# Patient Record
Sex: FEMALE | Race: WHITE | NOT HISPANIC OR LATINO | Employment: OTHER | URBAN - METROPOLITAN AREA
[De-identification: names, ages, dates, MRNs, and addresses within clinical notes are randomized per-mention and may not be internally consistent; named-entity substitution may affect disease eponyms.]

---

## 2018-01-17 ENCOUNTER — HISTORICAL (OUTPATIENT)
Dept: LAB | Facility: HOSPITAL | Age: 78
End: 2018-01-17

## 2018-01-17 LAB — LIPASE SERPL-CCNC: 256 UNIT/L (ref 73–393)

## 2018-02-07 ENCOUNTER — HISTORICAL (OUTPATIENT)
Dept: LAB | Facility: HOSPITAL | Age: 78
End: 2018-02-07

## 2018-02-26 ENCOUNTER — HISTORICAL (OUTPATIENT)
Dept: CARDIOLOGY | Facility: HOSPITAL | Age: 78
End: 2018-02-26

## 2019-02-28 ENCOUNTER — HISTORICAL (OUTPATIENT)
Dept: LAB | Facility: HOSPITAL | Age: 79
End: 2019-02-28

## 2019-03-02 LAB — FINAL CULTURE: NO GROWTH

## 2019-05-02 ENCOUNTER — HISTORICAL (OUTPATIENT)
Dept: LAB | Facility: HOSPITAL | Age: 79
End: 2019-05-02

## 2019-05-04 LAB — FINAL CULTURE: NORMAL

## 2019-08-27 ENCOUNTER — HISTORICAL (OUTPATIENT)
Dept: LAB | Facility: HOSPITAL | Age: 79
End: 2019-08-27

## 2019-08-27 ENCOUNTER — HISTORICAL (OUTPATIENT)
Dept: ADMINISTRATIVE | Facility: HOSPITAL | Age: 79
End: 2019-08-27

## 2019-08-27 LAB
ABS NEUT (OLG): 4.8 X10(3)/MCL (ref 2.1–9.2)
ALBUMIN SERPL-MCNC: 3.8 GM/DL (ref 3.4–5)
ALBUMIN SERPL-MCNC: 4.2 GM/DL (ref 3.4–5)
ALBUMIN/GLOB SERPL: 1.75 {RATIO} (ref 1.5–2.5)
ALP SERPL-CCNC: 43 UNIT/L (ref 38–126)
ALT SERPL-CCNC: 15 UNIT/L (ref 7–52)
APPEARANCE, UA: CLEAR
AST SERPL-CCNC: 20 UNIT/L (ref 15–37)
BACTERIA #/AREA URNS AUTO: ABNORMAL /HPF
BILIRUB SERPL-MCNC: 0.6 MG/DL (ref 0.2–1)
BILIRUB UR QL STRIP: NEGATIVE MG/DL
BILIRUBIN DIRECT+TOT PNL SERPL-MCNC: 0.1 MG/DL (ref 0–0.5)
BILIRUBIN DIRECT+TOT PNL SERPL-MCNC: 0.5 MG/DL
BUN SERPL-MCNC: 41 MG/DL (ref 7–18)
CALCIUM SERPL-MCNC: 9.4 MG/DL (ref 8.5–10)
CHLORIDE SERPL-SCNC: 103 MMOL/L (ref 98–107)
CHOLEST SERPL-MCNC: 142 MG/DL (ref 0–200)
CHOLEST/HDLC SERPL: 4.3 {RATIO}
CO2 SERPL-SCNC: 30 MMOL/L (ref 21–32)
COLOR UR: YELLOW
CREAT SERPL-MCNC: 1.24 MG/DL (ref 0.6–1.3)
ERYTHROCYTE [DISTWIDTH] IN BLOOD BY AUTOMATED COUNT: 12.7 % (ref 11.5–17)
EST. AVERAGE GLUCOSE BLD GHB EST-MCNC: 108 MG/DL
GLOBULIN SER-MCNC: 2.4 GM/DL (ref 1.2–3)
GLUCOSE (UA): NEGATIVE MG/DL
GLUCOSE SERPL-MCNC: 113 MG/DL (ref 74–106)
HBA1C MFR BLD: 5.4 % (ref 4.4–6.4)
HCT VFR BLD AUTO: 34.1 % (ref 37–47)
HDLC SERPL-MCNC: 33 MG/DL (ref 35–60)
HGB BLD-MCNC: 11 GM/DL (ref 12–16)
HGB UR QL STRIP: ABNORMAL UNIT/L
KETONES UR QL STRIP: NEGATIVE MG/DL
LDLC SERPL CALC-MCNC: 67 MG/DL (ref 0–129)
LEUKOCYTE ESTERASE UR QL STRIP: ABNORMAL UNIT/L
LYMPHOCYTES # BLD AUTO: 2 X10(3)/MCL (ref 0.6–3.4)
LYMPHOCYTES NFR BLD AUTO: 26.7 % (ref 13–40)
MAGNESIUM SERPL-MCNC: 2.1 MG/DL (ref 1.8–2.4)
MCH RBC QN AUTO: 30.6 PG (ref 27–31.2)
MCHC RBC AUTO-ENTMCNC: 32 GM/DL (ref 32–36)
MCV RBC AUTO: 95 FL (ref 80–94)
MONOCYTES # BLD AUTO: 0.6 X10(3)/MCL (ref 0.1–1.3)
MONOCYTES NFR BLD AUTO: 8.1 % (ref 0.1–24)
NEUTROPHILS NFR BLD AUTO: 65.2 % (ref 47–80)
NITRITE UR QL STRIP.AUTO: NEGATIVE
PH UR STRIP: 5.5 [PH]
PHOSPHATE SERPL-MCNC: 4.1 MG/DL (ref 2.5–4.9)
PLATELET # BLD AUTO: 162 X10(3)/MCL (ref 130–400)
PMV BLD AUTO: 12.2 FL (ref 9.4–12.4)
POTASSIUM SERPL-SCNC: 4.4 MMOL/L (ref 3.5–5.1)
PROT SERPL-MCNC: 6.6 GM/DL (ref 6.4–8.2)
PROT UR QL STRIP: NEGATIVE MG/DL
PTH-INTACT SERPL-MCNC: 47 PG/ML (ref 18.4–80.1)
RBC # BLD AUTO: 3.59 X10(6)/MCL (ref 4.2–5.4)
RBC #/AREA URNS HPF: ABNORMAL /HPF
SODIUM SERPL-SCNC: 141 MMOL/L (ref 136–145)
SP GR UR STRIP: <1.005
SQUAMOUS EPITHELIAL, UA: ABNORMAL /LPF
TRIGL SERPL-MCNC: 138 MG/DL (ref 30–150)
URATE SERPL-MCNC: 7.1 MG/DL (ref 2.6–7.2)
UROBILINOGEN UR STRIP-ACNC: 0.2 MG/DL
VLDLC SERPL CALC-MCNC: 27.6 MG/DL
WBC # SPEC AUTO: 7.4 X10(3)/MCL (ref 4.5–11.5)
WBC #/AREA URNS AUTO: ABNORMAL /[HPF]

## 2021-06-03 ENCOUNTER — HISTORICAL (OUTPATIENT)
Dept: ADMINISTRATIVE | Facility: HOSPITAL | Age: 81
End: 2021-06-03

## 2021-06-06 LAB — FINAL CULTURE: NORMAL

## 2021-07-15 ENCOUNTER — HISTORICAL (OUTPATIENT)
Dept: ADMINISTRATIVE | Facility: HOSPITAL | Age: 81
End: 2021-07-15

## 2021-07-18 LAB — FINAL CULTURE: NO GROWTH

## 2021-08-04 ENCOUNTER — HISTORICAL (OUTPATIENT)
Dept: ADMINISTRATIVE | Facility: HOSPITAL | Age: 81
End: 2021-08-04

## 2021-08-07 LAB — FINAL CULTURE: NO GROWTH

## 2021-08-16 LAB — FINAL CULTURE: NORMAL

## 2021-11-03 ENCOUNTER — HISTORICAL (OUTPATIENT)
Dept: ADMINISTRATIVE | Facility: HOSPITAL | Age: 81
End: 2021-11-03

## 2021-11-03 LAB
ABS NEUT (OLG): 3.9 X10(3)/MCL (ref 2.1–9.2)
ALBUMIN SERPL-MCNC: 4.2 GM/DL (ref 3.4–5)
ALBUMIN SERPL-MCNC: 4.2 GM/DL (ref 3.4–5)
ALBUMIN/GLOB SERPL: 1.75 {RATIO} (ref 1.5–2.5)
ALP SERPL-CCNC: 38 UNIT/L (ref 38–126)
ALT SERPL-CCNC: 13 UNIT/L (ref 7–52)
APPEARANCE, UA: CLEAR
AST SERPL-CCNC: 21 UNIT/L (ref 15–37)
BACTERIA #/AREA URNS AUTO: ABNORMAL /HPF
BILIRUB SERPL-MCNC: 0.4 MG/DL (ref 0.2–1)
BILIRUB UR QL STRIP: NEGATIVE MG/DL
BILIRUBIN DIRECT+TOT PNL SERPL-MCNC: 0.1 MG/DL (ref 0–0.5)
BILIRUBIN DIRECT+TOT PNL SERPL-MCNC: 0.3 MG/DL
BUN SERPL-MCNC: 49 MG/DL (ref 7–18)
BUN SERPL-MCNC: 51 MG/DL (ref 7–18)
CALCIUM SERPL-MCNC: 9.6 MG/DL (ref 8.5–10)
CALCIUM SERPL-MCNC: 9.6 MG/DL (ref 8.5–10)
CHLORIDE SERPL-SCNC: 105 MMOL/L (ref 98–107)
CHLORIDE SERPL-SCNC: 106 MMOL/L (ref 98–107)
CHOLEST SERPL-MCNC: 161 MG/DL (ref 0–200)
CHOLEST/HDLC SERPL: 4.4 {RATIO}
CO2 SERPL-SCNC: 28 MMOL/L (ref 21–32)
CO2 SERPL-SCNC: 30 MMOL/L (ref 21–32)
COLOR UR: YELLOW
CREAT SERPL-MCNC: 1.31 MG/DL (ref 0.6–1.3)
CREAT SERPL-MCNC: 1.33 MG/DL (ref 0.6–1.3)
CREAT UR-MCNC: 114.7 MG/DL (ref 45–106)
CREAT/UREA NIT SERPL: 37.4
DEPRECATED CALCIDIOL+CALCIFEROL SERPL-MC: 49.2 NG/ML (ref 30–80)
ERYTHROCYTE [DISTWIDTH] IN BLOOD BY AUTOMATED COUNT: 13 % (ref 11.5–17)
EST. AVERAGE GLUCOSE BLD GHB EST-MCNC: 117 MG/DL
GLOBULIN SER-MCNC: 2.4 GM/DL (ref 1.2–3)
GLUCOSE (UA): NEGATIVE MG/DL
GLUCOSE SERPL-MCNC: 118 MG/DL (ref 74–106)
GLUCOSE SERPL-MCNC: 120 MG/DL (ref 74–106)
HBA1C MFR BLD: 5.7 % (ref 4.4–6.4)
HCT VFR BLD AUTO: 35.2 % (ref 37–47)
HDLC SERPL-MCNC: 37 MG/DL (ref 35–60)
HGB BLD-MCNC: 11.2 GM/DL (ref 12–16)
HGB UR QL STRIP: ABNORMAL UNIT/L
KETONES UR QL STRIP: NEGATIVE MG/DL
LDLC SERPL CALC-MCNC: 65 MG/DL (ref 0–129)
LEUKOCYTE ESTERASE UR QL STRIP: ABNORMAL UNIT/L
LYMPHOCYTES # BLD AUTO: 1.9 X10(3)/MCL (ref 0.6–3.4)
LYMPHOCYTES NFR BLD AUTO: 29.1 % (ref 13–40)
MCH RBC QN AUTO: 30.3 PG (ref 27–31.2)
MCHC RBC AUTO-ENTMCNC: 32 GM/DL (ref 32–36)
MCV RBC AUTO: 95 FL (ref 80–94)
MONOCYTES # BLD AUTO: 0.6 X10(3)/MCL (ref 0.1–1.3)
MONOCYTES NFR BLD AUTO: 10.1 % (ref 0.1–24)
NEUTROPHILS NFR BLD AUTO: 60.8 % (ref 47–80)
NITRITE UR QL STRIP.AUTO: NEGATIVE
PH UR STRIP: 5.5 [PH]
PHOSPHATE SERPL-MCNC: 3.7 MG/DL (ref 2.3–4.7)
PLATELET # BLD AUTO: 192 X10(3)/MCL (ref 130–400)
PMV BLD AUTO: 11.5 FL (ref 9.4–12.4)
POTASSIUM SERPL-SCNC: 5 MMOL/L (ref 3.5–5.1)
POTASSIUM SERPL-SCNC: 5 MMOL/L (ref 3.5–5.1)
PROT SERPL-MCNC: 6.6 GM/DL (ref 6.4–8.2)
PROT UR QL STRIP: NEGATIVE MG/DL
PROT UR STRIP-MCNC: 7.3 MG/DL
PROT/CREAT UR-RTO: 63.6 MG/GM CR
PTH-INTACT SERPL-MCNC: 84 PG/ML (ref 8.7–77.1)
RBC # BLD AUTO: 3.7 X10(6)/MCL (ref 4.2–5.4)
RBC #/AREA URNS HPF: ABNORMAL /HPF
SODIUM SERPL-SCNC: 143 MMOL/L (ref 136–145)
SODIUM SERPL-SCNC: 143 MMOL/L (ref 136–145)
SP GR UR STRIP: 1.02
SQUAMOUS EPITHELIAL, UA: ABNORMAL /LPF
TRIGL SERPL-MCNC: 199 MG/DL (ref 30–150)
TSH SERPL-ACNC: 2.11 MIU/ML (ref 0.35–4.94)
UROBILINOGEN UR STRIP-ACNC: 0.2 MG/DL
VLDLC SERPL CALC-MCNC: 39.8 MG/DL
WBC # SPEC AUTO: 6.4 X10(3)/MCL (ref 4.5–11.5)
WBC #/AREA URNS AUTO: ABNORMAL /[HPF]

## 2022-03-31 ENCOUNTER — HISTORICAL (OUTPATIENT)
Dept: ADMINISTRATIVE | Facility: HOSPITAL | Age: 82
End: 2022-03-31

## 2022-03-31 LAB
ALBUMIN SERPL-MCNC: 4 G/DL (ref 3.4–4.8)
CREAT UR-MCNC: 151.8 MG/DL (ref 45–106)
HEMOLYSIS INTERF INDEX SERPL-ACNC: 4
HEMOLYSIS INTERF INDEX SERPL-ACNC: 4
ICTERIC INTERF INDEX SERPL-ACNC: 1
LIPEMIC INTERF INDEX SERPL-ACNC: 0
MAGNESIUM SERPL-MCNC: 2.1 MG/DL (ref 1.6–2.6)
PHOSPHATE SERPL-MCNC: 4.1 MG/DL (ref 2.3–4.7)
PROT UR STRIP-MCNC: 17.4 MG/DL
PROT/CREAT UR-RTO: 114.6
PTH-INTACT SERPL-MCNC: 73.5 PG/ML (ref 8.7–77.1)

## 2022-04-10 ENCOUNTER — HISTORICAL (OUTPATIENT)
Dept: ADMINISTRATIVE | Facility: HOSPITAL | Age: 82
End: 2022-04-10

## 2022-04-29 VITALS
DIASTOLIC BLOOD PRESSURE: 58 MMHG | WEIGHT: 142.19 LBS | HEIGHT: 65 IN | BODY MASS INDEX: 23.69 KG/M2 | SYSTOLIC BLOOD PRESSURE: 106 MMHG

## 2022-08-10 PROCEDURE — 83735 ASSAY OF MAGNESIUM: CPT | Performed by: FAMILY MEDICINE

## 2022-08-10 PROCEDURE — 82570 ASSAY OF URINE CREATININE: CPT | Performed by: FAMILY MEDICINE

## 2022-08-10 PROCEDURE — 84100 ASSAY OF PHOSPHORUS: CPT | Performed by: FAMILY MEDICINE

## 2022-08-10 PROCEDURE — 83970 ASSAY OF PARATHORMONE: CPT | Performed by: FAMILY MEDICINE

## 2022-08-18 PROCEDURE — 87077 CULTURE AEROBIC IDENTIFY: CPT | Performed by: FAMILY MEDICINE

## 2022-09-22 ENCOUNTER — LAB VISIT (OUTPATIENT)
Dept: LAB | Facility: HOSPITAL | Age: 82
End: 2022-09-22
Attending: INTERNAL MEDICINE
Payer: MEDICARE

## 2022-09-22 DIAGNOSIS — I25.10 CORONARY ATHEROSCLEROSIS OF NATIVE CORONARY ARTERY: Primary | ICD-10-CM

## 2022-09-22 LAB
ANION GAP SERPL CALC-SCNC: 11 MEQ/L
BUN SERPL-MCNC: 34.6 MG/DL (ref 9.8–20.1)
CALCIUM SERPL-MCNC: 9.2 MG/DL (ref 8.4–10.2)
CHLORIDE SERPL-SCNC: 110 MMOL/L (ref 98–107)
CO2 SERPL-SCNC: 22 MMOL/L (ref 23–31)
CREAT SERPL-MCNC: 1.2 MG/DL (ref 0.55–1.02)
CREAT/UREA NIT SERPL: 29
ERYTHROCYTE [DISTWIDTH] IN BLOOD BY AUTOMATED COUNT: 14 % (ref 11.5–17)
GFR SERPLBLD CREATININE-BSD FMLA CKD-EPI: 46 MLS/MIN/1.73/M2
GLUCOSE SERPL-MCNC: 106 MG/DL (ref 82–115)
HCT VFR BLD AUTO: 35.8 % (ref 37–47)
HGB BLD-MCNC: 11 GM/DL (ref 12–16)
MCH RBC QN AUTO: 30.2 PG (ref 27–31)
MCHC RBC AUTO-ENTMCNC: 30.7 MG/DL (ref 33–36)
MCV RBC AUTO: 98.4 FL (ref 80–94)
NRBC BLD AUTO-RTO: 0 %
PLATELET # BLD AUTO: 161 X10(3)/MCL (ref 130–400)
PMV BLD AUTO: 12.8 FL (ref 7.4–10.4)
POTASSIUM SERPL-SCNC: 4.1 MMOL/L (ref 3.5–5.1)
RBC # BLD AUTO: 3.64 X10(6)/MCL (ref 4.2–5.4)
SODIUM SERPL-SCNC: 143 MMOL/L (ref 136–145)
WBC # SPEC AUTO: 5.2 X10(3)/MCL (ref 4.5–11.5)

## 2022-09-22 PROCEDURE — 80048 BASIC METABOLIC PNL TOTAL CA: CPT

## 2022-09-22 PROCEDURE — 85027 COMPLETE CBC AUTOMATED: CPT

## 2022-09-22 PROCEDURE — 36415 COLL VENOUS BLD VENIPUNCTURE: CPT

## 2022-09-23 ENCOUNTER — HOSPITAL ENCOUNTER (OUTPATIENT)
Facility: HOSPITAL | Age: 82
Discharge: HOME | End: 2022-09-23
Attending: INTERNAL MEDICINE | Admitting: INTERNAL MEDICINE
Payer: MEDICARE

## 2022-09-23 VITALS
HEIGHT: 64 IN | OXYGEN SATURATION: 93 % | DIASTOLIC BLOOD PRESSURE: 75 MMHG | RESPIRATION RATE: 14 BRPM | HEART RATE: 64 BPM | SYSTOLIC BLOOD PRESSURE: 164 MMHG | WEIGHT: 139.13 LBS | BODY MASS INDEX: 23.75 KG/M2 | TEMPERATURE: 98 F

## 2022-09-23 DIAGNOSIS — R94.8 NONSPECIFIC ABNORMAL RESULTS OF BASAL METABOLISM FUNCTION STUDY: Primary | ICD-10-CM

## 2022-09-23 LAB — CATH EF QUANTITATIVE: 55 %

## 2022-09-23 PROCEDURE — 25000003 PHARM REV CODE 250: Performed by: INTERNAL MEDICINE

## 2022-09-23 PROCEDURE — 99152 MOD SED SAME PHYS/QHP 5/>YRS: CPT | Performed by: INTERNAL MEDICINE

## 2022-09-23 PROCEDURE — 25500020 PHARM REV CODE 255: Performed by: INTERNAL MEDICINE

## 2022-09-23 PROCEDURE — 27201423 OPTIME MED/SURG SUP & DEVICES STERILE SUPPLY: Performed by: INTERNAL MEDICINE

## 2022-09-23 PROCEDURE — C1894 INTRO/SHEATH, NON-LASER: HCPCS | Performed by: INTERNAL MEDICINE

## 2022-09-23 PROCEDURE — 93459 L HRT ART/GRFT ANGIO: CPT | Performed by: INTERNAL MEDICINE

## 2022-09-23 PROCEDURE — 63600175 PHARM REV CODE 636 W HCPCS: Performed by: INTERNAL MEDICINE

## 2022-09-23 PROCEDURE — 99153 MOD SED SAME PHYS/QHP EA: CPT | Performed by: INTERNAL MEDICINE

## 2022-09-23 PROCEDURE — C1769 GUIDE WIRE: HCPCS | Performed by: INTERNAL MEDICINE

## 2022-09-23 RX ORDER — HEPARIN SODIUM 1000 [USP'U]/ML
INJECTION, SOLUTION INTRAVENOUS; SUBCUTANEOUS
Status: DISCONTINUED | OUTPATIENT
Start: 2022-09-23 | End: 2022-09-23 | Stop reason: HOSPADM

## 2022-09-23 RX ORDER — LIDOCAINE HYDROCHLORIDE 20 MG/ML
INJECTION, SOLUTION EPIDURAL; INFILTRATION; INTRACAUDAL; PERINEURAL
Status: DISCONTINUED | OUTPATIENT
Start: 2022-09-23 | End: 2022-09-23 | Stop reason: HOSPADM

## 2022-09-23 RX ORDER — NITROGLYCERIN 20 MG/100ML
INJECTION INTRAVENOUS
Status: DISCONTINUED | OUTPATIENT
Start: 2022-09-23 | End: 2022-09-23 | Stop reason: HOSPADM

## 2022-09-23 RX ORDER — ROSUVASTATIN CALCIUM 20 MG/1
20 TABLET, COATED ORAL NIGHTLY
Status: ON HOLD | COMMUNITY
Start: 2022-08-14 | End: 2022-11-03 | Stop reason: SDUPTHER

## 2022-09-23 RX ORDER — SERTRALINE HYDROCHLORIDE 50 MG/1
50 TABLET, FILM COATED ORAL DAILY
Status: ON HOLD | COMMUNITY
Start: 2022-06-27 | End: 2022-11-03 | Stop reason: SDUPTHER

## 2022-09-23 RX ORDER — SODIUM CHLORIDE 0.9 % (FLUSH) 0.9 %
10 SYRINGE (ML) INJECTION
Status: ACTIVE | OUTPATIENT
Start: 2022-09-23

## 2022-09-23 RX ORDER — APIXABAN 5 MG/1
5 TABLET, FILM COATED ORAL 2 TIMES DAILY
Status: ON HOLD | COMMUNITY
Start: 2022-09-06 | End: 2022-11-03 | Stop reason: SDUPTHER

## 2022-09-23 RX ORDER — ESTRADIOL 0.1 MG/G
CREAM VAGINAL
COMMUNITY
Start: 2021-09-29 | End: 2022-10-10 | Stop reason: CLARIF

## 2022-09-23 RX ORDER — AZELASTINE 1 MG/ML
SPRAY, METERED NASAL
COMMUNITY
Start: 2021-09-29 | End: 2023-01-27

## 2022-09-23 RX ORDER — UBIDECARENONE 30 MG
200 CAPSULE ORAL DAILY
COMMUNITY

## 2022-09-23 RX ORDER — DIAZEPAM 5 MG/1
10 TABLET ORAL
Status: DISPENSED | OUTPATIENT
Start: 2022-09-23

## 2022-09-23 RX ORDER — SODIUM CHLORIDE 9 MG/ML
INJECTION, SOLUTION INTRAVENOUS ONCE
Status: ACTIVE | OUTPATIENT
Start: 2022-09-23

## 2022-09-23 RX ORDER — TERAZOSIN 2 MG/1
2 CAPSULE ORAL DAILY
Status: ON HOLD | COMMUNITY
Start: 2022-08-14 | End: 2022-11-03 | Stop reason: HOSPADM

## 2022-09-23 RX ORDER — MIDAZOLAM HYDROCHLORIDE 1 MG/ML
INJECTION, SOLUTION INTRAMUSCULAR; INTRAVENOUS
Status: DISCONTINUED | OUTPATIENT
Start: 2022-09-23 | End: 2022-09-23 | Stop reason: HOSPADM

## 2022-09-23 RX ORDER — FENTANYL CITRATE 50 UG/ML
INJECTION, SOLUTION INTRAMUSCULAR; INTRAVENOUS
Status: DISCONTINUED | OUTPATIENT
Start: 2022-09-23 | End: 2022-09-23 | Stop reason: HOSPADM

## 2022-09-23 RX ORDER — DIPHENHYDRAMINE HCL 25 MG
50 CAPSULE ORAL
Status: DISPENSED | OUTPATIENT
Start: 2022-09-23

## 2022-09-23 RX ORDER — AMLODIPINE BESYLATE 10 MG/1
5 TABLET ORAL DAILY
Status: ON HOLD | COMMUNITY
Start: 2022-07-30 | End: 2022-11-03 | Stop reason: HOSPADM

## 2022-09-23 RX ORDER — ACETAMINOPHEN 325 MG/1
650 TABLET ORAL EVERY 4 HOURS PRN
Status: DISCONTINUED | OUTPATIENT
Start: 2022-09-23 | End: 2022-09-23 | Stop reason: HOSPADM

## 2022-09-23 RX ORDER — VERAPAMIL HYDROCHLORIDE 2.5 MG/ML
INJECTION, SOLUTION INTRAVENOUS
Status: DISCONTINUED | OUTPATIENT
Start: 2022-09-23 | End: 2022-09-23 | Stop reason: HOSPADM

## 2022-09-23 RX ORDER — SODIUM CHLORIDE 9 MG/ML
INJECTION, SOLUTION INTRAVENOUS CONTINUOUS
Status: DISCONTINUED | OUTPATIENT
Start: 2022-09-23 | End: 2022-09-23 | Stop reason: HOSPADM

## 2022-09-23 RX ORDER — ONDANSETRON 4 MG/1
8 TABLET, ORALLY DISINTEGRATING ORAL EVERY 8 HOURS PRN
Status: DISCONTINUED | OUTPATIENT
Start: 2022-09-23 | End: 2022-09-23 | Stop reason: HOSPADM

## 2022-09-23 RX ADMIN — DIAZEPAM 10 MG: 5 TABLET ORAL at 08:09

## 2022-09-23 RX ADMIN — DIPHENHYDRAMINE HYDROCHLORIDE 50 MG: 25 CAPSULE ORAL at 08:09

## 2022-09-23 NOTE — Clinical Note
The catheter was inserted into the, was removed from the and was inserted over the wire into the left ventricle. Hemodynamics were performed.  and Pullback was recorded.  An angiography was performed of the LV. The angiography was performed via power injection.  pig

## 2022-09-23 NOTE — CONSULTS
[facility]                       [address] [address]    PATIENT NAME:       GEORGE LANIER  YOB: 1940  CSN:                [id number]   MRN:                [id number]  ADMIT DATE:         09/23/2022 06:51:00  PHYSICIAN:          Bety Thompson MD                            CONSULTATION    DATE OF CONSULT:  09/23/2022 00:00:00    REASON FOR CONSULT:  Evaluate for severe triple-vessel coronary artery disease.    HISTORY OF PRESENT ILLNESS:  The patient is an 82-year-old female with a history   of hypertension, hyperlipidemia, and nonobstructive coronary artery disease.    Apparently had been having a lot of episodes of dizziness and palpitations.    Evaluation by Dr. Mitchell with a PET scan revealed evidence of significant   ischemia.  She underwent cardiac cath today revealing evidence of severe   triple-vessel disease.  I was consulted for possible intervention.  She does   report some palpitations.    PAST MEDICAL HISTORY:  Positive for coronary carotid artery disease bilateral,   history of pacemaker, sick sinus syndrome, bradycardia, dizziness, presyncope,   hypertension, obstructive sleep apnea, hyperlipidemia, history of carotid   endarterectomy.    PAST SURGICAL HISTORY:  Appendectomy, bladder surgery, hysterectomy, removal of   lipoma, pacemaker.    FAMILY HISTORY:  Positive for coronary artery disease and cerebrovascular   disease.    SOCIAL HISTORY:  Former smoker, quit in 1967.    MEDICATIONS:  Per MAR.    ALLERGIES:  PENICILLIN AND SULFA DRUGS.     REVIEW OF SYSTEMS:  Essentially very positive for dizziness, presyncope, palpitations.    PHYSICAL EXAMINATION:  GENERAL:  She is in no acute distress.  VITAL SIGNS:  Stable.  HEENT:  Trachea midline.  No carotid bruits.  Eyes EOMI, PERRLA.  LUNGS:  Good air entry bilaterally.  HEART:  Normal S1, S2.    ABDOMEN:  Soft.  EXTREMITIES:   Warm.  PSYCHIATRIC:  Affect appropriate.  NEUROLOGIC:  2+ motor power.  MUSCULOSKELETAL:  No gross deformity.    ASSESSMENT AND PLAN:  Cardiac cath was reviewed revealing evidence of severe   triple-vessel disease.  The patient will definitely benefit from coronary artery   bypass grafting with targets in the left anterior descending, circumflex and in   the right coronary artery.  She understands the risk and benefits of procedure,   including risk of bleeding, infection, myocardial infarction, stroke, renal   failure and death.  She elected to proceed.  She will need to come off her   Eliquis prior to this procedure.  Will plan surgery soon.    I would like to thank Dr. Mitchell for allowing us to participate in the care of   this patient.        ______________________________  MD KEE Martins/ABHIJEET  DD:  09/23/2022  Time:  01:28PM  DT:  09/23/2022  Time:  02:24PM  Job #:  [id number]      CONSULTATION

## 2022-09-23 NOTE — Clinical Note
The groin and right radial was prepped. The site was prepped with ChloraPrep. The patient was draped. The patient was positioned supine.

## 2022-09-23 NOTE — H&P (VIEW-ONLY)
[facility]                       [address] [address]    PATIENT NAME:       GEORGE LANIER  YOB: 1940  CSN:                [id number]   MRN:                [id number]  ADMIT DATE:         09/23/2022 06:51:00  PHYSICIAN:          Bety Thompson MD                            CONSULTATION    DATE OF CONSULT:  09/23/2022 00:00:00    REASON FOR CONSULT:  Evaluate for severe triple-vessel coronary artery disease.    HISTORY OF PRESENT ILLNESS:  The patient is an 82-year-old female with a history   of hypertension, hyperlipidemia, and nonobstructive coronary artery disease.    Apparently had been having a lot of episodes of dizziness and palpitations.    Evaluation by Dr. Mitchell with a PET scan revealed evidence of significant   ischemia.  She underwent cardiac cath today revealing evidence of severe   triple-vessel disease.  I was consulted for possible intervention.  She does   report some palpitations.    PAST MEDICAL HISTORY:  Positive for coronary carotid artery disease bilateral,   history of pacemaker, sick sinus syndrome, bradycardia, dizziness, presyncope,   hypertension, obstructive sleep apnea, hyperlipidemia, history of carotid   endarterectomy.    PAST SURGICAL HISTORY:  Appendectomy, bladder surgery, hysterectomy, removal of   lipoma, pacemaker.    FAMILY HISTORY:  Positive for coronary artery disease and cerebrovascular   disease.    SOCIAL HISTORY:  Former smoker, quit in 1967.    MEDICATIONS:  Per MAR.    ALLERGIES:  PENICILLIN AND SULFA DRUGS.     REVIEW OF SYSTEMS:  Essentially very positive for dizziness, presyncope, palpitations.    PHYSICAL EXAMINATION:  GENERAL:  She is in no acute distress.  VITAL SIGNS:  Stable.  HEENT:  Trachea midline.  No carotid bruits.  Eyes EOMI, PERRLA.  LUNGS:  Good air entry bilaterally.  HEART:  Normal S1, S2.    ABDOMEN:  Soft.  EXTREMITIES:   Warm.  PSYCHIATRIC:  Affect appropriate.  NEUROLOGIC:  2+ motor power.  MUSCULOSKELETAL:  No gross deformity.    ASSESSMENT AND PLAN:  Cardiac cath was reviewed revealing evidence of severe   triple-vessel disease.  The patient will definitely benefit from coronary artery   bypass grafting with targets in the left anterior descending, circumflex and in   the right coronary artery.  She understands the risk and benefits of procedure,   including risk of bleeding, infection, myocardial infarction, stroke, renal   failure and death.  She elected to proceed.  She will need to come off her   Eliquis prior to this procedure.  Will plan surgery soon.    I would like to thank Dr. Mitchell for allowing us to participate in the care of   this patient.        ______________________________  MD KEE Martins/ABHIJEET  DD:  09/23/2022  Time:  01:28PM  DT:  09/23/2022  Time:  02:24PM  Job #:  [id number]      CONSULTATION

## 2022-09-23 NOTE — H&P
Patient name: Jessica Elias  MRN: [id number]  : 1940  Cath Lab Procedure H&P Update    Pre-Procedure Assessment:    I saw and examined the patient face to face. The patient has been re-evaluated and her condition is unchanged. The reason for admission, procedure and care is still present.  Based on the patients H&P, pre-procedure physical exam, relevant diagnostic studies, NPO status and information obtained from the patient, I determine the patient is an appropriate candidate for the proposed procedure and anesthesia planned. I further certify the anesthesia risks, benefits and options have been explained to the patient to which she agrees as documented on the procedural consent.    See scanned updated H&P and additional records from media tab.      Abner Mitchell

## 2022-09-23 NOTE — Clinical Note
The catheter was removed from the and was repositioned into the left subclavian artery. An angiography was performed of the LIMA. The angiography was performed via power injection.  The catheter was unable to engage the area..

## 2022-09-23 NOTE — Clinical Note
The catheter was inserted into the and was inserted over the wire into the ostium   left main. Hemodynamics were performed.  An angiography was performed of the left coronary arteries. Multiple views were taken. The angiography was performed via power injection.

## 2022-09-23 NOTE — Clinical Note
The catheter was inserted into the, was removed from the and was inserted over the wire into the ostium   right coronary artery. An angiography was performed of the right coronary arteries. Multiple views were taken. The angiography was performed via power injection.

## 2022-09-24 ENCOUNTER — PATIENT MESSAGE (OUTPATIENT)
Dept: ADMINISTRATIVE | Facility: OTHER | Age: 82
End: 2022-09-24
Payer: MEDICARE

## 2022-09-26 ENCOUNTER — TELEPHONE (OUTPATIENT)
Dept: CARDIAC SURGERY | Facility: CLINIC | Age: 82
End: 2022-09-26
Payer: MEDICARE

## 2022-09-26 DIAGNOSIS — Z79.1 ENCOUNTER FOR MONITORING NSAID THERAPY: ICD-10-CM

## 2022-09-26 DIAGNOSIS — I25.10 CORONARY ARTERY DISEASE, UNSPECIFIED VESSEL OR LESION TYPE, UNSPECIFIED WHETHER ANGINA PRESENT, UNSPECIFIED WHETHER NATIVE OR TRANSPLANTED HEART: Primary | ICD-10-CM

## 2022-09-26 DIAGNOSIS — Z51.81 ENCOUNTER FOR MONITORING NSAID THERAPY: ICD-10-CM

## 2022-09-26 RX ORDER — MUPIROCIN 20 MG/G
OINTMENT TOPICAL
Status: CANCELLED | OUTPATIENT
Start: 2022-09-26 | End: 2022-09-26

## 2022-09-26 RX ORDER — HYDROCODONE BITARTRATE AND ACETAMINOPHEN 500; 5 MG/1; MG/1
TABLET ORAL
Status: CANCELLED | OUTPATIENT
Start: 2022-09-26

## 2022-09-26 NOTE — TELEPHONE ENCOUNTER
Pt stated she was calling her insurance to make sure she gets approved for surgery, thought she may could move it up but informed pt MD will be our of town and date scheduled was 1st available.  Verbalized understanding.   ----- Message from Higinio Garcia sent at 2022  9:11 AM CDT -----  Regarding: Pt needs a call back  Contact: Pt Called  Dr. Thompson's pt called and had a question about her insurance for her upcoming surgery. Please give a call back whenever you get a chance.    : 44  Phone: [phone]

## 2022-10-07 ENCOUNTER — PATIENT MESSAGE (OUTPATIENT)
Dept: ADMINISTRATIVE | Facility: OTHER | Age: 82
End: 2022-10-07
Payer: MEDICARE

## 2022-10-07 ENCOUNTER — ANESTHESIA EVENT (OUTPATIENT)
Dept: SURGERY | Facility: HOSPITAL | Age: 82
DRG: 236 | End: 2022-10-07
Payer: MEDICARE

## 2022-10-07 ENCOUNTER — HOSPITAL ENCOUNTER (OUTPATIENT)
Dept: RADIOLOGY | Facility: HOSPITAL | Age: 82
Discharge: HOME | End: 2022-10-07
Attending: THORACIC SURGERY (CARDIOTHORACIC VASCULAR SURGERY)
Payer: MEDICARE

## 2022-10-07 DIAGNOSIS — I25.10 CORONARY ARTERY DISEASE, UNSPECIFIED VESSEL OR LESION TYPE, UNSPECIFIED WHETHER ANGINA PRESENT, UNSPECIFIED WHETHER NATIVE OR TRANSPLANTED HEART: ICD-10-CM

## 2022-10-07 PROCEDURE — 71046 X-RAY EXAM CHEST 2 VIEWS: CPT | Mod: TC

## 2022-10-07 NOTE — DISCHARGE SUMMARY
[facility] - Cath Lab Services  Discharge Note  Short Stay    Procedure(s) (LRB):  CATHETERIZATION, HEART, LEFT (Left)      OUTCOME: Patient tolerated treatment/procedure well without complication and is now ready for discharge.    DISPOSITION: Home or Self Care    FINAL DIAGNOSIS:  CAD    FOLLOWUP: In clinic    DISCHARGE INSTRUCTIONS:    Discharge Procedure Orders   Diet Cardiac         Clinical Reference Documents Added to Patient Instructions         Document    CARDIAC CATHETERIZATION DISCHARGE INSTRUCTIONS (ENGLISH)    MODERATE SEDATION IN ADULTS DISCHARGE INSTRUCTIONS (ENGLISH)            TIME SPENT ON DISCHARGE: minutes

## 2022-10-09 ENCOUNTER — PATIENT MESSAGE (OUTPATIENT)
Dept: ADMINISTRATIVE | Facility: OTHER | Age: 82
End: 2022-10-09
Payer: MEDICARE

## 2022-10-11 ENCOUNTER — HOSPITAL ENCOUNTER (INPATIENT)
Facility: HOSPITAL | Age: 82
LOS: 14 days | Discharge: NURSING, REHAB OR LONG-TERM CARE | DRG: 236 | End: 2022-10-25
Attending: THORACIC SURGERY (CARDIOTHORACIC VASCULAR SURGERY) | Admitting: THORACIC SURGERY (CARDIOTHORACIC VASCULAR SURGERY)
Payer: MEDICARE

## 2022-10-11 ENCOUNTER — ANESTHESIA (OUTPATIENT)
Dept: SURGERY | Facility: HOSPITAL | Age: 82
DRG: 236 | End: 2022-10-11
Payer: MEDICARE

## 2022-10-11 DIAGNOSIS — R60.9 SWELLING: ICD-10-CM

## 2022-10-11 DIAGNOSIS — I25.10 CORONARY ARTERY DISEASE: ICD-10-CM

## 2022-10-11 DIAGNOSIS — I48.91 A-FIB: ICD-10-CM

## 2022-10-11 DIAGNOSIS — I25.10 CAD (CORONARY ARTERY DISEASE): ICD-10-CM

## 2022-10-11 DIAGNOSIS — I25.10 CORONARY ARTERY DISEASE, UNSPECIFIED VESSEL OR LESION TYPE, UNSPECIFIED WHETHER ANGINA PRESENT, UNSPECIFIED WHETHER NATIVE OR TRANSPLANTED HEART: ICD-10-CM

## 2022-10-11 DIAGNOSIS — E11.9 TYPE 2 DIABETES MELLITUS WITHOUT COMPLICATION, WITHOUT LONG-TERM CURRENT USE OF INSULIN: Primary | ICD-10-CM

## 2022-10-11 LAB
ANION GAP SERPL CALC-SCNC: 4 MEQ/L
ANION GAP SERPL CALC-SCNC: 5 MEQ/L
APTT PPP: 30.4 SECONDS (ref 23.2–33.7)
BASE EXCESS ARTERIAL: 0.3 MMOL/L (ref -2–2)
BASE EXCESS ARTERIAL: 3 MMOL/L (ref -2–2)
BASOPHILS # BLD AUTO: 0.02 X10(3)/MCL (ref 0–0.2)
BASOPHILS # BLD AUTO: 0.05 X10(3)/MCL (ref 0–0.2)
BASOPHILS NFR BLD AUTO: 0.2 %
BASOPHILS NFR BLD AUTO: 0.4 %
BSA FOR ECHO PROCEDURE: 1.68 M2
BUN SERPL-MCNC: 22.1 MG/DL (ref 9.8–20.1)
BUN SERPL-MCNC: 27.5 MG/DL (ref 9.8–20.1)
CALCIUM SERPL-MCNC: 10.9 MG/DL (ref 8.4–10.2)
CALCIUM SERPL-MCNC: 8.2 MG/DL (ref 8.4–10.2)
CHLORIDE SERPL-SCNC: 114 MMOL/L (ref 98–107)
CHLORIDE SERPL-SCNC: 115 MMOL/L (ref 98–107)
CO2 SERPL-SCNC: 24 MMOL/L (ref 23–31)
CO2 SERPL-SCNC: 24 MMOL/L (ref 23–31)
CREAT SERPL-MCNC: 0.75 MG/DL (ref 0.55–1.02)
CREAT SERPL-MCNC: 0.79 MG/DL (ref 0.55–1.02)
CREAT/UREA NIT SERPL: 28
CREAT/UREA NIT SERPL: 37
EOSINOPHIL # BLD AUTO: 0.01 X10(3)/MCL (ref 0–0.9)
EOSINOPHIL # BLD AUTO: 0.24 X10(3)/MCL (ref 0–0.9)
EOSINOPHIL NFR BLD AUTO: 0.1 %
EOSINOPHIL NFR BLD AUTO: 1.8 %
ERYTHROCYTE [DISTWIDTH] IN BLOOD BY AUTOMATED COUNT: 14.1 % (ref 11.5–17)
ERYTHROCYTE [DISTWIDTH] IN BLOOD BY AUTOMATED COUNT: 14.6 % (ref 11.5–17)
GFR SERPLBLD CREATININE-BSD FMLA CKD-EPI: >60 MLS/MIN/1.73/M2
GFR SERPLBLD CREATININE-BSD FMLA CKD-EPI: >60 MLS/MIN/1.73/M2
GLUCOSE SERPL-MCNC: 156 MG/DL (ref 82–115)
GLUCOSE SERPL-MCNC: 163 MG/DL (ref 82–115)
GROUP & RH: NORMAL
HCO3 ARTERIAL: 22.9 MMOL/L (ref 18–23)
HCO3 ARTERIAL: 28.5 MMOL/L (ref 18–23)
HCT VFR BLD AUTO: 31.9 % (ref 37–47)
HCT VFR BLD AUTO: 33.5 % (ref 37–47)
HGB BLD-MCNC: 10.1 GM/DL (ref 12–16)
HGB BLD-MCNC: 10.8 GM/DL (ref 12–16)
HGB BLD-MCNC: 9.6 G/DL
HGB BLD-MCNC: 99 G/DL
IMM GRANULOCYTES # BLD AUTO: 0.04 X10(3)/MCL (ref 0–0.04)
IMM GRANULOCYTES # BLD AUTO: 0.05 X10(3)/MCL (ref 0–0.04)
IMM GRANULOCYTES NFR BLD AUTO: 0.4 %
IMM GRANULOCYTES NFR BLD AUTO: 0.4 %
INDIRECT COOMBS GEL: NORMAL
INR BLD: 1.55 (ref 0–1.3)
LYMPHOCYTES # BLD AUTO: 0.66 X10(3)/MCL (ref 0.6–4.6)
LYMPHOCYTES # BLD AUTO: 3.94 X10(3)/MCL (ref 0.6–4.6)
LYMPHOCYTES NFR BLD AUTO: 29.9 %
LYMPHOCYTES NFR BLD AUTO: 7.1 %
MAGNESIUM SERPL-MCNC: 3.2 MG/DL (ref 1.6–2.6)
MCH RBC QN AUTO: 30.4 PG (ref 27–31)
MCH RBC QN AUTO: 30.5 PG (ref 27–31)
MCHC RBC AUTO-ENTMCNC: 31.7 MG/DL (ref 33–36)
MCHC RBC AUTO-ENTMCNC: 32.2 MG/DL (ref 33–36)
MCV RBC AUTO: 94.4 FL (ref 80–94)
MCV RBC AUTO: 96.4 FL (ref 80–94)
MONOCYTES # BLD AUTO: 0.37 X10(3)/MCL (ref 0.1–1.3)
MONOCYTES # BLD AUTO: 0.67 X10(3)/MCL (ref 0.1–1.3)
MONOCYTES NFR BLD AUTO: 2.8 %
MONOCYTES NFR BLD AUTO: 7.2 %
NEUTROPHILS # BLD AUTO: 7.9 X10(3)/MCL (ref 2.1–9.2)
NEUTROPHILS # BLD AUTO: 8.5 X10(3)/MCL (ref 2.1–9.2)
NEUTROPHILS NFR BLD AUTO: 64.7 %
NEUTROPHILS NFR BLD AUTO: 85 %
NRBC BLD AUTO-RTO: 0 %
NRBC BLD AUTO-RTO: 0 %
PCO2 BLDA: 30 MM[HG]
PCO2 BLDA: 46 MMHG
PCO2 BLDA: 47 MM[HG]
PCO2 BLDA: NORMAL MM[HG]
PH SMN: 7.37 [PH]
PH SMN: 7.39 [PH]
PH SMN: 7.49 [PH]
PHOSPHATE SERPL-MCNC: 1.8 MG/DL (ref 2.3–4.7)
PHOSPHATE SERPL-MCNC: 2.4 MG/DL (ref 2.3–4.7)
PLATELET # BLD AUTO: 81 X10(3)/MCL (ref 130–400)
PLATELET # BLD AUTO: 87 X10(3)/MCL (ref 130–400)
PMV BLD AUTO: 12.4 FL (ref 7.4–10.4)
PMV BLD AUTO: 12.6 FL (ref 7.4–10.4)
PO2 BLDA: 108 MM[HG]
PO2 BLDA: 77 MM[HG]
PO2 BLDA: 92 MMHG
POC BASE DEFICIT: 0.9 MMOL/L
POC COHB: 0.7
POC COHB: NORMAL
POC HCO3: 26.6 MMOL/L
POC IONIZED CALCIUM: 0.3
POC IONIZED CALCIUM: 1.18
POC IONIZED CALCIUM: 1.22 MMOL/L
POC METHB: 0.6
POC METHB: NORMAL
POC O2HB: 94.2
POC O2HB: NORMAL
POC SATURATED O2: 97 %
POC TCO2: 29.9
POC TEMPERATURE: 37 C
POCT GLUCOSE: 100 MG/DL (ref 70–110)
POCT GLUCOSE: 113 MG/DL (ref 70–110)
POCT GLUCOSE: 115 MG/DL (ref 70–110)
POCT GLUCOSE: 115 MG/DL (ref 70–110)
POCT GLUCOSE: 124 MG/DL (ref 70–110)
POCT GLUCOSE: 125 MG/DL (ref 70–110)
POCT GLUCOSE: 125 MG/DL (ref 70–110)
POCT GLUCOSE: 132 MG/DL (ref 70–110)
POCT GLUCOSE: 134 MG/DL (ref 70–110)
POCT GLUCOSE: 136 MG/DL (ref 70–110)
POCT GLUCOSE: 140 MG/DL (ref 70–110)
POCT GLUCOSE: 143 MG/DL (ref 70–110)
POCT GLUCOSE: 147 MG/DL (ref 70–110)
POCT GLUCOSE: 154 MG/DL (ref 70–110)
POTASSIUM BLD-SCNC: 23.8 MMOL/L
POTASSIUM BLD-SCNC: 3.1 MMOL/L
POTASSIUM BLD-SCNC: 3.8 MMOL/L
POTASSIUM SERPL-SCNC: 2.7 MMOL/L (ref 3.5–5.1)
POTASSIUM SERPL-SCNC: 3.9 MMOL/L (ref 3.5–5.1)
PROTHROMBIN TIME: 18.3 SECONDS (ref 12.5–14.5)
RBC # BLD AUTO: 3.31 X10(6)/MCL (ref 4.2–5.4)
RBC # BLD AUTO: 3.55 X10(6)/MCL (ref 4.2–5.4)
SATURATED O2 ARTERIAL, I-STAT: 95.1
SATURATED O2 ARTERIAL, I-STAT: 99
SODIUM BLD-SCNC: 141 MMOL/L
SODIUM BLD-SCNC: 142 MMOL/L
SODIUM BLD-SCNC: 22.9 MMOL/L
SODIUM SERPL-SCNC: 142 MMOL/L (ref 136–145)
SODIUM SERPL-SCNC: 144 MMOL/L (ref 136–145)
SPECIMEN SOURCE: ABNORMAL
WBC # SPEC AUTO: 13.2 X10(3)/MCL (ref 4.5–11.5)
WBC # SPEC AUTO: 9.3 X10(3)/MCL (ref 4.5–11.5)

## 2022-10-11 PROCEDURE — 33519 PR CABG, ARTERY-VEIN, THREE: ICD-10-PCS | Mod: AS,,, | Performed by: PHYSICIAN ASSISTANT

## 2022-10-11 PROCEDURE — 63600175 PHARM REV CODE 636 W HCPCS: Performed by: THORACIC SURGERY (CARDIOTHORACIC VASCULAR SURGERY)

## 2022-10-11 PROCEDURE — 20000000 HC ICU ROOM

## 2022-10-11 PROCEDURE — 84100 ASSAY OF PHOSPHORUS: CPT | Performed by: THORACIC SURGERY (CARDIOTHORACIC VASCULAR SURGERY)

## 2022-10-11 PROCEDURE — 99900031 HC PATIENT EDUCATION (STAT)

## 2022-10-11 PROCEDURE — 27000221 HC OXYGEN, UP TO 24 HOURS

## 2022-10-11 PROCEDURE — 33519 CABG ARTERY-VEIN THREE: CPT | Mod: AS,,, | Performed by: PHYSICIAN ASSISTANT

## 2022-10-11 PROCEDURE — 33268 EXCL LAA OPN OTH PX ANY METH: CPT | Mod: ,,, | Performed by: THORACIC SURGERY (CARDIOTHORACIC VASCULAR SURGERY)

## 2022-10-11 PROCEDURE — 85610 PROTHROMBIN TIME: CPT | Performed by: THORACIC SURGERY (CARDIOTHORACIC VASCULAR SURGERY)

## 2022-10-11 PROCEDURE — 94799 UNLISTED PULMONARY SVC/PX: CPT

## 2022-10-11 PROCEDURE — C1894 INTRO/SHEATH, NON-LASER: HCPCS | Performed by: THORACIC SURGERY (CARDIOTHORACIC VASCULAR SURGERY)

## 2022-10-11 PROCEDURE — 94761 N-INVAS EAR/PLS OXIMETRY MLT: CPT

## 2022-10-11 PROCEDURE — 37000008 HC ANESTHESIA 1ST 15 MINUTES: Performed by: THORACIC SURGERY (CARDIOTHORACIC VASCULAR SURGERY)

## 2022-10-11 PROCEDURE — A4216 STERILE WATER/SALINE, 10 ML: HCPCS

## 2022-10-11 PROCEDURE — 25000003 PHARM REV CODE 250: Performed by: THORACIC SURGERY (CARDIOTHORACIC VASCULAR SURGERY)

## 2022-10-11 PROCEDURE — 27200966 HC CLOSED SUCTION SYSTEM

## 2022-10-11 PROCEDURE — 33533 PR CABG, ARTERIAL, SINGLE: ICD-10-PCS | Mod: AS,,, | Performed by: PHYSICIAN ASSISTANT

## 2022-10-11 PROCEDURE — C1887 CATHETER, GUIDING: HCPCS | Performed by: THORACIC SURGERY (CARDIOTHORACIC VASCULAR SURGERY)

## 2022-10-11 PROCEDURE — 82803 BLOOD GASES ANY COMBINATION: CPT

## 2022-10-11 PROCEDURE — 86901 BLOOD TYPING SEROLOGIC RH(D): CPT | Performed by: THORACIC SURGERY (CARDIOTHORACIC VASCULAR SURGERY)

## 2022-10-11 PROCEDURE — C1729 CATH, DRAINAGE: HCPCS | Performed by: THORACIC SURGERY (CARDIOTHORACIC VASCULAR SURGERY)

## 2022-10-11 PROCEDURE — 25000003 PHARM REV CODE 250

## 2022-10-11 PROCEDURE — 33519 PR CABG, ARTERY-VEIN, THREE: ICD-10-PCS | Mod: ,,, | Performed by: THORACIC SURGERY (CARDIOTHORACIC VASCULAR SURGERY)

## 2022-10-11 PROCEDURE — 33268 PR EXCLUSION, LT ATRIAL APPENDAGE, W/OTHER PROCED, OPEN, ANY METHOD: ICD-10-PCS | Mod: ,,, | Performed by: THORACIC SURGERY (CARDIOTHORACIC VASCULAR SURGERY)

## 2022-10-11 PROCEDURE — 33533 CABG ARTERIAL SINGLE: CPT | Mod: ,,, | Performed by: THORACIC SURGERY (CARDIOTHORACIC VASCULAR SURGERY)

## 2022-10-11 PROCEDURE — 37000009 HC ANESTHESIA EA ADD 15 MINS: Performed by: THORACIC SURGERY (CARDIOTHORACIC VASCULAR SURGERY)

## 2022-10-11 PROCEDURE — 83735 ASSAY OF MAGNESIUM: CPT | Performed by: PHYSICIAN ASSISTANT

## 2022-10-11 PROCEDURE — 25000003 PHARM REV CODE 250: Performed by: PHYSICIAN ASSISTANT

## 2022-10-11 PROCEDURE — 85730 THROMBOPLASTIN TIME PARTIAL: CPT | Performed by: THORACIC SURGERY (CARDIOTHORACIC VASCULAR SURGERY)

## 2022-10-11 PROCEDURE — 63600175 PHARM REV CODE 636 W HCPCS

## 2022-10-11 PROCEDURE — 33268 PR EXCLUSION, LT ATRIAL APPENDAGE, W/OTHER PROCED, OPEN, ANY METHOD: ICD-10-PCS | Mod: AS,,, | Performed by: PHYSICIAN ASSISTANT

## 2022-10-11 PROCEDURE — 36620 INSERTION CATHETER ARTERY: CPT

## 2022-10-11 PROCEDURE — 84100 ASSAY OF PHOSPHORUS: CPT | Performed by: PHYSICIAN ASSISTANT

## 2022-10-11 PROCEDURE — 99900026 HC AIRWAY MAINTENANCE (STAT)

## 2022-10-11 PROCEDURE — 27201423 OPTIME MED/SURG SUP & DEVICES STERILE SUPPLY: Performed by: THORACIC SURGERY (CARDIOTHORACIC VASCULAR SURGERY)

## 2022-10-11 PROCEDURE — 63600175 PHARM REV CODE 636 W HCPCS: Performed by: PHYSICIAN ASSISTANT

## 2022-10-11 PROCEDURE — 99900035 HC TECH TIME PER 15 MIN (STAT)

## 2022-10-11 PROCEDURE — C9248 INJ, CLEVIDIPINE BUTYRATE: HCPCS | Mod: JG | Performed by: THORACIC SURGERY (CARDIOTHORACIC VASCULAR SURGERY)

## 2022-10-11 PROCEDURE — 33519 CABG ARTERY-VEIN THREE: CPT | Mod: ,,, | Performed by: THORACIC SURGERY (CARDIOTHORACIC VASCULAR SURGERY)

## 2022-10-11 PROCEDURE — 99900017 HC EXTUBATION W/PARAMETERS (STAT)

## 2022-10-11 PROCEDURE — 37799 UNLISTED PX VASCULAR SURGERY: CPT

## 2022-10-11 PROCEDURE — 94002 VENT MGMT INPAT INIT DAY: CPT

## 2022-10-11 PROCEDURE — 33268 EXCL LAA OPN OTH PX ANY METH: CPT | Mod: AS,,, | Performed by: PHYSICIAN ASSISTANT

## 2022-10-11 PROCEDURE — 86923 COMPATIBILITY TEST ELECTRIC: CPT | Mod: 91 | Performed by: THORACIC SURGERY (CARDIOTHORACIC VASCULAR SURGERY)

## 2022-10-11 PROCEDURE — S5010 5% DEXTROSE AND 0.45% SALINE: HCPCS | Performed by: PHYSICIAN ASSISTANT

## 2022-10-11 PROCEDURE — 85025 COMPLETE CBC W/AUTO DIFF WBC: CPT | Performed by: THORACIC SURGERY (CARDIOTHORACIC VASCULAR SURGERY)

## 2022-10-11 PROCEDURE — 80048 BASIC METABOLIC PNL TOTAL CA: CPT | Performed by: THORACIC SURGERY (CARDIOTHORACIC VASCULAR SURGERY)

## 2022-10-11 PROCEDURE — 33508 ENDOSCOPIC VEIN HARVEST: CPT | Mod: 59,,, | Performed by: THORACIC SURGERY (CARDIOTHORACIC VASCULAR SURGERY)

## 2022-10-11 PROCEDURE — 33533 PR CABG, ARTERIAL, SINGLE: ICD-10-PCS | Mod: ,,, | Performed by: THORACIC SURGERY (CARDIOTHORACIC VASCULAR SURGERY)

## 2022-10-11 PROCEDURE — 36415 COLL VENOUS BLD VENIPUNCTURE: CPT | Performed by: THORACIC SURGERY (CARDIOTHORACIC VASCULAR SURGERY)

## 2022-10-11 PROCEDURE — 33508 PR ENDOSCOPY W/VIDEO-ASST VEIN HARVEST,CABG: ICD-10-PCS | Mod: 59,,, | Performed by: THORACIC SURGERY (CARDIOTHORACIC VASCULAR SURGERY)

## 2022-10-11 PROCEDURE — 36000713 HC OR TIME LEV V EA ADD 15 MIN: Performed by: THORACIC SURGERY (CARDIOTHORACIC VASCULAR SURGERY)

## 2022-10-11 PROCEDURE — 33533 CABG ARTERIAL SINGLE: CPT | Mod: AS,,, | Performed by: PHYSICIAN ASSISTANT

## 2022-10-11 PROCEDURE — 36000712 HC OR TIME LEV V 1ST 15 MIN: Performed by: THORACIC SURGERY (CARDIOTHORACIC VASCULAR SURGERY)

## 2022-10-11 PROCEDURE — P9016 RBC LEUKOCYTES REDUCED: HCPCS | Performed by: THORACIC SURGERY (CARDIOTHORACIC VASCULAR SURGERY)

## 2022-10-11 PROCEDURE — 27800903 OPTIME MED/SURG SUP & DEVICES OTHER IMPLANTS: Performed by: THORACIC SURGERY (CARDIOTHORACIC VASCULAR SURGERY)

## 2022-10-11 RX ORDER — VANCOMYCIN HCL IN 5 % DEXTROSE 1G/250ML
1000 PLASTIC BAG, INJECTION (ML) INTRAVENOUS ONCE
Status: DISCONTINUED | OUTPATIENT
Start: 2022-10-11 | End: 2022-10-25 | Stop reason: HOSPADM

## 2022-10-11 RX ORDER — TRANEXAMIC ACID 100 MG/ML
INJECTION, SOLUTION INTRAVENOUS
Status: DISCONTINUED | OUTPATIENT
Start: 2022-10-11 | End: 2022-10-11

## 2022-10-11 RX ORDER — FENTANYL CITRATE 50 UG/ML
INJECTION, SOLUTION INTRAMUSCULAR; INTRAVENOUS
Status: DISCONTINUED | OUTPATIENT
Start: 2022-10-11 | End: 2022-10-11

## 2022-10-11 RX ORDER — LACTULOSE 10 G/15ML
20 SOLUTION ORAL EVERY 6 HOURS PRN
Status: DISCONTINUED | OUTPATIENT
Start: 2022-10-11 | End: 2022-10-25 | Stop reason: HOSPADM

## 2022-10-11 RX ORDER — FOLIC ACID 1 MG/1
1 TABLET ORAL DAILY
Status: DISCONTINUED | OUTPATIENT
Start: 2022-10-12 | End: 2022-10-25 | Stop reason: HOSPADM

## 2022-10-11 RX ORDER — CEFAZOLIN SODIUM 2 G/50ML
2 SOLUTION INTRAVENOUS
Status: CANCELLED | OUTPATIENT
Start: 2022-10-11 | End: 2022-10-12

## 2022-10-11 RX ORDER — ACETAMINOPHEN 650 MG/20.3ML
650 LIQUID ORAL EVERY 6 HOURS PRN
Status: DISCONTINUED | OUTPATIENT
Start: 2022-10-11 | End: 2022-10-25 | Stop reason: HOSPADM

## 2022-10-11 RX ORDER — FAMOTIDINE 10 MG/ML
20 INJECTION INTRAVENOUS DAILY
Status: DISCONTINUED | OUTPATIENT
Start: 2022-10-11 | End: 2022-10-18

## 2022-10-11 RX ORDER — DEXMEDETOMIDINE HYDROCHLORIDE 4 UG/ML
0-1.4 INJECTION, SOLUTION INTRAVENOUS CONTINUOUS
Status: DISCONTINUED | OUTPATIENT
Start: 2022-10-11 | End: 2022-10-13

## 2022-10-11 RX ORDER — DOCUSATE SODIUM 100 MG/1
100 CAPSULE, LIQUID FILLED ORAL 2 TIMES DAILY
Status: DISCONTINUED | OUTPATIENT
Start: 2022-10-12 | End: 2022-10-25 | Stop reason: HOSPADM

## 2022-10-11 RX ORDER — HYDROCODONE BITARTRATE AND ACETAMINOPHEN 500; 5 MG/1; MG/1
TABLET ORAL
Status: DISCONTINUED | OUTPATIENT
Start: 2022-10-11 | End: 2022-10-25 | Stop reason: HOSPADM

## 2022-10-11 RX ORDER — HEPARIN SODIUM 1000 [USP'U]/ML
INJECTION, SOLUTION INTRAVENOUS; SUBCUTANEOUS
Status: DISCONTINUED | OUTPATIENT
Start: 2022-10-11 | End: 2022-10-11

## 2022-10-11 RX ORDER — POTASSIUM CHLORIDE 14.9 MG/ML
20 INJECTION INTRAVENOUS
Status: DISCONTINUED | OUTPATIENT
Start: 2022-10-11 | End: 2022-10-13

## 2022-10-11 RX ORDER — HEPARIN 100 UNIT/ML
SYRINGE INTRAVENOUS
Status: DISPENSED
Start: 2022-10-11 | End: 2022-10-11

## 2022-10-11 RX ORDER — MIDAZOLAM HYDROCHLORIDE 1 MG/ML
INJECTION INTRAMUSCULAR; INTRAVENOUS
Status: DISCONTINUED | OUTPATIENT
Start: 2022-10-11 | End: 2022-10-11

## 2022-10-11 RX ORDER — SODIUM CHLORIDE, SODIUM GLUCONATE, SODIUM ACETATE, POTASSIUM CHLORIDE AND MAGNESIUM CHLORIDE 30; 37; 368; 526; 502 MG/100ML; MG/100ML; MG/100ML; MG/100ML; MG/100ML
1000 INJECTION, SOLUTION INTRAVENOUS CONTINUOUS
Status: ACTIVE | OUTPATIENT
Start: 2022-10-11 | End: 2022-10-11

## 2022-10-11 RX ORDER — ROCURONIUM BROMIDE 10 MG/ML
INJECTION, SOLUTION INTRAVENOUS
Status: DISCONTINUED | OUTPATIENT
Start: 2022-10-11 | End: 2022-10-11

## 2022-10-11 RX ORDER — PHENYLEPHRINE HCL IN 0.9% NACL 1 MG/10 ML
SYRINGE (ML) INTRAVENOUS
Status: DISCONTINUED | OUTPATIENT
Start: 2022-10-11 | End: 2022-10-11

## 2022-10-11 RX ORDER — POTASSIUM CHLORIDE 29.8 MG/ML
40 INJECTION INTRAVENOUS
Status: DISCONTINUED | OUTPATIENT
Start: 2022-10-11 | End: 2022-10-13

## 2022-10-11 RX ORDER — METOCLOPRAMIDE HYDROCHLORIDE 5 MG/ML
5 INJECTION INTRAMUSCULAR; INTRAVENOUS EVERY 6 HOURS PRN
Status: DISCONTINUED | OUTPATIENT
Start: 2022-10-11 | End: 2022-10-25 | Stop reason: HOSPADM

## 2022-10-11 RX ORDER — PROPOFOL 10 MG/ML
VIAL (ML) INTRAVENOUS
Status: DISCONTINUED | OUTPATIENT
Start: 2022-10-11 | End: 2022-10-11

## 2022-10-11 RX ORDER — LIDOCAINE HYDROCHLORIDE 10 MG/ML
1 INJECTION, SOLUTION EPIDURAL; INFILTRATION; INTRACAUDAL; PERINEURAL ONCE
Status: CANCELLED | OUTPATIENT
Start: 2022-10-11 | End: 2022-10-11

## 2022-10-11 RX ORDER — LIDOCAINE HYDROCHLORIDE 20 MG/ML
INJECTION, SOLUTION EPIDURAL; INFILTRATION; INTRACAUDAL; PERINEURAL
Status: DISCONTINUED | OUTPATIENT
Start: 2022-10-11 | End: 2022-10-11

## 2022-10-11 RX ORDER — MUPIROCIN 20 MG/G
OINTMENT TOPICAL 2 TIMES DAILY
Status: COMPLETED | OUTPATIENT
Start: 2022-10-11 | End: 2022-10-15

## 2022-10-11 RX ORDER — CALCIUM GLUCONATE 20 MG/ML
3 INJECTION, SOLUTION INTRAVENOUS
Status: DISCONTINUED | OUTPATIENT
Start: 2022-10-11 | End: 2022-10-25 | Stop reason: HOSPADM

## 2022-10-11 RX ORDER — OXYCODONE HYDROCHLORIDE 5 MG/1
10 TABLET ORAL EVERY 4 HOURS PRN
Status: DISCONTINUED | OUTPATIENT
Start: 2022-10-11 | End: 2022-10-25 | Stop reason: HOSPADM

## 2022-10-11 RX ORDER — ONDANSETRON 2 MG/ML
4 INJECTION INTRAMUSCULAR; INTRAVENOUS EVERY 4 HOURS PRN
Status: DISCONTINUED | OUTPATIENT
Start: 2022-10-11 | End: 2022-10-25 | Stop reason: HOSPADM

## 2022-10-11 RX ORDER — METOPROLOL SUCCINATE 50 MG/1
50 TABLET, EXTENDED RELEASE ORAL DAILY
Status: ON HOLD | COMMUNITY
Start: 2022-09-28 | End: 2022-10-25 | Stop reason: HOSPADM

## 2022-10-11 RX ORDER — MUPIROCIN 20 MG/G
OINTMENT TOPICAL
Status: DISPENSED | OUTPATIENT
Start: 2022-10-11 | End: 2022-10-11

## 2022-10-11 RX ORDER — MAGNESIUM SULFATE HEPTAHYDRATE 40 MG/ML
2 INJECTION, SOLUTION INTRAVENOUS
Status: DISCONTINUED | OUTPATIENT
Start: 2022-10-11 | End: 2022-10-25 | Stop reason: HOSPADM

## 2022-10-11 RX ORDER — EPINEPHRINE 1 MG/ML
INJECTION, SOLUTION INTRACARDIAC; INTRAMUSCULAR; INTRAVENOUS; SUBCUTANEOUS
Status: DISCONTINUED | OUTPATIENT
Start: 2022-10-11 | End: 2022-10-11

## 2022-10-11 RX ORDER — PROTAMINE SULFATE 10 MG/ML
INJECTION, SOLUTION INTRAVENOUS
Status: DISCONTINUED | OUTPATIENT
Start: 2022-10-11 | End: 2022-10-11

## 2022-10-11 RX ORDER — POTASSIUM CHLORIDE 14.9 MG/ML
20 INJECTION INTRAVENOUS
Status: COMPLETED | OUTPATIENT
Start: 2022-10-11 | End: 2022-10-12

## 2022-10-11 RX ORDER — CALCIUM CHLORIDE INJECTION 100 MG/ML
INJECTION, SOLUTION INTRAVENOUS
Status: DISCONTINUED | OUTPATIENT
Start: 2022-10-11 | End: 2022-10-11

## 2022-10-11 RX ORDER — METOPROLOL TARTRATE 25 MG/1
12.5 TABLET ORAL 2 TIMES DAILY
Status: DISCONTINUED | OUTPATIENT
Start: 2022-10-12 | End: 2022-10-15

## 2022-10-11 RX ORDER — FAMOTIDINE 10 MG/ML
20 INJECTION INTRAVENOUS ONCE
Status: CANCELLED | OUTPATIENT
Start: 2022-10-11

## 2022-10-11 RX ORDER — ALBUMIN HUMAN 50 G/1000ML
12.5 SOLUTION INTRAVENOUS
Status: DISCONTINUED | OUTPATIENT
Start: 2022-10-11 | End: 2022-10-25 | Stop reason: HOSPADM

## 2022-10-11 RX ORDER — SUCRALFATE 1 G/1
1 TABLET ORAL
Status: DISCONTINUED | OUTPATIENT
Start: 2022-10-12 | End: 2022-10-25 | Stop reason: HOSPADM

## 2022-10-11 RX ORDER — DEXTROSE MONOHYDRATE AND SODIUM CHLORIDE 5; .45 G/100ML; G/100ML
INJECTION, SOLUTION INTRAVENOUS CONTINUOUS
Status: DISCONTINUED | OUTPATIENT
Start: 2022-10-11 | End: 2022-10-14

## 2022-10-11 RX ORDER — MAGNESIUM SULFATE HEPTAHYDRATE 40 MG/ML
4 INJECTION, SOLUTION INTRAVENOUS
Status: DISCONTINUED | OUTPATIENT
Start: 2022-10-11 | End: 2022-10-25 | Stop reason: HOSPADM

## 2022-10-11 RX ORDER — MORPHINE SULFATE 10 MG/ML
4 INJECTION INTRAMUSCULAR; INTRAVENOUS; SUBCUTANEOUS EVERY 4 HOURS PRN
Status: DISCONTINUED | OUTPATIENT
Start: 2022-10-11 | End: 2022-10-25 | Stop reason: HOSPADM

## 2022-10-11 RX ORDER — ASPIRIN 81 MG/1
81 TABLET ORAL DAILY
Status: DISCONTINUED | OUTPATIENT
Start: 2022-10-12 | End: 2022-10-25 | Stop reason: HOSPADM

## 2022-10-11 RX ORDER — PAPAVERINE HYDROCHLORIDE 30 MG/ML
INJECTION INTRAMUSCULAR; INTRAVENOUS
Status: DISCONTINUED | OUTPATIENT
Start: 2022-10-11 | End: 2022-10-11 | Stop reason: HOSPADM

## 2022-10-11 RX ORDER — HEPARIN SODIUM 5000 [USP'U]/ML
INJECTION, SOLUTION INTRAVENOUS; SUBCUTANEOUS
Status: DISCONTINUED | OUTPATIENT
Start: 2022-10-11 | End: 2022-10-11 | Stop reason: HOSPADM

## 2022-10-11 RX ORDER — HYDROCODONE BITARTRATE AND ACETAMINOPHEN 5; 325 MG/1; MG/1
1 TABLET ORAL EVERY 4 HOURS PRN
Status: DISCONTINUED | OUTPATIENT
Start: 2022-10-11 | End: 2022-10-25 | Stop reason: HOSPADM

## 2022-10-11 RX ORDER — POTASSIUM CHLORIDE 14.9 MG/ML
40 INJECTION INTRAVENOUS
Status: DISCONTINUED | OUTPATIENT
Start: 2022-10-11 | End: 2022-10-13

## 2022-10-11 RX ORDER — CALCIUM GLUCONATE 20 MG/ML
2 INJECTION, SOLUTION INTRAVENOUS
Status: DISCONTINUED | OUTPATIENT
Start: 2022-10-11 | End: 2022-10-25 | Stop reason: HOSPADM

## 2022-10-11 RX ORDER — VANCOMYCIN HCL IN 5 % DEXTROSE 1G/250ML
1000 PLASTIC BAG, INJECTION (ML) INTRAVENOUS
Status: COMPLETED | OUTPATIENT
Start: 2022-10-11 | End: 2022-10-12

## 2022-10-11 RX ORDER — LOPERAMIDE HYDROCHLORIDE 2 MG/1
2 CAPSULE ORAL CONTINUOUS PRN
Status: DISCONTINUED | OUTPATIENT
Start: 2022-10-11 | End: 2022-10-25 | Stop reason: HOSPADM

## 2022-10-11 RX ORDER — CALCIUM GLUCONATE 20 MG/ML
1 INJECTION, SOLUTION INTRAVENOUS
Status: DISCONTINUED | OUTPATIENT
Start: 2022-10-11 | End: 2022-10-25 | Stop reason: HOSPADM

## 2022-10-11 RX ADMIN — FENTANYL CITRATE 150 MCG: 50 INJECTION, SOLUTION INTRAMUSCULAR; INTRAVENOUS at 07:10

## 2022-10-11 RX ADMIN — CLEVIPIDINE 3 MG/HR: 0.5 EMULSION INTRAVENOUS at 08:10

## 2022-10-11 RX ADMIN — Medication 100 MCG: at 08:10

## 2022-10-11 RX ADMIN — DEXTROSE AND SODIUM CHLORIDE: 5; 450 INJECTION, SOLUTION INTRAVENOUS at 11:10

## 2022-10-11 RX ADMIN — DEXMEDETOMIDINE HYDROCHLORIDE 0.4 MCG/KG/HR: 100 INJECTION, SOLUTION INTRAVENOUS at 09:10

## 2022-10-11 RX ADMIN — SODIUM CHLORIDE 1 UNITS/HR: 9 INJECTION, SOLUTION INTRAVENOUS at 09:10

## 2022-10-11 RX ADMIN — FAMOTIDINE 20 MG: 10 INJECTION, SOLUTION INTRAVENOUS at 02:10

## 2022-10-11 RX ADMIN — INSULIN HUMAN 2.5 UNITS/HR: 1 INJECTION, SOLUTION INTRAVENOUS at 10:10

## 2022-10-11 RX ADMIN — ROCURONIUM BROMIDE 25 MG: 50 INJECTION INTRAVENOUS at 08:10

## 2022-10-11 RX ADMIN — EPINEPHRINE 1.24 MG: 1 INJECTION, SOLUTION INTRACARDIAC; INTRAMUSCULAR; INTRAVENOUS; SUBCUTANEOUS at 09:10

## 2022-10-11 RX ADMIN — DEXTROSE AND SODIUM CHLORIDE: 5; 450 INJECTION, SOLUTION INTRAVENOUS at 10:10

## 2022-10-11 RX ADMIN — PROPOFOL 50 MG: 10 INJECTION, EMULSION INTRAVENOUS at 07:10

## 2022-10-11 RX ADMIN — MIDAZOLAM 2 MG: 1 INJECTION INTRAMUSCULAR; INTRAVENOUS at 07:10

## 2022-10-11 RX ADMIN — OXYCODONE 5 MG: 5 TABLET ORAL at 11:10

## 2022-10-11 RX ADMIN — SODIUM CHLORIDE, SODIUM GLUCONATE, SODIUM ACETATE, POTASSIUM CHLORIDE AND MAGNESIUM CHLORIDE: 526; 502; 368; 37; 30 INJECTION, SOLUTION INTRAVENOUS at 07:10

## 2022-10-11 RX ADMIN — LIDOCAINE HYDROCHLORIDE 50 MG: 20 INJECTION, SOLUTION EPIDURAL; INFILTRATION; INTRACAUDAL; PERINEURAL at 07:10

## 2022-10-11 RX ADMIN — PROTAMINE SULFATE 250 MG: 10 INJECTION, SOLUTION INTRAVENOUS at 09:10

## 2022-10-11 RX ADMIN — SODIUM PHOSPHATE, MONOBASIC, MONOHYDRATE AND SODIUM PHOSPHATE, DIBASIC, ANHYDROUS 15 MMOL: 142; 276 INJECTION, SOLUTION INTRAVENOUS at 10:10

## 2022-10-11 RX ADMIN — CLEVIPIDINE 2 MG/HR: 0.5 EMULSION INTRAVENOUS at 12:10

## 2022-10-11 RX ADMIN — MIDAZOLAM 3 MG: 1 INJECTION INTRAMUSCULAR; INTRAVENOUS at 08:10

## 2022-10-11 RX ADMIN — FENTANYL CITRATE 100 MCG: 50 INJECTION, SOLUTION INTRAMUSCULAR; INTRAVENOUS at 10:10

## 2022-10-11 RX ADMIN — FENTANYL CITRATE 100 MCG: 50 INJECTION, SOLUTION INTRAMUSCULAR; INTRAVENOUS at 08:10

## 2022-10-11 RX ADMIN — MUPIROCIN: 20 OINTMENT TOPICAL at 02:10

## 2022-10-11 RX ADMIN — ROCURONIUM BROMIDE 50 MG: 50 INJECTION INTRAVENOUS at 07:10

## 2022-10-11 RX ADMIN — CALCIUM CHLORIDE INJECTION 250 G: 100 INJECTION, SOLUTION INTRAVENOUS at 09:10

## 2022-10-11 RX ADMIN — ROCURONIUM BROMIDE 25 MG: 50 INJECTION INTRAVENOUS at 09:10

## 2022-10-11 RX ADMIN — TRANEXAMIC ACID 700 MG: 100 INJECTION, SOLUTION INTRAVENOUS at 07:10

## 2022-10-11 RX ADMIN — POTASSIUM CHLORIDE 40 MEQ: 14.9 INJECTION, SOLUTION INTRAVENOUS at 12:10

## 2022-10-11 RX ADMIN — SUGAMMADEX 200 MG: 100 INJECTION, SOLUTION INTRAVENOUS at 10:10

## 2022-10-11 RX ADMIN — MORPHINE SULFATE 2 MG: 10 INJECTION, SOLUTION INTRAMUSCULAR; INTRAVENOUS at 02:10

## 2022-10-11 RX ADMIN — TRANEXAMIC ACID 700 MG: 100 INJECTION, SOLUTION INTRAVENOUS at 09:10

## 2022-10-11 RX ADMIN — MUPIROCIN: 20 OINTMENT TOPICAL at 08:10

## 2022-10-11 RX ADMIN — HEPARIN SODIUM 20000 UNITS: 1000 INJECTION, SOLUTION INTRAVENOUS; SUBCUTANEOUS at 08:10

## 2022-10-11 RX ADMIN — VANCOMYCIN HYDROCHLORIDE 1000 MG: 1 INJECTION, POWDER, LYOPHILIZED, FOR SOLUTION INTRAVENOUS at 06:10

## 2022-10-11 RX ADMIN — SODIUM CHLORIDE, SODIUM GLUCONATE, SODIUM ACETATE, POTASSIUM CHLORIDE AND MAGNESIUM CHLORIDE 1000 ML: 526; 502; 368; 37; 30 INJECTION, SOLUTION INTRAVENOUS at 10:10

## 2022-10-11 NOTE — TRANSFER OF CARE
Anesthesia Transfer of Care Note    Patient: Jessica Elias    Procedure(s) Performed: Procedure(s) (LRB):  CORONARY ARTERY BYPASS GRAFT (CABG) (N/A)    Patient location: ICU    Anesthesia Type: general    Transport from OR: Transported from OR intubated on 100% O2 by AMBU with adequate controlled ventilation. Upon arrival to PACU/ICU, patient attached to ventilator and auscultated to confirm bilateral breath sounds and adequate TV    Post pain: adequate analgesia    Post assessment: no apparent anesthetic complications    Post vital signs: stable    Level of consciousness: sedated    Nausea/Vomiting: no nausea/vomiting    Complications: none    Transfer of care protocol was followed      Last vitals:   115/49 BP  SpO2 100%  HR 60  T 37.1

## 2022-10-11 NOTE — ANESTHESIA PROCEDURE NOTES
Central Line    Diagnosis: venous access  Patient location during procedure: done in OR    Staffing  Authorizing Provider: Álvaro Brice DO  Performing Provider: Elma Millard    Staffing  Performed: resident/CRNA   Anesthesiologist: Álvaro Brice DO  Resident/CRNA: Elma Millard  Anesthesiologist was present at the time of the procedure.  Preanesthetic Checklist  Completed: patient identified, IV checked, site marked, risks and benefits discussed, surgical consent, monitors and equipment checked, pre-op evaluation, timeout performed and anesthesia consent given  Indication   Indication: hemodynamic monitoring, vascular access, med administration     Anesthesia   general anesthesia    Central Line   Skin Prep: skin prepped with ChloraPrep, skin prep agent completely dried prior to procedure  Sterile Barriers Followed: Yes    All five maximal barriers used- gloves, gown, cap, mask, and large sterile sheet    hand hygiene performed prior to central venous catheter insertion  Location: right internal jugular.   Catheter type: double lumen  Catheter Size: 7 Fr  Ultrasound: vascular probe with ultrasound   Vessel Caliber: medium, patent, compressibility normal  Needle advanced into vessel with real time Ultrasound guidance.  Guidewire confirmed in vessel.  Image recorded and saved.  sterile gel and probe cover used in ultrasound-guided central venous catheter insertion  Manometry: none  Insertion Attempts: 1   Securement:line sutured, sterile dressing applied and blood return through all ports    Post-Procedure        Guidewire Guidewire removed intact. Guidewire removed intact, verified with nurse.

## 2022-10-11 NOTE — OP NOTE
[facility]                       [address]                      [place] [address]    PATIENT NAME:      GEORGE LANIER  YOB: 1940  CSN:               [id number]  MRN:               [id number]  ADMIT DATE:        10/11/2022 05:27:00  PHYSICIAN:         Bety Thompson MD                          OPERATIVE REPORT      DATE OF SURGERY:    10/11/2022 00:00:00    SURGEON:  Bety Thompson MD    PREOPERATIVE DIAGNOSIS:  Severe triple-vessel coronary artery disease, atrial   fibrillation.    PROCEDURES:    1. Coronary artery bypass grafting x4 with left internal mammary artery to the   left anterior descending, saphenous venous graft to diagonal 1, saphenous venous   graft to obtuse marginal, and saphenous venous graft to right coronary artery.  2. Endoscopic venous harvesting left greater saphenous vein.   3. Ligation of left atrial appendage.    POSTOPERATIVE DIAGNOSIS:  Severe triple-vessel coronary artery disease, atrial   fibrillation.    ASSISTANT:  Nehemias Elkins.    BLOOD LOSS:  Per perfusion.    ANESTHESIA:  General.    TECHNIQUE:  Under informed consent, the patient was taken to the OR in supine   position.  General anesthesia was induced and therefore maintained for the   remainder of procedure.  All pressure points were padded equally.  The skin over   chest, abdomen, and legs was prepped and draped in a sterile fashion.  IV   antibiotics were administered.  Preop RYLEE revealed evidence of trivial mitral   regurgitation.  Good ejection fraction.  No clot in the left atrial appendage.    Endoscopic venous harvesting was performed left lower extremity, good quality   vein.  Next, a midline incision was made while taking down subcutaneous tissue   and fascia, exposing the sternum that was penetrated in the midline.  The   mammary was harvested on pedicle.  Patient was heparinized.  Midline retractor was   placed.   Pericardium was opened.  Pericardial stay sutures were placed.    Ascending aortic cannulation was performed.  The mammary was cut.  Distal   pedicle ligated and clipped.  Proximal pedicle was controlled with bulldog.  It   was shaped for later anastomosis.  The mammary had good flow in it.  A dual   stage venous cannula was placed in the right atrium.  When the ACT was   therapeutic, the patient went on full cardiopulmonary bypass with good emptying.    Crossclamp was placed followed by antegrade dose of cardioplegia, 1 L of cold   blood repeated after 20-30 minutes with another dose with good iso electricity throughout   the whole case.  The patient's temperature was allowed to drift to 34 degrees   Celsius.  Left atrial appendage was ligated using a 40 mm Endoclip.  Next,   examination of the lateral aspect of the heart revealed the OM vessel.  It was   opened.  This was 2 mm in diameter.  It was anastomosed to reverse segment of   saphenous vein with good flow down the graft.  The vein was cut to the   appropriate length.  Right coronary artery was prematurely calcified along the   whole course.  I was able to find a soft spot at the acute margin of the heart.    This was opened.  This was 2 mm in diameter.  It was anastomosed to reverse   segment of saphenous vein.  Good flow down the graft.  The vein was cut to the   appropriate length.  The 1st diagonal vessel was opened again.  This was a 2 mm   vessel.  It was anastomosed to reverse segment of saphenous vein.  Good flow   down the graft.  The vein was cut to the appropriate length.  Slit was made in   the pericardium.  The patient was being rewarmed.  The LAD was opened in the mid   epicardium.  It was anastomosed to mammary in running Prolene fashion.  Mammary   pedicle was tacked to the epicardium.  This was a 2 mm vessel.  There was brisk   blood flow down the LAD when the mammary bulldog was released.  Crossclamp was   removed.  Partial occlusion clamp  was placed.  Three arteriotomies were   performed.  4 mm punch was used.  Proximal anastomoses were made.  Partial   occlusion clamp was released.  Proximal and distal anastomoses were checked for   hemostasis.  When the patient was warmed, she came off pump with no problem.    Heparin was reversed with protamine.  Mediastinum and left chest were drained.    The V wire was placed.  The patient was decannulated.  Sternum was wired, and   the wounds were closed in layers of absorbable sutures.  The patient tolerated   procedure well.  She was transferred to the ICU in acceptable condition.        ______________________________  MD KEE Martins/ABHIJEET  DD:  10/11/2022  Time:  11:54AM  DT:  10/11/2022  Time:  12:22PM  Job #:  [id number]      OPERATIVE REPORT

## 2022-10-11 NOTE — H&P
[facility] - 7 North ICU  Pulmonary Critical Care Note    Patient Name: Jessica Elias  MRN: [id number]  Admission Date: 10/11/2022  Hospital Length of Stay: 0 days  Code Status: Full Code  Attending Provider: Bety Thompson MD  Primary Care Provider: Tarik Gee MD     Subjective:     HPI:   There is a 82-year-old female with a past medical history sick sinus syndrome status post pacemaker placement, hypertension, hyperlipidemia, obstructive sleep apnea, and carotid artery to the status post previous endarterectomy who had recently been experiencing dizziness and palpitation.  She underwent a PET scan which revealed significant anemia and subsequent heart catheterization revealed triple-vessel disease.  She was admitted today on 10/11/2022 and underwent CABG x3 (op report pending). Admitted to the ICU still sedated and intubated. Not currently on any vasopressor support.     Hospital Course/Significant events:  As above    24 Hour Interval History:  As above    Past Medical History:   Diagnosis Date    Chronic kidney disease, unspecified     stage 3    Coronary artery disease     History of carotid artery disease     Hyperlipidemia     Hypertension     Sleep apnea        Past Surgical History:   Procedure Laterality Date    APPENDECTOMY N/A     BLADDER SURGERY N/A     CAROTID ENDARTERECTOMY N/A     CATARACT EXTRACTION N/A     HYSTERECTOMY N/A     INSERTION OF PERMANENT PACEMAKER N/A     LEFT HEART CATHETERIZATION Left 09/23/2022    Procedure: CATHETERIZATION, HEART, LEFT;  Surgeon: Abner Mitchell MD;  Location: [facility] CATH LAB;  Service: Cardiology;  Laterality: Left;  LHC VIA RRA    lump removed from right shoulder         Social History     Socioeconomic History    Marital status:    Tobacco Use    Smoking status: Never    Smokeless tobacco: Never   Substance and Sexual Activity    Alcohol use: Not Currently    Drug use: Never           Current Outpatient Medications   Medication  Instructions    amLODIPine (NORVASC) 5 mg, Oral, Daily    azelastine (ASTELIN) 137 mcg (0.1 %) nasal spray Nasal    co-enzyme Q-10 200 mg, Oral, Daily    ELIQUIS 5 mg, Oral, 2 times daily    metoprolol succinate (TOPROL-XL) 50 mg, Oral, Daily    multivitamin capsule 1 capsule, Oral, Daily    rosuvastatin (CRESTOR) 20 mg, Oral, Nightly    sertraline (ZOLOFT) 50 mg, Oral, Daily    terazosin (HYTRIN) 2 mg, Oral, Daily       Current Inpatient Medications   [START ON 10/12/2022] aspirin  81 mg Oral Daily    [START ON 10/12/2022] docusate sodium  100 mg Oral BID    famotidine (PF)  20 mg Intravenous Daily    [START ON 10/12/2022] folic acid  1 mg Oral Daily    heparin, porcine (PF)        [START ON 10/12/2022] metoprolol tartrate  12.5 mg Oral BID    mupirocin   Topical (Top) Once Pre-Op    mupirocin   Nasal BID    [START ON 10/12/2022] sucralfate  1 g Oral QID (AC & HS)    vancomycin (VANCOCIN) IVPB  1,000 mg Intravenous Once    vancomycin (VANCOCIN) IVPB  1,000 mg Intravenous Q12H       Current Intravenous Infusions   dexmedetomidine (PRECEDEX) infusion      dextrose 5 % and 0.45 % NaCl      EPINEPHrine      insulin regular 1 units/mL infusion orderable (CTS POST-OP)      loperamide           Review of Systems   Unable to perform ROS: Intubated        Objective:       Intake/Output Summary (Last 24 hours) at 10/11/2022 1137  Last data filed at 10/11/2022 1101  Gross per 24 hour   Intake 1600 ml   Output 435 ml   Net 1165 ml         Vital Signs (Most Recent):  Temp: (!) 94.5 °F (34.7 °C) (10/11/22 1100)  Pulse: 60 (10/11/22 1100)  Resp: 18 (10/11/22 0613)  BP: 133/69 (10/11/22 1100)  SpO2: 97 % (10/11/22 1100)    Body mass index is 23.17 kg/m².  Weight: 62.2 kg (137 lb 2 oz) Vital Signs (24h Range):  Temp:  [94.5 °F (34.7 °C)-98 °F (36.7 °C)] 94.5 °F (34.7 °C)  Pulse:  [60-68] 60  Resp:  [18] 18  SpO2:  [97 %-98 %] 97 %  BP: (133-193)/(69-84) 133/69  Arterial Line BP: (121)/(53) 121/53     Physical Exam  Vitals reviewed.    Constitutional:       Appearance: Normal appearance.   HENT:      Head: Normocephalic and atraumatic.      Mouth/Throat:      Comments: Et tube present  Cardiovascular:      Rate and Rhythm: Normal rate.      Heart sounds: Normal heart sounds.      Comments: Chest tubes present  Pulmonary:      Effort: Pulmonary effort is normal.      Breath sounds: Normal breath sounds.   Abdominal:      Palpations: Abdomen is soft.   Musculoskeletal:         General: Normal range of motion.      Cervical back: Neck supple.   Skin:     General: Skin is warm and dry.   Neurological:      Comments: Still sedated         Lines/Drains/Airways       Central Venous Catheter Line  Duration             Percutaneous Central Line Insertion/Assessment - Double Lumen  10/11/22 0752 right internal jugular <1 day              Drain  Duration                  Chest Tube 10/11/22 0950 Left Midaxillary 28 Fr. <1 day         Chest Tube 10/11/22 0950 Mediastinal 28 Fr. <1 day         Urethral Catheter 10/11/22 0717 Non-latex;Temperature probe 16 Fr. <1 day              Airway  Duration                  Airway - Non-Surgical 10/11/22 0716 <1 day              Arterial Line  Duration             Arterial Line 10/11/22 0751 Left Radial <1 day              Peripheral Intravenous Line  Duration                  Peripheral IV - Single Lumen 10/11/22 0546 20 G Anterior;Distal;Right Forearm <1 day                    Significant Labs:    Lab Results   Component Value Date    WBC 13.2 (H) 10/11/2022    HGB 10.1 (L) 10/11/2022    HCT 31.9 (L) 10/11/2022    MCV 96.4 (H) 10/11/2022    PLT 81 (L) 10/11/2022         BMP  Lab Results   Component Value Date     10/07/2022    K 3.7 10/07/2022    CO2 24 10/07/2022    BUN 38.5 (H) 10/07/2022    CREATININE 1.28 (H) 10/07/2022    CALCIUM 9.3 10/07/2022    EGFRNONAA 41 11/03/2021       ABG  Recent Labs   Lab 10/11/22  0625   PH 7.390   PO2 77   PCO2 47       Mechanical Ventilation Support:       Significant  Imaging:  I have reviewed the pertinent imaging within the past 24 hours.        Assessment/Plan:     Assessment  Multivessel CAD s/p CABG x3 (op report pending)  Hx of HTN and HLD  SSS post PPM placement in the past      Plan  Continue post CABG orders per protocol  Wean mechanical ventilation as tolerated  Monitor chest tubes for bleeding  Continue good blood pressure and glucose control  Continue ICU care         HEATH Castillo  Pulmonary Critical Care Medicine  [facility] - [facility] ICU

## 2022-10-11 NOTE — ANESTHESIA PROCEDURE NOTES
Intubation    Date/Time: 10/11/2022 7:16 AM  Performed by: Elma Millard  Authorized by: Álvaro Brice DO     Intubation:     Induction:  Intravenous    Intubated:  Postinduction    Mask Ventilation:  Easy mask    Attempts:  1    Attempted By:  Student    Method of Intubation:  Direct    Blade:  Joseph 2    Laryngeal View Grade: Grade I - full view of cords      Difficult Airway Encountered?: No      Complications:  None    Airway Device:  Oral endotracheal tube    Airway Device Size:  8.0    Style/Cuff Inflation:  Cuffed (inflated to minimal occlusive pressure)    Tube secured:  22    Secured at:  The lips    Placement Verified By:  Capnometry    Complicating Factors:  None    Findings Post-Intubation:  BS equal bilateral and atraumatic/condition of teeth unchanged

## 2022-10-11 NOTE — ANESTHESIA PROCEDURE NOTES
RYLEE    Diagnosis: coronary artery disease  Patient location during procedure: OR  Procedure start time: 10/11/2022 7:30 AM  Timeout: 10/11/2022 7:30 AM  Procedure end time: 10/11/2022 7:40 AM  Surgery related to: coronary artery disease  Exam type: Baseline    Staffing  Performed: anesthesiologist     Anesthesiologist: Álvaro Brice DO        Anesthesiologist Present  Yes      Setup & Induction  Patient preparation: bite block inserted  Probe Insertion: easy  Exam: completeDoppler Echo: 2D, color flow mapping and pulse wave Doppler.  Exam     Left Heart  Left Atruim: normal    Left Ventricle: cm, normal (men 4.2-5.9; women 3.8-5.2)  LV Wall Thickness (posterior wall):0.85 cm, normal (men 0.6-1.0 cm; women 0.6-0.9 cm)    LVAD  Estimated Ejection Fraction: > 55% normal  Regional Wall Abnormalities: no RWMA            Right Heart  Right Ventricle: normal  Right Atria:  normal    Intra Atrial Septum  PFO: no shunt by color flow doppler  no IAS aneurysm  no lipomatous hypertrophy  no Atrial Septal Defect (Asd)    Right Ventricle  Size: normal    Aortic Valve:  Stenosis: none.  Morphology: trileaflet    Regurgitation: no aortic valve regurgitation      Mitral Valve:   Morphology:normal  Prolapse: none  Flail: no flail  Jet Description: mildStenosis: no significant stenosis.    Tricuspid Valve:  Morphology: normal        Great Vessels  Ascending Aorta Diameter: 3.36 cm   Ascending Aorta Atherosclerosis: 1=nl-min dz  Descending Aorta Atherosclerosis: 4=protruding dz (>5mm)      Effusions none    SummaryFindings discussed with surgeon.    Other Findings

## 2022-10-11 NOTE — TRANSFER OF CARE
"Anesthesia Transfer of Care Note    Patient: Jessica Elias    Procedure(s) Performed: Procedure(s) (LRB):  CORONARY ARTERY BYPASS GRAFT (CABG) (N/A)    Patient location: ICU    Anesthesia Type: general    Transport from OR: Upon arrival to PACU/ICU, patient attached to ventilator and auscultated to confirm bilateral breath sounds and adequate TV. Transported from OR intubated on 100% O2 by AMBU with adequate controlled ventilation    Post pain: adequate analgesia    Post assessment: no apparent anesthetic complications    Post vital signs: stable    Level of consciousness: sedated    Nausea/Vomiting: no nausea/vomiting    Complications: none    Transfer of care protocol was followed      Last vitals:   Visit Vitals  BP (!) 193/79   Pulse 61   Temp 36.7 °C (98 °F) (Oral)   Resp 18   Ht 5' 4.5" (1.638 m)   Wt 62.2 kg (137 lb 2 oz)   SpO2 98%   Breastfeeding No   BMI 23.17 kg/m²     "

## 2022-10-11 NOTE — ANESTHESIA PREPROCEDURE EVALUATION
10/11/2022  Jessica Elias is a 82 y.o., female.      Pre-op Assessment    I have reviewed the Patient Summary Reports.     I have reviewed the Nursing Notes. I have reviewed the NPO Status.   I have reviewed the Medications.     Review of Systems  Anesthesia Hx:  No problems with previous Anesthesia    Social:  Non-Smoker    Hematology/Oncology:        Hematology Comments: H/H 11.2/34.8  plt 169   Cardiovascular:   Pacemaker (pacer) Hypertension Denies MI. CAD     Denies Angina.  Denies CHF. hyperlipidemia EF 55%    Cath    LMT normal, calcified    LAD 75% prox      70% distal      diag 90%    CIRC 99% prox    RCA99% ostium   Pulmonary:   Denies COPD.  Denies Asthma. Sleep Apnea    Renal/:   Chronic Renal Disease, CKD Cr 1.28   Hepatic/GI:   GERD, well controlled Denies Liver Disease. Denies Hepatitis.    Neurological:   Denies CVA. Denies Seizures.    Endocrine:   Diabetes: A1C 5.7 for an average glucose of 117. Denies Hypothyroidism. Denies Hyperthyroidism. K+ 3.7 Denies Obesity / BMI > 30      Physical Exam  General: Well nourished, Cooperative, Alert and Oriented    Airway:  Mallampati: I   Mouth Opening: Normal  TM Distance: Normal  Tongue: Normal  Neck ROM: Normal ROM    Dental:  Intact        Anesthesia Plan  Type of Anesthesia, risks & benefits discussed:    Anesthesia Type: Gen ETT  Intra-op Monitoring Plan: Standard ASA Monitors, Art Line, Central Line and RYLEE  Induction:  IV  Airway Plan: Video  Informed Consent: Informed consent signed with the Patient and all parties understand the risks and agree with anesthesia plan.  All questions answered. Patient consented to blood products? Yes  ASA Score: 4  Day of Surgery Review of History & Physical: H&P Update referred to the surgeon/provider.    Ready For Surgery From Anesthesia Perspective.     .

## 2022-10-11 NOTE — NURSING
Nurses Note -- 4 Eyes      10/11/2022   11:04 AM      Skin assessed during: Admit      [x] No Pressure Injuries Present    []Prevention Measures Documented      [] Yes- Altered Skin Integrity Present or Discovered   [] LDA Added if Not in Epic (Describe Wound)   [] New Altered Skin Integrity was Present on Admit and Documented in LDA   [] Wound Image Taken    Wound Care Consulted? No    Attending Nurse:  Urvashi Hernandez RN     Second RN/Staff Member:  Rosina Mcgowan RN

## 2022-10-11 NOTE — ANESTHESIA PROCEDURE NOTES
Arterial    Diagnosis: hemodynamic monitoring/labwork    Patient location during procedure: holding area    Staffing  Authorizing Provider: Álvaro Brice DO  Performing Provider: Elma Millard    Staffing  Other anesthesia staff: Elma Millard  Anesthesiologist was present at the time of the procedure.    Preanesthetic Checklist  Completed: patient identified, IV checked, site marked, risks and benefits discussed, surgical consent, monitors and equipment checked, pre-op evaluation, timeout performed and anesthesia consent givenArterial  Skin Prep: chlorhexidine gluconate  Local Infiltration: lidocaine  Orientation: left  Location: radial    Catheter Size: 20 G  Catheter placement by Ultrasound guidance. Heme positive aspiration all ports.   Vessel Caliber: medium, patent  Needle advanced into vessel with real time Ultrasound guidance.  Guidewire confirmed in vessel.Insertion Attempts: 1  Assessment  Dressing: secured with tape and tegaderm  Patient: Tolerated well

## 2022-10-12 LAB
ANION GAP SERPL CALC-SCNC: 5 MEQ/L
APPEARANCE UR: ABNORMAL
BACTERIA #/AREA URNS AUTO: ABNORMAL /HPF
BASOPHILS # BLD AUTO: 0.03 X10(3)/MCL (ref 0–0.2)
BASOPHILS NFR BLD AUTO: 0.2 %
BILIRUB UR QL STRIP.AUTO: NEGATIVE MG/DL
BUN SERPL-MCNC: 19.4 MG/DL (ref 9.8–20.1)
CALCIUM SERPL-MCNC: 8 MG/DL (ref 8.4–10.2)
CHLORIDE SERPL-SCNC: 109 MMOL/L (ref 98–107)
CO2 SERPL-SCNC: 24 MMOL/L (ref 23–31)
COLOR UR AUTO: YELLOW
CREAT SERPL-MCNC: 0.81 MG/DL (ref 0.55–1.02)
CREAT UR-MCNC: 105.1 MG/DL (ref 47–110)
CREAT/UREA NIT SERPL: 24
EOSINOPHIL # BLD AUTO: 0 X10(3)/MCL (ref 0–0.9)
EOSINOPHIL NFR BLD AUTO: 0 %
ERYTHROCYTE [DISTWIDTH] IN BLOOD BY AUTOMATED COUNT: 15 % (ref 11.5–17)
GFR SERPLBLD CREATININE-BSD FMLA CKD-EPI: >60 MLS/MIN/1.73/M2
GLUCOSE SERPL-MCNC: 189 MG/DL (ref 82–115)
GLUCOSE UR QL STRIP.AUTO: NEGATIVE MG/DL
HCT VFR BLD AUTO: 33.2 % (ref 37–47)
HGB BLD-MCNC: 11.1 GM/DL (ref 12–16)
IMM GRANULOCYTES # BLD AUTO: 0.06 X10(3)/MCL (ref 0–0.04)
IMM GRANULOCYTES NFR BLD AUTO: 0.5 %
KETONES UR QL STRIP.AUTO: NEGATIVE MG/DL
LEUKOCYTE ESTERASE UR QL STRIP.AUTO: ABNORMAL UNIT/L
LYMPHOCYTES # BLD AUTO: 1.24 X10(3)/MCL (ref 0.6–4.6)
LYMPHOCYTES NFR BLD AUTO: 10.3 %
MCH RBC QN AUTO: 31.1 PG (ref 27–31)
MCHC RBC AUTO-ENTMCNC: 33.4 MG/DL (ref 33–36)
MCV RBC AUTO: 93 FL (ref 80–94)
MONOCYTES # BLD AUTO: 1.25 X10(3)/MCL (ref 0.1–1.3)
MONOCYTES NFR BLD AUTO: 10.3 %
NEUTROPHILS # BLD AUTO: 9.5 X10(3)/MCL (ref 2.1–9.2)
NEUTROPHILS NFR BLD AUTO: 78.7 %
NITRITE UR QL STRIP.AUTO: NEGATIVE
NRBC BLD AUTO-RTO: 0 %
PH UR STRIP.AUTO: 5 [PH]
PLATELET # BLD AUTO: 93 X10(3)/MCL (ref 130–400)
PMV BLD AUTO: 12.8 FL (ref 7.4–10.4)
POCT GLUCOSE: 104 MG/DL (ref 70–110)
POCT GLUCOSE: 112 MG/DL (ref 70–110)
POCT GLUCOSE: 118 MG/DL (ref 70–110)
POCT GLUCOSE: 118 MG/DL (ref 70–110)
POCT GLUCOSE: 138 MG/DL (ref 70–110)
POCT GLUCOSE: 140 MG/DL (ref 70–110)
POCT GLUCOSE: 158 MG/DL (ref 70–110)
POCT GLUCOSE: 171 MG/DL (ref 70–110)
POCT GLUCOSE: 173 MG/DL (ref 70–110)
POCT GLUCOSE: 181 MG/DL (ref 70–110)
POCT GLUCOSE: 192 MG/DL (ref 70–110)
POCT GLUCOSE: 95 MG/DL (ref 70–110)
POCT GLUCOSE: 98 MG/DL (ref 70–110)
POTASSIUM SERPL-SCNC: 3.5 MMOL/L (ref 3.5–5.1)
PROT UR QL STRIP.AUTO: ABNORMAL MG/DL
RBC # BLD AUTO: 3.57 X10(6)/MCL (ref 4.2–5.4)
RBC #/AREA URNS AUTO: <5 /HPF
RBC UR QL AUTO: ABNORMAL UNIT/L
SODIUM SERPL-SCNC: 138 MMOL/L (ref 136–145)
SODIUM UR-SCNC: 69 MMOL/L
SP GR UR STRIP.AUTO: 1.02 (ref 1–1.03)
SQUAMOUS #/AREA URNS AUTO: <5 /HPF
UROBILINOGEN UR STRIP-ACNC: 0.2 MG/DL
WBC # SPEC AUTO: 12.1 X10(3)/MCL (ref 4.5–11.5)
WBC #/AREA URNS AUTO: 228 /HPF

## 2022-10-12 PROCEDURE — 27000221 HC OXYGEN, UP TO 24 HOURS

## 2022-10-12 PROCEDURE — 81001 URINALYSIS AUTO W/SCOPE: CPT | Performed by: INTERNAL MEDICINE

## 2022-10-12 PROCEDURE — 63600175 PHARM REV CODE 636 W HCPCS: Performed by: INTERNAL MEDICINE

## 2022-10-12 PROCEDURE — 63600175 PHARM REV CODE 636 W HCPCS: Mod: JG | Performed by: PHYSICIAN ASSISTANT

## 2022-10-12 PROCEDURE — 82570 ASSAY OF URINE CREATININE: CPT | Performed by: INTERNAL MEDICINE

## 2022-10-12 PROCEDURE — 63600175 PHARM REV CODE 636 W HCPCS: Performed by: THORACIC SURGERY (CARDIOTHORACIC VASCULAR SURGERY)

## 2022-10-12 PROCEDURE — 25000003 PHARM REV CODE 250: Performed by: THORACIC SURGERY (CARDIOTHORACIC VASCULAR SURGERY)

## 2022-10-12 PROCEDURE — 94761 N-INVAS EAR/PLS OXIMETRY MLT: CPT

## 2022-10-12 PROCEDURE — 84300 ASSAY OF URINE SODIUM: CPT | Performed by: INTERNAL MEDICINE

## 2022-10-12 PROCEDURE — 87088 URINE BACTERIA CULTURE: CPT | Performed by: INTERNAL MEDICINE

## 2022-10-12 PROCEDURE — 85025 COMPLETE CBC W/AUTO DIFF WBC: CPT | Performed by: PHYSICIAN ASSISTANT

## 2022-10-12 PROCEDURE — 25000003 PHARM REV CODE 250: Performed by: PHYSICIAN ASSISTANT

## 2022-10-12 PROCEDURE — 99024 POSTOP FOLLOW-UP VISIT: CPT | Mod: ,,, | Performed by: PHYSICIAN ASSISTANT

## 2022-10-12 PROCEDURE — S5010 5% DEXTROSE AND 0.45% SALINE: HCPCS | Performed by: PHYSICIAN ASSISTANT

## 2022-10-12 PROCEDURE — 25000003 PHARM REV CODE 250: Performed by: INTERNAL MEDICINE

## 2022-10-12 PROCEDURE — 80048 BASIC METABOLIC PNL TOTAL CA: CPT | Performed by: PHYSICIAN ASSISTANT

## 2022-10-12 PROCEDURE — P9045 ALBUMIN (HUMAN), 5%, 250 ML: HCPCS | Mod: JG | Performed by: PHYSICIAN ASSISTANT

## 2022-10-12 PROCEDURE — 20000000 HC ICU ROOM

## 2022-10-12 PROCEDURE — 99024 PR POST-OP FOLLOW-UP VISIT: ICD-10-PCS | Mod: ,,, | Performed by: PHYSICIAN ASSISTANT

## 2022-10-12 PROCEDURE — C9248 INJ, CLEVIDIPINE BUTYRATE: HCPCS | Mod: JG | Performed by: THORACIC SURGERY (CARDIOTHORACIC VASCULAR SURGERY)

## 2022-10-12 PROCEDURE — 36415 COLL VENOUS BLD VENIPUNCTURE: CPT | Performed by: PHYSICIAN ASSISTANT

## 2022-10-12 RX ORDER — AMLODIPINE BESYLATE 5 MG/1
5 TABLET ORAL DAILY
Status: DISCONTINUED | OUTPATIENT
Start: 2022-10-12 | End: 2022-10-12

## 2022-10-12 RX ORDER — IBUPROFEN 200 MG
16 TABLET ORAL
Status: DISCONTINUED | OUTPATIENT
Start: 2022-10-12 | End: 2022-10-25 | Stop reason: HOSPADM

## 2022-10-12 RX ORDER — FUROSEMIDE 10 MG/ML
80 INJECTION INTRAMUSCULAR; INTRAVENOUS ONCE
Status: COMPLETED | OUTPATIENT
Start: 2022-10-12 | End: 2022-10-12

## 2022-10-12 RX ORDER — SERTRALINE HYDROCHLORIDE 50 MG/1
50 TABLET, FILM COATED ORAL DAILY
Status: DISCONTINUED | OUTPATIENT
Start: 2022-10-12 | End: 2022-10-25 | Stop reason: HOSPADM

## 2022-10-12 RX ORDER — GLUCAGON 1 MG
1 KIT INJECTION
Status: DISCONTINUED | OUTPATIENT
Start: 2022-10-12 | End: 2022-10-25 | Stop reason: HOSPADM

## 2022-10-12 RX ORDER — FUROSEMIDE 10 MG/ML
INJECTION INTRAMUSCULAR; INTRAVENOUS
Status: DISPENSED
Start: 2022-10-12 | End: 2022-10-13

## 2022-10-12 RX ORDER — TAMSULOSIN HYDROCHLORIDE 0.4 MG/1
0.4 CAPSULE ORAL NIGHTLY
Status: DISCONTINUED | OUTPATIENT
Start: 2022-10-12 | End: 2022-10-17

## 2022-10-12 RX ORDER — IBUPROFEN 200 MG
24 TABLET ORAL
Status: DISCONTINUED | OUTPATIENT
Start: 2022-10-12 | End: 2022-10-25 | Stop reason: HOSPADM

## 2022-10-12 RX ORDER — TAMSULOSIN HYDROCHLORIDE 0.4 MG/1
0.4 CAPSULE ORAL DAILY
Status: DISCONTINUED | OUTPATIENT
Start: 2022-10-12 | End: 2022-10-12

## 2022-10-12 RX ORDER — INSULIN ASPART 100 [IU]/ML
0-5 INJECTION, SOLUTION INTRAVENOUS; SUBCUTANEOUS
Status: DISCONTINUED | OUTPATIENT
Start: 2022-10-12 | End: 2022-10-25 | Stop reason: HOSPADM

## 2022-10-12 RX ADMIN — DEXTROSE AND SODIUM CHLORIDE: 5; 450 INJECTION, SOLUTION INTRAVENOUS at 04:10

## 2022-10-12 RX ADMIN — Medication: at 08:10

## 2022-10-12 RX ADMIN — FUROSEMIDE 80 MG: 10 INJECTION, SOLUTION INTRAMUSCULAR; INTRAVENOUS at 12:10

## 2022-10-12 RX ADMIN — POTASSIUM BICARBONATE 50 MEQ: 977.5 TABLET, EFFERVESCENT ORAL at 12:10

## 2022-10-12 RX ADMIN — MUPIROCIN: 20 OINTMENT TOPICAL at 08:10

## 2022-10-12 RX ADMIN — SERTRALINE HYDROCHLORIDE 50 MG: 50 TABLET ORAL at 08:10

## 2022-10-12 RX ADMIN — SUCRALFATE 1 G: 1 TABLET ORAL at 04:10

## 2022-10-12 RX ADMIN — ASPIRIN 81 MG: 81 TABLET, COATED ORAL at 08:10

## 2022-10-12 RX ADMIN — INSULIN HUMAN 3 UNITS/HR: 1 INJECTION, SOLUTION INTRAVENOUS at 05:10

## 2022-10-12 RX ADMIN — CLEVIPIDINE 4.5 MG/HR: 0.5 EMULSION INTRAVENOUS at 02:10

## 2022-10-12 RX ADMIN — DOCUSATE SODIUM 100 MG: 100 CAPSULE, LIQUID FILLED ORAL at 08:10

## 2022-10-12 RX ADMIN — SUCRALFATE 1 G: 1 TABLET ORAL at 11:10

## 2022-10-12 RX ADMIN — FAMOTIDINE 20 MG: 10 INJECTION, SOLUTION INTRAVENOUS at 08:10

## 2022-10-12 RX ADMIN — METOPROLOL TARTRATE 12.5 MG: 25 TABLET, FILM COATED ORAL at 08:10

## 2022-10-12 RX ADMIN — FOLIC ACID 1 MG: 1 TABLET ORAL at 08:10

## 2022-10-12 RX ADMIN — ONDANSETRON 4 MG: 2 INJECTION INTRAMUSCULAR; INTRAVENOUS at 05:10

## 2022-10-12 RX ADMIN — SUCRALFATE 1 G: 1 TABLET ORAL at 08:10

## 2022-10-12 RX ADMIN — OXYCODONE 5 MG: 5 TABLET ORAL at 04:10

## 2022-10-12 RX ADMIN — VANCOMYCIN HYDROCHLORIDE 1000 MG: 1 INJECTION, POWDER, LYOPHILIZED, FOR SOLUTION INTRAVENOUS at 05:10

## 2022-10-12 RX ADMIN — TAMSULOSIN HYDROCHLORIDE 0.4 MG: 0.4 CAPSULE ORAL at 08:10

## 2022-10-12 RX ADMIN — AMLODIPINE BESYLATE 5 MG: 5 TABLET ORAL at 08:10

## 2022-10-12 RX ADMIN — SUCRALFATE 1 G: 1 TABLET ORAL at 06:10

## 2022-10-12 RX ADMIN — CLEVIPIDINE 4.5 MG/HR: 0.5 EMULSION INTRAVENOUS at 05:10

## 2022-10-12 RX ADMIN — ALBUMIN (HUMAN) 12.5 G: 12.5 SOLUTION INTRAVENOUS at 02:10

## 2022-10-12 RX ADMIN — POTASSIUM CHLORIDE 20 MEQ: 14.9 INJECTION, SOLUTION INTRAVENOUS at 06:10

## 2022-10-12 RX ADMIN — HYDROCODONE BITARTRATE AND ACETAMINOPHEN 1 TABLET: 5; 325 TABLET ORAL at 01:10

## 2022-10-12 NOTE — PROGRESS NOTES
[facility] - [facility] ICU  Wound Care    Patient Name:  Jessica Elias  MRN:  [id number]  Date: 10/12/2022  Diagnosis: <principal problem not specified>    History:   S/p Coronary artery bypass grafting x4 on 10/11/2022 admitted to ICU.    Past Medical History:   Diagnosis Date    Chronic kidney disease, unspecified     stage 3    Coronary artery disease     History of carotid artery disease     Hyperlipidemia     Hypertension     Sleep apnea        Social History     Socioeconomic History    Marital status:    Tobacco Use    Smoking status: Never    Smokeless tobacco: Never   Substance and Sexual Activity    Alcohol use: Not Currently    Drug use: Never     Social Determinants of Health     Food Insecurity: Unknown    Worried About Running Out of Food in the Last Year: Never true   Transportation Needs: Unknown    Lack of Transportation (Medical): No   Housing Stability: Unknown    Unable to Pay for Housing in the Last Year: No       Allergies as of 09/26/2022 - Reviewed 09/26/2022   Allergen Reaction Noted    Penicillin  09/23/2022    Sulfa (sulfonamide antibiotics)  09/23/2022         Need for Treatment:     Patient tolerated today's treatment without any adverse effects.      [facility] Assessment Details:      10/12/22 1357        Altered Skin Integrity 10/12/22 1356 Buttocks Purple or maroon localized area of discolored intact skin or non-intact skin or a blood-filled blister.   Date First Assessed/Time First Assessed: 10/12/22 1356   Location: Buttocks  Description of Altered Skin Integrity: Purple or maroon localized area of discolored intact skin or non-intact skin or a blood-filled blister.   Wound Image    Description of Altered Skin Integrity Purple or maroon localized area of discolored intact skin or non-intact skin or a blood-filled blister.   Dressing Appearance Open to air   Drainage Amount None   Appearance Red;Maroon;Purple   Periwound Area Intact   Wound Edges Defined   Wound  Length (cm) 3 cm   Wound Width (cm) 3 cm   Wound Depth (cm)   (unknown)   Wound Surface Area (cm^2) 9 cm^2   Care Cleansed with:;Sterile normal saline   Dressing Applied         Treatment:     Clean technique maintained throughout treatment.   Previous dressing removed avoiding trauma to the wound bed.       Plan:     WOCN consulted for Buttocks.   Daughter at bedside. Patient sitting in chair, no distress noted. Patient states she has had this wound for years. It comes and goes. Educated on pressure relief. She voiced understanding. Recommendations made to Nurse for Desitin ointment, cover with 4 x 4, abd pad, secure with medipore tape. Continue with turning every 2 hours, wedge and float heels while in bed. She is on a DONATO mattress. Will follow up in a few days.       10/12/2022

## 2022-10-12 NOTE — PROGRESS NOTES
[facility] - [facility] ICU  Pulmonary Critical Care Note    Patient Name: Jessica Elias  MRN: [id number]  Admission Date: 10/11/2022  Hospital Length of Stay: 1 days  Code Status: Full Code  Attending Provider: Bety Thompson MD  Primary Care Provider: Tarik Gee MD     Subjective:     HPI:   This is an 82-year-old female with a past medical history sick sinus syndrome status post pacemaker placement, hypertension, hyperlipidemia, obstructive sleep apnea, and carotid artery to the status post previous endarterectomy who had recently been experiencing dizziness and palpitation.  She underwent a PET scan which revealed significant anemia and subsequent heart catheterization revealed triple-vessel disease.  She was admitted today on 10/11/2022 and underwent CABG x3 (op report pending). Admitted to the ICU still sedated and intubated. Not currently on any vasopressor support.     Hospital Course/Significant events:  As above.    24 Hour Interval History:  No acute events overnight. VSS. Afebrile. BP controlled with Cleviprex. Patient doing well this morning. She reports no pain and states that she feels well. Intake 4865mL and 1801mL Output.    Past Medical History:   Diagnosis Date    Chronic kidney disease, unspecified     stage 3    Coronary artery disease     History of carotid artery disease     Hyperlipidemia     Hypertension     Sleep apnea        Social History     Socioeconomic History    Marital status:    Tobacco Use    Smoking status: Never    Smokeless tobacco: Never   Substance and Sexual Activity    Alcohol use: Not Currently    Drug use: Never           Objective:     Current Outpatient Medications   Medication Instructions    amLODIPine (NORVASC) 5 mg, Oral, Daily    azelastine (ASTELIN) 137 mcg (0.1 %) nasal spray Nasal    co-enzyme Q-10 200 mg, Oral, Daily    ELIQUIS 5 mg, Oral, 2 times daily    metoprolol succinate (TOPROL-XL) 50 mg, Oral, Daily    multivitamin capsule 1  capsule, Oral, Daily    rosuvastatin (CRESTOR) 20 mg, Oral, Nightly    sertraline (ZOLOFT) 50 mg, Oral, Daily    terazosin (HYTRIN) 2 mg, Oral, Daily       Current Inpatient Medications   aspirin  81 mg Oral Daily    docusate sodium  100 mg Oral BID    famotidine (PF)  20 mg Intravenous Daily    folic acid  1 mg Oral Daily    metoprolol tartrate  12.5 mg Oral BID    mupirocin   Nasal BID    sucralfate  1 g Oral QID (AC & HS)    vancomycin (VANCOCIN) IVPB  1,000 mg Intravenous Once    vancomycin (VANCOCIN) IVPB  1,000 mg Intravenous Q12H           Intake/Output Summary (Last 24 hours) at 10/12/2022 0346  Last data filed at 10/12/2022 0300  Gross per 24 hour   Intake 4864.98 ml   Output 1631 ml   Net 3233.98 ml       Review of Systems   Constitutional:  Negative for chills and fever.   Cardiovascular:  Negative for chest pain.   Gastrointestinal:  Positive for nausea. Negative for vomiting.   Neurological:  Negative for headaches.      Vital Signs (Most Recent):  Temp: 98.7 °F (37.1 °C) (10/11/22 1930)  Pulse: 78 (10/12/22 0300)  Resp: 18 (10/12/22 0300)  BP: 134/62 (10/12/22 0300)  SpO2: 97 % (10/12/22 0300)  Body mass index is 23.17 kg/m².  Weight: 62.2 kg (137 lb 2 oz) Vital Signs (24h Range):  Temp:  [94.1 °F (34.5 °C)-98.7 °F (37.1 °C)] 98.7 °F (37.1 °C)  Pulse:  [60-78] 78  Resp:  [10-24] 18  SpO2:  [88 %-100 %] 97 %  BP: ()/(54-85) 134/62  Arterial Line BP: ()/(38-64) 124/40     Physical Exam  Constitutional:       Appearance: Pleasant elderly woman. Awake and alert. Sitting in chair in NAD.  HENT:      Head: Normocephalic and atraumatic.   Cardiovascular:      Rate and Rhythm: Normal rate.      Heart sounds: Normal heart sounds.      Comments: Chest tubes present.  Pulmonary:      Effort: Pulmonary effort is normal.      Breath sounds: Normal breath sounds.   Abdominal:      Palpations: Abdomen is soft.   Musculoskeletal:         General: Normal range of motion.      Cervical back: Neck supple.    Skin:     General: Skin is warm and dry.     Mechanical ventilation support:  Vent Mode: CPAP PSV (10/11/22 1650)  Set Rate: 10 BPM (10/11/22 1505)  Vt Set: 500 mL (10/11/22 1505)  Pressure Support: 10 cmH20 (10/11/22 1650)  PEEP/CPAP: 5 cmH20 (10/11/22 1650)  Oxygen Concentration (%): 30 (10/11/22 1650)  Total Ve: 8.5 mL (10/11/22 1650)  F/VT Ratio<105 (RSBI): (!) 53.57 (10/11/22 1650)    Lines/Drains/Airways       Central Venous Catheter Line  Duration             Percutaneous Central Line Insertion/Assessment - Double Lumen  10/11/22 0752 right internal jugular <1 day              Drain  Duration                  Chest Tube 10/11/22 0950 Left Midaxillary 28 Fr. <1 day         Chest Tube 10/11/22 0950 Mediastinal 28 Fr. <1 day         Urethral Catheter 10/11/22 0717 Non-latex;Temperature probe 16 Fr. <1 day              Arterial Line  Duration             Arterial Line 10/11/22 0751 Left Radial <1 day              Line  Duration                  Pacer Wires 10/11/22 1055 <1 day              Peripheral Intravenous Line  Duration                  Peripheral IV - Single Lumen 10/11/22 0546 20 G Anterior;Distal;Right Forearm <1 day                    Significant Labs:    Lab Results   Component Value Date    WBC 9.3 10/11/2022    HGB 10.8 (L) 10/11/2022    HCT 33.5 (L) 10/11/2022    MCV 94.4 (H) 10/11/2022    PLT 87 (L) 10/11/2022         BMP  Lab Results   Component Value Date     10/11/2022    K 3.9 10/11/2022    CO2 24 10/11/2022    BUN 22.1 (H) 10/11/2022    CREATININE 0.79 10/11/2022    CALCIUM 8.2 (L) 10/11/2022    EGFRNONAA 41 11/03/2021       ABG  Recent Labs   Lab 10/11/22  1656   PH 7.37   PO2 92   PCO2 46*   HCO3 26.6           Significant Imaging:  I have reviewed all pertinent imaging within the past 24 hours.        Assessment/Plan:     Assessment  Multivessel CAD s/p CABG x4  Hx of HTN and HLD  Hx of SSS post PPM placement      Plan  POD #1: Patient doing well.  Continue post CABG orders per  protocol.   [facility].  Wean Cleviprex.  Monitor chest tubes for excessive bleeding.  Continue blood pressure and glucose control  Plan to downgrade later today if able.       DVT Prophylaxis: SCD  GI Prophylaxis: Famotidine     Greater than 30 minutes of critical care was time spent personally by me on the following activities: development of treatment plan with patient or surrogate and bedside caregivers, discussions with consultants, evaluation of patient's response to treatment, examination of patient, ordering and performing treatments and interventions, ordering and review of laboratory studies, ordering and review of radiographic studies, pulse oximetry, re-evaluation of patient's condition.  This critical care time did not overlap with that of any other provider or involve time for any procedures.     Ruben Joshi MD  Pulmonary Critical Care Medicine  [facility] - [facility] ICU

## 2022-10-12 NOTE — PROGRESS NOTES
Jessica Elias is a 82 y.o. female patient.   1. CAD (coronary artery disease)    2. Coronary artery disease, unspecified vessel or lesion type, unspecified whether angina present, unspecified whether native or transplanted heart    3. Coronary artery disease      Past Medical History:   Diagnosis Date    Chronic kidney disease, unspecified     stage 3    Coronary artery disease     History of carotid artery disease     Hyperlipidemia     Hypertension     Sleep apnea      No past surgical history pertinent negatives on file.  Scheduled Meds:   aspirin  81 mg Oral Daily    docusate sodium  100 mg Oral BID    famotidine (PF)  20 mg Intravenous Daily    folic acid  1 mg Oral Daily    metoprolol tartrate  12.5 mg Oral BID    mupirocin   Nasal BID    sucralfate  1 g Oral QID (AC & HS)    vancomycin (VANCOCIN) IVPB  1,000 mg Intravenous Once     Continuous Infusions:   clevidipine 3 mg/hr (10/12/22 0645)    dexmedetomidine (PRECEDEX) infusion Stopped (10/11/22 1145)    dextrose 5 % and 0.45 % NaCl 100 mL/hr at 10/12/22 0600    EPINEPHrine      insulin regular 1 units/mL infusion orderable (CTS POST-OP) 4 Units/hr (10/12/22 0601)    loperamide       PRN Meds:sodium chloride, acetaminophen, albumin human 5%, calcium gluconate IVPB, calcium gluconate IVPB, calcium gluconate IVPB, dextrose 50%, dextrose 50%, HYDROcodone-acetaminophen, lactulose 10 gram/15 ml, loperamide, magnesium sulfate IVPB, magnesium sulfate IVPB, metoclopramide HCl, morphine, ondansetron, oxyCODONE, potassium chloride in water, potassium chloride in water, potassium chloride in water, sodium phosphate IVPB, sodium phosphate IVPB, sodium phosphate IVPB    Review of patient's allergies indicates:   Allergen Reactions    Penicillin     Sulfa (sulfonamide antibiotics)      There are no hospital problems to display for this patient.    Blood pressure 132/76, pulse 64, temperature 99.5 °F (37.5 °C), temperature source Core Bladder, resp. rate 17, height 5'  "4.5" (1.638 m), weight 68.5 kg (151 lb 0.2 oz), SpO2 96 %, not currently breastfeeding.    Subjective:    POD #1  Awake. Alert.  Sitting up in chair  On cleviprex  UOP 20cc/hr - patient of Dr. Ibrahim    Objective:   AFVSS  Heart: RRR  Lungs: respirations nonlabored, diminished at bases  Incision: dressed, dry  H/H: 11/33  Cr: 0.81  L CT: 350cc/24hrs  MS CT: 290cc/24hrs    Assesment/Plan:    S/P CAB 10/11  CKD - patient of Dr. Ibrahim    - restart home blood pressure meds  - wean cleviprex as tolerate  - mobilize  - IS    HOLDEN Herman  10/12/2022      "

## 2022-10-12 NOTE — PLAN OF CARE
10/12/22 1303   Discharge Assessment   Assessment Type Discharge Planning Assessment   Confirmed/corrected address, phone number and insurance Yes   Confirmed Demographics Correct on Facesheet   Source of Information patient   Reason For Admission s/p CAB   Lives With alone  (Pt lives in a single story home with one step to enter home)   Do you expect to return to your current living situation? Yes   Do you have help at home or someone to help you manage your care at home? No   Prior to hospitilization cognitive status: Alert/Oriented   Current cognitive status: Alert/Oriented   Walking or Climbing Stairs Difficulty none   Dressing/Bathing Difficulty none   Home Layout Able to live on 1st floor   Equipment Currently Used at Home none   Patient currently being followed by outpatient case management? No   Do you currently have service(s) that help you manage your care at home? No   Who is going to help you get home at discharge? Family   How do you get to doctors appointments? car, drives self   Are you on dialysis? No   Discharge Plan A   (Home with home health)   Discharge Plan B   (Home with home health)   DME Needed Upon Discharge  other (see comments)  (TBD)   Discharge Plan discussed with: Patient   Discharge Barriers Identified None   Housing Stability   In the last 12 months, was there a time when you were not able to pay the mortgage or rent on time? N   Transportation Needs   In the past 12 months, has lack of transportation kept you from medical appointments or from getting medications? no   Food Insecurity   Within the past 12 months, you worried that your food would run out before you got the money to buy more. Never true     Pt's PCP is Dr Tarik Gee. Her  is her daughter, Carrillo ([phone]). She was independent with mobility and was driving. She uses [facility] pharmacy off of [address]   Rec consult for HH services. FOC obtained. Referral sent to [facility] thru care port. Follow for  dc needs.

## 2022-10-13 LAB
ALBUMIN SERPL-MCNC: 2.7 GM/DL (ref 3.4–4.8)
ALBUMIN/GLOB SERPL: 1.6 RATIO (ref 1.1–2)
ALP SERPL-CCNC: 34 UNIT/L (ref 40–150)
ALT SERPL-CCNC: 30 UNIT/L (ref 0–55)
AST SERPL-CCNC: 37 UNIT/L (ref 5–34)
BASOPHILS # BLD AUTO: 0.03 X10(3)/MCL (ref 0–0.2)
BASOPHILS NFR BLD AUTO: 0.3 %
BILIRUBIN DIRECT+TOT PNL SERPL-MCNC: 0.6 MG/DL
BUN SERPL-MCNC: 21.5 MG/DL (ref 9.8–20.1)
CALCIUM SERPL-MCNC: 7.4 MG/DL (ref 8.4–10.2)
CHLORIDE SERPL-SCNC: 104 MMOL/L (ref 98–107)
CO2 SERPL-SCNC: 24 MMOL/L (ref 23–31)
CREAT SERPL-MCNC: 1.08 MG/DL (ref 0.55–1.02)
EOSINOPHIL # BLD AUTO: 0.02 X10(3)/MCL (ref 0–0.9)
EOSINOPHIL NFR BLD AUTO: 0.2 %
ERYTHROCYTE [DISTWIDTH] IN BLOOD BY AUTOMATED COUNT: 15 % (ref 11.5–17)
GFR SERPLBLD CREATININE-BSD FMLA CKD-EPI: 52 MLS/MIN/1.73/M2
GLOBULIN SER-MCNC: 1.7 GM/DL (ref 2.4–3.5)
GLUCOSE SERPL-MCNC: 168 MG/DL (ref 70–110)
GLUCOSE SERPL-MCNC: 184 MG/DL (ref 82–115)
HCT VFR BLD AUTO: 32.4 % (ref 37–47)
HGB BLD-MCNC: 10.6 GM/DL (ref 12–16)
IMM GRANULOCYTES # BLD AUTO: 0.06 X10(3)/MCL (ref 0–0.04)
IMM GRANULOCYTES NFR BLD AUTO: 0.6 %
LYMPHOCYTES # BLD AUTO: 1.42 X10(3)/MCL (ref 0.6–4.6)
LYMPHOCYTES NFR BLD AUTO: 13.3 %
MCH RBC QN AUTO: 30.6 PG (ref 27–31)
MCHC RBC AUTO-ENTMCNC: 32.7 MG/DL (ref 33–36)
MCV RBC AUTO: 93.6 FL (ref 80–94)
MONOCYTES # BLD AUTO: 0.98 X10(3)/MCL (ref 0.1–1.3)
MONOCYTES NFR BLD AUTO: 9.2 %
NEUTROPHILS # BLD AUTO: 8.1 X10(3)/MCL (ref 2.1–9.2)
NEUTROPHILS NFR BLD AUTO: 76.4 %
NRBC BLD AUTO-RTO: 0 %
PLATELET # BLD AUTO: 83 X10(3)/MCL (ref 130–400)
PMV BLD AUTO: 13.5 FL (ref 7.4–10.4)
POCT GLUCOSE: 143 MG/DL (ref 70–110)
POCT GLUCOSE: 164 MG/DL (ref 70–110)
POCT GLUCOSE: 171 MG/DL (ref 70–110)
POCT GLUCOSE: 172 MG/DL (ref 70–110)
POTASSIUM SERPL-SCNC: 4 MMOL/L (ref 3.5–5.1)
PROT SERPL-MCNC: 4.4 GM/DL (ref 5.8–7.6)
RBC # BLD AUTO: 3.46 X10(6)/MCL (ref 4.2–5.4)
SODIUM SERPL-SCNC: 133 MMOL/L (ref 136–145)
WBC # SPEC AUTO: 10.6 X10(3)/MCL (ref 4.5–11.5)

## 2022-10-13 PROCEDURE — 97166 OT EVAL MOD COMPLEX 45 MIN: CPT

## 2022-10-13 PROCEDURE — 36415 COLL VENOUS BLD VENIPUNCTURE: CPT | Performed by: INTERNAL MEDICINE

## 2022-10-13 PROCEDURE — 25000003 PHARM REV CODE 250: Performed by: PHYSICIAN ASSISTANT

## 2022-10-13 PROCEDURE — 11000001 HC ACUTE MED/SURG PRIVATE ROOM

## 2022-10-13 PROCEDURE — 99024 POSTOP FOLLOW-UP VISIT: CPT | Mod: ,,,

## 2022-10-13 PROCEDURE — 99024 PR POST-OP FOLLOW-UP VISIT: ICD-10-PCS | Mod: ,,,

## 2022-10-13 PROCEDURE — 85025 COMPLETE CBC W/AUTO DIFF WBC: CPT | Performed by: PHYSICIAN ASSISTANT

## 2022-10-13 PROCEDURE — 97110 THERAPEUTIC EXERCISES: CPT

## 2022-10-13 PROCEDURE — 94760 N-INVAS EAR/PLS OXIMETRY 1: CPT

## 2022-10-13 PROCEDURE — 63600175 PHARM REV CODE 636 W HCPCS: Performed by: PHYSICIAN ASSISTANT

## 2022-10-13 PROCEDURE — 27000221 HC OXYGEN, UP TO 24 HOURS

## 2022-10-13 PROCEDURE — 97162 PT EVAL MOD COMPLEX 30 MIN: CPT

## 2022-10-13 PROCEDURE — 63600175 PHARM REV CODE 636 W HCPCS: Performed by: INTERNAL MEDICINE

## 2022-10-13 PROCEDURE — 25000003 PHARM REV CODE 250: Performed by: INTERNAL MEDICINE

## 2022-10-13 PROCEDURE — 80053 COMPREHEN METABOLIC PANEL: CPT | Performed by: INTERNAL MEDICINE

## 2022-10-13 PROCEDURE — 94761 N-INVAS EAR/PLS OXIMETRY MLT: CPT

## 2022-10-13 PROCEDURE — 21400001 HC TELEMETRY ROOM

## 2022-10-13 RX ORDER — BISACODYL 10 MG
10 SUPPOSITORY, RECTAL RECTAL DAILY PRN
Status: DISCONTINUED | OUTPATIENT
Start: 2022-10-13 | End: 2022-10-25 | Stop reason: HOSPADM

## 2022-10-13 RX ORDER — FUROSEMIDE 10 MG/ML
40 INJECTION INTRAMUSCULAR; INTRAVENOUS ONCE
Status: COMPLETED | OUTPATIENT
Start: 2022-10-13 | End: 2022-10-13

## 2022-10-13 RX ORDER — ENOXAPARIN SODIUM 100 MG/ML
40 INJECTION SUBCUTANEOUS EVERY 24 HOURS
Status: DISCONTINUED | OUTPATIENT
Start: 2022-10-13 | End: 2022-10-19

## 2022-10-13 RX ORDER — POLYETHYLENE GLYCOL 3350 17 G/17G
17 POWDER, FOR SOLUTION ORAL DAILY PRN
Status: DISCONTINUED | OUTPATIENT
Start: 2022-10-13 | End: 2022-10-25 | Stop reason: HOSPADM

## 2022-10-13 RX ADMIN — Medication: at 08:10

## 2022-10-13 RX ADMIN — DOCUSATE SODIUM 100 MG: 100 CAPSULE, LIQUID FILLED ORAL at 08:10

## 2022-10-13 RX ADMIN — SUCRALFATE 1 G: 1 TABLET ORAL at 08:10

## 2022-10-13 RX ADMIN — MUPIROCIN: 20 OINTMENT TOPICAL at 08:10

## 2022-10-13 RX ADMIN — TAMSULOSIN HYDROCHLORIDE 0.4 MG: 0.4 CAPSULE ORAL at 08:10

## 2022-10-13 RX ADMIN — METOPROLOL TARTRATE 12.5 MG: 25 TABLET, FILM COATED ORAL at 08:10

## 2022-10-13 RX ADMIN — SUCRALFATE 1 G: 1 TABLET ORAL at 04:10

## 2022-10-13 RX ADMIN — FAMOTIDINE 20 MG: 10 INJECTION, SOLUTION INTRAVENOUS at 08:10

## 2022-10-13 RX ADMIN — SUCRALFATE 1 G: 1 TABLET ORAL at 10:10

## 2022-10-13 RX ADMIN — FUROSEMIDE 40 MG: 10 INJECTION, SOLUTION INTRAMUSCULAR; INTRAVENOUS at 10:10

## 2022-10-13 RX ADMIN — ASPIRIN 81 MG: 81 TABLET, COATED ORAL at 08:10

## 2022-10-13 RX ADMIN — ENOXAPARIN SODIUM 40 MG: 40 INJECTION SUBCUTANEOUS at 04:10

## 2022-10-13 RX ADMIN — SODIUM PHOSPHATE, MONOBASIC, MONOHYDRATE AND SODIUM PHOSPHATE, DIBASIC, ANHYDROUS 20.01 MMOL: 142; 276 INJECTION, SOLUTION INTRAVENOUS at 12:10

## 2022-10-13 RX ADMIN — HYDROCODONE BITARTRATE AND ACETAMINOPHEN 1 TABLET: 5; 325 TABLET ORAL at 04:10

## 2022-10-13 RX ADMIN — FOLIC ACID 1 MG: 1 TABLET ORAL at 08:10

## 2022-10-13 RX ADMIN — SERTRALINE HYDROCHLORIDE 50 MG: 50 TABLET ORAL at 08:10

## 2022-10-13 NOTE — PROGRESS NOTES
CT SURGERY PROGRESS NOTE  Jessica Elias  82 y.o.  1940    Patients Procedure: Procedure(s) (LRB):  CORONARY ARTERY BYPASS GRAFT (CABG) (N/A)    Subjective  Interval History: NAEO. Patient doing well, minimal pain. Off pressors. Chest tube output 540 cc in 24hr. Slight increase in CR 1. From 0.8.     Review of Systems   Constitutional: Negative.    Respiratory:  Negative for cough, sputum production and shortness of breath.    Cardiovascular:  Negative for chest pain, palpitations, claudication and leg swelling.   Gastrointestinal:  Negative for abdominal pain, nausea and vomiting.   Genitourinary: Negative.    Skin: Negative.         Incision C/D/I     Medication List  Infusions   dextrose 5 % and 0.45 % NaCl 100 mL/hr at 10/12/22 0600    loperamide       Scheduled   aspirin  81 mg Oral Daily    docusate sodium  100 mg Oral BID    famotidine (PF)  20 mg Intravenous Daily    folic acid  1 mg Oral Daily    metoprolol tartrate  12.5 mg Oral BID    mupirocin   Nasal BID    sertraline  50 mg Oral Daily    sucralfate  1 g Oral QID (AC & HS)    tamsulosin  0.4 mg Oral QHS    vancomycin (VANCOCIN) IVPB  1,000 mg Intravenous Once    zinc oxide-cod liver oil   Topical (Top) BID       Objective:  Recent Vitals:  Temp:  [98.4 °F (36.9 °C)] 98.4 °F (36.9 °C)  Pulse:  [60-91] 78  Resp:  [14-30] 23  SpO2:  [86 %-100 %] 100 %  BP: (111-163)/(52-98) 133/90  Arterial Line BP: ()/() 163/59    Physical Exam  Vitals and nursing note reviewed.   Constitutional:       General: She is awake. She is not in acute distress.     Appearance: Normal appearance. She is not toxic-appearing or diaphoretic.   HENT:      Head: Normocephalic and atraumatic.   Eyes:      Extraocular Movements: Extraocular movements intact.      Pupils: Pupils are equal, round, and reactive to light.   Cardiovascular:      Rate and Rhythm: Normal rate and regular rhythm.      Pulses: Normal pulses.           Radial pulses are 2+ on the right side  and 2+ on the left side.        Dorsalis pedis pulses are 2+ on the right side and 2+ on the left side.      Heart sounds: Normal heart sounds.   Pulmonary:      Effort: Pulmonary effort is normal.      Breath sounds: Normal breath sounds.   Musculoskeletal:      Right lower leg: No edema.      Left lower leg: No edema.   Skin:     General: Skin is warm and dry.      Comments: INCISION C/D/I   Neurological:      General: No focal deficit present.      Mental Status: She is alert and oriented to person, place, and time.   Psychiatric:         Mood and Affect: Mood and affect normal.        I/O last 24 hrs:  Intake/Output - Last 3 Shifts         10/11 0700  10/12 0659 10/12 0700  10/13 0659 10/13 0700  10/14 0659    P.O. 240 765     I.V. (mL/kg) 4309.9 (62.9) 2162 (31.6)     Blood 400      IV Piggyback 2257 322.3     Total Intake(mL/kg) 7206.9 (105.2) 3249.2 (47.4)     Urine (mL/kg/hr) 1250 (0.8) 1590 (1)     Chest Tube 641 540     Total Output 1891 2130     Net +5315.9 +1119.2                    Labs  BMP:   Recent Labs   Lab 10/13/22  0047   *   K 4.0   CO2 24   BUN 21.5*   CREATININE 1.08*   CALCIUM 7.4*     CBC:   Recent Labs   Lab 10/13/22  0047   WBC 10.6   RBC 3.46*   HGB 10.6*   HCT 32.4*   PLT 83*   MCV 93.6   MCH 30.6   MCHC 32.7*     All pertinent labs from the last 24 hours have been reviewed.      Imaging:   CXR: X-Ray Chest AP Portable    Result Date: 10/13/2022  Stable exam without significant interval change. Electronically signed by: Joycelyn Bolanos Date:    10/13/2022 Time:    06:06   I have reviewed all pertinent imaging results/findings within the past 24 hours.        ASSESSMENT/PLAN:  - downgrade  - con chest tubes  - Is/OOB/Ambulate  - Cardiac rehab    Case and plan of care discussed with MD Scott Balderrama PA-C

## 2022-10-13 NOTE — PROGRESS NOTES
[facility] - [facility] ICU  Pulmonary Critical Care Note    Patient Name: Jessica Elias  MRN: [id number]  Admission Date: 10/11/2022  Hospital Length of Stay: 2 days  Code Status: Full Code  Attending Provider: Bety Thompson MD  Primary Care Provider: Tarik Gee MD     Subjective:     HPI:   This is an 82-year-old female with a past medical history sick sinus syndrome status post pacemaker placement, hypertension, hyperlipidemia, obstructive sleep apnea, and carotid artery to the status post previous endarterectomy who had recently been experiencing dizziness and palpitation.  She underwent a PET scan which revealed significant anemia and subsequent heart catheterization revealed triple-vessel disease.  She was admitted today on 10/11/2022 and underwent CABG x4 and SHAVON ligation. Admitted to the ICU still sedated and intubated. Not currently on any vasopressor support.     Hospital Course/Significant events:  10/11: CABG x4     24 Hour Interval History:  No acute events overnight. VSS. Afebrile. Cleviprex drip has been weaned off.  Resting comfortably in bed this morning.  On 2 L nasal cannula.  Net positive 1.1 L over 24 hours.  Nephrology and CT surgery following.    Past Medical History:   Diagnosis Date    Chronic kidney disease, unspecified     stage 3    Coronary artery disease     History of carotid artery disease     Hyperlipidemia     Hypertension     Sleep apnea        Social History     Socioeconomic History    Marital status:    Tobacco Use    Smoking status: Never    Smokeless tobacco: Never   Substance and Sexual Activity    Alcohol use: Not Currently    Drug use: Never     Social Determinants of Health     Food Insecurity: Unknown    Worried About Running Out of Food in the Last Year: Never true   Transportation Needs: Unknown    Lack of Transportation (Medical): No   Housing Stability: Unknown    Unable to Pay for Housing in the Last Year: No           Objective:      Current Outpatient Medications   Medication Instructions    amLODIPine (NORVASC) 5 mg, Oral, Daily    azelastine (ASTELIN) 137 mcg (0.1 %) nasal spray Nasal    co-enzyme Q-10 200 mg, Oral, Daily    ELIQUIS 5 mg, Oral, 2 times daily    metoprolol succinate (TOPROL-XL) 50 mg, Oral, Daily    multivitamin capsule 1 capsule, Oral, Daily    rosuvastatin (CRESTOR) 20 mg, Oral, Nightly    sertraline (ZOLOFT) 50 mg, Oral, Daily    terazosin (HYTRIN) 2 mg, Oral, Daily       Current Inpatient Medications   aspirin  81 mg Oral Daily    docusate sodium  100 mg Oral BID    famotidine (PF)  20 mg Intravenous Daily    folic acid  1 mg Oral Daily    metoprolol tartrate  12.5 mg Oral BID    mupirocin   Nasal BID    sertraline  50 mg Oral Daily    sucralfate  1 g Oral QID (AC & HS)    tamsulosin  0.4 mg Oral QHS    vancomycin (VANCOCIN) IVPB  1,000 mg Intravenous Once    zinc oxide-cod liver oil   Topical (Top) BID           Intake/Output Summary (Last 24 hours) at 10/13/2022 0713  Last data filed at 10/13/2022 0600  Gross per 24 hour   Intake 3249.23 ml   Output 2130 ml   Net 1119.23 ml       ROS:   Negative except as stated above     Vital Signs (Most Recent):  Temp: 98.4 °F (36.9 °C) (10/13/22 0400)  Pulse: 76 (10/13/22 0545)  Resp: (!) 23 (10/13/22 0545)  BP: (!) 163/85 (10/13/22 0545)  SpO2: 97 % (10/13/22 0545)  Body mass index is 25.52 kg/m².  Weight: 68.5 kg (151 lb 0.2 oz) Vital Signs (24h Range):  Temp:  [98.3 °F (36.8 °C)-98.4 °F (36.9 °C)] 98.4 °F (36.9 °C)  Pulse:  [60-78] 76  Resp:  [14-30] 23  SpO2:  [90 %-100 %] 97 %  BP: (105-163)/(50-85) 163/85  Arterial Line BP: ()/() 163/59     Physical Exam  Constitutional:       Appearance: Pleasant elderly woman. Awake and alert. Sitting in chair in NAD.  HENT:      Head: Normocephalic and atraumatic.   Cardiovascular:      Rate and Rhythm: Normal rate.      Heart sounds: Normal heart sounds.      Comments: Chest tubes present.  Pulmonary:      Effort: Pulmonary  effort is normal.      Breath sounds: Normal breath sounds.   Abdominal:      Palpations: Abdomen is soft.   Musculoskeletal:         General: Normal range of motion.      Cervical back: Neck supple.   Skin:     General: Skin is warm and dry.     Mechanical ventilation support:  2L NC     Lines/Drains/Airways       Central Venous Catheter Line  Duration             Percutaneous Central Line Insertion/Assessment - Double Lumen  10/11/22 0752 right internal jugular 1 day              Drain  Duration                  Chest Tube 10/11/22 0950 Left Midaxillary 28 Fr. 1 day         Chest Tube 10/11/22 0950 Mediastinal 28 Fr. 1 day         Urethral Catheter 10/11/22 0717 Non-latex;Temperature probe 16 Fr. 1 day              Arterial Line  Duration             Arterial Line 10/11/22  Left Radial 2 days              Line  Duration                  Pacer Wires 10/11/22 1055 1 day              Peripheral Intravenous Line  Duration                  Peripheral IV - Single Lumen 10/11/22 0546 20 G Anterior;Distal;Right Forearm 2 days                    Significant Labs:    Lab Results   Component Value Date    WBC 10.6 10/13/2022    HGB 10.6 (L) 10/13/2022    HCT 32.4 (L) 10/13/2022    MCV 93.6 10/13/2022    PLT 83 (L) 10/13/2022         BMP  Lab Results   Component Value Date     (L) 10/13/2022    K 4.0 10/13/2022    CO2 24 10/13/2022    BUN 21.5 (H) 10/13/2022    CREATININE 1.08 (H) 10/13/2022    CALCIUM 7.4 (L) 10/13/2022    EGFRNONAA 41 11/03/2021       ABG  Recent Labs   Lab 10/11/22  1656   PH 7.37   PO2 92   PCO2 46*   HCO3 26.6             Significant Imaging:  I have reviewed all pertinent imaging within the past 24 hours.        Assessment/Plan:     Assessment  Multivessel CAD s/p CABG x4 and SHAVON ligation   Hx of HTN, HLD, CKD, PAD  Hx of SSS post PPM placement    Plan  POD #2: Patient doing well.  Continue post CABG orders per protocol.   Cleviprex has been weaned off  Requiring 2 L nasal cannula, wean as  tolerated   Monitor chest tubes for excessive bleeding.  Continue blood pressure and glucose control  Nephrology on board   PT/OT, IS, OOB    Potential downgrade from ICU level of care later today     DVT Prophylaxis: SCD  GI Prophylaxis: Famotidine     Greater than 30 minutes of critical care was time spent personally by me on the following activities: development of treatment plan with patient or surrogate and bedside caregivers, discussions with consultants, evaluation of patient's response to treatment, examination of patient, ordering and performing treatments and interventions, ordering and review of laboratory studies, ordering and review of radiographic studies, pulse oximetry, re-evaluation of patient's condition.  This critical care time did not overlap with that of any other provider or involve time for any procedures.     Marco Antonio Cervantes MD, PGY-3   Pulmonary Critical Care Medicine  [facility] - [facility] ICU

## 2022-10-13 NOTE — PROGRESS NOTES
Chief complaint: none    Interval History:  Pt sitting up in chair in good spirits, wants to advance diet, no acute c/o.    Review of Systems   Constitutional: Negative.    HENT: Negative.     Respiratory:  Positive for cough.    Cardiovascular: Negative.    Gastrointestinal: Negative.    Genitourinary: Negative.    Musculoskeletal:         Post-op pain well controlled   Psychiatric/Behavioral: Negative.      all else Neg     aspirin  81 mg Oral Daily    docusate sodium  100 mg Oral BID    famotidine (PF)  20 mg Intravenous Daily    folic acid  1 mg Oral Daily    furosemide (LASIX) injection  40 mg Intravenous Once    metoprolol tartrate  12.5 mg Oral BID    mupirocin   Nasal BID    sertraline  50 mg Oral Daily    sucralfate  1 g Oral QID (AC & HS)    tamsulosin  0.4 mg Oral QHS    vancomycin (VANCOCIN) IVPB  1,000 mg Intravenous Once    zinc oxide-cod liver oil   Topical (Top) BID       Objective     VITAL SIGNS: 24 HR MIN & MAX LAST    Temp  Min: 98.4 °F (36.9 °C)  Max: 98.4 °F (36.9 °C)  98.4 °F (36.9 °C)    BP  Min: 105/74  Max: 163/85  136/79     Pulse  Min: 60  Max: 78  73     Resp  Min: 14  Max: 30  15    SpO2  Min: 86 %  Max: 100 %  98 %      Wt Readings from Last 3 Encounters:   10/12/22 68.5 kg (151 lb 0.2 oz)   09/23/22 63.1 kg (139 lb 1.8 oz)   03/26/21 64.5 kg (142 lb 3.2 oz)       Intake/Output Summary (Last 24 hours) at 10/13/2022 0957  Last data filed at 10/13/2022 0600  Gross per 24 hour   Intake 3249.23 ml   Output 2040 ml   Net 1209.23 ml       Physical Exam  Constitutional:       General: She is not in acute distress.  Cardiovascular:      Rate and Rhythm: Normal rate.   Pulmonary:      Effort: Pulmonary effort is normal. No respiratory distress.      Breath sounds: Rales present.      Comments: CT's in place  Abdominal:      General: Bowel sounds are normal.      Palpations: Abdomen is soft.      Tenderness: There is no abdominal tenderness.   Genitourinary:     Comments:  israel  Musculoskeletal:         General: Swelling present.      Cervical back: Normal range of motion.      Comments: L leg ACE wrap C/D/I   Neurological:      Mental Status: She is alert and oriented to person, place, and time.        Recent Labs     10/11/22  1020 10/11/22  1854 10/12/22  0537 10/13/22  0047    142 138 133*   K 2.7* 3.9 3.5 4.0   CHLORIDE 115* 114* 109* 104   CO2 24 24 24 24   BUN 27.5* 22.1* 19.4 21.5*   CREATININE 0.75 0.79 0.81 1.08*   GLUCOSE 156* 163* 189* 184*   CALCIUM 10.9* 8.2* 8.0* 7.4*   MG 3.20*  --   --   --    PHOS 1.8* 2.4  --   --    ALBUMIN  --   --   --  2.7*      Recent Labs     10/11/22  1854 10/12/22  0537 10/13/22  0047   WBC 9.3 12.1* 10.6   HGB 10.8* 11.1* 10.6*   HCT 33.5* 33.2* 32.4*   PLT 87* 93* 83*         Assessment & Plan     Prerenal azotemia - due to perioperative 3rd spacing, cardiorenal with improved Uout p lasix 80 mg IV yesterday.  Will continue diuresis, supportive care for now   Vol overload -  pt was up about 15 lbs post-op, will give additional lasix 40mg IV as VSS, monitor response.  Hold IVF as BP's good  CAD, POD #2 s/p CAB - routine post-op mngt per critical care, CV sx  Hypokalemia - resolved p Effer K 50 meq yesterday  Bacteruria, chronic, ? Bladdder dysfxn - she has remote h/o bladder sling, on flomax, Ucx + quinolone-resistant E coli, will order israel removal, consider abx coverage with macrobid (+ allergy to sulfa, PCN)  HTN - she is normotensive off cleviprex, monitor

## 2022-10-13 NOTE — PT/OT/SLP EVAL
Occupational Therapy   Evaluation    Name: Jessica Elias  MRN: [id number]  Admitting Diagnosis:  CABG  Recent Surgery: Procedure(s) (LRB):  CORONARY ARTERY BYPASS GRAFT (CABG) (N/A) 2 Days Post-Op    Recommendations:     Discharge Recommendations: home with home health, home (pending progress)  Discharge Equipment Recommendations:  walker, rolling  Barriers to discharge:  None    Assessment:     Jessica Elias is a 82 y.o. female with a medical diagnosis of MVCAD s/p CABG x 4.  She presents post op limitations from pain and precautions. Performance deficits affecting function: weakness, impaired endurance, impaired self care skills, impaired functional mobility, pain, impaired cardiopulmonary response to activity.  She states that her daughter plans to stay with her 2 for weeks on dc. Rec home with  services once medically stable as long as pt continues to progress as expected.     Rehab Prognosis: Good; patient would benefit from acute skilled OT services to address these deficits and reach maximum level of function.       Plan:     Patient to be seen 5 x/week, 6 x/week to address the above listed problems via self-care/home management, therapeutic activities  Plan of Care Expires: 10/27/22  Plan of Care Reviewed with: patient    Subjective     Chief Complaint: none  Patient/Family Comments/goals: to go home    Occupational Profile:  Living Environment: pt lives alone in a single story home with 1 step to enter, walk in shower with SC, and grab bars near shower/toilet  Previous level of function: independent  Roles and Routines: ADL, IADL, driving  Equipment Used at Home:  shower chair, grab bar  Assistance upon Discharge: daughter for 2 weeks    Pain/Comfort:  Pain Rating 1: 3/10  Location - Orientation 1: midline  Location 1: chest  Pain Addressed 1: Reposition, Distraction    Patients cultural, spiritual, Rastafarian conflicts given the current situation: no    Objective:     Communicated with: RN prior to  session.  Patient found up in chair with pulse ox (continuous), telemetry, blood pressure cuff, israel catheter, peripheral IV, chest tube upon OT entry to room.    General Precautions: Standard, sternal   Orthopedic Precautions:N/A   Braces: N/A  Respiratory Status: Nasal cannula, flow 2 L/min  /98  Sp02 98%  HR 75    Occupational Performance:    Functional Mobility/Transfers:  Patient completed Sit <> Stand Transfer with minimum assistance  with  cues for forward weight shift   Functional Mobility: min A with RW    Activities of Daily Living:  Feeding:  stand by assistance UIC  Grooming: stand by assistance seated  Upper Body Dressing: moderate assistance .  Lower Body Dressing: moderate assistance .  Toileting: does not occur-  israel      Physical Exam:  Sit balance SBA  Stand balance Min A  BUE WFL within sternal pxns    AMPAC 6 Click ADL:  AMPAC Total Score: 15    Treatment & Education:  OT eval performed. Education provided on OT role/goals/POC and sternal pxns.    Patient left up in chair with all lines intact    GOALS:   Multidisciplinary Problems       Occupational Therapy Goals          Problem: Occupational Therapy    Goal Priority Disciplines Outcome Interventions   Occupational Therapy Goal     OT, PT/OT Ongoing, Progressing    Description: STG: to be met in 2 weeks  1. UB dressing Mod I.  2. LB dressing Mod I.  3. Toileting, toilet t/f Mod I with LRAD.  4. Shower t/f Mod I.                        History:     Past Medical History:   Diagnosis Date    Chronic kidney disease, unspecified     stage 3    Coronary artery disease     History of carotid artery disease     Hyperlipidemia     Hypertension     Sleep apnea          Past Surgical History:   Procedure Laterality Date    APPENDECTOMY N/A     BLADDER SURGERY N/A     CAROTID ENDARTERECTOMY N/A     CATARACT EXTRACTION N/A     CORONARY ARTERY BYPASS GRAFT (CABG) N/A 10/11/2022    Procedure: CORONARY ARTERY BYPASS GRAFT (CABG);  Surgeon: Bety JERNIGAN  MD Donna;  Location: [facility] OR;  Service: Cardiothoracic;  Laterality: N/A;    HYSTERECTOMY N/A     INSERTION OF PERMANENT PACEMAKER N/A     LEFT HEART CATHETERIZATION Left 09/23/2022    Procedure: CATHETERIZATION, HEART, LEFT;  Surgeon: Abner Mitchell MD;  Location: [facility] CATH LAB;  Service: Cardiology;  Laterality: Left;  LHC VIA RRA    lump removed from right shoulder         Time Tracking:     OT Date of Treatment: 10/13/22  OT Start Time: 0830  OT Stop Time: 0847  OT Total Time (min): 17 min    Billable Minutes:Evaluation moderate    10/13/2022

## 2022-10-13 NOTE — PLAN OF CARE
Goals to be met by: 2022     Patient will increase functional independence with mobility by performin. Supine to sit with Modified [place]  2. Sit to supine with Modified [place]  3. Sit to stand transfer with Modified [place]  4. Gait  x 500 feet with Modified [place] using Rolling Walker vs LRAD.

## 2022-10-13 NOTE — PLAN OF CARE
Problem: Occupational Therapy  Goal: Occupational Therapy Goal  Description: STG: to be met in 2 weeks  1. UB dressing Mod I.  2. LB dressing Mod I.  3. Toileting, toilet t/f Mod I with LRAD.  4. Shower t/f Mod I.   Outcome: Ongoing, Progressing

## 2022-10-13 NOTE — PT/OT/SLP EVAL
Physical Therapy Evaluation    Patient Name:  Jessica Elias   MRN:  [id number]    Recommendations:     Discharge Recommendations:  rehabilitation facility, home with home health   Discharge Equipment Recommendations: walker, rolling   Barriers to discharge:  acuity of illness    Assessment:     Jessica Elias is a 82 y.o. female admitted with a medical diagnosis of CAD, s/p CABG.  She presents with the following impairments/functional limitations:  weakness, impaired endurance, impaired self care skills, impaired functional mobility, gait instability, pain.    Rehab Prognosis: Good; patient would benefit from acute skilled PT services to address these deficits and reach maximum level of function.    Recent Surgery: Procedure(s) (LRB):  CORONARY ARTERY BYPASS GRAFT (CABG) (N/A) 2 Days Post-Op    Plan:     During this hospitalization, patient to be seen 6 x/week to address the identified rehab impairments via gait training, therapeutic activities, therapeutic exercises, neuromuscular re-education and progress toward the following goals:    Plan of Care Expires:  11/12/22    Subjective     Chief Complaint: pain  Patient/Family Comments/goals: to be independent  Pain/Comfort:  Pain Rating 1: 3/10  Location - Side 1: Left  Location 1: rib(s)    Patients cultural, spiritual, Temple conflicts given the current situation: no    Living Environment:  Pt lives alone in a [facility] home with one step to enter.  Prior to admission, patients level of function was independent.  Equipment used at home: none.  DME owned (not currently used): rolling walker.  Upon discharge, patient will have assistance from unknown.    Objective:     Communicated with RN prior to session.  Patient found up in chair with telemetry, peripheral IV, chest tube, israel catheter (Kalyan mat on chair)  upon PT entry to room.    General Precautions: Standard,  (GABG precautions)   Orthopedic Precautions:    Braces:    Respiratory Status: Nasal cannula, flow  2 L/min    Exams:  Cognitive Exam:  Patient is oriented to Person, Place, Time, and Situation  RLE ROM: WFL  RLE Strength: WFL  LLE ROM: WFL  LLE Strength: WFL    Functional Mobility:  Transfers:     Sit to Stand:  minimum assistance with rolling walker  Gait: pt ambulated 80 ft with a slow steady gt pattern with a RW and Lila. Pt required mutliple VC's to navigate obstacles in the hallway.         AM-PAC 6 CLICK MOBILITY  Total Score:18     Patient left up in chair with all lines intact, call button in reach, and RN notified. Seat cushion in place for pressure ulcer prevention    GOALS:   Multidisciplinary Problems       Physical Therapy Goals          Problem: Physical Therapy    Goal Priority Disciplines Outcome Goal Variances Interventions   Physical Therapy Goal     PT, PT/OT      Description: Goals to be met by: 2022     Patient will increase functional independence with mobility by performin. Supine to sit with Modified [place]  2. Sit to supine with Modified [place]  3. Sit to stand transfer with Modified [place]  4. Gait  x 500 feet with Modified [place] using Rolling Walker vs LRAD.                          History:     Past Medical History:   Diagnosis Date    Chronic kidney disease, unspecified     stage 3    Coronary artery disease     History of carotid artery disease     Hyperlipidemia     Hypertension     Sleep apnea        Past Surgical History:   Procedure Laterality Date    APPENDECTOMY N/A     BLADDER SURGERY N/A     CAROTID ENDARTERECTOMY N/A     CATARACT EXTRACTION N/A     CORONARY ARTERY BYPASS GRAFT (CABG) N/A 10/11/2022    Procedure: CORONARY ARTERY BYPASS GRAFT (CABG);  Surgeon: Bety Thompson MD;  Location: [facility];  Service: Cardiothoracic;  Laterality: N/A;    HYSTERECTOMY N/A     INSERTION OF PERMANENT PACEMAKER N/A     LEFT HEART CATHETERIZATION Left 2022    Procedure: CATHETERIZATION, HEART, LEFT;  Surgeon: Abner Mitchell MD;  Location: [facility]  CATH LAB;  Service: Cardiology;  Laterality: Left;  LHC VIA RRA    lump removed from right shoulder         Time Tracking:     PT Received On: 10/13/22  PT Start Time: 1118     PT Stop Time: 1128  PT Total Time (min): 10 min     Billable Minutes: Evaluation , moderate complexity      10/13/2022

## 2022-10-13 NOTE — PROGRESS NOTES
"Inpatient Nutrition Evaluation    Admit Date: 10/11/2022   Total duration of encounter: 2 days    Nutrition Recommendation/Prescription     Continue current diet as tolerated.    Nutrition Assessment     Chart Review    Reason Seen: continuous nutrition monitoring    Diagnosis:  Multivessel CAD s/p CABG x4 and SHAVON ligation     Relevant Medical History: CKD, CAD, HLD, HTN    Nutrition-Related Medications: D5-1/2NS @ 100ml/hr    Nutrition-Related Labs:  10/13 Na 133, BUN 21.5, Crea 1.08, Glu 184, Mg 3.2    Diet Order: Diet Adult Regular  Oral Supplement Order: none at this time  Appetite/Oral Intake: good/% of meals  Factors Affecting Nutritional Intake: none identified at this time  Food/Buddhist/Cultural Preferences: none reported    Skin Integrity: incision  Wound(s):   incision noted    Comments    10/13/22: Pt with good appetite, tolerating meals.    Anthropometrics    Height: 5' 4.5" (163.8 cm) Height Method: Stated  Last Weight: 68.5 kg (151 lb 0.2 oz) (10/12/22 0600) Weight Method: Standard Scale  BMI (Calculated): 25.5  BMI Classification: overweight (BMI 25-29.9)     Ideal Body Weight (IBW), Female: 122.5 lb     % Ideal Body Weight, Female (lb): 111.94 %                             Usual Weight Provided By: not applicable    Wt Readings from Last 5 Encounters:   10/12/22 68.5 kg (151 lb 0.2 oz)   09/23/22 63.1 kg (139 lb 1.8 oz)   03/26/21 64.5 kg (142 lb 3.2 oz)     Weight Change(s) Since Admission:  Admit Weight: 62.2 kg (137 lb 2 oz) (10/11/22 0556)  10/13/22: 68.5kg (post-op CABG)    Patient Education    Not applicable.    Monitoring & Evaluation     Dietitian will monitor energy intake.  Nutrition Risk/Follow-Up: low (follow-up in 5-7 days)  Patients assigned 'low nutrition risk' status do not qualify for a full nutritional assessment but will be monitored and re-evaluated in a 5-7 day time period.   "

## 2022-10-13 NOTE — PROGRESS NOTES
"   10/13/22 1015   Pre Exercise Vitals   /64   Pulse 74  (paced)   Supplemental O2? Yes   O2 Device nasal cannula   O2 Flow (L/min) 2   SpO2 98 %   During Exercise Vitals   Supplemental O2? Yes   O2 Device nasal cannula   O2 Flow (L/min) 3   SpO2 (!) 86 %   Distance Walked 25 feet   Post Exercise Vitals   /76   Pulse 77  (paced)   Supplemental O2? Yes   O2 Device nasal cannula   O2 Flow (L/min) 2   SpO2 98 %   Modality   Modality   (rollator)   Communicated with nurse prior to activity.   Jhonatan, CT x2, 02 cont.  Min assist to ambulate.  Desats with activity but quickly recovers.  Ambulation limited by pt's "feeling like I need to pee" (jhonatan draining without obstruction).  Encouraged increased activity and use of IS.  Sternal precautions reviewed and maintained.     "

## 2022-10-14 LAB
ALBUMIN SERPL-MCNC: 2.6 GM/DL (ref 3.4–4.8)
ALBUMIN/GLOB SERPL: 1.2 RATIO (ref 1.1–2)
ALP SERPL-CCNC: 44 UNIT/L (ref 40–150)
ALT SERPL-CCNC: 26 UNIT/L (ref 0–55)
AST SERPL-CCNC: 27 UNIT/L (ref 5–34)
BACTERIA UR CULT: NORMAL
BASOPHILS # BLD AUTO: 0.03 X10(3)/MCL (ref 0–0.2)
BASOPHILS NFR BLD AUTO: 0.3 %
BILIRUBIN DIRECT+TOT PNL SERPL-MCNC: 0.7 MG/DL
BUN SERPL-MCNC: 23.7 MG/DL (ref 9.8–20.1)
CALCIUM SERPL-MCNC: 8.2 MG/DL (ref 8.4–10.2)
CHLORIDE SERPL-SCNC: 104 MMOL/L (ref 98–107)
CO2 SERPL-SCNC: 24 MMOL/L (ref 23–31)
CREAT SERPL-MCNC: 1.02 MG/DL (ref 0.55–1.02)
EOSINOPHIL # BLD AUTO: 0.18 X10(3)/MCL (ref 0–0.9)
EOSINOPHIL NFR BLD AUTO: 1.9 %
ERYTHROCYTE [DISTWIDTH] IN BLOOD BY AUTOMATED COUNT: 14.4 % (ref 11.5–17)
GFR SERPLBLD CREATININE-BSD FMLA CKD-EPI: 56 MLS/MIN/1.73/M2
GLOBULIN SER-MCNC: 2.2 GM/DL (ref 2.4–3.5)
GLUCOSE SERPL-MCNC: 118 MG/DL (ref 82–115)
HCT VFR BLD AUTO: 32.6 % (ref 37–47)
HGB BLD-MCNC: 10.7 GM/DL (ref 12–16)
IMM GRANULOCYTES # BLD AUTO: 0.04 X10(3)/MCL (ref 0–0.04)
IMM GRANULOCYTES NFR BLD AUTO: 0.4 %
LYMPHOCYTES # BLD AUTO: 1.03 X10(3)/MCL (ref 0.6–4.6)
LYMPHOCYTES NFR BLD AUTO: 10.8 %
MCH RBC QN AUTO: 30.7 PG (ref 27–31)
MCHC RBC AUTO-ENTMCNC: 32.8 MG/DL (ref 33–36)
MCV RBC AUTO: 93.7 FL (ref 80–94)
MONOCYTES # BLD AUTO: 0.89 X10(3)/MCL (ref 0.1–1.3)
MONOCYTES NFR BLD AUTO: 9.3 %
NEUTROPHILS # BLD AUTO: 7.4 X10(3)/MCL (ref 2.1–9.2)
NEUTROPHILS NFR BLD AUTO: 77.3 %
NRBC BLD AUTO-RTO: 0 %
PLATELET # BLD AUTO: 103 X10(3)/MCL (ref 130–400)
PMV BLD AUTO: 13.3 FL (ref 7.4–10.4)
POCT GLUCOSE: 107 MG/DL (ref 70–110)
POCT GLUCOSE: 117 MG/DL (ref 70–110)
POCT GLUCOSE: 152 MG/DL (ref 70–110)
POCT GLUCOSE: 203 MG/DL (ref 70–110)
POTASSIUM SERPL-SCNC: 3.3 MMOL/L (ref 3.5–5.1)
PROT SERPL-MCNC: 4.8 GM/DL (ref 5.8–7.6)
RBC # BLD AUTO: 3.48 X10(6)/MCL (ref 4.2–5.4)
SODIUM SERPL-SCNC: 135 MMOL/L (ref 136–145)
WBC # SPEC AUTO: 9.5 X10(3)/MCL (ref 4.5–11.5)

## 2022-10-14 PROCEDURE — 97116 GAIT TRAINING THERAPY: CPT | Mod: CQ

## 2022-10-14 PROCEDURE — 97110 THERAPEUTIC EXERCISES: CPT

## 2022-10-14 PROCEDURE — 63600175 PHARM REV CODE 636 W HCPCS

## 2022-10-14 PROCEDURE — 63600175 PHARM REV CODE 636 W HCPCS: Performed by: INTERNAL MEDICINE

## 2022-10-14 PROCEDURE — 99024 POSTOP FOLLOW-UP VISIT: CPT | Mod: ,,, | Performed by: PHYSICIAN ASSISTANT

## 2022-10-14 PROCEDURE — 25000003 PHARM REV CODE 250: Performed by: PHYSICIAN ASSISTANT

## 2022-10-14 PROCEDURE — 36415 COLL VENOUS BLD VENIPUNCTURE: CPT | Performed by: PHYSICIAN ASSISTANT

## 2022-10-14 PROCEDURE — 63600175 PHARM REV CODE 636 W HCPCS: Performed by: PHYSICIAN ASSISTANT

## 2022-10-14 PROCEDURE — 99024 PR POST-OP FOLLOW-UP VISIT: ICD-10-PCS | Mod: ,,, | Performed by: PHYSICIAN ASSISTANT

## 2022-10-14 PROCEDURE — 85025 COMPLETE CBC W/AUTO DIFF WBC: CPT | Performed by: PHYSICIAN ASSISTANT

## 2022-10-14 PROCEDURE — 25000003 PHARM REV CODE 250: Performed by: INTERNAL MEDICINE

## 2022-10-14 PROCEDURE — 80053 COMPREHEN METABOLIC PANEL: CPT | Performed by: PHYSICIAN ASSISTANT

## 2022-10-14 PROCEDURE — 11000001 HC ACUTE MED/SURG PRIVATE ROOM

## 2022-10-14 PROCEDURE — 21400001 HC TELEMETRY ROOM

## 2022-10-14 PROCEDURE — 97530 THERAPEUTIC ACTIVITIES: CPT | Mod: CQ

## 2022-10-14 PROCEDURE — 94760 N-INVAS EAR/PLS OXIMETRY 1: CPT

## 2022-10-14 RX ORDER — FUROSEMIDE 10 MG/ML
40 INJECTION INTRAMUSCULAR; INTRAVENOUS ONCE
Status: COMPLETED | OUTPATIENT
Start: 2022-10-14 | End: 2022-10-14

## 2022-10-14 RX ADMIN — FAMOTIDINE 20 MG: 10 INJECTION, SOLUTION INTRAVENOUS at 08:10

## 2022-10-14 RX ADMIN — Medication: at 07:10

## 2022-10-14 RX ADMIN — METOPROLOL TARTRATE 12.5 MG: 25 TABLET, FILM COATED ORAL at 07:10

## 2022-10-14 RX ADMIN — FOLIC ACID 1 MG: 1 TABLET ORAL at 08:10

## 2022-10-14 RX ADMIN — DOCUSATE SODIUM 100 MG: 100 CAPSULE, LIQUID FILLED ORAL at 08:10

## 2022-10-14 RX ADMIN — Medication: at 08:10

## 2022-10-14 RX ADMIN — SUCRALFATE 1 G: 1 TABLET ORAL at 07:10

## 2022-10-14 RX ADMIN — METOPROLOL TARTRATE 12.5 MG: 25 TABLET, FILM COATED ORAL at 08:10

## 2022-10-14 RX ADMIN — MUPIROCIN: 20 OINTMENT TOPICAL at 08:10

## 2022-10-14 RX ADMIN — INSULIN ASPART 2 UNITS: 100 INJECTION, SOLUTION INTRAVENOUS; SUBCUTANEOUS at 11:10

## 2022-10-14 RX ADMIN — ENOXAPARIN SODIUM 40 MG: 40 INJECTION SUBCUTANEOUS at 04:10

## 2022-10-14 RX ADMIN — SUCRALFATE 1 G: 1 TABLET ORAL at 04:10

## 2022-10-14 RX ADMIN — FUROSEMIDE 40 MG: 10 INJECTION, SOLUTION INTRAMUSCULAR; INTRAVENOUS at 12:10

## 2022-10-14 RX ADMIN — SERTRALINE HYDROCHLORIDE 50 MG: 50 TABLET ORAL at 08:10

## 2022-10-14 RX ADMIN — ASPIRIN 81 MG: 81 TABLET, COATED ORAL at 08:10

## 2022-10-14 RX ADMIN — TAMSULOSIN HYDROCHLORIDE 0.4 MG: 0.4 CAPSULE ORAL at 08:10

## 2022-10-14 RX ADMIN — SUCRALFATE 1 G: 1 TABLET ORAL at 05:10

## 2022-10-14 RX ADMIN — SUCRALFATE 1 G: 1 TABLET ORAL at 11:10

## 2022-10-14 RX ADMIN — POTASSIUM BICARBONATE 50 MEQ: 977.5 TABLET, EFFERVESCENT ORAL at 12:10

## 2022-10-14 RX ADMIN — HYDROCODONE BITARTRATE AND ACETAMINOPHEN 1 TABLET: 5; 325 TABLET ORAL at 06:10

## 2022-10-14 NOTE — PT/OT/SLP PROGRESS
Occupational Therapy   Treatment    Name: Jessica Elias  MRN: [id number]  Admitting Diagnosis:  CABG  Recent Surgery: Procedure(s) (LRB):  CORONARY ARTERY BYPASS GRAFT (CABG) (N/A) 3 Days Post-Op    Recommendations:     Discharge Recommendations: home with HH and family assist vs rehab (TBD)  Discharge Equipment Recommendations:  walker, rolling  Barriers to discharge: acuity of illness    Assessment:     Jessica Elias is a 82 y.o. female with a medical diagnosis of of MVCAD s/p CABG x 4.  She presents motivated to participate in therapy. Performance deficits affecting function are weakness, impaired endurance, impaired self care skills, impaired functional mobility, impaired cardiopulmonary response to activity.     Rehab Prognosis:  Good; patient would benefit from acute skilled OT services to address these deficits and reach maximum level of function.       Plan:     Patient to be seen 5 x/week, 6 x/week to address the above listed problems via self-care/home management, therapeutic activities  Plan of Care Expires: 10/27/22  Plan of Care Reviewed with: patient    Subjective     Pain/Comfort:  Pt denied any pain during session.    Objective:     Communicated with: RN prior to session. Patient found up in chair with pulse ox (continuous), telemetry, chest tubes x2 upon OT entry to room.    General Precautions: Standard, fall, sternal   Respiratory Status: Room air  Vitals:  BP: 129/60  NE: 69-80  O2: 82-96%. Pt's O2 able to recover from 80s to WNL with rest and use of pursued lip breathing technique. RN notified.     Occupational Performance:     Functional Mobility/Transfers:  Patient completed Sit <> Stand Transfers with CGA progressing to SBA provided  and with BUEs positioned across chest, holding cardiac bear.   Functional Mobility: Pt transferred on/off medical scale (for RN to record pt's current weight) with min A x2 provided during session.    Treatment & Education:  Patient performed sit to stand  exercise 3x5 with CGA progressing to SBA, rest breaks x2 provided, and with BUEs positioned across chest (holding cardiac bear). Pt participated in therapeutic exercise to increase strength and endurance of BLEs and standing balance needed for ADLs.   OT provided education on pursued lip breathing technique to conserve pt's energy and increase O2 intake during session. Pt verbalized and demonstrated good understanding.    Patient left up in chair with all lines intact and call button in reach    GOALS:   Multidisciplinary Problems       Occupational Therapy Goals          Problem: Occupational Therapy    Goal Priority Disciplines Outcome Interventions   Occupational Therapy Goal     OT, PT/OT Ongoing, Progressing    Description: STG: to be met in 2 weeks  1. UB dressing Mod I.  2. LB dressing Mod I.  3. Toileting, toilet t/f Mod I with LRAD.  4. Shower t/f Mod I.                        Time Tracking:     OT Date of Treatment: 10/14/22  OT Start Time: 1100  OT Stop Time: 1122  OT Total Time (min): 22 min    Billable Minutes:Therapeutic Exercise 22 mins    OT/JACKIE: OT     JACKIE Visit Number: 1    10/14/2022

## 2022-10-14 NOTE — PROGRESS NOTES
10/14/22 0900   Pre Exercise Vitals   /67   Pulse 79   Supplemental O2? No   SpO2 91 %   During Exercise Vitals   Pulse 88   Supplemental O2? No   SpO2 91 %   Distance Walked 80 feet   Post Exercise Vitals   /73   Pulse 77   Supplemental O2? No   SpO2 92 %   Modality   Modality Walker   Minimal assist to stand; maintained sternal precautions; demonstrated IS @500; ambulated 80 feet using rolling walker, O2 sat @88% recovers to 91% with standing rest break

## 2022-10-14 NOTE — PROGRESS NOTES
Chief complaint: none    Interval History:  Pt sitting in chair reading, no c/o, no SOB, appetite improving    Review of Systems   Constitutional: Negative.    HENT: Negative.     Respiratory: Negative.     Cardiovascular: Negative.    Gastrointestinal: Negative.    Genitourinary: Negative.    Musculoskeletal: Negative.    Neurological: Negative.    Psychiatric/Behavioral: Negative.      all else Neg     aspirin  81 mg Oral Daily    docusate sodium  100 mg Oral BID    enoxaparin  40 mg Subcutaneous Daily    famotidine (PF)  20 mg Intravenous Daily    folic acid  1 mg Oral Daily    metoprolol tartrate  12.5 mg Oral BID    mupirocin   Nasal BID    sertraline  50 mg Oral Daily    sucralfate  1 g Oral QID (AC & HS)    tamsulosin  0.4 mg Oral QHS    vancomycin (VANCOCIN) IVPB  1,000 mg Intravenous Once    zinc oxide-cod liver oil   Topical (Top) BID       Objective     VITAL SIGNS: 24 HR MIN & MAX LAST    Temp  Min: 98.3 °F (36.8 °C)  Max: 98.6 °F (37 °C)  98.4 °F (36.9 °C)    BP  Min: 115/56  Max: 145/74  129/60     Pulse  Min: 66  Max: 80  80     Resp  Min: 18  Max: 29  20    SpO2  Min: 90 %  Max: 97 %  (!) 91 %      Wt Readings from Last 3 Encounters:   10/14/22 70 kg (154 lb 4.8 oz)   09/23/22 63.1 kg (139 lb 1.8 oz)   03/26/21 64.5 kg (142 lb 3.2 oz)       Intake/Output Summary (Last 24 hours) at 10/14/2022 1227  Last data filed at 10/14/2022 0458  Gross per 24 hour   Intake --   Output 1001 ml   Net -1001 ml       Physical Exam  Constitutional:       General: She is not in acute distress.  Cardiovascular:      Rate and Rhythm: Normal rate.   Pulmonary:      Effort: Pulmonary effort is normal. No respiratory distress.      Comments: CT's in place  Abdominal:      General: Bowel sounds are normal.      Palpations: Abdomen is soft.      Tenderness: There is no abdominal tenderness.   Musculoskeletal:         General: Swelling present.      Cervical back: Normal range of motion.   Neurological:      Mental Status:  She is alert and oriented to person, place, and time. Mental status is at baseline.        Recent Labs     10/11/22  1854 10/12/22  0537 10/12/22  0537 10/13/22  0047 10/14/22  0445    138  --  133* 135*   K 3.9 3.5  --  4.0 3.3*   CHLORIDE 114* 109*  --  104 104   CO2 24 24  --  24 24   BUN 22.1* 19.4  --  21.5* 23.7*   CREATININE 0.79 0.81  --  1.08* 1.02   GLUCOSE 163* 189*  --  184* 118*   CALCIUM 8.2* 8.0*  --  7.4* 8.2*   PHOS 2.4  --   --   --   --    ALBUMIN  --   --    < > 2.7* 2.6*    < > = values in this interval not displayed.      Recent Labs     10/12/22  0537 10/13/22  0047 10/14/22  0445   WBC 12.1* 10.6 9.5   HGB 11.1* 10.6* 10.7*   HCT 33.2* 32.4* 32.6*   PLT 93* 83* 103*         Assessment & Plan     Prerenal azotemia - due to perioperative 3rd spacing, cardiorenal, resolved with diuresis, continue supportive care  Vol overload -  pt was up about 15 lbs post-op, will give additional lasix 40mg IV, monitor response.    CAD, POD #3 s/p CAB - routine post-op mngt per CV sx  Hypokalemia - will give additional Effer K 50 meq   Bacteruria, chronic, ? Bladdder dysfxn - she has remote h/o bladder sling, on flomax, Ucx + quinolone-resistant E coli, israel removed, consider abx coverage with macrobid if symptomatic (+ allergy to sulfa, PCN)  HTN - she is normotensive off cleviprex, diurese    Will sign off, please call for questions / concerns

## 2022-10-14 NOTE — PLAN OF CARE
Problem: Adult Inpatient Plan of Care  Goal: Plan of Care Review  Outcome: Ongoing, Progressing  Goal: Patient-Specific Goal (Individualized)  Outcome: Ongoing, Progressing  Goal: Absence of Hospital-Acquired Illness or Injury  Outcome: Ongoing, Progressing  Goal: Optimal Comfort and Wellbeing  Outcome: Ongoing, Progressing  Goal: Readiness for Transition of Care  Outcome: Ongoing, Progressing     Problem: Infection  Goal: Absence of Infection Signs and Symptoms  Outcome: Ongoing, Progressing     Problem: Fall Injury Risk  Goal: Absence of Fall and Fall-Related Injury  Outcome: Ongoing, Progressing     Problem: Skin Injury Risk Increased  Goal: Skin Health and Integrity  Outcome: Ongoing, Progressing     Problem: Ventilator-Induced Lung Injury (Mechanical Ventilation, Invasive)  Goal: Absence of Ventilator-Induced Lung Injury  Outcome: Ongoing, Progressing     Problem: Impaired Wound Healing  Goal: Optimal Wound Healing  Outcome: Ongoing, Progressing

## 2022-10-15 LAB
ABO + RH BLD: NORMAL
ANION GAP SERPL CALC-SCNC: 9 MEQ/L
BLD PROD TYP BPU: NORMAL
BLOOD UNIT EXPIRATION DATE: NORMAL
BLOOD UNIT TYPE CODE: 8400
BUN SERPL-MCNC: 28.2 MG/DL (ref 9.8–20.1)
CALCIUM SERPL-MCNC: 8.5 MG/DL (ref 8.4–10.2)
CHLORIDE SERPL-SCNC: 105 MMOL/L (ref 98–107)
CO2 SERPL-SCNC: 27 MMOL/L (ref 23–31)
CREAT SERPL-MCNC: 0.97 MG/DL (ref 0.55–1.02)
CREAT/UREA NIT SERPL: 29
CROSSMATCH INTERPRETATION: NORMAL
DISPENSE STATUS: NORMAL
GFR SERPLBLD CREATININE-BSD FMLA CKD-EPI: 59 MLS/MIN/1.73/M2
GLUCOSE SERPL-MCNC: 112 MG/DL (ref 82–115)
POCT GLUCOSE: 106 MG/DL (ref 70–110)
POCT GLUCOSE: 114 MG/DL (ref 70–110)
POCT GLUCOSE: 121 MG/DL (ref 70–110)
POTASSIUM SERPL-SCNC: 3.8 MMOL/L (ref 3.5–5.1)
SODIUM SERPL-SCNC: 141 MMOL/L (ref 136–145)
UNIT NUMBER: NORMAL

## 2022-10-15 PROCEDURE — 80048 BASIC METABOLIC PNL TOTAL CA: CPT | Performed by: INTERNAL MEDICINE

## 2022-10-15 PROCEDURE — 99024 PR POST-OP FOLLOW-UP VISIT: ICD-10-PCS | Mod: ,,, | Performed by: PHYSICIAN ASSISTANT

## 2022-10-15 PROCEDURE — 25000003 PHARM REV CODE 250: Performed by: PHYSICIAN ASSISTANT

## 2022-10-15 PROCEDURE — 11000001 HC ACUTE MED/SURG PRIVATE ROOM

## 2022-10-15 PROCEDURE — 97530 THERAPEUTIC ACTIVITIES: CPT | Mod: CQ

## 2022-10-15 PROCEDURE — 97110 THERAPEUTIC EXERCISES: CPT

## 2022-10-15 PROCEDURE — 36415 COLL VENOUS BLD VENIPUNCTURE: CPT | Performed by: INTERNAL MEDICINE

## 2022-10-15 PROCEDURE — 93010 ELECTROCARDIOGRAM REPORT: CPT | Mod: ,,, | Performed by: INTERNAL MEDICINE

## 2022-10-15 PROCEDURE — 27000221 HC OXYGEN, UP TO 24 HOURS

## 2022-10-15 PROCEDURE — 99024 POSTOP FOLLOW-UP VISIT: CPT | Mod: ,,, | Performed by: PHYSICIAN ASSISTANT

## 2022-10-15 PROCEDURE — 63600175 PHARM REV CODE 636 W HCPCS: Performed by: PHYSICIAN ASSISTANT

## 2022-10-15 PROCEDURE — 21400001 HC TELEMETRY ROOM

## 2022-10-15 PROCEDURE — 93010 EKG 12-LEAD: ICD-10-PCS | Mod: ,,, | Performed by: INTERNAL MEDICINE

## 2022-10-15 PROCEDURE — 97116 GAIT TRAINING THERAPY: CPT | Mod: CQ

## 2022-10-15 PROCEDURE — 94760 N-INVAS EAR/PLS OXIMETRY 1: CPT

## 2022-10-15 PROCEDURE — 25000003 PHARM REV CODE 250: Performed by: INTERNAL MEDICINE

## 2022-10-15 PROCEDURE — 93005 ELECTROCARDIOGRAM TRACING: CPT

## 2022-10-15 PROCEDURE — 25000003 PHARM REV CODE 250: Performed by: NURSE PRACTITIONER

## 2022-10-15 RX ORDER — ATORVASTATIN CALCIUM 40 MG/1
40 TABLET, FILM COATED ORAL DAILY
Status: DISCONTINUED | OUTPATIENT
Start: 2022-10-15 | End: 2022-10-25 | Stop reason: HOSPADM

## 2022-10-15 RX ORDER — METOPROLOL SUCCINATE 50 MG/1
50 TABLET, EXTENDED RELEASE ORAL DAILY
Status: DISCONTINUED | OUTPATIENT
Start: 2022-10-15 | End: 2022-10-18

## 2022-10-15 RX ORDER — VALSARTAN 80 MG/1
80 TABLET ORAL DAILY
Status: DISCONTINUED | OUTPATIENT
Start: 2022-10-15 | End: 2022-10-16

## 2022-10-15 RX ADMIN — Medication: at 09:10

## 2022-10-15 RX ADMIN — ENOXAPARIN SODIUM 40 MG: 40 INJECTION SUBCUTANEOUS at 04:10

## 2022-10-15 RX ADMIN — ATORVASTATIN CALCIUM 40 MG: 40 TABLET, FILM COATED ORAL at 04:10

## 2022-10-15 RX ADMIN — SUCRALFATE 1 G: 1 TABLET ORAL at 05:10

## 2022-10-15 RX ADMIN — DOCUSATE SODIUM 100 MG: 100 CAPSULE, LIQUID FILLED ORAL at 09:10

## 2022-10-15 RX ADMIN — DOCUSATE SODIUM 100 MG: 100 CAPSULE, LIQUID FILLED ORAL at 08:10

## 2022-10-15 RX ADMIN — METOPROLOL SUCCINATE 50 MG: 50 TABLET, EXTENDED RELEASE ORAL at 04:10

## 2022-10-15 RX ADMIN — MUPIROCIN: 20 OINTMENT TOPICAL at 08:10

## 2022-10-15 RX ADMIN — FAMOTIDINE 20 MG: 10 INJECTION, SOLUTION INTRAVENOUS at 08:10

## 2022-10-15 RX ADMIN — Medication: at 08:10

## 2022-10-15 RX ADMIN — SUCRALFATE 1 G: 1 TABLET ORAL at 04:10

## 2022-10-15 RX ADMIN — VALSARTAN 80 MG: 80 TABLET, FILM COATED ORAL at 12:10

## 2022-10-15 RX ADMIN — AMIODARONE HYDROCHLORIDE 0.5 MG/MIN: 1.8 INJECTION, SOLUTION INTRAVENOUS at 12:10

## 2022-10-15 RX ADMIN — ASPIRIN 81 MG: 81 TABLET, COATED ORAL at 08:10

## 2022-10-15 RX ADMIN — MUPIROCIN: 20 OINTMENT TOPICAL at 09:10

## 2022-10-15 RX ADMIN — SERTRALINE HYDROCHLORIDE 50 MG: 50 TABLET ORAL at 08:10

## 2022-10-15 RX ADMIN — AMIODARONE HYDROCHLORIDE 150 MG: 1.5 INJECTION, SOLUTION INTRAVENOUS at 06:10

## 2022-10-15 RX ADMIN — SUCRALFATE 1 G: 1 TABLET ORAL at 10:10

## 2022-10-15 RX ADMIN — AMIODARONE HYDROCHLORIDE 1 MG/MIN: 1.8 INJECTION, SOLUTION INTRAVENOUS at 06:10

## 2022-10-15 RX ADMIN — FOLIC ACID 1 MG: 1 TABLET ORAL at 08:10

## 2022-10-15 RX ADMIN — METOPROLOL TARTRATE 12.5 MG: 25 TABLET, FILM COATED ORAL at 08:10

## 2022-10-15 RX ADMIN — SUCRALFATE 1 G: 1 TABLET ORAL at 08:10

## 2022-10-15 RX ADMIN — TAMSULOSIN HYDROCHLORIDE 0.4 MG: 0.4 CAPSULE ORAL at 08:10

## 2022-10-15 NOTE — PT/OT/SLP PROGRESS
Physical Therapy  Treatment    Jessica Elias   MRN: [id number]   Admitting Diagnosis: <principal problem not specified>       PT Start Time: 1500     PT Stop Time: 1524    PT Total Time (min): 24 min       Billable Minutes:  Gait Training 12 and Therapeutic Activity 12    Treatment Type: Treatment  PT/PTA: PTA     PTA Visit Number: 1       General Precautions: Standard,  (GABG precautions)  Orthopedic Precautions:     Braces:    Respiratory Status: Room air    Spiritual, Cultural Beliefs, Yazdanism Practices, Values that Affect Care: no    Subjective:  Communicated with NSG prior to session.         Objective:        Functional Mobility     Transfers:   Sit to/from stand. Cues for sternal pxns when coming to standing. Pt demos good follow through.      Gait: Pt ambulated ~15ft to/from the commode without AD; pt states she has been walking without RW to the bathroom. Pt demos a slow gait speed with decreased SOLEDAD foot clearance and flexed posture. Encouraged use of the RW for all mobility at this time for safety. Assist with donning underwear. Pt ambulated ~100ft in the hallway. Min A/CGA with RW. Slow but steady gait. Pt very motivated.           AM-PAC 6 CLICK MOBILITY  How much help from another person does this patient currently need?   1 = Unable, Total/Dependent Assistance  2 = A lot, Maximum/Moderate Assistance  3 = A little, Minimum/Contact Guard/Supervision  4 = None, Modified [place]/Independent         AM-PAC Raw Score CMS G-Code Modifier Level of Impairment Assistance   6 % Total / Unable   7 - 9 CM 80 - 100% Maximal Assist   10 - 14 CL 60 - 80% Moderate Assist   15 - 19 CK 40 - 60% Moderate Assist   20 - 22 CJ 20 - 40% Minimal Assist   23 CI 1-20% SBA / CGA   24 CH 0% Independent/ Mod I     Patient left up in chair with all lines intact, call button in reach, and geomat in chair for wound prevention .      Rehab identified problem list/impairments:      Rehab potential is  excellent.    Activity tolerance: Excellent    Discharge recommendations:       Barriers to discharge:      Equipment recommendations:       GOALS:   Multidisciplinary Problems       Physical Therapy Goals          Problem: Physical Therapy    Goal Priority Disciplines Outcome Goal Variances Interventions   Physical Therapy Goal     PT, PT/OT      Description: Goals to be met by: 2022     Patient will increase functional independence with mobility by performin. Supine to sit with Modified [place]  2. Sit to supine with Modified [place]  3. Sit to stand transfer with Modified [place]  4. Gait  x 500 feet with Modified [place] using Rolling Walker vs LRAD.                          PLAN:    Patient to be seen 6 x/week  to address the above listed problems via gait training, therapeutic activities, therapeutic exercises  Plan of Care expires: 22  Plan of Care reviewed with: patient         10/15/2022

## 2022-10-15 NOTE — PROGRESS NOTES
Pod 4  Afib this am  Back in sinus on amio  Vss  Woudns c/d/I  Cts min drainage  Dc cts, afib mgt per card

## 2022-10-15 NOTE — PLAN OF CARE
Problem: Adult Inpatient Plan of Care  Goal: Plan of Care Review  Outcome: Ongoing, Progressing  Goal: Patient-Specific Goal (Individualized)  Outcome: Ongoing, Progressing  Goal: Absence of Hospital-Acquired Illness or Injury  Outcome: Ongoing, Progressing  Goal: Optimal Comfort and Wellbeing  Outcome: Ongoing, Progressing  Goal: Readiness for Transition of Care  Outcome: Ongoing, Progressing     Problem: Infection  Goal: Absence of Infection Signs and Symptoms  Outcome: Ongoing, Progressing     Problem: Fall Injury Risk  Goal: Absence of Fall and Fall-Related Injury  Outcome: Ongoing, Progressing     Problem: Skin Injury Risk Increased  Goal: Skin Health and Integrity  Outcome: Ongoing, Progressing     Problem: Impaired Wound Healing  Goal: Optimal Wound Healing  Outcome: Ongoing, Progressing

## 2022-10-15 NOTE — PLAN OF CARE
Problem: Adult Inpatient Plan of Care  Goal: Plan of Care Review  10/15/2022 1508 by Willy Rushing RN  Outcome: Ongoing, Progressing  10/15/2022 1508 by Willy Rushing RN  Outcome: Ongoing, Progressing  Goal: Patient-Specific Goal (Individualized)  10/15/2022 1508 by Willy Rushing RN  Outcome: Ongoing, Progressing  10/15/2022 1508 by Willy Rushing RN  Outcome: Ongoing, Progressing  Goal: Absence of Hospital-Acquired Illness or Injury  10/15/2022 1508 by Willy Rushing RN  Outcome: Ongoing, Progressing  10/15/2022 1508 by Willy Rushing RN  Outcome: Ongoing, Progressing  Goal: Optimal Comfort and Wellbeing  10/15/2022 1508 by Willy Rushing RN  Outcome: Ongoing, Progressing  10/15/2022 1508 by Willy Rushing RN  Outcome: Ongoing, Progressing  Goal: Readiness for Transition of Care  10/15/2022 1508 by Willy Rushing RN  Outcome: Ongoing, Progressing  10/15/2022 1508 by Willy Rushing RN  Outcome: Ongoing, Progressing     Problem: Infection  Goal: Absence of Infection Signs and Symptoms  10/15/2022 1508 by Willy Rushing RN  Outcome: Ongoing, Progressing  10/15/2022 1508 by Willy Rushing RN  Outcome: Ongoing, Progressing     Problem: Fall Injury Risk  Goal: Absence of Fall and Fall-Related Injury  10/15/2022 1508 by Willy Rushing RN  Outcome: Ongoing, Progressing  10/15/2022 1508 by Willy Rushing RN  Outcome: Ongoing, Progressing     Problem: Skin Injury Risk Increased  Goal: Skin Health and Integrity  10/15/2022 1508 by Willy Rushing RN  Outcome: Ongoing, Progressing  10/15/2022 1508 by Willy Rushing RN  Outcome: Ongoing, Progressing     Problem: Impaired Wound Healing  Goal: Optimal Wound Healing  10/15/2022 1508 by Willy Rushing RN  Outcome: Ongoing, Progressing  10/15/2022 1508 by Willy Rushing RN  Outcome: Ongoing, Progressing

## 2022-10-15 NOTE — PROGRESS NOTES
10/15/22 0815   Pre Exercise Vitals   /69   Pulse 69   Supplemental O2? Yes   O2 Device nasal cannula   O2 Flow (L/min) 2   SpO2 97 %   During Exercise Vitals   Pulse 72   Supplemental O2? Yes   O2 Device nasal cannula   O2 Flow (L/min) 3   SpO2 92 %   Distance Walked 100 feet   Post Exercise Vitals   /69   Pulse 69   Supplemental O2? Yes   O2 Device nasal cannula   O2 Flow (L/min) 2   SpO2 98 %   Modality   Modality Walker   Min assist x1 from sitting to standing. Sternal precautions maintained. Gait steady, slow. C/o tired today. Early fatigue noted. Encouraged activity and incentive spirometer use.    Patient designates girlfriend Lina Torri to make medical decisions on his behalf in an emergency/No

## 2022-10-15 NOTE — PLAN OF CARE
Problem: Adult Inpatient Plan of Care  Goal: Plan of Care Review  10/15/2022 1514 by Willy Rushing RN  Outcome: Ongoing, Progressing  10/15/2022 1508 by Willy Rushing RN  Outcome: Ongoing, Progressing  10/15/2022 1508 by Willy Rushing RN  Outcome: Ongoing, Progressing  Goal: Patient-Specific Goal (Individualized)  10/15/2022 1514 by Willy Rushing RN  Outcome: Ongoing, Progressing  10/15/2022 1508 by Willy Rushing RN  Outcome: Ongoing, Progressing  10/15/2022 1508 by Willy Rushing RN  Outcome: Ongoing, Progressing  Goal: Absence of Hospital-Acquired Illness or Injury  10/15/2022 1514 by Willy Rushing RN  Outcome: Ongoing, Progressing  10/15/2022 1508 by Willy Rushing RN  Outcome: Ongoing, Progressing  10/15/2022 1508 by Willy Rushing RN  Outcome: Ongoing, Progressing  Goal: Optimal Comfort and Wellbeing  10/15/2022 1514 by Willy Rushing RN  Outcome: Ongoing, Progressing  10/15/2022 1508 by Willy Rushing RN  Outcome: Ongoing, Progressing  10/15/2022 1508 by Willy Rushing RN  Outcome: Ongoing, Progressing  Goal: Readiness for Transition of Care  10/15/2022 1514 by Willy Rushing RN  Outcome: Ongoing, Progressing  10/15/2022 1508 by Willy Rushing RN  Outcome: Ongoing, Progressing  10/15/2022 1508 by Willy Rushing RN  Outcome: Ongoing, Progressing     Problem: Infection  Goal: Absence of Infection Signs and Symptoms  10/15/2022 1514 by Willy Rushing RN  Outcome: Ongoing, Progressing  10/15/2022 1508 by Willy Rushing RN  Outcome: Ongoing, Progressing  10/15/2022 1508 by Willy Rushing RN  Outcome: Ongoing, Progressing     Problem: Fall Injury Risk  Goal: Absence of Fall and Fall-Related Injury  10/15/2022 1514 by Willy Rushing RN  Outcome: Ongoing, Progressing  10/15/2022 1508 by Willy Rushing RN  Outcome: Ongoing, Progressing  10/15/2022 1508  by Willy Rushing RN  Outcome: Ongoing, Progressing     Problem: Skin Injury Risk Increased  Goal: Skin Health and Integrity  10/15/2022 1514 by Willy Rushing RN  Outcome: Ongoing, Progressing  10/15/2022 1508 by Willy Rushing RN  Outcome: Ongoing, Progressing  10/15/2022 1508 by Willy Rushing RN  Outcome: Ongoing, Progressing     Problem: Impaired Wound Healing  Goal: Optimal Wound Healing  10/15/2022 1514 by Willy Rushing RN  Outcome: Ongoing, Progressing  10/15/2022 1508 by Willy Rushing RN  Outcome: Ongoing, Progressing  10/15/2022 1508 by Willy Rushing RN  Outcome: Ongoing, Progressing

## 2022-10-15 NOTE — CONSULTS
Consults  [facility] - 3rd Floor Medical Telemetry  Cardiology  Consult Note    Patient Name: Jessica Elias  MRN: [id number]  Admission Date: 10/11/2022  Hospital Length of Stay: 4 days  Code Status: Full Code   Attending Provider:  Dr. Bety Thompson  Consulting Provider: LUIS ENRIQUE Gomez  Primary Care Physician: Tarik Gee MD  Principal Problem:<principal problem not specified>    Patient information was obtained from ER records.     Subjective:     Chief Complaint:  Consult:  Atrial Fibrillation     HPI:  82-year-old female known to both Dr. Dos Santos and Dr. Sánchez with a past medical history of CAD, PAF, carotid artery stenosis status post CEA, hypertension, hyperlipidemia, CKD, obstructive sleep apnea, and recurrent bradycardia status post pacemaker implant in February 2018.  Patient underwent outpatient coronary angiogram which revealed triple-vessel disease.  She was electively admitted on October 11, 2022 and underwent CABG x 4 (LIMA to LAD, SVG to Diag 1, SVG to OM, and SVG to RCA), Ligation of left atrial appendage per Dr. Bety Thompson.   Patient was noted to be in Atrial Fibrillation RVR earlier this morning,  therefore [facility] has been consulted for further evaluation of Atrial Fibrillation.      PMH: CAD, PAF, RUKHSANA, HTN, HLD, MP, Recurrent Bradycardia, CKD  PSH: CEA, Cardiac Pacemaker, Colonoscopy, Coronary Angiogram, Lipoma Removal, Cataract Surgery, Hysterectomy, Bladder Surgery, Appendectomy  Family History: Father - MI, HTN; Mother - CVA, HTN, CABG  Social History: Former Smoker.  Denies Alcohol Use.  Denies Illicit Drug Use.     Previous Cardiac Diagnostics:  LEFT HEART CATHETERIZATION, SELECTIVE CORONARY ANGIOGRAPHY AND LEFT VENTRICULOGRAM 9.23.22:  1. LEFT MAIN:  HIGHLY CALCIFIED WITHOUT OBSTRUCTIVE LESIONS  2. LAD:  HEAVILY CALCIFIED 70-80% STENOSIS PROXIMALLY (LONG LESION) AND 70% DISTAL AT THE BIFURCATION WITH THE 2ND DIAGONAL BRANCH.  THE 1ST DIAGONAL BRANCH SHOWS DISEASE OF  90%.  3. LEFT CIRCUMFLEX:  CALCIFIED, 99% STENOSIS PROXIMALLY  4.  RIGHT CORONARY ARTERY:  HIGHLY CALCIFIED, 99% STENOSIS OF THE OSTIUM FOLLOWED BY 90% STENOSIS PROXIMALLY     RECOMMENDATIONS:  1. RECOMMEND CARDIOTHORACIC SURGICAL EVALUATION BY DR. THOMPSON CORONARY ARTERY BYPASS SURGERY  2. IF SHE GOES HOME TODAY WITH AN APPOINTMENT FOR SURGERY LATER, RESUME ELIQUIS TOMORROW MORNING  3.  CARDIAC DIET  4.  FOLLOW-UP WITH ME AT THE OFFICE IN 1 WEEK      Left Ventriculogram Summary 9.23.22:  The ejection fraction was calculated to be 55%.  The left ventricular systolic function was normal.  The left ventricular end diastolic pressure was elevated.  The pre-procedure left ventricular end diastolic pressure was 23.  The estimated blood loss was none.  There was three vessel coronary artery disease.  There was no mitral valve regurgitation.  There was no aortic valve stenosis.        Past Medical History:   Diagnosis Date    Chronic kidney disease, unspecified     stage 3    Coronary artery disease     History of carotid artery disease     Hyperlipidemia     Hypertension     Sleep apnea        Past Surgical History:   Procedure Laterality Date    APPENDECTOMY N/A     BLADDER SURGERY N/A     CAROTID ENDARTERECTOMY N/A     CATARACT EXTRACTION N/A     CORONARY ARTERY BYPASS GRAFT (CABG) N/A 10/11/2022    Procedure: CORONARY ARTERY BYPASS GRAFT (CABG);  Surgeon: Bety Thompson MD;  Location: [facility] OR;  Service: Cardiothoracic;  Laterality: N/A;    HYSTERECTOMY N/A     INSERTION OF PERMANENT PACEMAKER N/A     LEFT HEART CATHETERIZATION Left 09/23/2022    Procedure: CATHETERIZATION, HEART, LEFT;  Surgeon: Abner Mitchell MD;  Location: [facility] CATH LAB;  Service: Cardiology;  Laterality: Left;  LHC VIA RRA    lump removed from right shoulder         Review of patient's allergies indicates:   Allergen Reactions    Penicillin     Sulfa (sulfonamide antibiotics)        Current Facility-Administered Medications on File Prior to  Encounter   Medication    0.9%  NaCl infusion    diazePAM tablet 10 mg    diphenhydrAMINE capsule 50 mg    sodium chloride 0.9% flush 10 mL     Current Outpatient Medications on File Prior to Encounter   Medication Sig    amLODIPine (NORVASC) 10 MG tablet Take 5 mg by mouth once daily.    ELIQUIS 5 mg Tab Take 5 mg by mouth 2 (two) times daily.    metoprolol succinate (TOPROL-XL) 50 MG 24 hr tablet Take 50 mg by mouth once daily.    multivitamin capsule Take 1 capsule by mouth once daily.    rosuvastatin (CRESTOR) 20 MG tablet Take 20 mg by mouth every evening.    sertraline (ZOLOFT) 50 MG tablet Take 50 mg by mouth once daily.    terazosin (HYTRIN) 2 MG capsule Take 2 mg by mouth once daily.    azelastine (ASTELIN) 137 mcg (0.1 %) nasal spray by Nasal route.    co-enzyme Q-10 30 mg capsule Take 200 mg by mouth once daily.     Family History    None       Tobacco Use    Smoking status: Never    Smokeless tobacco: Never   Substance and Sexual Activity    Alcohol use: Not Currently    Drug use: Never    Sexual activity: Not on file       Review of Systems   Constitutional: Negative.    HENT: Negative.     Eyes: Negative.    Respiratory: Negative.     Cardiovascular:         Incisional Pain   Gastrointestinal:  Positive for nausea.   Endocrine: Negative.    Genitourinary:  Positive for dysuria.   Musculoskeletal: Negative.    Skin: Negative.    Neurological:  Positive for weakness.   Psychiatric/Behavioral: Negative.       Objective:     Vital Signs (Most Recent):  Temp: 98.2 °F (36.8 °C) (10/15/22 0720)  Pulse: 74 (10/15/22 0720)  Resp: 20 (10/15/22 0720)  BP: (!) 150/75 (10/15/22 0720)  SpO2: (!) 93 % (10/15/22 0720)   Vital Signs (24h Range):  Temp:  [98.2 °F (36.8 °C)-98.7 °F (37.1 °C)] 98.2 °F (36.8 °C)  Pulse:  [66-89] 74  Resp:  [18-20] 20  SpO2:  [88 %-96 %] 93 %  BP: (129-168)/(60-81) 150/75     Weight: 68.5 kg (151 lb 0.2 oz)  Body mass index is 25.52 kg/m².    SpO2: (!) 93 %  O2 Device (Oxygen Therapy): room  air      Intake/Output Summary (Last 24 hours) at 10/15/2022 0949  Last data filed at 10/15/2022 0648  Gross per 24 hour   Intake 440 ml   Output 270 ml   Net 170 ml       Lines/Drains/Airways       Drain  Duration                  Chest Tube 10/11/22 0950 Left Midaxillary 28 Fr. 3 days         Chest Tube 10/11/22 0950 Mediastinal 28 Fr. 3 days              Line  Duration                  Pacer Wires 10/11/22 1055 3 days              Peripheral Intravenous Line  Duration                  Peripheral IV - Single Lumen 10/11/22 0546 20 G Anterior;Distal;Right Forearm 4 days                    Significant Labs:  Recent Results (from the past 72 hour(s))   POCT glucose    Collection Time: 10/12/22 10:08 AM   Result Value Ref Range    POCT Glucose 118 (H) 70 - 110 mg/dL   POCT glucose    Collection Time: 10/12/22 12:00 PM   Result Value Ref Range    POCT Glucose 138 (H) 70 - 110 mg/dL   Urinalysis, Reflex to Urine Culture Urine, Clean Catch    Collection Time: 10/12/22 12:36 PM    Specimen: Urine, Catheterized   Result Value Ref Range    Color, UA Yellow Yellow, Light-Yellow, Dark Yellow, Clari, Straw    Appearance, UA Turbid (A) Clear    Specific Gravity, UA 1.016 1.001 - 1.030    pH, UA 5.0 5.0, 5.5, 6.0, 6.5, 7.0, 7.5, 8.0, 8.5    Protein, UA 1+ (A) Negative mg/dL    Glucose, UA Negative Negative, Normal mg/dL    Ketones, UA Negative Negative mg/dL    Blood, UA Trace (A) Negative unit/L    Bilirubin, UA Negative Negative mg/dL    Urobilinogen, UA 0.2 0.2, 1.0, Normal mg/dL    Nitrites, UA Negative Negative    Leukocyte Esterase, UA 3+ (A) Negative unit/L   Sodium, Random Urine    Collection Time: 10/12/22 12:36 PM   Result Value Ref Range    Urine Sodium 69.0 mmol/L   Creatinine, Random Urine    Collection Time: 10/12/22 12:36 PM   Result Value Ref Range    Urine Creatinine 105.1 47.0 - 110.0 mg/dL   Urinalysis, Microscopic    Collection Time: 10/12/22 12:36 PM   Result Value Ref Range    RBC, UA <5 <=5 /HPF    WBC, UA  228 (H) <=5 /HPF    Squamous Epithelial Cells, UA <5 <=5 /HPF    Bacteria, UA None Seen None Seen, Rare, Occasional /HPF   Urine culture    Collection Time: 10/12/22 12:36 PM    Specimen: Urine, Catheterized   Result Value Ref Range    Urine Culture No Significant Growth    POCT glucose    Collection Time: 10/12/22  4:44 PM   Result Value Ref Range    POCT Glucose 192 (H) 70 - 110 mg/dL   POCT glucose    Collection Time: 10/12/22 10:09 PM   Result Value Ref Range    POCT Glucose 172 (H) 70 - 110 mg/dL   Comprehensive Metabolic Panel    Collection Time: 10/13/22 12:47 AM   Result Value Ref Range    Sodium Level 133 (L) 136 - 145 mmol/L    Potassium Level 4.0 3.5 - 5.1 mmol/L    Chloride 104 98 - 107 mmol/L    Carbon Dioxide 24 23 - 31 mmol/L    Glucose Level 184 (H) 82 - 115 mg/dL    Blood Urea Nitrogen 21.5 (H) 9.8 - 20.1 mg/dL    Creatinine 1.08 (H) 0.55 - 1.02 mg/dL    Calcium Level Total 7.4 (L) 8.4 - 10.2 mg/dL    Protein Total 4.4 (L) 5.8 - 7.6 gm/dL    Albumin Level 2.7 (L) 3.4 - 4.8 gm/dL    Globulin 1.7 (L) 2.4 - 3.5 gm/dL    Albumin/Globulin Ratio 1.6 1.1 - 2.0 ratio    Bilirubin Total 0.6 <=1.5 mg/dL    Alkaline Phosphatase 34 (L) 40 - 150 unit/L    Alanine Aminotransferase 30 0 - 55 unit/L    Aspartate Aminotransferase 37 (H) 5 - 34 unit/L    eGFR 52 mls/min/1.73/m2   CBC with Differential    Collection Time: 10/13/22 12:47 AM   Result Value Ref Range    WBC 10.6 4.5 - 11.5 x10(3)/mcL    RBC 3.46 (L) 4.20 - 5.40 x10(6)/mcL    Hgb 10.6 (L) 12.0 - 16.0 gm/dL    Hct 32.4 (L) 37.0 - 47.0 %    MCV 93.6 80.0 - 94.0 fL    MCH 30.6 27.0 - 31.0 pg    MCHC 32.7 (L) 33.0 - 36.0 mg/dL    RDW 15.0 11.5 - 17.0 %    Platelet 83 (L) 130 - 400 x10(3)/mcL    MPV 13.5 (H) 7.4 - 10.4 fL    Neut % 76.4 %    Lymph % 13.3 %    Mono % 9.2 %    Eos % 0.2 %    Basophil % 0.3 %    Lymph # 1.42 0.6 - 4.6 x10(3)/mcL    Neut # 8.1 2.1 - 9.2 x10(3)/mcL    Mono # 0.98 0.1 - 1.3 x10(3)/mcL    Eos # 0.02 0 - 0.9 x10(3)/mcL    Baso # 0.03  0 - 0.2 x10(3)/mcL    IG# 0.06 (H) 0 - 0.04 x10(3)/mcL    IG% 0.6 %    NRBC% 0.0 %   POCT glucose    Collection Time: 10/13/22  8:26 AM   Result Value Ref Range    POCT Glucose 164 (H) 70 - 110 mg/dL   POCT Glucose, Hand-Held Device    Collection Time: 10/13/22 12:34 PM   Result Value Ref Range    POC Glucose 168 (A) 70 - 110 MG/DL   POCT glucose    Collection Time: 10/13/22  4:08 PM   Result Value Ref Range    POCT Glucose 143 (H) 70 - 110 mg/dL   POCT glucose    Collection Time: 10/13/22  9:12 PM   Result Value Ref Range    POCT Glucose 171 (H) 70 - 110 mg/dL   Comprehensive metabolic panel    Collection Time: 10/14/22  4:45 AM   Result Value Ref Range    Sodium Level 135 (L) 136 - 145 mmol/L    Potassium Level 3.3 (L) 3.5 - 5.1 mmol/L    Chloride 104 98 - 107 mmol/L    Carbon Dioxide 24 23 - 31 mmol/L    Glucose Level 118 (H) 82 - 115 mg/dL    Blood Urea Nitrogen 23.7 (H) 9.8 - 20.1 mg/dL    Creatinine 1.02 0.55 - 1.02 mg/dL    Calcium Level Total 8.2 (L) 8.4 - 10.2 mg/dL    Protein Total 4.8 (L) 5.8 - 7.6 gm/dL    Albumin Level 2.6 (L) 3.4 - 4.8 gm/dL    Globulin 2.2 (L) 2.4 - 3.5 gm/dL    Albumin/Globulin Ratio 1.2 1.1 - 2.0 ratio    Bilirubin Total 0.7 <=1.5 mg/dL    Alkaline Phosphatase 44 40 - 150 unit/L    Alanine Aminotransferase 26 0 - 55 unit/L    Aspartate Aminotransferase 27 5 - 34 unit/L    eGFR 56 mls/min/1.73/m2   CBC with Differential    Collection Time: 10/14/22  4:45 AM   Result Value Ref Range    WBC 9.5 4.5 - 11.5 x10(3)/mcL    RBC 3.48 (L) 4.20 - 5.40 x10(6)/mcL    Hgb 10.7 (L) 12.0 - 16.0 gm/dL    Hct 32.6 (L) 37.0 - 47.0 %    MCV 93.7 80.0 - 94.0 fL    MCH 30.7 27.0 - 31.0 pg    MCHC 32.8 (L) 33.0 - 36.0 mg/dL    RDW 14.4 11.5 - 17.0 %    Platelet 103 (L) 130 - 400 x10(3)/mcL    MPV 13.3 (H) 7.4 - 10.4 fL    Neut % 77.3 %    Lymph % 10.8 %    Mono % 9.3 %    Eos % 1.9 %    Basophil % 0.3 %    Lymph # 1.03 0.6 - 4.6 x10(3)/mcL    Neut # 7.4 2.1 - 9.2 x10(3)/mcL    Mono # 0.89 0.1 - 1.3  x10(3)/mcL    Eos # 0.18 0 - 0.9 x10(3)/mcL    Baso # 0.03 0 - 0.2 x10(3)/mcL    IG# 0.04 0 - 0.04 x10(3)/mcL    IG% 0.4 %    NRBC% 0.0 %   POCT glucose    Collection Time: 10/14/22  5:05 AM   Result Value Ref Range    POCT Glucose 107 70 - 110 mg/dL   POCT glucose    Collection Time: 10/14/22 10:43 AM   Result Value Ref Range    POCT Glucose 203 (H) 70 - 110 mg/dL   POCT glucose    Collection Time: 10/14/22  3:58 PM   Result Value Ref Range    POCT Glucose 152 (H) 70 - 110 mg/dL   POCT glucose    Collection Time: 10/14/22  7:57 PM   Result Value Ref Range    POCT Glucose 117 (H) 70 - 110 mg/dL   Basic Metabolic Panel    Collection Time: 10/15/22  4:43 AM   Result Value Ref Range    Sodium Level 141 136 - 145 mmol/L    Potassium Level 3.8 3.5 - 5.1 mmol/L    Chloride 105 98 - 107 mmol/L    Carbon Dioxide 27 23 - 31 mmol/L    Glucose Level 112 82 - 115 mg/dL    Blood Urea Nitrogen 28.2 (H) 9.8 - 20.1 mg/dL    Creatinine 0.97 0.55 - 1.02 mg/dL    BUN/Creatinine Ratio 29     Calcium Level Total 8.5 8.4 - 10.2 mg/dL    Anion Gap 9.0 mEq/L    eGFR 59 mls/min/1.73/m2   POCT glucose    Collection Time: 10/15/22  5:33 AM   Result Value Ref Range    POCT Glucose 114 (H) 70 - 110 mg/dL       Significant Imaging:      EKG:        Telemetry:  SR with HR 64bpm at 1025    Physical Exam  Constitutional:       Appearance: Normal appearance.   HENT:      Head: Normocephalic and atraumatic.   Eyes:      Extraocular Movements: Extraocular movements intact.   Cardiovascular:      Rate and Rhythm: Normal rate and regular rhythm.   Pulmonary:      Effort: Pulmonary effort is normal.   Musculoskeletal:         General: Normal range of motion.   Skin:     General: Skin is warm and dry.   Neurological:      Mental Status: She is alert and oriented to person, place, and time.   Psychiatric:         Mood and Affect: Mood normal.         Behavior: Behavior normal.       Home Medications:   Current Facility-Administered Medications on File  Prior to Encounter   Medication Dose Route Frequency Provider Last Rate Last Admin    0.9%  NaCl infusion   Intravenous Once Abner Mitchell MD        diazePAM tablet 10 mg  10 mg Oral On Call Procedure Abner Mitchell MD   10 mg at 09/23/22 0823    diphenhydrAMINE capsule 50 mg  50 mg Oral On Call Procedure Abner Mitchell MD   50 mg at 09/23/22 0823    sodium chloride 0.9% flush 10 mL  10 mL Intravenous PRN Abner Mitchell MD         Current Outpatient Medications on File Prior to Encounter   Medication Sig Dispense Refill    amLODIPine (NORVASC) 10 MG tablet Take 5 mg by mouth once daily.      ELIQUIS 5 mg Tab Take 5 mg by mouth 2 (two) times daily.      metoprolol succinate (TOPROL-XL) 50 MG 24 hr tablet Take 50 mg by mouth once daily.      multivitamin capsule Take 1 capsule by mouth once daily.      rosuvastatin (CRESTOR) 20 MG tablet Take 20 mg by mouth every evening.      sertraline (ZOLOFT) 50 MG tablet Take 50 mg by mouth once daily.      terazosin (HYTRIN) 2 MG capsule Take 2 mg by mouth once daily.      azelastine (ASTELIN) 137 mcg (0.1 %) nasal spray by Nasal route.      co-enzyme Q-10 30 mg capsule Take 200 mg by mouth once daily.         Current Inpatient Medications:    Current Facility-Administered Medications:     0.9%  NaCl infusion (for blood administration), , Intravenous, Q24H PRN, Bety Thompson MD    acetaminophen oral solution 650 mg, 650 mg, Per OG tube, Q6H PRN, OHLDEN Cristina    albumin human 5% bottle 12.5 g, 12.5 g, Intravenous, PRN, HOLDEN Cristina, Stopped at 10/12/22 0310    amiodarone 360 mg/200 mL (1.8 mg/mL) infusion, 1 mg/min, Intravenous, Continuous, HOLDEN Cristina, Last Rate: 33.3 mL/hr at 10/15/22 0621, 1 mg/min at 10/15/22 0621    amiodarone 360 mg/200 mL (1.8 mg/mL) infusion, 0.5 mg/min, Intravenous, Continuous, HOLDEN Cristina    aspirin EC tablet 81 mg, 81 mg, Oral, Daily, HOLDEN Cristina, 81 mg at 10/15/22 1911    bisacodyL  suppository 10 mg, 10 mg, Rectal, Daily PRN, HOLDEN Mendoza    calcium gluconate 1 g in NS IVPB (premixed), 1 g, Intravenous, PRN, HOLDEN Cristina    calcium gluconate 1 g in NS IVPB (premixed), 2 g, Intravenous, PRN, HOLDEN Cristina    calcium gluconate 1 g in NS IVPB (premixed), 3 g, Intravenous, PRN, HOLDEN Cristina    dextrose 10% bolus 125 mL, 12.5 g, Intravenous, PRN, Bety Thompson MD    dextrose 10% bolus 250 mL, 25 g, Intravenous, PRN, Bety Thompson MD    dextrose 50% injection 12.5 g, 12.5 g, Intravenous, PRN, HOLDEN Cristina    dextrose 50% injection 25 g, 25 g, Intravenous, PRN, HOLDEN Cristina    docusate sodium capsule 100 mg, 100 mg, Oral, BID, HOLDEN Cristina, 100 mg at 10/15/22 0900    enoxaparin injection 40 mg, 40 mg, Subcutaneous, Daily, HOLDEN Mendoza, 40 mg at 10/14/22 1627    famotidine (PF) injection 20 mg, 20 mg, Intravenous, Daily, HOLDEN Cristina, 20 mg at 10/15/22 0845    folic acid tablet 1 mg, 1 mg, Oral, Daily, HOLDEN Cristina, 1 mg at 10/15/22 0845    glucagon (human recombinant) injection 1 mg, 1 mg, Intramuscular, PRN, Jackie Rush MD    glucose chewable tablet 16 g, 16 g, Oral, PRN, Jackie Rush MD    glucose chewable tablet 24 g, 24 g, Oral, PRN, Jackie Rush MD    HYDROcodone-acetaminophen 5-325 mg per tablet 1 tablet, 1 tablet, Oral, Q4H PRN, HOLDEN Cristina, 1 tablet at 10/14/22 0600    insulin aspart U-100 injection 0-5 Units, 0-5 Units, Subcutaneous, QID (AC + HS) PRN, Jackie Rush MD, 2 Units at 10/14/22 1109    lactulose 10 gram/15 ml solution 20 g, 20 g, Oral, Q6H PRN, HOLDEN Cristina    loperamide capsule 2 mg, 2 mg, Oral, Continuous PRN, HOLDEN Cristina    magnesium sulfate 2g in water 50mL IVPB (premix), 2 g, Intravenous, PRN, HOLDEN Cristina    magnesium sulfate 2g in water 50mL IVPB (premix), 4 g, Intravenous, PRN, HOLDEN Cristina    metoclopramide HCl injection  5 mg, 5 mg, Intravenous, Q6H PRN, HOLDEN Cristina    metoprolol tartrate (LOPRESSOR) split tablet 12.5 mg, 12.5 mg, Oral, BID, HOLDEN Cristina, 12.5 mg at 10/15/22 0845    morphine injection 4 mg, 4 mg, Intravenous, Q4H PRN, HOLDEN Cristina, 2 mg at 10/11/22 1423    mupirocin 2 % ointment, , Nasal, BID, HOLDEN Cristina, Given at 10/15/22 0846    ondansetron injection 4 mg, 4 mg, Intravenous, Q4H PRN, HOLDEN Cristina, 4 mg at 10/12/22 0545    oxyCODONE immediate release tablet 10 mg, 10 mg, Oral, Q4H PRN, HOLDEN Cristina, 5 mg at 10/12/22 0410    polyethylene glycol packet 17 g, 17 g, Oral, Daily PRN, HOLDEN Mendoza    sertraline tablet 50 mg, 50 mg, Oral, Daily, HOLDEN Mendoza, 50 mg at 10/15/22 0845    sodium phosphate 15 mmol in dextrose 5 % 250 mL IVPB, 15 mmol, Intravenous, PRN, HOLDEN Cristina, Stopped at 10/12/22 0156    sodium phosphate 20.01 mmol in dextrose 5 % 250 mL IVPB, 20.01 mmol, Intravenous, PRN, HOLDEN Cristina, Stopped at 10/13/22 1612    sodium phosphate 30 mmol in dextrose 5 % 250 mL IVPB, 30 mmol, Intravenous, PRN, HOLDEN Cristina    sucralfate tablet 1 g, 1 g, Oral, QID (AC & HS), HOLDEN Cristina, 1 g at 10/15/22 0531    tamsulosin 24 hr capsule 0.4 mg, 0.4 mg, Oral, QHS, Yoshi Wiggins MD, 0.4 mg at 10/14/22 2000    vancomycin in dextrose 5 % 1 gram/250 mL IVPB 1,000 mg, 1,000 mg, Intravenous, Once, Bety Thompson MD    zinc oxide-cod liver oil 40 % paste, , Topical (Top), BID, Bety Thompson MD, Given at 10/15/22 0846    Facility-Administered Medications Ordered in Other Encounters:     0.9%  NaCl infusion, , Intravenous, Once, Abner Mitchell MD    diazePAM tablet 10 mg, 10 mg, Oral, On Call Procedure, Abner Mitchell MD, 10 mg at 09/23/22 0823    diphenhydrAMINE capsule 50 mg, 50 mg, Oral, On Call Procedure, Abner Mitchell MD, 50 mg at 09/23/22 0823    sodium chloride 0.9% flush 10 mL, 10 mL,  Intravenous, PRN, Abner Mitchell MD         VTE Risk Mitigation (From admission, onward)           Ordered     enoxaparin injection 40 mg  Daily         10/13/22 1141     IP VTE HIGH RISK PATIENT  Once         10/13/22 1141     heparin, porcine (PF) (heparin flush 100 units/mL) 100 unit/mL injection flush         10/11/22 0607                    Assessment:   CAD       - s/p CABG x 4 (LIMA to LAD, SVG to Diag 1, SVG to OM, and SVG to RCA), Ligation of left atrial appendage per Dr. Bety Thompson.    PAF       - Afib RVR on 10.15.22 - now SR  Recurrent Bradycardia       - status post pacemaker implant in February 2018   Carotid Artery Stenosis        - status post CEA  Hypertension  Hyperlipidemia  CKD  Obstructive Sleep Apnea      Plan:   Continue IV Amiodarone per 24 hour protocol.  Continue Aspirin 81mg daily  Resume Eliquis 5mg BID for CVA prevention when okay with CT Surgery  Discontinue Metoprolol Tartrate  and resume home dose Metoprolol Succinate 50mg daily   Start Atorvastatin 40mg daily (was on Crestor 20mg daily at home)  Start Valsartan 80mg daily (was on Olmesartan 20mg daily at home)    Labs in AM: CBC, CMP    Thank you for your consult.     LUIS ENRIQUE Gomez  Cardiology  [facility]   10/15/2022 9:49 AM

## 2022-10-16 LAB
ALBUMIN SERPL-MCNC: 2.5 GM/DL (ref 3.4–4.8)
ALBUMIN/GLOB SERPL: 0.9 RATIO (ref 1.1–2)
ALP SERPL-CCNC: 45 UNIT/L (ref 40–150)
ALT SERPL-CCNC: 17 UNIT/L (ref 0–55)
AST SERPL-CCNC: 19 UNIT/L (ref 5–34)
BASOPHILS # BLD AUTO: 0.04 X10(3)/MCL (ref 0–0.2)
BASOPHILS NFR BLD AUTO: 0.5 %
BILIRUBIN DIRECT+TOT PNL SERPL-MCNC: 0.6 MG/DL
BUN SERPL-MCNC: 25.5 MG/DL (ref 9.8–20.1)
CALCIUM SERPL-MCNC: 8.7 MG/DL (ref 8.4–10.2)
CHLORIDE SERPL-SCNC: 104 MMOL/L (ref 98–107)
CO2 SERPL-SCNC: 25 MMOL/L (ref 23–31)
CREAT SERPL-MCNC: 0.91 MG/DL (ref 0.55–1.02)
EOSINOPHIL # BLD AUTO: 0.45 X10(3)/MCL (ref 0–0.9)
EOSINOPHIL NFR BLD AUTO: 5.7 %
ERYTHROCYTE [DISTWIDTH] IN BLOOD BY AUTOMATED COUNT: 13.9 % (ref 11.5–17)
GFR SERPLBLD CREATININE-BSD FMLA CKD-EPI: >60 MLS/MIN/1.73/M2
GLOBULIN SER-MCNC: 2.7 GM/DL (ref 2.4–3.5)
GLUCOSE SERPL-MCNC: 119 MG/DL (ref 82–115)
HCT VFR BLD AUTO: 36.8 % (ref 37–47)
HGB BLD-MCNC: 11.8 GM/DL (ref 12–16)
IMM GRANULOCYTES # BLD AUTO: 0.02 X10(3)/MCL (ref 0–0.04)
IMM GRANULOCYTES NFR BLD AUTO: 0.3 %
LYMPHOCYTES # BLD AUTO: 1.18 X10(3)/MCL (ref 0.6–4.6)
LYMPHOCYTES NFR BLD AUTO: 15.1 %
MCH RBC QN AUTO: 30.2 PG (ref 27–31)
MCHC RBC AUTO-ENTMCNC: 32.1 MG/DL (ref 33–36)
MCV RBC AUTO: 94.1 FL (ref 80–94)
MONOCYTES # BLD AUTO: 0.9 X10(3)/MCL (ref 0.1–1.3)
MONOCYTES NFR BLD AUTO: 11.5 %
NEUTROPHILS # BLD AUTO: 5.2 X10(3)/MCL (ref 2.1–9.2)
NEUTROPHILS NFR BLD AUTO: 66.9 %
NRBC BLD AUTO-RTO: 0 %
PLATELET # BLD AUTO: 168 X10(3)/MCL (ref 130–400)
PMV BLD AUTO: 12.1 FL (ref 7.4–10.4)
POCT GLUCOSE: 110 MG/DL (ref 70–110)
POTASSIUM SERPL-SCNC: 3.6 MMOL/L (ref 3.5–5.1)
PROT SERPL-MCNC: 5.2 GM/DL (ref 5.8–7.6)
RBC # BLD AUTO: 3.91 X10(6)/MCL (ref 4.2–5.4)
SODIUM SERPL-SCNC: 140 MMOL/L (ref 136–145)
WBC # SPEC AUTO: 7.8 X10(3)/MCL (ref 4.5–11.5)

## 2022-10-16 PROCEDURE — 36415 COLL VENOUS BLD VENIPUNCTURE: CPT | Performed by: NURSE PRACTITIONER

## 2022-10-16 PROCEDURE — 63600175 PHARM REV CODE 636 W HCPCS: Performed by: NURSE PRACTITIONER

## 2022-10-16 PROCEDURE — 63600175 PHARM REV CODE 636 W HCPCS: Performed by: PHYSICIAN ASSISTANT

## 2022-10-16 PROCEDURE — 94760 N-INVAS EAR/PLS OXIMETRY 1: CPT

## 2022-10-16 PROCEDURE — 25000003 PHARM REV CODE 250: Performed by: NURSE PRACTITIONER

## 2022-10-16 PROCEDURE — 25000003 PHARM REV CODE 250: Performed by: PHYSICIAN ASSISTANT

## 2022-10-16 PROCEDURE — 85025 COMPLETE CBC W/AUTO DIFF WBC: CPT | Performed by: NURSE PRACTITIONER

## 2022-10-16 PROCEDURE — 97530 THERAPEUTIC ACTIVITIES: CPT | Mod: CQ

## 2022-10-16 PROCEDURE — 99024 PR POST-OP FOLLOW-UP VISIT: ICD-10-PCS | Mod: ,,, | Performed by: PHYSICIAN ASSISTANT

## 2022-10-16 PROCEDURE — 97110 THERAPEUTIC EXERCISES: CPT

## 2022-10-16 PROCEDURE — 11000001 HC ACUTE MED/SURG PRIVATE ROOM

## 2022-10-16 PROCEDURE — 21400001 HC TELEMETRY ROOM

## 2022-10-16 PROCEDURE — 99024 POSTOP FOLLOW-UP VISIT: CPT | Mod: ,,, | Performed by: PHYSICIAN ASSISTANT

## 2022-10-16 PROCEDURE — 97116 GAIT TRAINING THERAPY: CPT | Mod: CQ

## 2022-10-16 PROCEDURE — 80053 COMPREHEN METABOLIC PANEL: CPT | Performed by: NURSE PRACTITIONER

## 2022-10-16 PROCEDURE — 25000003 PHARM REV CODE 250: Performed by: INTERNAL MEDICINE

## 2022-10-16 RX ORDER — HYDRALAZINE HYDROCHLORIDE 20 MG/ML
10 INJECTION INTRAMUSCULAR; INTRAVENOUS EVERY 4 HOURS PRN
Status: DISCONTINUED | OUTPATIENT
Start: 2022-10-16 | End: 2022-10-25 | Stop reason: HOSPADM

## 2022-10-16 RX ORDER — VALSARTAN 80 MG/1
80 TABLET ORAL ONCE
Status: COMPLETED | OUTPATIENT
Start: 2022-10-16 | End: 2022-10-16

## 2022-10-16 RX ORDER — VALSARTAN 80 MG/1
160 TABLET ORAL DAILY
Status: DISCONTINUED | OUTPATIENT
Start: 2022-10-17 | End: 2022-10-25 | Stop reason: HOSPADM

## 2022-10-16 RX ADMIN — HYDRALAZINE HYDROCHLORIDE 10 MG: 20 INJECTION INTRAMUSCULAR; INTRAVENOUS at 12:10

## 2022-10-16 RX ADMIN — SUCRALFATE 1 G: 1 TABLET ORAL at 04:10

## 2022-10-16 RX ADMIN — ASPIRIN 81 MG: 81 TABLET, COATED ORAL at 08:10

## 2022-10-16 RX ADMIN — Medication: at 08:10

## 2022-10-16 RX ADMIN — LACTULOSE 20 G: 10 SOLUTION ORAL at 09:10

## 2022-10-16 RX ADMIN — FAMOTIDINE 20 MG: 10 INJECTION, SOLUTION INTRAVENOUS at 08:10

## 2022-10-16 RX ADMIN — SUCRALFATE 1 G: 1 TABLET ORAL at 11:10

## 2022-10-16 RX ADMIN — TAMSULOSIN HYDROCHLORIDE 0.4 MG: 0.4 CAPSULE ORAL at 08:10

## 2022-10-16 RX ADMIN — ATORVASTATIN CALCIUM 40 MG: 40 TABLET, FILM COATED ORAL at 04:10

## 2022-10-16 RX ADMIN — VALSARTAN 80 MG: 80 TABLET, FILM COATED ORAL at 08:10

## 2022-10-16 RX ADMIN — SUCRALFATE 1 G: 1 TABLET ORAL at 06:10

## 2022-10-16 RX ADMIN — SUCRALFATE 1 G: 1 TABLET ORAL at 08:10

## 2022-10-16 RX ADMIN — VALSARTAN 80 MG: 80 TABLET, FILM COATED ORAL at 12:10

## 2022-10-16 RX ADMIN — METOPROLOL SUCCINATE 50 MG: 50 TABLET, EXTENDED RELEASE ORAL at 08:10

## 2022-10-16 RX ADMIN — AMIODARONE HYDROCHLORIDE 0.5 MG/MIN: 1.8 INJECTION, SOLUTION INTRAVENOUS at 12:10

## 2022-10-16 RX ADMIN — FOLIC ACID 1 MG: 1 TABLET ORAL at 08:10

## 2022-10-16 RX ADMIN — ENOXAPARIN SODIUM 40 MG: 40 INJECTION SUBCUTANEOUS at 04:10

## 2022-10-16 RX ADMIN — DOCUSATE SODIUM 100 MG: 100 CAPSULE, LIQUID FILLED ORAL at 08:10

## 2022-10-16 RX ADMIN — Medication: at 09:10

## 2022-10-16 RX ADMIN — SERTRALINE HYDROCHLORIDE 50 MG: 50 TABLET ORAL at 08:10

## 2022-10-16 NOTE — PLAN OF CARE
Problem: Adult Inpatient Plan of Care  Goal: Plan of Care Review  Outcome: Ongoing, Progressing  Goal: Patient-Specific Goal (Individualized)  Outcome: Ongoing, Progressing  Goal: Absence of Hospital-Acquired Illness or Injury  Outcome: Ongoing, Progressing  Goal: Optimal Comfort and Wellbeing  Outcome: Ongoing, Progressing  Goal: Readiness for Transition of Care  Outcome: Ongoing, Progressing     Problem: Infection  Goal: Absence of Infection Signs and Symptoms  Outcome: Ongoing, Progressing     Problem: Fall Injury Risk  Goal: Absence of Fall and Fall-Related Injury  Outcome: Ongoing, Progressing     Problem: Skin Injury Risk Increased  Goal: Skin Health and Integrity  Outcome: Ongoing, Progressing     Problem: Impaired Wound Healing  Goal: Optimal Wound Healing  Outcome: Ongoing, Progressing      26-Dec-2018 08:15

## 2022-10-16 NOTE — ANESTHESIA RELEASE NOTE
"Anesthesia Release from PACU Note    Patient: Jessica Elias    Procedure(s) Performed: Procedure(s) (LRB):  CORONARY ARTERY BYPASS GRAFT (CABG) (N/A)    Anesthesia type: general    Post pain: Adequate analgesia    Post assessment: no apparent anesthetic complications    Last Vitals:   Visit Vitals  BP (!) 153/76   Pulse 75   Temp 36.6 °C (97.9 °F) (Oral)   Resp 20   Ht 5' 4.5" (1.638 m)   Wt 68.5 kg (151 lb 0.2 oz)   SpO2 96%   Breastfeeding No   BMI 25.52 kg/m²       Post vital signs: stable    Level of consciousness: awake    Nausea/Vomiting: no nausea/no vomiting    Complications: none    Airway Patency: patent    Respiratory: room air spontaneous ventilation    Cardiovascular: stable and blood pressure at baseline    Hydration: euvolemic  "

## 2022-10-16 NOTE — ANESTHESIA POSTPROCEDURE EVALUATION
Anesthesia Post Evaluation    Patient: Jessica Elias    Procedure(s) Performed: Procedure(s) (LRB):  CORONARY ARTERY BYPASS GRAFT (CABG) (N/A)    Final Anesthesia Type: general      Patient location during evaluation: PACU  Patient participation: Yes- Able to Participate  Level of consciousness: awake and alert  Post-procedure vital signs: reviewed and stable  Pain management: adequate  Airway patency: patent  MP mitigation strategies: Multimodal analgesia  PONV status at discharge: No PONV  Anesthetic complications: no      Cardiovascular status: blood pressure returned to baseline and hemodynamically stable  Respiratory status: unassisted and spontaneous ventilation  Hydration status: euvolemic  Follow-up not needed.          Vitals Value Taken Time   /76 10/16/22 0844   Temp 36.6 °C (97.9 °F) 10/16/22 0743   Pulse 75 10/16/22 0844   Resp 20 10/16/22 0743   SpO2 96 % 10/16/22 0743         No case tracking events are documented in the log.      Pain/Tyson Score: No data recorded

## 2022-10-16 NOTE — PROGRESS NOTES
Consults  [facility] - 3rd Floor Medical Telemetry  Cardiology  Consult Note    Patient Name: Jessica Elias  MRN: [id number]  Admission Date: 10/11/2022  Hospital Length of Stay: 5 days  Code Status: Full Code   Attending Provider:  Dr. Bety Thompson  Consulting Provider: LUIS ENRIQUE Gomez  Primary Care Physician: Tarik Gee MD  Principal Problem:<principal problem not specified>    Patient information was obtained from ER records.     Subjective:     Chief Complaint:  Consult:  Atrial Fibrillation     HPI:  82-year-old female known to both Dr. Dos Santos and Dr. Sánchez with a past medical history of CAD, PAF, carotid artery stenosis status post CEA, hypertension, hyperlipidemia, CKD, obstructive sleep apnea, and recurrent bradycardia status post pacemaker implant in February 2018.  Patient underwent outpatient coronary angiogram which revealed triple-vessel disease.  She was electively admitted on October 11, 2022 and underwent CABG x 4 (LIMA to LAD, SVG to Diag 1, SVG to OM, and SVG to RCA), Ligation of left atrial appendage per Dr. Bety Thompson.   Patient was noted to be in Atrial Fibrillation RVR earlier this morning,  therefore [facility] has been consulted for further evaluation of Atrial Fibrillation.      10.16.22:  NAD noted.  Denies SOB.  Reports incisional soreness.      PMH: CAD, PAF, RUKHSANA, HTN, HLD, MP, Recurrent Bradycardia, CKD  PSH: CEA, Cardiac Pacemaker, Colonoscopy, Coronary Angiogram, Lipoma Removal, Cataract Surgery, Hysterectomy, Bladder Surgery, Appendectomy  Family History: Father - MI, HTN; Mother - CVA, HTN, CABG  Social History: Former Smoker.  Denies Alcohol Use.  Denies Illicit Drug Use.     Previous Cardiac Diagnostics:  LEFT HEART CATHETERIZATION, SELECTIVE CORONARY ANGIOGRAPHY AND LEFT VENTRICULOGRAM 9.23.22:  1. LEFT MAIN:  HIGHLY CALCIFIED WITHOUT OBSTRUCTIVE LESIONS  2. LAD:  HEAVILY CALCIFIED 70-80% STENOSIS PROXIMALLY (LONG LESION) AND 70% DISTAL AT THE BIFURCATION  WITH THE 2ND DIAGONAL BRANCH.  THE 1ST DIAGONAL BRANCH SHOWS DISEASE OF 90%.  3. LEFT CIRCUMFLEX:  CALCIFIED, 99% STENOSIS PROXIMALLY  4.  RIGHT CORONARY ARTERY:  HIGHLY CALCIFIED, 99% STENOSIS OF THE OSTIUM FOLLOWED BY 90% STENOSIS PROXIMALLY     RECOMMENDATIONS:  1. RECOMMEND CARDIOTHORACIC SURGICAL EVALUATION BY DR. THOMPSON CORONARY ARTERY BYPASS SURGERY  2. IF SHE GOES HOME TODAY WITH AN APPOINTMENT FOR SURGERY LATER, RESUME ELIQUIS TOMORROW MORNING  3.  CARDIAC DIET  4.  FOLLOW-UP WITH ME AT THE OFFICE IN 1 WEEK      Left Ventriculogram Summary 9.23.22:  The ejection fraction was calculated to be 55%.  The left ventricular systolic function was normal.  The left ventricular end diastolic pressure was elevated.  The pre-procedure left ventricular end diastolic pressure was 23.  The estimated blood loss was none.  There was three vessel coronary artery disease.  There was no mitral valve regurgitation.  There was no aortic valve stenosis.        Past Medical History:   Diagnosis Date    Chronic kidney disease, unspecified     stage 3    Coronary artery disease     History of carotid artery disease     Hyperlipidemia     Hypertension     Sleep apnea        Past Surgical History:   Procedure Laterality Date    APPENDECTOMY N/A     BLADDER SURGERY N/A     CAROTID ENDARTERECTOMY N/A     CATARACT EXTRACTION N/A     CORONARY ARTERY BYPASS GRAFT (CABG) N/A 10/11/2022    Procedure: CORONARY ARTERY BYPASS GRAFT (CABG);  Surgeon: Bety Thompson MD;  Location: [facility] OR;  Service: Cardiothoracic;  Laterality: N/A;    HYSTERECTOMY N/A     INSERTION OF PERMANENT PACEMAKER N/A     LEFT HEART CATHETERIZATION Left 09/23/2022    Procedure: CATHETERIZATION, HEART, LEFT;  Surgeon: Abner Mitchell MD;  Location: [facility] CATH LAB;  Service: Cardiology;  Laterality: Left;  LHC VIA RRA    lump removed from right shoulder         Review of patient's allergies indicates:   Allergen Reactions    Penicillin     Sulfa (sulfonamide antibiotics)         Current Facility-Administered Medications on File Prior to Encounter   Medication    0.9%  NaCl infusion    diazePAM tablet 10 mg    diphenhydrAMINE capsule 50 mg    sodium chloride 0.9% flush 10 mL     Current Outpatient Medications on File Prior to Encounter   Medication Sig    amLODIPine (NORVASC) 10 MG tablet Take 5 mg by mouth once daily.    ELIQUIS 5 mg Tab Take 5 mg by mouth 2 (two) times daily.    metoprolol succinate (TOPROL-XL) 50 MG 24 hr tablet Take 50 mg by mouth once daily.    multivitamin capsule Take 1 capsule by mouth once daily.    rosuvastatin (CRESTOR) 20 MG tablet Take 20 mg by mouth every evening.    sertraline (ZOLOFT) 50 MG tablet Take 50 mg by mouth once daily.    terazosin (HYTRIN) 2 MG capsule Take 2 mg by mouth once daily.    azelastine (ASTELIN) 137 mcg (0.1 %) nasal spray by Nasal route.    co-enzyme Q-10 30 mg capsule Take 200 mg by mouth once daily.     Family History    None       Tobacco Use    Smoking status: Never    Smokeless tobacco: Never   Substance and Sexual Activity    Alcohol use: Not Currently    Drug use: Never    Sexual activity: Not on file       Review of Systems   Respiratory:  Negative for shortness of breath.    Cardiovascular:         Incisional Pain   Musculoskeletal: Negative.    Skin: Negative.    Psychiatric/Behavioral: Negative.       Objective:     Vital Signs (Most Recent):  Temp: 97.9 °F (36.6 °C) (10/16/22 0743)  Pulse: 75 (10/16/22 0844)  Resp: 20 (10/16/22 0743)  BP: (!) 153/76 (10/16/22 0844)  SpO2: (!) 94 % (10/16/22 0800)   Vital Signs (24h Range):  Temp:  [97.7 °F (36.5 °C)-99 °F (37.2 °C)] 97.9 °F (36.6 °C)  Pulse:  [65-77] 75  Resp:  [16-20] 20  SpO2:  [94 %-98 %] 94 %  BP: (150-180)/(60-82) 153/76     Weight: 68.5 kg (151 lb 0.2 oz)  Body mass index is 25.52 kg/m².    SpO2: (!) 94 %  O2 Device (Oxygen Therapy): nasal cannula      Intake/Output Summary (Last 24 hours) at 10/16/2022 1039  Last data filed at 10/16/2022 0500  Gross per 24 hour    Intake 840 ml   Output --   Net 840 ml         Lines/Drains/Airways       Peripheral Intravenous Line  Duration                  Peripheral IV - Single Lumen 10/11/22 0546 20 G Anterior;Distal;Right Forearm 5 days                    Significant Labs:  Recent Results (from the past 72 hour(s))   POCT Glucose, Hand-Held Device    Collection Time: 10/13/22 12:34 PM   Result Value Ref Range    POC Glucose 168 (A) 70 - 110 MG/DL   POCT glucose    Collection Time: 10/13/22  4:08 PM   Result Value Ref Range    POCT Glucose 143 (H) 70 - 110 mg/dL   POCT glucose    Collection Time: 10/13/22  9:12 PM   Result Value Ref Range    POCT Glucose 171 (H) 70 - 110 mg/dL   Comprehensive metabolic panel    Collection Time: 10/14/22  4:45 AM   Result Value Ref Range    Sodium Level 135 (L) 136 - 145 mmol/L    Potassium Level 3.3 (L) 3.5 - 5.1 mmol/L    Chloride 104 98 - 107 mmol/L    Carbon Dioxide 24 23 - 31 mmol/L    Glucose Level 118 (H) 82 - 115 mg/dL    Blood Urea Nitrogen 23.7 (H) 9.8 - 20.1 mg/dL    Creatinine 1.02 0.55 - 1.02 mg/dL    Calcium Level Total 8.2 (L) 8.4 - 10.2 mg/dL    Protein Total 4.8 (L) 5.8 - 7.6 gm/dL    Albumin Level 2.6 (L) 3.4 - 4.8 gm/dL    Globulin 2.2 (L) 2.4 - 3.5 gm/dL    Albumin/Globulin Ratio 1.2 1.1 - 2.0 ratio    Bilirubin Total 0.7 <=1.5 mg/dL    Alkaline Phosphatase 44 40 - 150 unit/L    Alanine Aminotransferase 26 0 - 55 unit/L    Aspartate Aminotransferase 27 5 - 34 unit/L    eGFR 56 mls/min/1.73/m2   CBC with Differential    Collection Time: 10/14/22  4:45 AM   Result Value Ref Range    WBC 9.5 4.5 - 11.5 x10(3)/mcL    RBC 3.48 (L) 4.20 - 5.40 x10(6)/mcL    Hgb 10.7 (L) 12.0 - 16.0 gm/dL    Hct 32.6 (L) 37.0 - 47.0 %    MCV 93.7 80.0 - 94.0 fL    MCH 30.7 27.0 - 31.0 pg    MCHC 32.8 (L) 33.0 - 36.0 mg/dL    RDW 14.4 11.5 - 17.0 %    Platelet 103 (L) 130 - 400 x10(3)/mcL    MPV 13.3 (H) 7.4 - 10.4 fL    Neut % 77.3 %    Lymph % 10.8 %    Mono % 9.3 %    Eos % 1.9 %    Basophil % 0.3 %     Lymph # 1.03 0.6 - 4.6 x10(3)/mcL    Neut # 7.4 2.1 - 9.2 x10(3)/mcL    Mono # 0.89 0.1 - 1.3 x10(3)/mcL    Eos # 0.18 0 - 0.9 x10(3)/mcL    Baso # 0.03 0 - 0.2 x10(3)/mcL    IG# 0.04 0 - 0.04 x10(3)/mcL    IG% 0.4 %    NRBC% 0.0 %   POCT glucose    Collection Time: 10/14/22  5:05 AM   Result Value Ref Range    POCT Glucose 107 70 - 110 mg/dL   POCT glucose    Collection Time: 10/14/22 10:43 AM   Result Value Ref Range    POCT Glucose 203 (H) 70 - 110 mg/dL   POCT glucose    Collection Time: 10/14/22  3:58 PM   Result Value Ref Range    POCT Glucose 152 (H) 70 - 110 mg/dL   POCT glucose    Collection Time: 10/14/22  7:57 PM   Result Value Ref Range    POCT Glucose 117 (H) 70 - 110 mg/dL   Basic Metabolic Panel    Collection Time: 10/15/22  4:43 AM   Result Value Ref Range    Sodium Level 141 136 - 145 mmol/L    Potassium Level 3.8 3.5 - 5.1 mmol/L    Chloride 105 98 - 107 mmol/L    Carbon Dioxide 27 23 - 31 mmol/L    Glucose Level 112 82 - 115 mg/dL    Blood Urea Nitrogen 28.2 (H) 9.8 - 20.1 mg/dL    Creatinine 0.97 0.55 - 1.02 mg/dL    BUN/Creatinine Ratio 29     Calcium Level Total 8.5 8.4 - 10.2 mg/dL    Anion Gap 9.0 mEq/L    eGFR 59 mls/min/1.73/m2   POCT glucose    Collection Time: 10/15/22  5:33 AM   Result Value Ref Range    POCT Glucose 114 (H) 70 - 110 mg/dL   POCT glucose    Collection Time: 10/15/22 10:55 AM   Result Value Ref Range    POCT Glucose 121 (H) 70 - 110 mg/dL   POCT glucose    Collection Time: 10/15/22  4:21 PM   Result Value Ref Range    POCT Glucose 106 70 - 110 mg/dL   Comprehensive Metabolic Panel    Collection Time: 10/16/22  7:09 AM   Result Value Ref Range    Sodium Level 140 136 - 145 mmol/L    Potassium Level 3.6 3.5 - 5.1 mmol/L    Chloride 104 98 - 107 mmol/L    Carbon Dioxide 25 23 - 31 mmol/L    Glucose Level 119 (H) 82 - 115 mg/dL    Blood Urea Nitrogen 25.5 (H) 9.8 - 20.1 mg/dL    Creatinine 0.91 0.55 - 1.02 mg/dL    Calcium Level Total 8.7 8.4 - 10.2 mg/dL    Protein Total 5.2  (L) 5.8 - 7.6 gm/dL    Albumin Level 2.5 (L) 3.4 - 4.8 gm/dL    Globulin 2.7 2.4 - 3.5 gm/dL    Albumin/Globulin Ratio 0.9 (L) 1.1 - 2.0 ratio    Bilirubin Total 0.6 <=1.5 mg/dL    Alkaline Phosphatase 45 40 - 150 unit/L    Alanine Aminotransferase 17 0 - 55 unit/L    Aspartate Aminotransferase 19 5 - 34 unit/L    eGFR >60 mls/min/1.73/m2   CBC with Differential    Collection Time: 10/16/22  7:09 AM   Result Value Ref Range    WBC 7.8 4.5 - 11.5 x10(3)/mcL    RBC 3.91 (L) 4.20 - 5.40 x10(6)/mcL    Hgb 11.8 (L) 12.0 - 16.0 gm/dL    Hct 36.8 (L) 37.0 - 47.0 %    MCV 94.1 (H) 80.0 - 94.0 fL    MCH 30.2 27.0 - 31.0 pg    MCHC 32.1 (L) 33.0 - 36.0 mg/dL    RDW 13.9 11.5 - 17.0 %    Platelet 168 130 - 400 x10(3)/mcL    MPV 12.1 (H) 7.4 - 10.4 fL    Neut % 66.9 %    Lymph % 15.1 %    Mono % 11.5 %    Eos % 5.7 %    Basophil % 0.5 %    Lymph # 1.18 0.6 - 4.6 x10(3)/mcL    Neut # 5.2 2.1 - 9.2 x10(3)/mcL    Mono # 0.90 0.1 - 1.3 x10(3)/mcL    Eos # 0.45 0 - 0.9 x10(3)/mcL    Baso # 0.04 0 - 0.2 x10(3)/mcL    IG# 0.02 0 - 0.04 x10(3)/mcL    IG% 0.3 %    NRBC% 0.0 %       Significant Imaging:      EKG:        Telemetry:  SR with HR 64bpm at 1025    Physical Exam  Constitutional:       Appearance: Normal appearance.   HENT:      Head: Normocephalic and atraumatic.   Eyes:      Extraocular Movements: Extraocular movements intact.   Cardiovascular:      Rate and Rhythm: Normal rate and regular rhythm.   Pulmonary:      Effort: Pulmonary effort is normal.   Musculoskeletal:         General: Normal range of motion.   Skin:     General: Skin is warm and dry.   Neurological:      Mental Status: She is alert and oriented to person, place, and time.   Psychiatric:         Mood and Affect: Mood normal.         Behavior: Behavior normal.       Home Medications:   Current Facility-Administered Medications on File Prior to Encounter   Medication Dose Route Frequency Provider Last Rate Last Admin    0.9%  NaCl infusion   Intravenous Once  Abner Mitchell MD        diazePAM tablet 10 mg  10 mg Oral On Call Procedure Abner Mitchell MD   10 mg at 09/23/22 0823    diphenhydrAMINE capsule 50 mg  50 mg Oral On Call Procedure Abner Mitchell MD   50 mg at 09/23/22 0823    sodium chloride 0.9% flush 10 mL  10 mL Intravenous PRN Abner Mitchell MD         Current Outpatient Medications on File Prior to Encounter   Medication Sig Dispense Refill    amLODIPine (NORVASC) 10 MG tablet Take 5 mg by mouth once daily.      ELIQUIS 5 mg Tab Take 5 mg by mouth 2 (two) times daily.      metoprolol succinate (TOPROL-XL) 50 MG 24 hr tablet Take 50 mg by mouth once daily.      multivitamin capsule Take 1 capsule by mouth once daily.      rosuvastatin (CRESTOR) 20 MG tablet Take 20 mg by mouth every evening.      sertraline (ZOLOFT) 50 MG tablet Take 50 mg by mouth once daily.      terazosin (HYTRIN) 2 MG capsule Take 2 mg by mouth once daily.      azelastine (ASTELIN) 137 mcg (0.1 %) nasal spray by Nasal route.      co-enzyme Q-10 30 mg capsule Take 200 mg by mouth once daily.         Current Inpatient Medications:    Current Facility-Administered Medications:     0.9%  NaCl infusion (for blood administration), , Intravenous, Q24H PRN, Bety Thompson MD    acetaminophen oral solution 650 mg, 650 mg, Per OG tube, Q6H PRN, HOLDEN Cristina    albumin human 5% bottle 12.5 g, 12.5 g, Intravenous, PRN, HOLDEN Cristina, Stopped at 10/12/22 0310    amiodarone 360 mg/200 mL (1.8 mg/mL) infusion, 0.5 mg/min, Intravenous, Continuous, HOLDEN Cristina, Last Rate: 16.7 mL/hr at 10/16/22 0017, 0.5 mg/min at 10/16/22 0017    aspirin EC tablet 81 mg, 81 mg, Oral, Daily, HOLDEN Cristina, 81 mg at 10/16/22 0844    atorvastatin tablet 40 mg, 40 mg, Oral, Daily, LUIS ENRIQUE Gomez, 40 mg at 10/15/22 1617    bisacodyL suppository 10 mg, 10 mg, Rectal, Daily PRN, HOLDEN Mendoza    calcium gluconate 1 g in NS IVPB (premixed), 1 g, Intravenous, PRN, Nehemias  HOLDEN Penn    calcium gluconate 1 g in NS IVPB (premixed), 2 g, Intravenous, PRN, HOLDEN Cristina    calcium gluconate 1 g in NS IVPB (premixed), 3 g, Intravenous, PRN, HOLDEN Cristina    dextrose 10% bolus 125 mL, 12.5 g, Intravenous, PRN, Bety Thompson MD    dextrose 10% bolus 250 mL, 25 g, Intravenous, PRN, Bety Thompson MD    dextrose 50% injection 12.5 g, 12.5 g, Intravenous, PRN, HOLDEN Cristina    dextrose 50% injection 25 g, 25 g, Intravenous, PRN, HOLDEN Cristina    docusate sodium capsule 100 mg, 100 mg, Oral, BID, HOLDEN Cristina, 100 mg at 10/16/22 0844    enoxaparin injection 40 mg, 40 mg, Subcutaneous, Daily, HOLDEN Mendoza, 40 mg at 10/15/22 1616    famotidine (PF) injection 20 mg, 20 mg, Intravenous, Daily, HOLDEN Cristina, 20 mg at 10/16/22 0844    folic acid tablet 1 mg, 1 mg, Oral, Daily, HOLDEN Cristina, 1 mg at 10/16/22 0844    glucagon (human recombinant) injection 1 mg, 1 mg, Intramuscular, PRN, Jackie Rush MD    glucose chewable tablet 16 g, 16 g, Oral, PRN, Jackie Rush MD    glucose chewable tablet 24 g, 24 g, Oral, PRN, Jackie Rush MD    HYDROcodone-acetaminophen 5-325 mg per tablet 1 tablet, 1 tablet, Oral, Q4H PRN, HOLDEN Cristina, 1 tablet at 10/14/22 0600    insulin aspart U-100 injection 0-5 Units, 0-5 Units, Subcutaneous, QID (AC + HS) PRN, Jackie Rush MD, 2 Units at 10/14/22 1109    lactulose 10 gram/15 ml solution 20 g, 20 g, Oral, Q6H PRN, HOLDEN Cristina, 20 g at 10/16/22 0949    loperamide capsule 2 mg, 2 mg, Oral, Continuous PRN, HOLDEN Cristina    magnesium sulfate 2g in water 50mL IVPB (premix), 2 g, Intravenous, PRN, HOLDEN Cristina    magnesium sulfate 2g in water 50mL IVPB (premix), 4 g, Intravenous, PRN, HOLDEN Cristina    metoclopramide HCl injection 5 mg, 5 mg, Intravenous, Q6H PRN, HOLDEN Cristina    metoprolol succinate (TOPROL-XL) 24 hr tablet 50 mg, 50 mg,  Oral, Daily, LUIS ENRIQUE Gomez, 50 mg at 10/16/22 0844    morphine injection 4 mg, 4 mg, Intravenous, Q4H PRN, HOLDEN Cristina, 2 mg at 10/11/22 1423    ondansetron injection 4 mg, 4 mg, Intravenous, Q4H PRN, HOLDEN Cristina, 4 mg at 10/12/22 0545    oxyCODONE immediate release tablet 10 mg, 10 mg, Oral, Q4H PRN, HOLDEN Cristina, 5 mg at 10/12/22 0410    polyethylene glycol packet 17 g, 17 g, Oral, Daily PRN, HOLDEN Mendoza    sertraline tablet 50 mg, 50 mg, Oral, Daily, HOLDEN Mendoza, 50 mg at 10/16/22 0844    sodium phosphate 15 mmol in dextrose 5 % 250 mL IVPB, 15 mmol, Intravenous, PRN, HOLDEN Cristina, Stopped at 10/12/22 0156    sodium phosphate 20.01 mmol in dextrose 5 % 250 mL IVPB, 20.01 mmol, Intravenous, PRN, HOLDEN Cristina, Stopped at 10/13/22 1612    sodium phosphate 30 mmol in dextrose 5 % 250 mL IVPB, 30 mmol, Intravenous, PRN, HOLDEN Cristina    sucralfate tablet 1 g, 1 g, Oral, QID (AC & HS), HOLDEN Cristina, 1 g at 10/16/22 0641    tamsulosin 24 hr capsule 0.4 mg, 0.4 mg, Oral, QHS, Yoshi Wiggins MD, 0.4 mg at 10/15/22 2039    valsartan tablet 80 mg, 80 mg, Oral, Daily, LUIS ENRIQUE Gomez, 80 mg at 10/16/22 0844    vancomycin in dextrose 5 % 1 gram/250 mL IVPB 1,000 mg, 1,000 mg, Intravenous, Once, Bety Thompson MD    zinc oxide-cod liver oil 40 % paste, , Topical (Top), BID, Bety Thompson MD, Given at 10/16/22 0845    Facility-Administered Medications Ordered in Other Encounters:     0.9%  NaCl infusion, , Intravenous, Once, Abner J Mitchell, MD    diazePAM tablet 10 mg, 10 mg, Oral, On Call Procedure, Abner Mitchell MD, 10 mg at 09/23/22 0823    diphenhydrAMINE capsule 50 mg, 50 mg, Oral, On Call Procedure, Abner Mitchell MD, 50 mg at 09/23/22 0823    sodium chloride 0.9% flush 10 mL, 10 mL, Intravenous, PRN, Abner Mitchell MD         VTE Risk Mitigation (From admission, onward)           Ordered      enoxaparin injection 40 mg  Daily         10/13/22 1141     IP VTE HIGH RISK PATIENT  Once         10/13/22 1141     heparin, porcine (PF) (heparin flush 100 units/mL) 100 unit/mL injection flush         10/11/22 0607                    Assessment:   CAD       - s/p CABG x 4 (LIMA to LAD, SVG to Diag 1, SVG to OM, and SVG to RCA), Ligation of left atrial appendage per Dr. Bety Thompson.    PAF       - Afib RVR on 10.15.22 - now SR  Recurrent Bradycardia       - status post pacemaker implant in February 2018   Carotid Artery Stenosis        - status post CEA  Hypertension  Hyperlipidemia  CKD  Obstructive Sleep Apnea      Plan:   Continue Aspirin 81mg daily  Resume Eliquis 5mg BID for CVA prevention when okay with CT Surgery  Will not need to continue Amiodarone orally after 24 hour IV Amiodarone gtt is completed per Dr. Hernandez  Continue Metoprolol Succinate 50mg daily and Atorvastatin 40mg daily (was on Crestor 20mg daily at home)  Increase Valsartan to 160mg daily for better BP control (was on Olmesartan 20mg daily at home)    Labs in AM: CBC, CMP      Marline Adams, LUIS ENRIQUE  Cardiology  [facility]   10/16/2022 9:49 AM

## 2022-10-16 NOTE — PT/OT/SLP PROGRESS
Physical Therapy  Treatment    Jessica Elias   MRN: [id number]   Admitting Diagnosis: <principal problem not specified>       PT Start Time: 1230     PT Stop Time: 1255    PT Total Time (min): 25 min       Billable Minutes:  Gait Training 13 and Therapeutic Activity 12    Treatment Type: Treatment  PT/PTA: PTA     PTA Visit Number: 2       General Precautions: Standard,  (GABG precautions)  Orthopedic Precautions: N/A   Braces: N/A  Respiratory Status: Room air    Spiritual, Cultural Beliefs, Judaism Practices, Values that Affect Care: no    Subjective:  Communicated with NSG prior to session.     /66 HR 82     Objective:   Patient found with: blood pressure cuff, telemetry    Functional Mobility:       Transfers:   Sit to/from stand. CGA/SBA. Sternal pxns maintained.     Gait: Pt ambulated ~120ft in the hallway with a slow but steady step through gait pattern. Decreased step length and von. No unsteadiness or LOB. RW. Min A.        AM-PAC 6 CLICK MOBILITY  How much help from another person does this patient currently need?   1 = Unable, Total/Dependent Assistance  2 = A lot, Maximum/Moderate Assistance  3 = A little, Minimum/Contact Guard/Supervision  4 = None, Modified [place]/Independent         AM-PAC Raw Score CMS G-Code Modifier Level of Impairment Assistance   6 % Total / Unable   7 - 9 CM 80 - 100% Maximal Assist   10 - 14 CL 60 - 80% Moderate Assist   15 - 19 CK 40 - 60% Moderate Assist   20 - 22 CJ 20 - 40% Minimal Assist   23 CI 1-20% SBA / CGA   24 CH 0% Independent/ Mod I     Patient left sitting UIC with all lines intact and call button in reach.    Assessment:  Jessica Elias is a 82 y.o. female with a medical diagnosis of <principal problem not specified> and presents with s/p CABG    Rehab identified problem list/impairments: Rehab identified problem list/impairments: weakness, impaired endurance, impaired balance, gait instability    Rehab potential is  good.    Activity tolerance: Good    Discharge recommendations: Discharge Facility/Level of Care Needs: home with home health     Barriers to discharge:      Equipment recommendations: Equipment Needed After Discharge: walker, rolling     GOALS:   Multidisciplinary Problems       Physical Therapy Goals          Problem: Physical Therapy    Goal Priority Disciplines Outcome Goal Variances Interventions   Physical Therapy Goal     PT, PT/OT      Description: Goals to be met by: 2022     Patient will increase functional independence with mobility by performin. Supine to sit with Modified [place]  2. Sit to supine with Modified [place]  3. Sit to stand transfer with Modified [place]  4. Gait  x 500 feet with Modified [place] using Rolling Walker vs LRAD.                          PLAN:    Patient to be seen 6 x/week  to address the above listed problems via gait training, therapeutic activities, therapeutic exercises  Plan of Care expires: 22  Plan of Care reviewed with: patient         10/16/2022

## 2022-10-16 NOTE — PROGRESS NOTES
10/16/22 0850   Pre Exercise Vitals   /71   Pulse 74   Supplemental O2? No   SpO2 92 %   Assisted out of bed to chair only. Reported to nurse elevated bp. Ambulation deferred until bp improved.

## 2022-10-17 LAB
ALBUMIN SERPL-MCNC: 2.3 GM/DL (ref 3.4–4.8)
ALBUMIN/GLOB SERPL: 1 RATIO (ref 1.1–2)
ALP SERPL-CCNC: 42 UNIT/L (ref 40–150)
ALT SERPL-CCNC: 14 UNIT/L (ref 0–55)
AST SERPL-CCNC: 16 UNIT/L (ref 5–34)
BASOPHILS # BLD AUTO: 0.03 X10(3)/MCL (ref 0–0.2)
BASOPHILS NFR BLD AUTO: 0.4 %
BILIRUBIN DIRECT+TOT PNL SERPL-MCNC: 0.7 MG/DL
BUN SERPL-MCNC: 24 MG/DL (ref 9.8–20.1)
CALCIUM SERPL-MCNC: 8.2 MG/DL (ref 8.4–10.2)
CHLORIDE SERPL-SCNC: 105 MMOL/L (ref 98–107)
CO2 SERPL-SCNC: 27 MMOL/L (ref 23–31)
CREAT SERPL-MCNC: 0.84 MG/DL (ref 0.55–1.02)
EOSINOPHIL # BLD AUTO: 0.23 X10(3)/MCL (ref 0–0.9)
EOSINOPHIL NFR BLD AUTO: 3.1 %
ERYTHROCYTE [DISTWIDTH] IN BLOOD BY AUTOMATED COUNT: 13.8 % (ref 11.5–17)
GFR SERPLBLD CREATININE-BSD FMLA CKD-EPI: >60 MLS/MIN/1.73/M2
GLOBULIN SER-MCNC: 2.2 GM/DL (ref 2.4–3.5)
GLUCOSE SERPL-MCNC: 124 MG/DL (ref 82–115)
HCT VFR BLD AUTO: 30.9 % (ref 37–47)
HGB BLD-MCNC: 10 GM/DL (ref 12–16)
IMM GRANULOCYTES # BLD AUTO: 0.02 X10(3)/MCL (ref 0–0.04)
IMM GRANULOCYTES NFR BLD AUTO: 0.3 %
LYMPHOCYTES # BLD AUTO: 1.32 X10(3)/MCL (ref 0.6–4.6)
LYMPHOCYTES NFR BLD AUTO: 17.7 %
MAGNESIUM SERPL-MCNC: 1.8 MG/DL (ref 1.6–2.6)
MCH RBC QN AUTO: 30 PG (ref 27–31)
MCHC RBC AUTO-ENTMCNC: 32.4 MG/DL (ref 33–36)
MCV RBC AUTO: 92.8 FL (ref 80–94)
MONOCYTES # BLD AUTO: 1 X10(3)/MCL (ref 0.1–1.3)
MONOCYTES NFR BLD AUTO: 13.4 %
NEUTROPHILS # BLD AUTO: 4.8 X10(3)/MCL (ref 2.1–9.2)
NEUTROPHILS NFR BLD AUTO: 65.1 %
NRBC BLD AUTO-RTO: 0 %
PLATELET # BLD AUTO: 188 X10(3)/MCL (ref 130–400)
PMV BLD AUTO: 11.7 FL (ref 7.4–10.4)
POCT GLUCOSE: 101 MG/DL (ref 70–110)
POCT GLUCOSE: 111 MG/DL (ref 70–110)
POTASSIUM SERPL-SCNC: 3 MMOL/L (ref 3.5–5.1)
PROT SERPL-MCNC: 4.5 GM/DL (ref 5.8–7.6)
RBC # BLD AUTO: 3.33 X10(6)/MCL (ref 4.2–5.4)
SODIUM SERPL-SCNC: 142 MMOL/L (ref 136–145)
WBC # SPEC AUTO: 7.4 X10(3)/MCL (ref 4.5–11.5)

## 2022-10-17 PROCEDURE — 25000003 PHARM REV CODE 250: Performed by: THORACIC SURGERY (CARDIOTHORACIC VASCULAR SURGERY)

## 2022-10-17 PROCEDURE — 97116 GAIT TRAINING THERAPY: CPT | Mod: CQ

## 2022-10-17 PROCEDURE — 63600175 PHARM REV CODE 636 W HCPCS: Performed by: NURSE PRACTITIONER

## 2022-10-17 PROCEDURE — 25000003 PHARM REV CODE 250: Performed by: PHYSICIAN ASSISTANT

## 2022-10-17 PROCEDURE — 93010 ELECTROCARDIOGRAM REPORT: CPT | Mod: ,,, | Performed by: INTERNAL MEDICINE

## 2022-10-17 PROCEDURE — 25000003 PHARM REV CODE 250

## 2022-10-17 PROCEDURE — 99024 PR POST-OP FOLLOW-UP VISIT: ICD-10-PCS | Mod: ,,,

## 2022-10-17 PROCEDURE — 85025 COMPLETE CBC W/AUTO DIFF WBC: CPT | Performed by: NURSE PRACTITIONER

## 2022-10-17 PROCEDURE — 25000003 PHARM REV CODE 250: Performed by: NURSE PRACTITIONER

## 2022-10-17 PROCEDURE — 97535 SELF CARE MNGMENT TRAINING: CPT

## 2022-10-17 PROCEDURE — 97110 THERAPEUTIC EXERCISES: CPT | Mod: CQ

## 2022-10-17 PROCEDURE — 94760 N-INVAS EAR/PLS OXIMETRY 1: CPT

## 2022-10-17 PROCEDURE — 99024 POSTOP FOLLOW-UP VISIT: CPT | Mod: ,,,

## 2022-10-17 PROCEDURE — 83735 ASSAY OF MAGNESIUM: CPT | Performed by: THORACIC SURGERY (CARDIOTHORACIC VASCULAR SURGERY)

## 2022-10-17 PROCEDURE — 97110 THERAPEUTIC EXERCISES: CPT

## 2022-10-17 PROCEDURE — 93010 EKG 12-LEAD: ICD-10-PCS | Mod: ,,, | Performed by: INTERNAL MEDICINE

## 2022-10-17 PROCEDURE — 80053 COMPREHEN METABOLIC PANEL: CPT | Performed by: NURSE PRACTITIONER

## 2022-10-17 PROCEDURE — 93005 ELECTROCARDIOGRAM TRACING: CPT

## 2022-10-17 PROCEDURE — 21400001 HC TELEMETRY ROOM

## 2022-10-17 PROCEDURE — 36415 COLL VENOUS BLD VENIPUNCTURE: CPT | Performed by: NURSE PRACTITIONER

## 2022-10-17 PROCEDURE — 63600175 PHARM REV CODE 636 W HCPCS: Performed by: PHYSICIAN ASSISTANT

## 2022-10-17 RX ORDER — POTASSIUM CHLORIDE 20 MEQ/1
20 TABLET, EXTENDED RELEASE ORAL 3 TIMES DAILY
Status: COMPLETED | OUTPATIENT
Start: 2022-10-17 | End: 2022-10-17

## 2022-10-17 RX ORDER — PRAZOSIN HYDROCHLORIDE 1 MG/1
1 CAPSULE ORAL DAILY
Status: DISCONTINUED | OUTPATIENT
Start: 2022-10-17 | End: 2022-10-25 | Stop reason: HOSPADM

## 2022-10-17 RX ORDER — AMLODIPINE BESYLATE 5 MG/1
10 TABLET ORAL DAILY
Status: DISCONTINUED | OUTPATIENT
Start: 2022-10-17 | End: 2022-10-20

## 2022-10-17 RX ADMIN — POTASSIUM CHLORIDE 20 MEQ: 20 TABLET, EXTENDED RELEASE ORAL at 09:10

## 2022-10-17 RX ADMIN — SUCRALFATE 1 G: 1 TABLET ORAL at 05:10

## 2022-10-17 RX ADMIN — Medication: at 10:10

## 2022-10-17 RX ADMIN — AMIODARONE HYDROCHLORIDE 150 MG: 1.5 INJECTION, SOLUTION INTRAVENOUS at 02:10

## 2022-10-17 RX ADMIN — HYDRALAZINE HYDROCHLORIDE 10 MG: 20 INJECTION INTRAMUSCULAR; INTRAVENOUS at 12:10

## 2022-10-17 RX ADMIN — PRAZOSIN HYDROCHLORIDE 1 MG: 1 CAPSULE ORAL at 11:10

## 2022-10-17 RX ADMIN — POTASSIUM CHLORIDE 20 MEQ: 20 TABLET, EXTENDED RELEASE ORAL at 10:10

## 2022-10-17 RX ADMIN — Medication: at 09:10

## 2022-10-17 RX ADMIN — SUCRALFATE 1 G: 1 TABLET ORAL at 10:10

## 2022-10-17 RX ADMIN — METOPROLOL SUCCINATE 50 MG: 50 TABLET, EXTENDED RELEASE ORAL at 08:10

## 2022-10-17 RX ADMIN — ATORVASTATIN CALCIUM 40 MG: 40 TABLET, FILM COATED ORAL at 05:10

## 2022-10-17 RX ADMIN — ASPIRIN 81 MG: 81 TABLET, COATED ORAL at 08:10

## 2022-10-17 RX ADMIN — FAMOTIDINE 20 MG: 10 INJECTION, SOLUTION INTRAVENOUS at 08:10

## 2022-10-17 RX ADMIN — SERTRALINE HYDROCHLORIDE 50 MG: 50 TABLET ORAL at 08:10

## 2022-10-17 RX ADMIN — SUCRALFATE 1 G: 1 TABLET ORAL at 09:10

## 2022-10-17 RX ADMIN — POTASSIUM CHLORIDE 20 MEQ: 20 TABLET, EXTENDED RELEASE ORAL at 05:10

## 2022-10-17 RX ADMIN — AMLODIPINE BESYLATE 10 MG: 5 TABLET ORAL at 10:10

## 2022-10-17 RX ADMIN — ENOXAPARIN SODIUM 40 MG: 40 INJECTION SUBCUTANEOUS at 05:10

## 2022-10-17 RX ADMIN — VALSARTAN 160 MG: 80 TABLET, FILM COATED ORAL at 08:10

## 2022-10-17 RX ADMIN — AMIODARONE HYDROCHLORIDE 1 MG/MIN: 1.8 INJECTION, SOLUTION INTRAVENOUS at 02:10

## 2022-10-17 RX ADMIN — FOLIC ACID 1 MG: 1 TABLET ORAL at 08:10

## 2022-10-17 NOTE — PROGRESS NOTES
10/17/22 0954   Pre Exercise Vitals   /71   Pulse 76   Reported elevated bp to nurse. Activity deferred.

## 2022-10-17 NOTE — PROGRESS NOTES
10/17/22 1445   Pre Exercise Vitals   /64   Pulse 73   Supplemental O2? No   SpO2 94 %   During Exercise Vitals   Pulse 86   Supplemental O2? No   SpO2 (!) 89 %   Distance Walked 175 feet   Post Exercise Vitals   /70   Pulse 79   Supplemental O2? No   SpO2 94 %   Modality   Modality Walker   Assist x1 from sitting to standing.Sternal precautions maintained. Gait steady with rollator. Tolerated well.

## 2022-10-17 NOTE — PT/OT/SLP PROGRESS
Occupational Therapy   Treatment    Name: Jessica Elias  MRN: [id number]  Admitting Diagnosis:  CABG  Recent Surgery: Procedure(s) (LRB):  CORONARY ARTERY BYPASS GRAFT (CABG) (N/A) 6 Days Post-Op    Recommendations:     Discharge Recommendations: home with HH and 24/7 care vs rehab  Discharge Equipment Recommendations:  walker, rolling  Barriers to discharge: medical dx    Assessment:     Jessica Elias is a 82 y.o. female with a medical diagnosis of of MVCAD s/p CABG x 4. Performance deficits affecting function are weakness, impaired endurance, impaired self care skills, and impaired functional mobility.    Rehab Prognosis: Good; patient would benefit from acute skilled OT services to address these deficits and reach maximum level of function.       Plan:     Patient to be seen 5 x/week, 6 x/week to address the above listed problems via self-care/home management, therapeutic activities  Plan of Care Expires: 10/27/22  Plan of Care Reviewed with: patient    Subjective     Pain/Comfort:  Pt denied any pain during session.  Pt stated that her daughter has to leave [facility] on Sunday (10/23) to return to [place] but that her son's MIL will be able to provide assistance for a week after her daughter leaves. RN was notified.    Objective:     Communicated with: RN prior to session. Patient found up in chair with pulse ox (continuous) and telemetry upon OT entry to room.    General Precautions: Standard, fall, sternal   Respiratory Status: Room air  Vitals:  BP: 152/67  NE: 69  O2: 89-94%  Pt's RN was notified.     Occupational Performance:     Functional Mobility/Transfers:  Patient performed multiple Sit <> Stand Transfers with vcs and min-mod A progressing to CGA-SBA provided with BUEs positioned across chest.  Functional Mobility: Pt ambulated <> bathroom and performed gait trial in hallway using RW with CGA and vcs provided to increase pt's safety.    ADLs:  Toileting: Pt required mod A and increased time to perform  task.  LB dressing: Pt able to don/doff B socks with SBA provided while seated in chair, crossing each LE during task.  Grooming: Pt performed oral hygiene task and combed hair while standing at sink with SBA provided.    Treatment & Education:  Pt required vcs to adhere to sternal pxns and to perform proper breathing technique during session to increase pt's safety, conserve pt's energy, and increase pt's O2 intake.     Patient left up in chair with all lines intact and call button in reach    GOALS:   Multidisciplinary Problems       Occupational Therapy Goals          Problem: Occupational Therapy    Goal Priority Disciplines Outcome Interventions   Occupational Therapy Goal     OT, PT/OT Ongoing, Progressing    Description: STG: to be met in 2 weeks  1. UB dressing Mod I.  2. LB dressing Mod I.  3. Toileting, toilet t/f Mod I with LRAD.  4. Shower t/f Mod I.                        Time Tracking:     OT Date of Treatment: 10/17/22  OT Start Time: 1225  OT Stop Time: 1303  OT Total Time (min): 38 min    Billable Minutes: Self Care/Home Mgmt 38 mins    OT/JACKIE: OT     JACKIE Visit Number: 2    10/17/2022

## 2022-10-17 NOTE — PROGRESS NOTES
[facility] - 3rd Floor Medical Telemetry  Cardiology  Progress Note    Patient Name: Jessica Elias  MRN: [id number]  Admission Date: 10/11/2022  Hospital Length of Stay: 6 days  Code Status: Full Code   Attending Provider:  Dr. eBty Thompson  Consulting Provider: HEATH George  Primary Care Physician: Tarik Gee MD  Principal Problem:<principal problem not specified>    Patient information was obtained from ER records.     Subjective:     Chief Complaint:  Consult:  Atrial Fibrillation     HPI:  82-year-old female known to both Dr. Dos Santos and Dr. Sánchez with a past medical history of CAD, PAF, carotid artery stenosis status post CEA, hypertension, hyperlipidemia, CKD, obstructive sleep apnea, and recurrent bradycardia status post pacemaker implant in February 2018.  Patient underwent outpatient coronary angiogram which revealed triple-vessel disease.  She was electively admitted on October 11, 2022 and underwent CABG x 4 (LIMA to LAD, SVG to Diag 1, SVG to OM, and SVG to RCA), Ligation of left atrial appendage per Dr. Bety Thompson.   Patient was noted to be in Atrial Fibrillation RVR earlier this morning,  therefore [facility] has been consulted for further evaluation of Atrial Fibrillation.      10.16.22:  NAD noted.  Denies SOB.  Reports incisional soreness.    10.17.22: NAD. Denies SOB or palps. Reports incisional soreness. SR on tele. Remains hypertensive.     PMH: CAD, PAF, RUKHSANA, HTN, HLD, MP, Recurrent Bradycardia, CKD  PSH: CEA, Cardiac Pacemaker, Colonoscopy, Coronary Angiogram, Lipoma Removal, Cataract Surgery, Hysterectomy, Bladder Surgery, Appendectomy  Family History: Father - MI, HTN; Mother - CVA, HTN, CABG  Social History: Former Smoker.  Denies Alcohol Use.  Denies Illicit Drug Use.     Previous Cardiac Diagnostics:  LEFT HEART CATHETERIZATION, SELECTIVE CORONARY ANGIOGRAPHY AND LEFT VENTRICULOGRAM 9.23.22:  1. LEFT MAIN:  HIGHLY CALCIFIED WITHOUT OBSTRUCTIVE LESIONS  2. LAD:   HEAVILY CALCIFIED 70-80% STENOSIS PROXIMALLY (LONG LESION) AND 70% DISTAL AT THE BIFURCATION WITH THE 2ND DIAGONAL BRANCH.  THE 1ST DIAGONAL BRANCH SHOWS DISEASE OF 90%.  3. LEFT CIRCUMFLEX:  CALCIFIED, 99% STENOSIS PROXIMALLY  4.  RIGHT CORONARY ARTERY:  HIGHLY CALCIFIED, 99% STENOSIS OF THE OSTIUM FOLLOWED BY 90% STENOSIS PROXIMALLY     RECOMMENDATIONS:  1. RECOMMEND CARDIOTHORACIC SURGICAL EVALUATION BY DR. THOMPSON CORONARY ARTERY BYPASS SURGERY  2. IF SHE GOES HOME TODAY WITH AN APPOINTMENT FOR SURGERY LATER, RESUME ELIQUIS TOMORROW MORNING  3.  CARDIAC DIET  4.  FOLLOW-UP WITH ME AT THE OFFICE IN 1 WEEK      Left Ventriculogram Summary 9.23.22:  The ejection fraction was calculated to be 55%.  The left ventricular systolic function was normal.  The left ventricular end diastolic pressure was elevated.  The pre-procedure left ventricular end diastolic pressure was 23.  The estimated blood loss was none.  There was three vessel coronary artery disease.  There was no mitral valve regurgitation.  There was no aortic valve stenosis.        Past Medical History:   Diagnosis Date    Chronic kidney disease, unspecified     stage 3    Coronary artery disease     History of carotid artery disease     Hyperlipidemia     Hypertension     Sleep apnea        Past Surgical History:   Procedure Laterality Date    APPENDECTOMY N/A     BLADDER SURGERY N/A     CAROTID ENDARTERECTOMY N/A     CATARACT EXTRACTION N/A     CORONARY ARTERY BYPASS GRAFT (CABG) N/A 10/11/2022    Procedure: CORONARY ARTERY BYPASS GRAFT (CABG);  Surgeon: Bety Thompson MD;  Location: [facility];  Service: Cardiothoracic;  Laterality: N/A;    HYSTERECTOMY N/A     INSERTION OF PERMANENT PACEMAKER N/A     LEFT HEART CATHETERIZATION Left 09/23/2022    Procedure: CATHETERIZATION, HEART, LEFT;  Surgeon: Abner Mitchell MD;  Location: [facility] CATH LAB;  Service: Cardiology;  Laterality: Left;  LHC VIA RRA    lump removed from right shoulder         Review of patient's  allergies indicates:   Allergen Reactions    Penicillin     Sulfa (sulfonamide antibiotics)        Current Facility-Administered Medications on File Prior to Encounter   Medication    0.9%  NaCl infusion    diazePAM tablet 10 mg    diphenhydrAMINE capsule 50 mg    sodium chloride 0.9% flush 10 mL     Current Outpatient Medications on File Prior to Encounter   Medication Sig    amLODIPine (NORVASC) 10 MG tablet Take 5 mg by mouth once daily.    ELIQUIS 5 mg Tab Take 5 mg by mouth 2 (two) times daily.    metoprolol succinate (TOPROL-XL) 50 MG 24 hr tablet Take 50 mg by mouth once daily.    multivitamin capsule Take 1 capsule by mouth once daily.    rosuvastatin (CRESTOR) 20 MG tablet Take 20 mg by mouth every evening.    sertraline (ZOLOFT) 50 MG tablet Take 50 mg by mouth once daily.    terazosin (HYTRIN) 2 MG capsule Take 2 mg by mouth once daily.    azelastine (ASTELIN) 137 mcg (0.1 %) nasal spray by Nasal route.    co-enzyme Q-10 30 mg capsule Take 200 mg by mouth once daily.     Family History    None       Tobacco Use    Smoking status: Never    Smokeless tobacco: Never   Substance and Sexual Activity    Alcohol use: Not Currently    Drug use: Never    Sexual activity: Not on file       Review of Systems   Respiratory:  Negative for shortness of breath.    Cardiovascular:         Incisional Pain   Musculoskeletal: Negative.    Skin: Negative.    Psychiatric/Behavioral: Negative.       Objective:     Vital Signs (Most Recent):  Temp: 99.5 °F (37.5 °C) (10/17/22 0727)  Pulse: 70 (10/17/22 0801)  Resp: 18 (10/17/22 0727)  BP: (!) 178/65 (10/17/22 0801)  SpO2: (!) 93 % (10/17/22 0727)   Vital Signs (24h Range):  Temp:  [98.1 °F (36.7 °C)-99.5 °F (37.5 °C)] 99.5 °F (37.5 °C)  Pulse:  [35-86] 70  Resp:  [18] 18  SpO2:  [92 %-94 %] 93 %  BP: (138-197)/(62-76) 178/65     Weight: 67.7 kg (149 lb 4 oz)  Body mass index is 25.22 kg/m².    SpO2: (!) 93 %  O2 Device (Oxygen Therapy): room air      Intake/Output Summary (Last  24 hours) at 10/17/2022 0947  Last data filed at 10/16/2022 2147  Gross per 24 hour   Intake 690 ml   Output --   Net 690 ml         Lines/Drains/Airways       Peripheral Intravenous Line  Duration                  Peripheral IV - Single Lumen 10/11/22 0546 20 G Anterior;Distal;Right Forearm 6 days                    Significant Labs:  Recent Results (from the past 72 hour(s))   POCT glucose    Collection Time: 10/14/22 10:43 AM   Result Value Ref Range    POCT Glucose 203 (H) 70 - 110 mg/dL   POCT glucose    Collection Time: 10/14/22  3:58 PM   Result Value Ref Range    POCT Glucose 152 (H) 70 - 110 mg/dL   POCT glucose    Collection Time: 10/14/22  7:57 PM   Result Value Ref Range    POCT Glucose 117 (H) 70 - 110 mg/dL   Basic Metabolic Panel    Collection Time: 10/15/22  4:43 AM   Result Value Ref Range    Sodium Level 141 136 - 145 mmol/L    Potassium Level 3.8 3.5 - 5.1 mmol/L    Chloride 105 98 - 107 mmol/L    Carbon Dioxide 27 23 - 31 mmol/L    Glucose Level 112 82 - 115 mg/dL    Blood Urea Nitrogen 28.2 (H) 9.8 - 20.1 mg/dL    Creatinine 0.97 0.55 - 1.02 mg/dL    BUN/Creatinine Ratio 29     Calcium Level Total 8.5 8.4 - 10.2 mg/dL    Anion Gap 9.0 mEq/L    eGFR 59 mls/min/1.73/m2   POCT glucose    Collection Time: 10/15/22  5:33 AM   Result Value Ref Range    POCT Glucose 114 (H) 70 - 110 mg/dL   POCT glucose    Collection Time: 10/15/22 10:55 AM   Result Value Ref Range    POCT Glucose 121 (H) 70 - 110 mg/dL   POCT glucose    Collection Time: 10/15/22  4:21 PM   Result Value Ref Range    POCT Glucose 106 70 - 110 mg/dL   Comprehensive Metabolic Panel    Collection Time: 10/16/22  7:09 AM   Result Value Ref Range    Sodium Level 140 136 - 145 mmol/L    Potassium Level 3.6 3.5 - 5.1 mmol/L    Chloride 104 98 - 107 mmol/L    Carbon Dioxide 25 23 - 31 mmol/L    Glucose Level 119 (H) 82 - 115 mg/dL    Blood Urea Nitrogen 25.5 (H) 9.8 - 20.1 mg/dL    Creatinine 0.91 0.55 - 1.02 mg/dL    Calcium Level Total 8.7  8.4 - 10.2 mg/dL    Protein Total 5.2 (L) 5.8 - 7.6 gm/dL    Albumin Level 2.5 (L) 3.4 - 4.8 gm/dL    Globulin 2.7 2.4 - 3.5 gm/dL    Albumin/Globulin Ratio 0.9 (L) 1.1 - 2.0 ratio    Bilirubin Total 0.6 <=1.5 mg/dL    Alkaline Phosphatase 45 40 - 150 unit/L    Alanine Aminotransferase 17 0 - 55 unit/L    Aspartate Aminotransferase 19 5 - 34 unit/L    eGFR >60 mls/min/1.73/m2   CBC with Differential    Collection Time: 10/16/22  7:09 AM   Result Value Ref Range    WBC 7.8 4.5 - 11.5 x10(3)/mcL    RBC 3.91 (L) 4.20 - 5.40 x10(6)/mcL    Hgb 11.8 (L) 12.0 - 16.0 gm/dL    Hct 36.8 (L) 37.0 - 47.0 %    MCV 94.1 (H) 80.0 - 94.0 fL    MCH 30.2 27.0 - 31.0 pg    MCHC 32.1 (L) 33.0 - 36.0 mg/dL    RDW 13.9 11.5 - 17.0 %    Platelet 168 130 - 400 x10(3)/mcL    MPV 12.1 (H) 7.4 - 10.4 fL    Neut % 66.9 %    Lymph % 15.1 %    Mono % 11.5 %    Eos % 5.7 %    Basophil % 0.5 %    Lymph # 1.18 0.6 - 4.6 x10(3)/mcL    Neut # 5.2 2.1 - 9.2 x10(3)/mcL    Mono # 0.90 0.1 - 1.3 x10(3)/mcL    Eos # 0.45 0 - 0.9 x10(3)/mcL    Baso # 0.04 0 - 0.2 x10(3)/mcL    IG# 0.02 0 - 0.04 x10(3)/mcL    IG% 0.3 %    NRBC% 0.0 %   POCT glucose    Collection Time: 10/16/22 11:28 AM   Result Value Ref Range    POCT Glucose 110 70 - 110 mg/dL   CBC with Differential    Collection Time: 10/17/22  4:00 AM   Result Value Ref Range    WBC 7.4 4.5 - 11.5 x10(3)/mcL    RBC 3.33 (L) 4.20 - 5.40 x10(6)/mcL    Hgb 10.0 (L) 12.0 - 16.0 gm/dL    Hct 30.9 (L) 37.0 - 47.0 %    MCV 92.8 80.0 - 94.0 fL    MCH 30.0 27.0 - 31.0 pg    MCHC 32.4 (L) 33.0 - 36.0 mg/dL    RDW 13.8 11.5 - 17.0 %    Platelet 188 130 - 400 x10(3)/mcL    MPV 11.7 (H) 7.4 - 10.4 fL    Neut % 65.1 %    Lymph % 17.7 %    Mono % 13.4 %    Eos % 3.1 %    Basophil % 0.4 %    Lymph # 1.32 0.6 - 4.6 x10(3)/mcL    Neut # 4.8 2.1 - 9.2 x10(3)/mcL    Mono # 1.00 0.1 - 1.3 x10(3)/mcL    Eos # 0.23 0 - 0.9 x10(3)/mcL    Baso # 0.03 0 - 0.2 x10(3)/mcL    IG# 0.02 0 - 0.04 x10(3)/mcL    IG% 0.3 %    NRBC% 0.0 %    Comprehensive Metabolic Panel    Collection Time: 10/17/22  4:33 AM   Result Value Ref Range    Sodium Level 142 136 - 145 mmol/L    Potassium Level 3.0 (L) 3.5 - 5.1 mmol/L    Chloride 105 98 - 107 mmol/L    Carbon Dioxide 27 23 - 31 mmol/L    Glucose Level 124 (H) 82 - 115 mg/dL    Blood Urea Nitrogen 24.0 (H) 9.8 - 20.1 mg/dL    Creatinine 0.84 0.55 - 1.02 mg/dL    Calcium Level Total 8.2 (L) 8.4 - 10.2 mg/dL    Protein Total 4.5 (L) 5.8 - 7.6 gm/dL    Albumin Level 2.3 (L) 3.4 - 4.8 gm/dL    Globulin 2.2 (L) 2.4 - 3.5 gm/dL    Albumin/Globulin Ratio 1.0 (L) 1.1 - 2.0 ratio    Bilirubin Total 0.7 <=1.5 mg/dL    Alkaline Phosphatase 42 40 - 150 unit/L    Alanine Aminotransferase 14 0 - 55 unit/L    Aspartate Aminotransferase 16 5 - 34 unit/L    eGFR >60 mls/min/1.73/m2   Magnesium    Collection Time: 10/17/22  4:33 AM   Result Value Ref Range    Magnesium Level 1.80 1.60 - 2.60 mg/dL   POCT glucose    Collection Time: 10/17/22  5:50 AM   Result Value Ref Range    POCT Glucose 111 (H) 70 - 110 mg/dL       Significant Imaging:      EKG:        Telemetry:  SR with HR 69 bpm at 0948    Physical Exam  Constitutional:       Appearance: Normal appearance.   HENT:      Head: Normocephalic and atraumatic.      Nose: Nose normal.   Eyes:      Extraocular Movements: Extraocular movements intact.   Cardiovascular:      Rate and Rhythm: Normal rate and regular rhythm.   Pulmonary:      Effort: Pulmonary effort is normal.   Abdominal:      Palpations: Abdomen is soft.   Musculoskeletal:         General: Normal range of motion.   Skin:     General: Skin is warm and dry.      Capillary Refill: Capillary refill takes less than 2 seconds.   Neurological:      Mental Status: She is alert and oriented to person, place, and time.   Psychiatric:         Mood and Affect: Mood normal.         Behavior: Behavior normal.       Home Medications:   Current Facility-Administered Medications on File Prior to Encounter   Medication Dose Route  Frequency Provider Last Rate Last Admin    0.9%  NaCl infusion   Intravenous Once Abner Mitchell MD        diazePAM tablet 10 mg  10 mg Oral On Call Procedure Abner Mitchell MD   10 mg at 09/23/22 0823    diphenhydrAMINE capsule 50 mg  50 mg Oral On Call Procedure Abner Mitchell MD   50 mg at 09/23/22 0823    sodium chloride 0.9% flush 10 mL  10 mL Intravenous PRN Anber Mitchell MD         Current Outpatient Medications on File Prior to Encounter   Medication Sig Dispense Refill    amLODIPine (NORVASC) 10 MG tablet Take 5 mg by mouth once daily.      ELIQUIS 5 mg Tab Take 5 mg by mouth 2 (two) times daily.      metoprolol succinate (TOPROL-XL) 50 MG 24 hr tablet Take 50 mg by mouth once daily.      multivitamin capsule Take 1 capsule by mouth once daily.      rosuvastatin (CRESTOR) 20 MG tablet Take 20 mg by mouth every evening.      sertraline (ZOLOFT) 50 MG tablet Take 50 mg by mouth once daily.      terazosin (HYTRIN) 2 MG capsule Take 2 mg by mouth once daily.      azelastine (ASTELIN) 137 mcg (0.1 %) nasal spray by Nasal route.      co-enzyme Q-10 30 mg capsule Take 200 mg by mouth once daily.         Current Inpatient Medications:    Current Facility-Administered Medications:     0.9%  NaCl infusion (for blood administration), , Intravenous, Q24H PRN, Bety Thompson MD    acetaminophen oral solution 650 mg, 650 mg, Per OG tube, Q6H PRN, HOLDEN Cristina    albumin human 5% bottle 12.5 g, 12.5 g, Intravenous, PRN, HOLDEN Cristina, Stopped at 10/12/22 0310    amiodarone 360 mg/200 mL (1.8 mg/mL) infusion, 0.5 mg/min, Intravenous, Continuous, HOLDEN Cristina, Last Rate: 16.7 mL/hr at 10/16/22 0017, 0.5 mg/min at 10/16/22 0017    amiodarone 360 mg/200 mL (1.8 mg/mL) infusion, 0.5 mg/min, Intravenous, Continuous, Adrián Romero NP    amLODIPine tablet 10 mg, 10 mg, Oral, Daily, Arlene L Mankin, FNP    aspirin EC tablet 81 mg, 81 mg, Oral, Daily, HOLDEN Cristina, 81 mg at  10/17/22 0801    atorvastatin tablet 40 mg, 40 mg, Oral, Daily, LUIS ENRIQUE Gomez, 40 mg at 10/16/22 1623    bisacodyL suppository 10 mg, 10 mg, Rectal, Daily PRN, HOLDEN Mendoza    calcium gluconate 1 g in NS IVPB (premixed), 1 g, Intravenous, PRN, HOLDEN Cristina    calcium gluconate 1 g in NS IVPB (premixed), 2 g, Intravenous, PRN, HOLDEN Cristina    calcium gluconate 1 g in NS IVPB (premixed), 3 g, Intravenous, PRN, HOLDEN Cristina    dextrose 10% bolus 125 mL, 12.5 g, Intravenous, PRN, Bety Thompson MD    dextrose 10% bolus 250 mL, 25 g, Intravenous, PRN, Bety Thompson MD    dextrose 50% injection 12.5 g, 12.5 g, Intravenous, PRN, HOLDEN Cristina    dextrose 50% injection 25 g, 25 g, Intravenous, PRN, HOLDEN Cristina    docusate sodium capsule 100 mg, 100 mg, Oral, BID, HOLDEN Cristina, 100 mg at 10/16/22 0844    enoxaparin injection 40 mg, 40 mg, Subcutaneous, Daily, HOLDEN Mendoza, 40 mg at 10/16/22 1623    famotidine (PF) injection 20 mg, 20 mg, Intravenous, Daily, HOLDNE Cristina, 20 mg at 10/17/22 0801    folic acid tablet 1 mg, 1 mg, Oral, Daily, HOLDEN Cristina, 1 mg at 10/17/22 0801    glucagon (human recombinant) injection 1 mg, 1 mg, Intramuscular, PRN, Jackie Rush MD    glucose chewable tablet 16 g, 16 g, Oral, PRN, Jackie Rush MD    glucose chewable tablet 24 g, 24 g, Oral, PRN, Jackie Rush MD    hydrALAZINE injection 10 mg, 10 mg, Intravenous, Q4H PRN, LUIS ENRIQUE Gomez, 10 mg at 10/17/22 0013    HYDROcodone-acetaminophen 5-325 mg per tablet 1 tablet, 1 tablet, Oral, Q4H PRN, HOLDEN Cristina, 1 tablet at 10/14/22 0600    insulin aspart U-100 injection 0-5 Units, 0-5 Units, Subcutaneous, QID (AC + HS) PRN, Jackie Rush MD, 2 Units at 10/14/22 1109    lactulose 10 gram/15 ml solution 20 g, 20 g, Oral, Q6H PRN, HOLDEN Cristina, 20 g at 10/16/22 0949    loperamide capsule 2 mg, 2 mg, Oral,  Continuous PRN, HOLDEN Cristina    magnesium sulfate 2g in water 50mL IVPB (premix), 2 g, Intravenous, PRN, HOLDEN Cristina    magnesium sulfate 2g in water 50mL IVPB (premix), 4 g, Intravenous, PRN, HOLDEN Cristina    metoclopramide HCl injection 5 mg, 5 mg, Intravenous, Q6H PRN, HOLDEN Cristina    metoprolol succinate (TOPROL-XL) 24 hr tablet 50 mg, 50 mg, Oral, Daily, LUIS ENRIQUE Gomez, 50 mg at 10/17/22 0801    morphine injection 4 mg, 4 mg, Intravenous, Q4H PRN, HOLDEN Cristina, 2 mg at 10/11/22 1423    ondansetron injection 4 mg, 4 mg, Intravenous, Q4H PRN, HOLDEN Cristina, 4 mg at 10/12/22 0545    oxyCODONE immediate release tablet 10 mg, 10 mg, Oral, Q4H PRN, HOLDEN Cristina, 5 mg at 10/12/22 0410    polyethylene glycol packet 17 g, 17 g, Oral, Daily PRN, HOLDEN Mendoza    sertraline tablet 50 mg, 50 mg, Oral, Daily, HOLDEN Mendoza, 50 mg at 10/17/22 0801    sodium phosphate 15 mmol in dextrose 5 % 250 mL IVPB, 15 mmol, Intravenous, PRN, HOLDEN Cristina, Stopped at 10/12/22 0156    sodium phosphate 20.01 mmol in dextrose 5 % 250 mL IVPB, 20.01 mmol, Intravenous, PRN, HOLDEN Cristina, Stopped at 10/13/22 1612    sodium phosphate 30 mmol in dextrose 5 % 250 mL IVPB, 30 mmol, Intravenous, PRN, HOLDEN Cristina    sucralfate tablet 1 g, 1 g, Oral, QID (AC & HS), HOLDEN Cristina, 1 g at 10/17/22 0555    tamsulosin 24 hr capsule 0.4 mg, 0.4 mg, Oral, QHS, Yoshi Wiggins MD, 0.4 mg at 10/16/22 2036    valsartan tablet 160 mg, 160 mg, Oral, Daily, Marline Adams, ANP, 160 mg at 10/17/22 0813    vancomycin in dextrose 5 % 1 gram/250 mL IVPB 1,000 mg, 1,000 mg, Intravenous, Once, Bety Thompson MD    zinc oxide-cod liver oil 40 % paste, , Topical (Top), BID, Bety Thompson MD, Given at 10/16/22 2100    Facility-Administered Medications Ordered in Other Encounters:     0.9%  NaCl infusion, , Intravenous, Once,  Abner Mitchell MD    diazePAM tablet 10 mg, 10 mg, Oral, On Call Procedure, Abner Mitchell MD, 10 mg at 09/23/22 0823    diphenhydrAMINE capsule 50 mg, 50 mg, Oral, On Call Procedure, Abner Mitchell MD, 50 mg at 09/23/22 0823    sodium chloride 0.9% flush 10 mL, 10 mL, Intravenous, PRN, Abner Mitchell MD         VTE Risk Mitigation (From admission, onward)           Ordered     enoxaparin injection 40 mg  Daily         10/13/22 1141     IP VTE HIGH RISK PATIENT  Once         10/13/22 1141     heparin, porcine (PF) (heparin flush 100 units/mL) 100 unit/mL injection flush         10/11/22 0607                    Assessment:   CAD       - s/p CABG x 4 (LIMA to LAD, SVG to Diag 1, SVG to OM, and SVG to RCA), Ligation of left atrial appendage per Dr. Bety Thompson.    PAF - now SR       - Afib RVR on 10.15.22       - CHADsVASc Score 5 Points - 7.2% stroke risk per year  Hx of Bradycardia        - status post pacemaker implant in February 2018   Carotid Artery Stenosis        - status post CEA  Hypertension  Hyperlipidemia  CKD  Obstructive Sleep Apnea    Plan:   Continue Aspirin 81mg daily  Resume Eliquis 5mg BID for CVA prevention when okay with CT Surgery. Will defer the discontinuation of eliquis to the outpatient setting.   Will not need to continue Amiodarone orally after 24 hour IV Amiodarone gtt is completed per Dr. Hernandez  Continue Metoprolol Succinate 50mg daily and Atorvastatin 40mg daily (was on Crestor 20mg daily at home)  Continue Valsartan 160mg daily.  Pt remains hypertensive. Will resume home dose of amlodipine 10 mg daily.       Arlene Martin, [facility]  Cardiology  [facility]   10/17/2022 9:49 AM

## 2022-10-17 NOTE — PROGRESS NOTES
CT SURGERY PROGRESS NOTE  Jessica Elias  82 y.o.  1940    Patients Procedure: Procedure(s) (LRB):  CORONARY ARTERY BYPASS GRAFT (CABG) (N/A)    Subjective  Interval History: Patient went back into a fib last night however is back in NSR during eval. K is 3.0. elevated /65 this morning    Review of Systems   Constitutional: Negative.    Respiratory:  Negative for cough, sputum production and shortness of breath.    Cardiovascular:  Negative for chest pain, palpitations, claudication and leg swelling.   Gastrointestinal:  Negative for abdominal pain, nausea and vomiting.   Genitourinary: Negative.    Skin: Negative.         Incision C/D/I     Medication List  Infusions   amiodarone in dextrose 5% 0.5 mg/min (10/16/22 0017)    amiodarone in dextrose 5%      loperamide       Scheduled   amLODIPine  10 mg Oral Daily    aspirin  81 mg Oral Daily    atorvastatin  40 mg Oral Daily    docusate sodium  100 mg Oral BID    enoxaparin  40 mg Subcutaneous Daily    famotidine (PF)  20 mg Intravenous Daily    folic acid  1 mg Oral Daily    metoprolol succinate  50 mg Oral Daily    sertraline  50 mg Oral Daily    sucralfate  1 g Oral QID (AC & HS)    tamsulosin  0.4 mg Oral QHS    valsartan  160 mg Oral Daily    vancomycin (VANCOCIN) IVPB  1,000 mg Intravenous Once    zinc oxide-cod liver oil   Topical (Top) BID       Objective:  Recent Vitals:  Temp:  [98.1 °F (36.7 °C)-99.5 °F (37.5 °C)] 99.5 °F (37.5 °C)  Pulse:  [35-86] 70  Resp:  [18] 18  SpO2:  [92 %-94 %] 93 %  BP: (138-197)/(62-76) 178/65    Physical Exam  Vitals and nursing note reviewed.   Constitutional:       General: She is awake. She is not in acute distress.     Appearance: Normal appearance. She is not toxic-appearing or diaphoretic.   HENT:      Head: Normocephalic and atraumatic.   Eyes:      Extraocular Movements: Extraocular movements intact.      Pupils: Pupils are equal, round, and reactive to light.   Cardiovascular:      Rate and Rhythm: Normal  rate and regular rhythm.      Pulses: Normal pulses.           Radial pulses are 2+ on the right side and 2+ on the left side.        Dorsalis pedis pulses are 2+ on the right side and 2+ on the left side.      Heart sounds: Normal heart sounds.   Pulmonary:      Effort: Pulmonary effort is normal.      Breath sounds: Normal breath sounds.   Musculoskeletal:      Right lower leg: No edema.      Left lower leg: No edema.   Skin:     General: Skin is warm and dry.      Comments: INCISION C/D/I   Neurological:      General: No focal deficit present.      Mental Status: She is alert and oriented to person, place, and time.   Psychiatric:         Mood and Affect: Mood and affect normal.        I/O last 24 hrs:  Intake/Output - Last 3 Shifts         10/15 0700  10/16 0659 10/16 0700  10/17 0659 10/17 0700  10/18 0659    P.O. 840 690     Total Intake(mL/kg) 840 (12.3) 690 (10.2)     Urine (mL/kg/hr)       Stool       Chest Tube       Total Output       Net +840 +690            Urine Occurrence 6 x 5 x     Stool Occurrence 1 x 1 x             Labs  BMP:   Recent Labs   Lab 10/17/22  0433      K 3.0*   CO2 27   BUN 24.0*   CREATININE 0.84   CALCIUM 8.2*   MG 1.80       CBC:   Recent Labs   Lab 10/17/22  0400   WBC 7.4   RBC 3.33*   HGB 10.0*   HCT 30.9*      MCV 92.8   MCH 30.0   MCHC 32.4*       All pertinent labs from the last 24 hours have been reviewed.      Imaging:   CXR: X-Ray Chest AP Portable    Result Date: 10/13/2022  Stable exam without significant interval change. Electronically signed by: Joycelyn Bolanos Date:    10/13/2022 Time:    06:06   I have reviewed all pertinent imaging results/findings within the past 24 hours.        ASSESSMENT/PLAN:  - Replete K, 20 Meq PO TID x 1 day  - Cardiology managing A Fib and BP  - dc planning is home with     Case and plan of care discussed with MD Scott Balderrama PA-C

## 2022-10-18 LAB
ANION GAP SERPL CALC-SCNC: 10 MEQ/L
BUN SERPL-MCNC: 24 MG/DL (ref 9.8–20.1)
CALCIUM SERPL-MCNC: 9.1 MG/DL (ref 8.4–10.2)
CHLORIDE SERPL-SCNC: 107 MMOL/L (ref 98–107)
CO2 SERPL-SCNC: 26 MMOL/L (ref 23–31)
CREAT SERPL-MCNC: 0.88 MG/DL (ref 0.55–1.02)
CREAT/UREA NIT SERPL: 27
GFR SERPLBLD CREATININE-BSD FMLA CKD-EPI: >60 MLS/MIN/1.73/M2
GLUCOSE SERPL-MCNC: 129 MG/DL (ref 82–115)
MAGNESIUM SERPL-MCNC: 1.9 MG/DL (ref 1.6–2.6)
POCT GLUCOSE: 111 MG/DL (ref 70–110)
POCT GLUCOSE: 115 MG/DL (ref 70–110)
POCT GLUCOSE: 117 MG/DL (ref 70–110)
POCT GLUCOSE: 191 MG/DL (ref 70–110)
POTASSIUM SERPL-SCNC: 3.4 MMOL/L (ref 3.5–5.1)
SODIUM SERPL-SCNC: 143 MMOL/L (ref 136–145)

## 2022-10-18 PROCEDURE — 97535 SELF CARE MNGMENT TRAINING: CPT

## 2022-10-18 PROCEDURE — 25000003 PHARM REV CODE 250: Performed by: NURSE PRACTITIONER

## 2022-10-18 PROCEDURE — 63600175 PHARM REV CODE 636 W HCPCS

## 2022-10-18 PROCEDURE — 63600175 PHARM REV CODE 636 W HCPCS: Performed by: NURSE PRACTITIONER

## 2022-10-18 PROCEDURE — 25000003 PHARM REV CODE 250

## 2022-10-18 PROCEDURE — 63600175 PHARM REV CODE 636 W HCPCS: Performed by: PHYSICIAN ASSISTANT

## 2022-10-18 PROCEDURE — 83735 ASSAY OF MAGNESIUM: CPT

## 2022-10-18 PROCEDURE — 80048 BASIC METABOLIC PNL TOTAL CA: CPT

## 2022-10-18 PROCEDURE — 25000003 PHARM REV CODE 250: Performed by: PHYSICIAN ASSISTANT

## 2022-10-18 PROCEDURE — 99024 POSTOP FOLLOW-UP VISIT: CPT | Mod: ,,,

## 2022-10-18 PROCEDURE — 25000003 PHARM REV CODE 250: Performed by: THORACIC SURGERY (CARDIOTHORACIC VASCULAR SURGERY)

## 2022-10-18 PROCEDURE — 97116 GAIT TRAINING THERAPY: CPT | Mod: CQ

## 2022-10-18 PROCEDURE — 94760 N-INVAS EAR/PLS OXIMETRY 1: CPT

## 2022-10-18 PROCEDURE — 93005 ELECTROCARDIOGRAM TRACING: CPT

## 2022-10-18 PROCEDURE — 93010 EKG 12-LEAD: ICD-10-PCS | Mod: ,,, | Performed by: INTERNAL MEDICINE

## 2022-10-18 PROCEDURE — 93010 ELECTROCARDIOGRAM REPORT: CPT | Mod: ,,, | Performed by: INTERNAL MEDICINE

## 2022-10-18 PROCEDURE — 97110 THERAPEUTIC EXERCISES: CPT

## 2022-10-18 PROCEDURE — 99024 PR POST-OP FOLLOW-UP VISIT: ICD-10-PCS | Mod: ,,,

## 2022-10-18 PROCEDURE — 36415 COLL VENOUS BLD VENIPUNCTURE: CPT

## 2022-10-18 PROCEDURE — 21400001 HC TELEMETRY ROOM

## 2022-10-18 PROCEDURE — 97110 THERAPEUTIC EXERCISES: CPT | Mod: CQ

## 2022-10-18 RX ORDER — HYDROCHLOROTHIAZIDE 25 MG/1
25 TABLET ORAL DAILY
Status: DISCONTINUED | OUTPATIENT
Start: 2022-10-18 | End: 2022-10-25 | Stop reason: HOSPADM

## 2022-10-18 RX ORDER — FAMOTIDINE 20 MG/1
40 TABLET, FILM COATED ORAL DAILY
Status: DISCONTINUED | OUTPATIENT
Start: 2022-10-18 | End: 2022-10-25 | Stop reason: HOSPADM

## 2022-10-18 RX ORDER — POTASSIUM CHLORIDE 20 MEQ/1
40 TABLET, EXTENDED RELEASE ORAL ONCE
Status: COMPLETED | OUTPATIENT
Start: 2022-10-18 | End: 2022-10-18

## 2022-10-18 RX ORDER — METOPROLOL SUCCINATE 25 MG/1
75 TABLET, EXTENDED RELEASE ORAL DAILY
Status: DISCONTINUED | OUTPATIENT
Start: 2022-10-19 | End: 2022-10-19

## 2022-10-18 RX ORDER — FUROSEMIDE 10 MG/ML
20 INJECTION INTRAMUSCULAR; INTRAVENOUS ONCE
Status: COMPLETED | OUTPATIENT
Start: 2022-10-18 | End: 2022-10-18

## 2022-10-18 RX ORDER — METOPROLOL TARTRATE 1 MG/ML
5 INJECTION, SOLUTION INTRAVENOUS
Status: DISCONTINUED | OUTPATIENT
Start: 2022-10-18 | End: 2022-10-25 | Stop reason: HOSPADM

## 2022-10-18 RX ORDER — FAMOTIDINE 20 MG/1
40 TABLET, FILM COATED ORAL DAILY
Status: DISCONTINUED | OUTPATIENT
Start: 2022-10-18 | End: 2022-10-18

## 2022-10-18 RX ORDER — FUROSEMIDE 10 MG/ML
40 INJECTION INTRAMUSCULAR; INTRAVENOUS ONCE
Status: COMPLETED | OUTPATIENT
Start: 2022-10-18 | End: 2022-10-18

## 2022-10-18 RX ORDER — MAGNESIUM SULFATE 1 G/100ML
1 INJECTION INTRAVENOUS ONCE
Status: COMPLETED | OUTPATIENT
Start: 2022-10-18 | End: 2022-10-18

## 2022-10-18 RX ADMIN — SERTRALINE HYDROCHLORIDE 50 MG: 50 TABLET ORAL at 09:10

## 2022-10-18 RX ADMIN — POTASSIUM CHLORIDE 40 MEQ: 20 TABLET, EXTENDED RELEASE ORAL at 07:10

## 2022-10-18 RX ADMIN — Medication: at 08:10

## 2022-10-18 RX ADMIN — POTASSIUM CHLORIDE 40 MEQ: 20 TABLET, EXTENDED RELEASE ORAL at 12:10

## 2022-10-18 RX ADMIN — HYDRALAZINE HYDROCHLORIDE 10 MG: 20 INJECTION INTRAMUSCULAR; INTRAVENOUS at 01:10

## 2022-10-18 RX ADMIN — ASPIRIN 81 MG: 81 TABLET, COATED ORAL at 09:10

## 2022-10-18 RX ADMIN — PRAZOSIN HYDROCHLORIDE 1 MG: 1 CAPSULE ORAL at 09:10

## 2022-10-18 RX ADMIN — HYDROCHLOROTHIAZIDE 25 MG: 25 TABLET ORAL at 01:10

## 2022-10-18 RX ADMIN — ENOXAPARIN SODIUM 40 MG: 40 INJECTION SUBCUTANEOUS at 04:10

## 2022-10-18 RX ADMIN — FAMOTIDINE 40 MG: 20 TABLET, FILM COATED ORAL at 01:10

## 2022-10-18 RX ADMIN — ATORVASTATIN CALCIUM 40 MG: 40 TABLET, FILM COATED ORAL at 04:10

## 2022-10-18 RX ADMIN — FUROSEMIDE 40 MG: 10 INJECTION, SOLUTION INTRAVENOUS at 07:10

## 2022-10-18 RX ADMIN — SUCRALFATE 1 G: 1 TABLET ORAL at 08:10

## 2022-10-18 RX ADMIN — METOPROLOL SUCCINATE 50 MG: 50 TABLET, EXTENDED RELEASE ORAL at 09:10

## 2022-10-18 RX ADMIN — Medication: at 09:10

## 2022-10-18 RX ADMIN — MAGNESIUM SULFATE 1 G: 1 INJECTION INTRAVENOUS at 12:10

## 2022-10-18 RX ADMIN — FUROSEMIDE 20 MG: 10 INJECTION, SOLUTION INTRAMUSCULAR; INTRAVENOUS at 08:10

## 2022-10-18 RX ADMIN — VALSARTAN 160 MG: 80 TABLET, FILM COATED ORAL at 09:10

## 2022-10-18 RX ADMIN — FAMOTIDINE 20 MG: 10 INJECTION, SOLUTION INTRAVENOUS at 09:10

## 2022-10-18 RX ADMIN — FOLIC ACID 1 MG: 1 TABLET ORAL at 09:10

## 2022-10-18 RX ADMIN — DOCUSATE SODIUM 100 MG: 100 CAPSULE, LIQUID FILLED ORAL at 09:10

## 2022-10-18 RX ADMIN — SUCRALFATE 1 G: 1 TABLET ORAL at 05:10

## 2022-10-18 RX ADMIN — SUCRALFATE 1 G: 1 TABLET ORAL at 04:10

## 2022-10-18 RX ADMIN — SUCRALFATE 1 G: 1 TABLET ORAL at 11:10

## 2022-10-18 RX ADMIN — AMLODIPINE BESYLATE 10 MG: 5 TABLET ORAL at 09:10

## 2022-10-18 NOTE — PROGRESS NOTES
CT SURGERY PROGRESS NOTE  Jessica Elias  82 y.o.  1940    Patients Procedure: Procedure(s) (LRB):  CORONARY ARTERY BYPASS GRAFT (CABG) (N/A)    Subjective  Interval History: Patient currently without complaints sitting OOB in chair. Patient back in A Fib RVR this morning, rate 130-140s.  and up 11 lbs from pre-op, gave 20 Lasix IV. Discussed letting cardiology know about rhythm with nurse.    Review of Systems   Constitutional: Negative.    Respiratory:  Negative for cough, sputum production and shortness of breath.    Cardiovascular:  Negative for chest pain, palpitations, claudication and leg swelling.   Gastrointestinal:  Negative for abdominal pain, nausea and vomiting.   Genitourinary: Negative.    Skin: Negative.         Incision C/D/I     Medication List  Infusions   amiodarone in dextrose 5% 0.5 mg/min (10/16/22 0017)    amiodarone in dextrose 5%      loperamide       Scheduled   amLODIPine  10 mg Oral Daily    aspirin  81 mg Oral Daily    atorvastatin  40 mg Oral Daily    docusate sodium  100 mg Oral BID    enoxaparin  40 mg Subcutaneous Daily    famotidine (PF)  20 mg Intravenous Daily    folic acid  1 mg Oral Daily    furosemide (LASIX) injection  20 mg Intravenous Once    metoprolol succinate  50 mg Oral Daily    prazosin  1 mg Oral Daily    sertraline  50 mg Oral Daily    sucralfate  1 g Oral QID (AC & HS)    valsartan  160 mg Oral Daily    vancomycin (VANCOCIN) IVPB  1,000 mg Intravenous Once    zinc oxide-cod liver oil   Topical (Top) BID       Objective:  Recent Vitals:  Temp:  [98.3 °F (36.8 °C)-98.8 °F (37.1 °C)] 98.6 °F (37 °C)  Pulse:  [69-78] 76  Resp:  [18-19] 18  SpO2:  [91 %-94 %] 94 %  BP: (128-175)/(63-76) 173/63    Physical Exam  Vitals and nursing note reviewed.   Constitutional:       General: She is awake. She is not in acute distress.     Appearance: Normal appearance. She is not toxic-appearing or diaphoretic.   HENT:      Head: Normocephalic and atraumatic.   Eyes:       Extraocular Movements: Extraocular movements intact.      Pupils: Pupils are equal, round, and reactive to light.   Cardiovascular:      Rate and Rhythm: Tachycardia present. Rhythm irregular.      Pulses: Normal pulses.           Radial pulses are 2+ on the right side and 2+ on the left side.        Dorsalis pedis pulses are 2+ on the right side and 2+ on the left side.      Heart sounds: Normal heart sounds.   Pulmonary:      Effort: Pulmonary effort is normal.      Breath sounds: Normal breath sounds.   Musculoskeletal:      Right lower leg: No edema.      Left lower leg: No edema.   Skin:     General: Skin is warm and dry.      Comments: INCISION C/D/I   Neurological:      General: No focal deficit present.      Mental Status: She is alert and oriented to person, place, and time.   Psychiatric:         Mood and Affect: Mood and affect normal.        I/O last 24 hrs:  Intake/Output - Last 3 Shifts         10/16 0700  10/17 0659 10/17 0700  10/18 0659 10/18 0700  10/19 0659    P.O. 690 540     Total Intake(mL/kg) 690 (10.2) 540 (8)     Net +690 +540            Urine Occurrence 5 x 3 x     Stool Occurrence 1 x 1 x             Labs  BMP:   Recent Labs   Lab 10/17/22  0433      K 3.0*   CO2 27   BUN 24.0*   CREATININE 0.84   CALCIUM 8.2*   MG 1.80       CBC:   Recent Labs   Lab 10/17/22  0400   WBC 7.4   RBC 3.33*   HGB 10.0*   HCT 30.9*      MCV 92.8   MCH 30.0   MCHC 32.4*       All pertinent labs from the last 24 hours have been reviewed.      Imaging:   CXR: X-Ray Chest AP Portable    Result Date: 10/13/2022  Stable exam without significant interval change. Electronically signed by: Joycelyn Bolanos Date:    10/13/2022 Time:    06:06   I have reviewed all pertinent imaging results/findings within the past 24 hours.        ASSESSMENT/PLAN:  - lasix 20 mg IV x 1   - Cardiology managing A Fib  - dc planning is home with     Case and plan of care discussed with MD Scott Balderrama PA-C     Average build

## 2022-10-18 NOTE — PT/OT/SLP PROGRESS
Occupational Therapy   Treatment    Name: Jessica Elias  MRN: [id number]  Admitting Diagnosis:  CABG  Recent Surgery: Procedure(s) (LRB):  CORONARY ARTERY BYPASS GRAFT (CABG) (N/A) 7 Days Post-Op    Recommendations:     Discharge Recommendations: home with HH and 24/7 care vs rehab (note: pt currently requesting rehab placement)  Discharge Equipment Recommendations: walker, rolling  Barriers to discharge: medical dx    Assessment:     Jessica Elias is a 82 y.o. female with a medical diagnosis of of MVCAD s/p CABG x 4. Performance deficits affecting function are weakness, impaired endurance, impaired self care skills, and impaired functional mobility.    Rehab Prognosis: Good; patient would benefit from acute skilled OT services to address these deficits and reach maximum level of function.       Plan:     Patient to be seen 5 x/week, 6 x/week to address the above listed problems via self-care/home management, therapeutic activities  Plan of Care Expires: 10/27/22  Plan of Care Reviewed with: patient    Subjective     Pain/Comfort:  Pt denied any pain during session.    Objective:     Communicated with: RN prior to session. Patient found up in chair with pulse ox (continuous) and telemetry upon OT entry to room.    General Precautions: Standard, fall, sternal   Respiratory Status: Room air  Vitals:  BP: 121/63  NV: 86  O2: 90-94%  Pt's RN was notified.     Occupational Performance:     Functional Mobility/Transfers:  Patient performed Sit <> Stand Transfer with min-mod A, vcs, and with BUEs positioned across chest, holding cardiac bear.  Functional Mobility: Pt ambulated from recliner at bedside to toilet with CGA and vcs provided, using RW (for balance only). Pt then ambulated from toilet > sink > recliner at bedside using RW (for balance only) with min A provided to properly manage RW (during turn, prior to sitting in recliner) and vcs provided for proper hand placement in order to increase pt's  safety.    ADLs:  Toileting: Pt required mod A to perform task, requiring assist to wipe buttocks and don brief over buttocks.   (Note: Pt also required assist to change brief during session 2/2 bladder accident).  Grooming: Pt performed oral hygiene task and combed hair while standing at sink with SBA provided.    Treatment & Education:  Pt continues to require vcs to adhere to sternal pxns and to perform proper breathing technique during session in order to increase pt's safety, conserve pt's energy, and increase pt's O2 intake.     Patient left up in chair with all lines intact and PTA present.    GOALS:   Multidisciplinary Problems       Occupational Therapy Goals          Problem: Occupational Therapy    Goal Priority Disciplines Outcome Interventions   Occupational Therapy Goal     OT, PT/OT Ongoing, Progressing    Description: STG: to be met in 2 weeks  1. UB dressing Mod I.  2. LB dressing Mod I.  3. Toileting, toilet t/f Mod I with LRAD.  4. Shower t/f Mod I.                        Time Tracking:     OT Date of Treatment: 10/18/22  OT Start Time: 1445  OT Stop Time: 1507  OT Total Time (min): 22 min    Billable Minutes: Self Care/Home Mgmt 22 mins    OT/JACKIE: OT     JACKIE Visit Number: 3    10/18/2022

## 2022-10-18 NOTE — PROGRESS NOTES
10/18/22 1100   Pre Exercise Vitals   /67   Pulse 74   Supplemental O2? No   SpO2 96 %   During Exercise Vitals   Pulse 77   Supplemental O2? No   SpO2 97 %   Distance Walked 50 feet   Post Exercise Vitals   /62   Pulse 77   Supplemental O2? No   SpO2 97 %   Modality   Modality Walker   Minimal assist to stand; maintained sternal precautions; poor IS @250; ambulated 50 feet on room air, O2 sat @98%

## 2022-10-18 NOTE — PROGRESS NOTES
[facility] - 3rd Floor Medical Telemetry  Cardiology  Progress Note    Patient Name: Jessica Elias  MRN: [id number]  Admission Date: 10/11/2022  Hospital Length of Stay: 7 days  Code Status: Full Code   Attending Provider:  Dr. Bety Thompson  Consulting Provider: HEATH George  Primary Care Physician: Tarik Gee MD  Principal Problem:<principal problem not specified>    Patient information was obtained from ER records.     Subjective:     Chief Complaint:  Consult:  Atrial Fibrillation     HPI:  82-year-old female known to both Dr. Dos Santos and Dr. Sánchez with a past medical history of CAD, PAF, carotid artery stenosis status post CEA, hypertension, hyperlipidemia, CKD, obstructive sleep apnea, and recurrent bradycardia status post pacemaker implant in February 2018.  Patient underwent outpatient coronary angiogram which revealed triple-vessel disease.  She was electively admitted on October 11, 2022 and underwent CABG x 4 (LIMA to LAD, SVG to Diag 1, SVG to OM, and SVG to RCA), Ligation of left atrial appendage per Dr. Bety Thompson.   Patient was noted to be in Atrial Fibrillation RVR earlier this morning,  therefore [facility] has been consulted for further evaluation of Atrial Fibrillation.      10.16.22:  NAD noted.  Denies SOB.  Reports incisional soreness.    10.17.22: NAD. Denies SOB or palps. Reports incisional soreness. SR on tele. Remains hypertensive.   10.18.22: NAD. Denies SOB or palps. + incisional CP. SR on tele. +11 lbs over baseline weight. Remains hypertensive.     PMH: CAD, PAF, RUKHSANA, HTN, HLD, MP, Recurrent Bradycardia, CKD  PSH: CEA, Cardiac Pacemaker, Colonoscopy, Coronary Angiogram, Lipoma Removal, Cataract Surgery, Hysterectomy, Bladder Surgery, Appendectomy  Family History: Father - MI, HTN; Mother - CVA, HTN, CABG  Social History: Former Smoker.  Denies Alcohol Use.  Denies Illicit Drug Use.     Previous Cardiac Diagnostics:  LEFT HEART CATHETERIZATION, SELECTIVE CORONARY  ANGIOGRAPHY AND LEFT VENTRICULOGRAM 9.23.22:  1. LEFT MAIN:  HIGHLY CALCIFIED WITHOUT OBSTRUCTIVE LESIONS  2. LAD:  HEAVILY CALCIFIED 70-80% STENOSIS PROXIMALLY (LONG LESION) AND 70% DISTAL AT THE BIFURCATION WITH THE 2ND DIAGONAL BRANCH.  THE 1ST DIAGONAL BRANCH SHOWS DISEASE OF 90%.  3. LEFT CIRCUMFLEX:  CALCIFIED, 99% STENOSIS PROXIMALLY  4.  RIGHT CORONARY ARTERY:  HIGHLY CALCIFIED, 99% STENOSIS OF THE OSTIUM FOLLOWED BY 90% STENOSIS PROXIMALLY     RECOMMENDATIONS:  1. RECOMMEND CARDIOTHORACIC SURGICAL EVALUATION BY DR. THOMPSON CORONARY ARTERY BYPASS SURGERY  2. IF SHE GOES HOME TODAY WITH AN APPOINTMENT FOR SURGERY LATER, RESUME ELIQUIS TOMORROW MORNING  3.  CARDIAC DIET  4.  FOLLOW-UP WITH ME AT THE OFFICE IN 1 WEEK      Left Ventriculogram Summary 9.23.22:  The ejection fraction was calculated to be 55%.  The left ventricular systolic function was normal.  The left ventricular end diastolic pressure was elevated.  The pre-procedure left ventricular end diastolic pressure was 23.  The estimated blood loss was none.  There was three vessel coronary artery disease.  There was no mitral valve regurgitation.  There was no aortic valve stenosis.        Past Medical History:   Diagnosis Date    Chronic kidney disease, unspecified     stage 3    Coronary artery disease     History of carotid artery disease     Hyperlipidemia     Hypertension     Sleep apnea        Past Surgical History:   Procedure Laterality Date    APPENDECTOMY N/A     BLADDER SURGERY N/A     CAROTID ENDARTERECTOMY N/A     CATARACT EXTRACTION N/A     CORONARY ARTERY BYPASS GRAFT (CABG) N/A 10/11/2022    Procedure: CORONARY ARTERY BYPASS GRAFT (CABG);  Surgeon: Bety Thompson MD;  Location: [facility];  Service: Cardiothoracic;  Laterality: N/A;    HYSTERECTOMY N/A     INSERTION OF PERMANENT PACEMAKER N/A     LEFT HEART CATHETERIZATION Left 09/23/2022    Procedure: CATHETERIZATION, HEART, LEFT;  Surgeon: Abner Mitchell MD;  Location: [facility] CATH LAB;   Service: Cardiology;  Laterality: Left;  LHC VIA RRA    lump removed from right shoulder         Review of patient's allergies indicates:   Allergen Reactions    Penicillin     Sulfa (sulfonamide antibiotics)        Current Facility-Administered Medications on File Prior to Encounter   Medication    0.9%  NaCl infusion    diazePAM tablet 10 mg    diphenhydrAMINE capsule 50 mg    sodium chloride 0.9% flush 10 mL     Current Outpatient Medications on File Prior to Encounter   Medication Sig    amLODIPine (NORVASC) 10 MG tablet Take 5 mg by mouth once daily.    ELIQUIS 5 mg Tab Take 5 mg by mouth 2 (two) times daily.    metoprolol succinate (TOPROL-XL) 50 MG 24 hr tablet Take 50 mg by mouth once daily.    multivitamin capsule Take 1 capsule by mouth once daily.    rosuvastatin (CRESTOR) 20 MG tablet Take 20 mg by mouth every evening.    sertraline (ZOLOFT) 50 MG tablet Take 50 mg by mouth once daily.    terazosin (HYTRIN) 2 MG capsule Take 2 mg by mouth once daily.    azelastine (ASTELIN) 137 mcg (0.1 %) nasal spray by Nasal route.    co-enzyme Q-10 30 mg capsule Take 200 mg by mouth once daily.     Family History    None       Tobacco Use    Smoking status: Never    Smokeless tobacco: Never   Substance and Sexual Activity    Alcohol use: Not Currently    Drug use: Never    Sexual activity: Not on file       Review of Systems   Respiratory:  Negative for shortness of breath.    Cardiovascular:  Positive for chest pain (incisional). Negative for palpitations.        Incisional Pain   Musculoskeletal: Negative.    Skin: Negative.    Psychiatric/Behavioral: Negative.       Objective:     Vital Signs (Most Recent):  Temp: 97.9 °F (36.6 °C) (10/18/22 1127)  Pulse: 70 (10/18/22 1127)  Resp: 18 (10/18/22 1127)  BP: 130/68 (10/18/22 1127)  SpO2: (!) 94 % (10/18/22 1127)   Vital Signs (24h Range):  Temp:  [97.9 °F (36.6 °C)-98.6 °F (37 °C)] 97.9 °F (36.6 °C)  Pulse:  [] 70  Resp:  [18-19] 18  SpO2:  [91 %-94 %] 94 %  BP:  (128-175)/(63-72) 130/68     Weight: 67.4 kg (148 lb 9.4 oz)  Body mass index is 25.11 kg/m².    SpO2: (!) 94 %  O2 Device (Oxygen Therapy): room air      Intake/Output Summary (Last 24 hours) at 10/18/2022 1224  Last data filed at 10/17/2022 1414  Gross per 24 hour   Intake 540 ml   Output --   Net 540 ml         Lines/Drains/Airways       Peripheral Intravenous Line  Duration                  Peripheral IV - Single Lumen 10/11/22 0546 20 G Anterior;Distal;Right Forearm 7 days                    Significant Labs:  Recent Results (from the past 72 hour(s))   POCT glucose    Collection Time: 10/15/22  4:21 PM   Result Value Ref Range    POCT Glucose 106 70 - 110 mg/dL   Comprehensive Metabolic Panel    Collection Time: 10/16/22  7:09 AM   Result Value Ref Range    Sodium Level 140 136 - 145 mmol/L    Potassium Level 3.6 3.5 - 5.1 mmol/L    Chloride 104 98 - 107 mmol/L    Carbon Dioxide 25 23 - 31 mmol/L    Glucose Level 119 (H) 82 - 115 mg/dL    Blood Urea Nitrogen 25.5 (H) 9.8 - 20.1 mg/dL    Creatinine 0.91 0.55 - 1.02 mg/dL    Calcium Level Total 8.7 8.4 - 10.2 mg/dL    Protein Total 5.2 (L) 5.8 - 7.6 gm/dL    Albumin Level 2.5 (L) 3.4 - 4.8 gm/dL    Globulin 2.7 2.4 - 3.5 gm/dL    Albumin/Globulin Ratio 0.9 (L) 1.1 - 2.0 ratio    Bilirubin Total 0.6 <=1.5 mg/dL    Alkaline Phosphatase 45 40 - 150 unit/L    Alanine Aminotransferase 17 0 - 55 unit/L    Aspartate Aminotransferase 19 5 - 34 unit/L    eGFR >60 mls/min/1.73/m2   CBC with Differential    Collection Time: 10/16/22  7:09 AM   Result Value Ref Range    WBC 7.8 4.5 - 11.5 x10(3)/mcL    RBC 3.91 (L) 4.20 - 5.40 x10(6)/mcL    Hgb 11.8 (L) 12.0 - 16.0 gm/dL    Hct 36.8 (L) 37.0 - 47.0 %    MCV 94.1 (H) 80.0 - 94.0 fL    MCH 30.2 27.0 - 31.0 pg    MCHC 32.1 (L) 33.0 - 36.0 mg/dL    RDW 13.9 11.5 - 17.0 %    Platelet 168 130 - 400 x10(3)/mcL    MPV 12.1 (H) 7.4 - 10.4 fL    Neut % 66.9 %    Lymph % 15.1 %    Mono % 11.5 %    Eos % 5.7 %    Basophil % 0.5 %     Lymph # 1.18 0.6 - 4.6 x10(3)/mcL    Neut # 5.2 2.1 - 9.2 x10(3)/mcL    Mono # 0.90 0.1 - 1.3 x10(3)/mcL    Eos # 0.45 0 - 0.9 x10(3)/mcL    Baso # 0.04 0 - 0.2 x10(3)/mcL    IG# 0.02 0 - 0.04 x10(3)/mcL    IG% 0.3 %    NRBC% 0.0 %   POCT glucose    Collection Time: 10/16/22 11:28 AM   Result Value Ref Range    POCT Glucose 110 70 - 110 mg/dL   CBC with Differential    Collection Time: 10/17/22  4:00 AM   Result Value Ref Range    WBC 7.4 4.5 - 11.5 x10(3)/mcL    RBC 3.33 (L) 4.20 - 5.40 x10(6)/mcL    Hgb 10.0 (L) 12.0 - 16.0 gm/dL    Hct 30.9 (L) 37.0 - 47.0 %    MCV 92.8 80.0 - 94.0 fL    MCH 30.0 27.0 - 31.0 pg    MCHC 32.4 (L) 33.0 - 36.0 mg/dL    RDW 13.8 11.5 - 17.0 %    Platelet 188 130 - 400 x10(3)/mcL    MPV 11.7 (H) 7.4 - 10.4 fL    Neut % 65.1 %    Lymph % 17.7 %    Mono % 13.4 %    Eos % 3.1 %    Basophil % 0.4 %    Lymph # 1.32 0.6 - 4.6 x10(3)/mcL    Neut # 4.8 2.1 - 9.2 x10(3)/mcL    Mono # 1.00 0.1 - 1.3 x10(3)/mcL    Eos # 0.23 0 - 0.9 x10(3)/mcL    Baso # 0.03 0 - 0.2 x10(3)/mcL    IG# 0.02 0 - 0.04 x10(3)/mcL    IG% 0.3 %    NRBC% 0.0 %   Comprehensive Metabolic Panel    Collection Time: 10/17/22  4:33 AM   Result Value Ref Range    Sodium Level 142 136 - 145 mmol/L    Potassium Level 3.0 (L) 3.5 - 5.1 mmol/L    Chloride 105 98 - 107 mmol/L    Carbon Dioxide 27 23 - 31 mmol/L    Glucose Level 124 (H) 82 - 115 mg/dL    Blood Urea Nitrogen 24.0 (H) 9.8 - 20.1 mg/dL    Creatinine 0.84 0.55 - 1.02 mg/dL    Calcium Level Total 8.2 (L) 8.4 - 10.2 mg/dL    Protein Total 4.5 (L) 5.8 - 7.6 gm/dL    Albumin Level 2.3 (L) 3.4 - 4.8 gm/dL    Globulin 2.2 (L) 2.4 - 3.5 gm/dL    Albumin/Globulin Ratio 1.0 (L) 1.1 - 2.0 ratio    Bilirubin Total 0.7 <=1.5 mg/dL    Alkaline Phosphatase 42 40 - 150 unit/L    Alanine Aminotransferase 14 0 - 55 unit/L    Aspartate Aminotransferase 16 5 - 34 unit/L    eGFR >60 mls/min/1.73/m2   Magnesium    Collection Time: 10/17/22  4:33 AM   Result Value Ref Range    Magnesium Level  1.80 1.60 - 2.60 mg/dL   POCT glucose    Collection Time: 10/17/22  5:50 AM   Result Value Ref Range    POCT Glucose 111 (H) 70 - 110 mg/dL   POCT glucose    Collection Time: 10/17/22  9:10 PM   Result Value Ref Range    POCT Glucose 101 70 - 110 mg/dL   POCT glucose    Collection Time: 10/18/22  5:24 AM   Result Value Ref Range    POCT Glucose 115 (H) 70 - 110 mg/dL   Basic Metabolic Panel    Collection Time: 10/18/22 10:39 AM   Result Value Ref Range    Sodium Level 143 136 - 145 mmol/L    Potassium Level 3.4 (L) 3.5 - 5.1 mmol/L    Chloride 107 98 - 107 mmol/L    Carbon Dioxide 26 23 - 31 mmol/L    Glucose Level 129 (H) 82 - 115 mg/dL    Blood Urea Nitrogen 24.0 (H) 9.8 - 20.1 mg/dL    Creatinine 0.88 0.55 - 1.02 mg/dL    BUN/Creatinine Ratio 27     Calcium Level Total 9.1 8.4 - 10.2 mg/dL    Anion Gap 10.0 mEq/L    eGFR >60 mls/min/1.73/m2   Magnesium    Collection Time: 10/18/22 10:39 AM   Result Value Ref Range    Magnesium Level 1.90 1.60 - 2.60 mg/dL   POCT glucose    Collection Time: 10/18/22 10:47 AM   Result Value Ref Range    POCT Glucose 191 (H) 70 - 110 mg/dL       Significant Imaging:      EKG:        Telemetry:  SR with HR 69 bpm at 0948    Physical Exam  Constitutional:       Appearance: Normal appearance.   HENT:      Head: Normocephalic and atraumatic.      Nose: Nose normal.   Eyes:      Extraocular Movements: Extraocular movements intact.   Cardiovascular:      Rate and Rhythm: Normal rate and regular rhythm.   Pulmonary:      Effort: Pulmonary effort is normal.   Abdominal:      Palpations: Abdomen is soft.   Musculoskeletal:         General: Normal range of motion.   Skin:     General: Skin is warm and dry.      Capillary Refill: Capillary refill takes less than 2 seconds.   Neurological:      Mental Status: She is alert and oriented to person, place, and time.   Psychiatric:         Mood and Affect: Mood normal.         Behavior: Behavior normal.       Home Medications:   Current  Facility-Administered Medications on File Prior to Encounter   Medication Dose Route Frequency Provider Last Rate Last Admin    0.9%  NaCl infusion   Intravenous Once Abner Mitchell MD        diazePAM tablet 10 mg  10 mg Oral On Call Procedure Abner Mitchell MD   10 mg at 09/23/22 0823    diphenhydrAMINE capsule 50 mg  50 mg Oral On Call Procedure Abner Mitchell MD   50 mg at 09/23/22 0823    sodium chloride 0.9% flush 10 mL  10 mL Intravenous PRN Abner Mitchell MD         Current Outpatient Medications on File Prior to Encounter   Medication Sig Dispense Refill    amLODIPine (NORVASC) 10 MG tablet Take 5 mg by mouth once daily.      ELIQUIS 5 mg Tab Take 5 mg by mouth 2 (two) times daily.      metoprolol succinate (TOPROL-XL) 50 MG 24 hr tablet Take 50 mg by mouth once daily.      multivitamin capsule Take 1 capsule by mouth once daily.      rosuvastatin (CRESTOR) 20 MG tablet Take 20 mg by mouth every evening.      sertraline (ZOLOFT) 50 MG tablet Take 50 mg by mouth once daily.      terazosin (HYTRIN) 2 MG capsule Take 2 mg by mouth once daily.      azelastine (ASTELIN) 137 mcg (0.1 %) nasal spray by Nasal route.      co-enzyme Q-10 30 mg capsule Take 200 mg by mouth once daily.         Current Inpatient Medications:    Current Facility-Administered Medications:     0.9%  NaCl infusion (for blood administration), , Intravenous, Q24H PRN, Bety Thompson MD    acetaminophen oral solution 650 mg, 650 mg, Per OG tube, Q6H PRN, HOLDEN Cristina    albumin human 5% bottle 12.5 g, 12.5 g, Intravenous, PRN, HOLDEN Cristina, Stopped at 10/12/22 0310    amiodarone 360 mg/200 mL (1.8 mg/mL) infusion, 0.5 mg/min, Intravenous, Continuous, HOLDEN Cristina, Last Rate: 16.7 mL/hr at 10/16/22 0017, 0.5 mg/min at 10/16/22 0017    amiodarone 360 mg/200 mL (1.8 mg/mL) infusion, 0.5 mg/min, Intravenous, Continuous, Adrián Romero NP    amLODIPine tablet 10 mg, 10 mg, Oral, Daily, HEATH George,  10 mg at 10/18/22 0928    aspirin EC tablet 81 mg, 81 mg, Oral, Daily, HOLDEN Cristina, 81 mg at 10/18/22 0932    atorvastatin tablet 40 mg, 40 mg, Oral, Daily, LUIS ENRIQUE Gomez, 40 mg at 10/17/22 1751    bisacodyL suppository 10 mg, 10 mg, Rectal, Daily PRN, HOLDEN Mendoza    calcium gluconate 1 g in NS IVPB (premixed), 1 g, Intravenous, PRN, Nehemias P Brittni, PA    calcium gluconate 1 g in NS IVPB (premixed), 2 g, Intravenous, PRN, Nehemias P Nolvias, PA    calcium gluconate 1 g in NS IVPB (premixed), 3 g, Intravenous, PRN, Nehemias Elkins, PA    dextrose 10% bolus 125 mL, 12.5 g, Intravenous, PRN, Bety Thompson MD    dextrose 10% bolus 250 mL, 25 g, Intravenous, PRN, Bety Thompson MD    dextrose 50% injection 12.5 g, 12.5 g, Intravenous, PRN, Nehemias Elkins, PA    dextrose 50% injection 25 g, 25 g, Intravenous, PRN, HOLDEN Cristina    docusate sodium capsule 100 mg, 100 mg, Oral, BID, Nehemias Elkins, PA, 100 mg at 10/18/22 0929    enoxaparin injection 40 mg, 40 mg, Subcutaneous, Daily, HOLDEN Mendoza, 40 mg at 10/17/22 1759    famotidine tablet 40 mg, 40 mg, Oral, Daily, HOLDEN Cristina    folic acid tablet 1 mg, 1 mg, Oral, Daily, HOLDEN Cristina, 1 mg at 10/18/22 0933    furosemide injection 40 mg, 40 mg, Intravenous, Once, HEATH George    glucagon (human recombinant) injection 1 mg, 1 mg, Intramuscular, PRN, Jackie Rush MD    glucose chewable tablet 16 g, 16 g, Oral, PRN, Jackie Rush MD    glucose chewable tablet 24 g, 24 g, Oral, PRN, Jackie Rush MD    hydrALAZINE injection 10 mg, 10 mg, Intravenous, Q4H PRN, Marline Adams, LUIS ENRIQUE, 10 mg at 10/18/22 0113    hydroCHLOROthiazide tablet 25 mg, 25 mg, Oral, Daily, HEATH George    HYDROcodone-acetaminophen 5-325 mg per tablet 1 tablet, 1 tablet, Oral, Q4H PRN, HOLDEN Cristina, 1 tablet at 10/14/22 0600    insulin aspart U-100 injection 0-5 Units, 0-5 Units,  Subcutaneous, QID (AC + HS) PRN, Jackie Rush MD, 2 Units at 10/14/22 1109    lactulose 10 gram/15 ml solution 20 g, 20 g, Oral, Q6H PRN, HOLDEN Cristina, 20 g at 10/16/22 0949    loperamide capsule 2 mg, 2 mg, Oral, Continuous PRN, HOLDEN Cristina    magnesium sulfate 2g in water 50mL IVPB (premix), 2 g, Intravenous, PRN, HOLDEN Cristina    magnesium sulfate 2g in water 50mL IVPB (premix), 4 g, Intravenous, PRN, HOLDEN Cristina    metoclopramide HCl injection 5 mg, 5 mg, Intravenous, Q6H PRN, HOLDEN Cristina    metoprolol succinate (TOPROL-XL) 24 hr tablet 50 mg, 50 mg, Oral, Daily, LUIS ENRIQUE Gomez, 50 mg at 10/18/22 0958    morphine injection 4 mg, 4 mg, Intravenous, Q4H PRN, HOLDEN Cristina, 2 mg at 10/11/22 1423    ondansetron injection 4 mg, 4 mg, Intravenous, Q4H PRN, HOLDEN Cristina, 4 mg at 10/12/22 0545    oxyCODONE immediate release tablet 10 mg, 10 mg, Oral, Q4H PRN, HOLDEN Cristina, 5 mg at 10/12/22 0410    polyethylene glycol packet 17 g, 17 g, Oral, Daily PRN, HOLDEN Mendoza    potassium chloride SA CR tablet 40 mEq, 40 mEq, Oral, Once, HEATH George    potassium chloride SA CR tablet 40 mEq, 40 mEq, Oral, Once, HEATH George    prazosin capsule 1 mg, 1 mg, Oral, Daily, Bety Thompson MD, 1 mg at 10/18/22 0933    sertraline tablet 50 mg, 50 mg, Oral, Daily, HOLDEN Mendoza, 50 mg at 10/18/22 0930    sodium phosphate 15 mmol in dextrose 5 % 250 mL IVPB, 15 mmol, Intravenous, PRN, HOLDEN Cristina, Stopped at 10/12/22 0156    sodium phosphate 20.01 mmol in dextrose 5 % 250 mL IVPB, 20.01 mmol, Intravenous, PRN, HOLDEN Cristina, Stopped at 10/13/22 1612    sodium phosphate 30 mmol in dextrose 5 % 250 mL IVPB, 30 mmol, Intravenous, PRN, HOLDEN Cristina    sucralfate tablet 1 g, 1 g, Oral, QID (AC & HS), HOLDEN Cristina, 1 g at 10/18/22 1111    valsartan tablet 160 mg, 160 mg, Oral, Daily,  Marline Adams, ANP, 160 mg at 10/18/22 0929    vancomycin in dextrose 5 % 1 gram/250 mL IVPB 1,000 mg, 1,000 mg, Intravenous, Once, Bety Thompson MD    zinc oxide-cod liver oil 40 % paste, , Topical (Top), BID, Bety Thompson MD, Given at 10/18/22 0934    Facility-Administered Medications Ordered in Other Encounters:     0.9%  NaCl infusion, , Intravenous, Once, Abner Mitchell MD    diazePAM tablet 10 mg, 10 mg, Oral, On Call Procedure, Abner Mitchell MD, 10 mg at 09/23/22 0823    diphenhydrAMINE capsule 50 mg, 50 mg, Oral, On Call Procedure, Abner Mitchell MD, 50 mg at 09/23/22 0823    sodium chloride 0.9% flush 10 mL, 10 mL, Intravenous, PRN, Abner Mitchell MD         VTE Risk Mitigation (From admission, onward)           Ordered     enoxaparin injection 40 mg  Daily         10/13/22 1141     IP VTE HIGH RISK PATIENT  Once         10/13/22 1141     heparin, porcine (PF) (heparin flush 100 units/mL) 100 unit/mL injection flush         10/11/22 0607                    Assessment:   CAD       - s/p CABG x 4 (LIMA to LAD, SVG to Diag 1, SVG to OM, and SVG to RCA), Ligation of left atrial appendage per Dr. Bety Thompson.    PAF - now SR       - Afib RVR on 10.15.22       - CHADsVASc Score 5 Points - 7.2% stroke risk per year  Hx of Bradycardia        - status post pacemaker implant in February 2018   Carotid Artery Stenosis        - status post CEA  Hypertension  Hyperlipidemia  CKD  Obstructive Sleep Apnea    Plan:   Continue Aspirin 81mg & atorvastatin 40 mg daily.   Resume Eliquis 5mg BID for CVA prevention when okay with CT Surgery. Will defer the discontinuation of eliquis to the outpatient setting.   Will not need to continue Amiodarone orally after 24 hour IV Amiodarone gtt is completed per Dr. Hernandez  Some afib RVR on tele this am. Currently SR. Increase to Metoprolol Succinate 75 mg daily.  Pt's home BP meds resumed yesterday. Will add HCTZ 25 mg daily.  Lasix 20 mg IVP x 1 this am & 40 mg IVP  x 1 this evening.   Replete K & Mg.  Pt requesting rehab. Will consult PT/OT to see if she is appropriate.   Labs in am: CMP & Mg      HEATH George  Cardiology  [facility]   10/18/2022 9:49 AM

## 2022-10-18 NOTE — PT/OT/SLP PROGRESS
Physical Therapy  Treatment    Jessica Elias   MRN: [id number]   Admitting Diagnosis: <principal problem not specified>       PT Start Time: 1502     PT Stop Time: 1526    PT Total Time (min): 24 min       Billable Minutes:  Gait Training 121 and Therapeutic Exercise 12    Treatment Type: Treatment  PT/PTA: PTA     PTA Visit Number: 3       General Precautions: Standard,  (GABG precautions)  Orthopedic Precautions: N/A   Braces:    Respiratory Status: Room air    Spiritual, Cultural Beliefs, Yazidism Practices, Values that Affect Care: no    Subjective:  Communicated with NSG prior to session.         Objective:        Functional Mobility:  Transfers:   Sit to/from stand x 10. T/f RW. Min A. Cues for maintaining sternal pxns.    Gait:    Pt ambulated ~60ft x 2 bouts. Step through gait pattern. Decreased step through gait pattern. No unsteadiness or LOB.    Therex  12 bouts. SOLEDAD LE therex    AM-PAC 6 CLICK MOBILITY  How much help from another person does this patient currently need?   1 = Unable, Total/Dependent Assistance  2 = A lot, Maximum/Moderate Assistance  3 = A little, Minimum/Contact Guard/Supervision  4 = None, Modified [place]/Independent         AM-PAC Raw Score CMS G-Code Modifier Level of Impairment Assistance   6 % Total / Unable   7 - 9 CM 80 - 100% Maximal Assist   10 - 14 CL 60 - 80% Moderate Assist   15 - 19 CK 40 - 60% Moderate Assist   20 - 22 CJ 20 - 40% Minimal Assist   23 CI 1-20% SBA / CGA   24 CH 0% Independent/ Mod I     Patient left up in chair with all lines intact and call button in reach.        Rehab identified problem list/impairments: Rehab identified problem list/impairments: weakness, impaired endurance, impaired balance, gait instability    Rehab potential is excellent.    Activity tolerance: Excellent    Discharge recommendations: Discharge Facility/Level of Care Needs: home with home health     Barriers to discharge:      Equipment recommendations: Equipment  Needed After Discharge: walker, rolling     GOALS:   Multidisciplinary Problems       Physical Therapy Goals          Problem: Physical Therapy    Goal Priority Disciplines Outcome Goal Variances Interventions   Physical Therapy Goal     PT, PT/OT      Description: Goals to be met by: 2022     Patient will increase functional independence with mobility by performin. Supine to sit with Modified [place]  2. Sit to supine with Modified [place]  3. Sit to stand transfer with Modified [place]  4. Gait  x 500 feet with Modified [place] using Rolling Walker vs LRAD.                          PLAN:    Patient to be seen 6 x/week  to address the above listed problems via gait training, therapeutic activities, therapeutic exercises  Plan of Care expires: 22  Plan of Care reviewed with: patient         10/18/2022

## 2022-10-19 LAB
ALBUMIN SERPL-MCNC: 2.6 GM/DL (ref 3.4–4.8)
ALBUMIN/GLOB SERPL: 1 RATIO (ref 1.1–2)
ALP SERPL-CCNC: 46 UNIT/L (ref 40–150)
ALT SERPL-CCNC: 15 UNIT/L (ref 0–55)
AST SERPL-CCNC: 16 UNIT/L (ref 5–34)
BILIRUBIN DIRECT+TOT PNL SERPL-MCNC: 0.8 MG/DL
BUN SERPL-MCNC: 32.4 MG/DL (ref 9.8–20.1)
CALCIUM SERPL-MCNC: 9.2 MG/DL (ref 8.4–10.2)
CHLORIDE SERPL-SCNC: 104 MMOL/L (ref 98–107)
CO2 SERPL-SCNC: 27 MMOL/L (ref 23–31)
CREAT SERPL-MCNC: 1.38 MG/DL (ref 0.55–1.02)
GFR SERPLBLD CREATININE-BSD FMLA CKD-EPI: 39 MLS/MIN/1.73/M2
GLOBULIN SER-MCNC: 2.7 GM/DL (ref 2.4–3.5)
GLUCOSE SERPL-MCNC: 107 MG/DL (ref 82–115)
MAGNESIUM SERPL-MCNC: 2 MG/DL (ref 1.6–2.6)
POCT GLUCOSE: 107 MG/DL (ref 70–110)
POCT GLUCOSE: 108 MG/DL (ref 70–110)
POCT GLUCOSE: 120 MG/DL (ref 70–110)
POCT GLUCOSE: 137 MG/DL (ref 70–110)
POTASSIUM SERPL-SCNC: 4.4 MMOL/L (ref 3.5–5.1)
PROT SERPL-MCNC: 5.3 GM/DL (ref 5.8–7.6)
SODIUM SERPL-SCNC: 141 MMOL/L (ref 136–145)

## 2022-10-19 PROCEDURE — 97110 THERAPEUTIC EXERCISES: CPT | Mod: CQ

## 2022-10-19 PROCEDURE — 25000003 PHARM REV CODE 250: Performed by: NURSE PRACTITIONER

## 2022-10-19 PROCEDURE — 25000003 PHARM REV CODE 250

## 2022-10-19 PROCEDURE — 83735 ASSAY OF MAGNESIUM: CPT

## 2022-10-19 PROCEDURE — 36415 COLL VENOUS BLD VENIPUNCTURE: CPT

## 2022-10-19 PROCEDURE — 25000003 PHARM REV CODE 250: Performed by: PHYSICIAN ASSISTANT

## 2022-10-19 PROCEDURE — 80053 COMPREHEN METABOLIC PANEL: CPT

## 2022-10-19 PROCEDURE — 25000003 PHARM REV CODE 250: Performed by: THORACIC SURGERY (CARDIOTHORACIC VASCULAR SURGERY)

## 2022-10-19 PROCEDURE — 21400001 HC TELEMETRY ROOM

## 2022-10-19 PROCEDURE — 99024 PR POST-OP FOLLOW-UP VISIT: ICD-10-PCS | Mod: ,,, | Performed by: PHYSICIAN ASSISTANT

## 2022-10-19 PROCEDURE — 97116 GAIT TRAINING THERAPY: CPT | Mod: CQ

## 2022-10-19 PROCEDURE — 97110 THERAPEUTIC EXERCISES: CPT

## 2022-10-19 PROCEDURE — 97535 SELF CARE MNGMENT TRAINING: CPT

## 2022-10-19 PROCEDURE — 94760 N-INVAS EAR/PLS OXIMETRY 1: CPT

## 2022-10-19 PROCEDURE — 99024 POSTOP FOLLOW-UP VISIT: CPT | Mod: ,,, | Performed by: PHYSICIAN ASSISTANT

## 2022-10-19 RX ORDER — CARVEDILOL 12.5 MG/1
12.5 TABLET ORAL 2 TIMES DAILY
Status: DISCONTINUED | OUTPATIENT
Start: 2022-10-19 | End: 2022-10-25 | Stop reason: HOSPADM

## 2022-10-19 RX ADMIN — SUCRALFATE 1 G: 1 TABLET ORAL at 04:10

## 2022-10-19 RX ADMIN — SUCRALFATE 1 G: 1 TABLET ORAL at 12:10

## 2022-10-19 RX ADMIN — PRAZOSIN HYDROCHLORIDE 1 MG: 1 CAPSULE ORAL at 08:10

## 2022-10-19 RX ADMIN — CARVEDILOL 12.5 MG: 12.5 TABLET, FILM COATED ORAL at 08:10

## 2022-10-19 RX ADMIN — METOPROLOL SUCCINATE 75 MG: 25 TABLET, EXTENDED RELEASE ORAL at 08:10

## 2022-10-19 RX ADMIN — VALSARTAN 160 MG: 80 TABLET, FILM COATED ORAL at 08:10

## 2022-10-19 RX ADMIN — SERTRALINE HYDROCHLORIDE 50 MG: 50 TABLET ORAL at 09:10

## 2022-10-19 RX ADMIN — SUCRALFATE 1 G: 1 TABLET ORAL at 05:10

## 2022-10-19 RX ADMIN — AMLODIPINE BESYLATE 10 MG: 5 TABLET ORAL at 09:10

## 2022-10-19 RX ADMIN — DOCUSATE SODIUM 100 MG: 100 CAPSULE, LIQUID FILLED ORAL at 09:10

## 2022-10-19 RX ADMIN — SUCRALFATE 1 G: 1 TABLET ORAL at 08:10

## 2022-10-19 RX ADMIN — HYDROCHLOROTHIAZIDE 25 MG: 25 TABLET ORAL at 09:10

## 2022-10-19 RX ADMIN — Medication: at 08:10

## 2022-10-19 RX ADMIN — FOLIC ACID 1 MG: 1 TABLET ORAL at 09:10

## 2022-10-19 RX ADMIN — ASPIRIN 81 MG: 81 TABLET, COATED ORAL at 09:10

## 2022-10-19 RX ADMIN — FAMOTIDINE 40 MG: 20 TABLET, FILM COATED ORAL at 09:10

## 2022-10-19 RX ADMIN — ATORVASTATIN CALCIUM 40 MG: 40 TABLET, FILM COATED ORAL at 04:10

## 2022-10-19 NOTE — PT/OT/SLP PROGRESS
Occupational Therapy   Treatment    Name: Jessica Elias  MRN: [id number]  Admitting Diagnosis:  CABG  Recent Surgery: Procedure(s) (LRB):  CORONARY ARTERY BYPASS GRAFT (CABG) (N/A) 8 Days Post-Op    Recommendations:     Discharge Recommendations: home with HH and 24/7 care vs rehab (note: pt currently requesting rehab placement)  Discharge Equipment Recommendations: walker, rolling  Barriers to discharge: medical dx, placement    Assessment:     Jessica Elias is a 82 y.o. female with a medical diagnosis of of MVCAD s/p CABG x 4. Performance deficits affecting function are weakness, impaired endurance, impaired self care skills, impaired functional mobility, and decreased safety awareness.    Rehab Prognosis: Good; patient would benefit from acute skilled OT services to address these deficits and reach maximum level of function.       Plan:     Patient to be seen 5 x/week, 6 x/week to address the above listed problems via self-care/home management, therapeutic activities  Plan of Care Expires: 10/27/22  Plan of Care Reviewed with: patient    Subjective     Pain/Comfort:  Pt denied any pain during session. Pt only complained that she was cold, so OT adjusted pt's thermostat and provided blanket at end of session.    Objective:     Communicated with: RN prior to session. Patient found up in chair with pulse ox (continuous), Purewick, and telemetry upon OT entry to room.    General Precautions: Standard, fall, sternal   Respiratory Status: Room air  Vitals:  BP: 121/61  WV: 83  O2: 92-95%     Occupational Performance:     Functional Mobility/Transfers:  Patient performed Sit <> Stand Transfer with min A, vcs, and with BUEs positioned across chest.  Functional Mobility: Pt performed long distance gait trial in hallway using RW with CGA progressing to SBA provided. Pt also ambulated <> bathroom using RW with SBA and vcs provided to increase pt's safety.    ADLs:  Grooming: Pt performed oral hygiene task and washed  hands while standing at sink with SBA provided.  LB dressing: Pt able to doff/don B socks while seated in chair with SBA provided. OT donned pt's B aguila hose during session.  Toileting: Pt performed task with mod A provided, continuing to require assist to wipe buttocks and don brief over buttocks during task.   Note: RN applied topical cream and re-dressed pt's wound on buttocks during session.    Patient left up in chair with all lines intact, call bell in reach, and Purewick.    GOALS:   Multidisciplinary Problems       Occupational Therapy Goals          Problem: Occupational Therapy    Goal Priority Disciplines Outcome Interventions   Occupational Therapy Goal     OT, PT/OT Ongoing, Progressing    Description: STG: to be met in 2 weeks  1. UB dressing Mod I.  2. LB dressing Mod I.  3. Toileting, toilet t/f Mod I with LRAD.  4. Shower t/f Mod I.                        Time Tracking:     OT Date of Treatment: 10/19/22  OT Start Time: 1225  OT Stop Time: 1300  OT Total Time (min): 35 min    Billable Minutes: Self Care/Home Mgmt 35 mins    OT/JACKIE: OT     JACKIE Visit Number: 4    10/19/2022

## 2022-10-19 NOTE — PROGRESS NOTES
Minimal assist to stand; maintained sternal precautions; demonstrated IS @500; ambulated 80 feet, slow/steady gait; O2 sat @95% during ambulation

## 2022-10-19 NOTE — PROGRESS NOTES
Jessica Elias is a 82 y.o. female patient.   1. Type 2 diabetes mellitus without complication, without long-term current use of insulin    2. CAD (coronary artery disease)    3. Coronary artery disease, unspecified vessel or lesion type, unspecified whether angina present, unspecified whether native or transplanted heart    4. Coronary artery disease    5. A-fib      Past Medical History:   Diagnosis Date    Chronic kidney disease, unspecified     stage 3    Coronary artery disease     History of carotid artery disease     Hyperlipidemia     Hypertension     Sleep apnea      No past surgical history pertinent negatives on file.  Scheduled Meds:   amLODIPine  10 mg Oral Daily    aspirin  81 mg Oral Daily    atorvastatin  40 mg Oral Daily    docusate sodium  100 mg Oral BID    enoxaparin  40 mg Subcutaneous Daily    famotidine  40 mg Oral Daily    folic acid  1 mg Oral Daily    hydroCHLOROthiazide  25 mg Oral Daily    metoprolol succinate  75 mg Oral Daily    prazosin  1 mg Oral Daily    sertraline  50 mg Oral Daily    sucralfate  1 g Oral QID (AC & HS)    valsartan  160 mg Oral Daily    vancomycin (VANCOCIN) IVPB  1,000 mg Intravenous Once    zinc oxide-cod liver oil   Topical (Top) BID     Continuous Infusions:   amiodarone in dextrose 5% 0.5 mg/min (10/16/22 0017)    amiodarone in dextrose 5%      loperamide       PRN Meds:sodium chloride, acetaminophen, albumin human 5%, bisacodyL, calcium gluconate IVPB, calcium gluconate IVPB, calcium gluconate IVPB, dextrose 10%, dextrose 10%, dextrose 50%, dextrose 50%, glucagon (human recombinant), glucose, glucose, hydrALAZINE, HYDROcodone-acetaminophen, insulin aspart U-100, lactulose 10 gram/15 ml, loperamide, magnesium sulfate IVPB, magnesium sulfate IVPB, metoclopramide HCl, metoprolol, morphine, ondansetron, oxyCODONE, polyethylene glycol, sodium phosphate IVPB, sodium phosphate IVPB, sodium phosphate IVPB    Review of patient's allergies indicates:   Allergen  "Reactions    Penicillin     Sulfa (sulfonamide antibiotics)      There are no hospital problems to display for this patient.    Blood pressure (!) 185/70, pulse 75, temperature 98 °F (36.7 °C), resp. rate 18, height 5' 4.5" (1.638 m), weight 65.3 kg (143 lb 15.4 oz), SpO2 97 %, not currently breastfeeding.    Subjective:    POD #8  Currently NSR, on metoprolol and amio  Hypertensive issues per cardiology  Diuresing, 60mg Lasix given yesterday  Off oxygen  Patient lives alone, requesting rehab. Case management consulted    Objective:   AFVSS. 94% on RA  Heart: RRR  Lungs: Respirations nonlabored, clear  Incision: c/d/i  Cr: 1.3 (up from 0.8)    Assesment/Plan:    S/P CAB, Lig SHAVON  Ok to resume eliquis  Hold lasix  Rehab eval pending            HOLDEN Herman  10/19/2022    "

## 2022-10-19 NOTE — PLAN OF CARE
CM consult for inpatient rehab noted. Spoke to daughter Carrillo via phone and choice obtained. Referrals sent to 1. [facility] 2. [facility] 3. [facility] via [facility].

## 2022-10-19 NOTE — PT/OT/SLP PROGRESS
Physical Therapy  Treatment    Jessica Elias   MRN: [id number]   Admitting Diagnosis: <principal problem not specified>       PT Start Time: 1400     PT Stop Time: 1425    PT Total Time (min): 25 min       Billable Minutes:  Gait Training 13 and Therapeutic Exercise 12    Treatment Type: Treatment  PT/PTA: PTA     PTA Visit Number: 4       General Precautions: Standard,  (GABG precautions)  Orthopedic Precautions: N/A   Braces:    Respiratory Status: Room air    Spiritual, Cultural Beliefs, Yarsanism Practices, Values that Affect Care: no    Subjective:  Communicated with NSG prior to session.       Objective:        Functional Mobility:      Transfers:   Sit to/from stand x 10reps . Min A. RW.     Gait:    Pt ambulated ~95ft. Step through gait pattern. Decreased step length and von. No unsteadiness or LOB.  RW.    AM-PAC 6 CLICK MOBILITY  How much help from another person does this patient currently need?   1 = Unable, Total/Dependent Assistance  2 = A lot, Maximum/Moderate Assistance  3 = A little, Minimum/Contact Guard/Supervision  4 = None, Modified [place]/Independent         AM-PAC Raw Score CMS G-Code Modifier Level of Impairment Assistance   6 % Total / Unable   7 - 9 CM 80 - 100% Maximal Assist   10 - 14 CL 60 - 80% Moderate Assist   15 - 19 CK 40 - 60% Moderate Assist   20 - 22 CJ 20 - 40% Minimal Assist   23 CI 1-20% SBA / CGA   24 CH 0% Independent/ Mod I     Patient left up in chair with all lines intact and call button in reach.    Assessment:  Jessica Elias is a 82 y.o. female with a medical diagnosis of <principal problem not specified>     Rehab identified problem list/impairments: Rehab identified problem list/impairments: weakness, impaired endurance, impaired balance, gait instability    Rehab potential is excellent.    Activity tolerance: Excellent    Discharge recommendations: Discharge Facility/Level of Care Needs: home with home health     Barriers to discharge:       Equipment recommendations: Equipment Needed After Discharge: walker, rolling     GOALS:   Multidisciplinary Problems       Physical Therapy Goals          Problem: Physical Therapy    Goal Priority Disciplines Outcome Goal Variances Interventions   Physical Therapy Goal     PT, PT/OT      Description: Goals to be met by: 2022     Patient will increase functional independence with mobility by performin. Supine to sit with Modified Walker  2. Sit to supine with Modified Walker  3. Sit to stand transfer with Modified Walker  4. Gait  x 500 feet with Modified Walker using Rolling Walker vs LRAD.                          PLAN:    Patient to be seen 6 x/week  to address the above listed problems via gait training, therapeutic activities, therapeutic exercises  Plan of Care expires: 22  Plan of Care reviewed with: patient         10/19/2022

## 2022-10-19 NOTE — PROGRESS NOTES
[facility] - 3rd Floor [facility] Telemetry  Cardiology  Progress Note    Patient Name: Jessica Elias  MRN: [id number]  Admission Date: 10/11/2022  Hospital Length of Stay: 8 days  Code Status: Full Code   Attending Provider:  Dr. Bety Thompson  Consulting Provider: HEATH George  Primary Care Physician: Tarik Gee MD  Principal Problem:<principal problem not specified>    Patient information was obtained from ER records.     Subjective:     Chief Complaint:  Consult:  Atrial Fibrillation     HPI:  82-year-old female known to both Dr. Dos Santos and Dr. Sánchez with a past medical history of CAD, PAF, carotid artery stenosis status post CEA, hypertension, hyperlipidemia, CKD, obstructive sleep apnea, and recurrent bradycardia status post pacemaker implant in February 2018.  Patient underwent outpatient coronary angiogram which revealed triple-vessel disease.  She was electively admitted on October 11, 2022 and underwent CABG x 4 (LIMA to LAD, SVG to Diag 1, SVG to OM, and SVG to RCA), Ligation of left atrial appendage per Dr. Bety Thompson.   Patient was noted to be in Atrial Fibrillation RVR earlier this morning,  therefore [facility] has been consulted for further evaluation of Atrial Fibrillation.      10.16.22:  NAD noted.  Denies SOB.  Reports incisional soreness.    10.17.22: NAD. Denies SOB or palps. Reports incisional soreness. SR on tele. Remains hypertensive.   10.18.22: NAD. Denies SOB or palps. + incisional CP. SR on tele. +11 lbs over baseline weight. Remains hypertensive.   10.19.22: NAD. Denies CP, SOB, or palps. Remains hypertensive. Cr. 1.38. Inaccurate I&O's.     PMH: CAD, PAF, RUKHSANA, HTN, HLD, MP, Recurrent Bradycardia, CKD  PSH: CEA, Cardiac Pacemaker, Colonoscopy, Coronary Angiogram, Lipoma Removal, Cataract Surgery, Hysterectomy, Bladder Surgery, Appendectomy  Family History: Father - MI, HTN; Mother - CVA, HTN, CABG  Social History: Former Smoker.  Denies Alcohol Use.  Denies Illicit  Drug Use.     Previous Cardiac Diagnostics:  LEFT HEART CATHETERIZATION, SELECTIVE CORONARY ANGIOGRAPHY AND LEFT VENTRICULOGRAM 9.23.22:  1. LEFT MAIN:  HIGHLY CALCIFIED WITHOUT OBSTRUCTIVE LESIONS  2. LAD:  HEAVILY CALCIFIED 70-80% STENOSIS PROXIMALLY (LONG LESION) AND 70% DISTAL AT THE BIFURCATION WITH THE 2ND DIAGONAL BRANCH.  THE 1ST DIAGONAL BRANCH SHOWS DISEASE OF 90%.  3. LEFT CIRCUMFLEX:  CALCIFIED, 99% STENOSIS PROXIMALLY  4.  RIGHT CORONARY ARTERY:  HIGHLY CALCIFIED, 99% STENOSIS OF THE OSTIUM FOLLOWED BY 90% STENOSIS PROXIMALLY     RECOMMENDATIONS:  1. RECOMMEND CARDIOTHORACIC SURGICAL EVALUATION BY DR. HENDRIX CORONARY ARTERY BYPASS SURGERY  2. IF SHE GOES HOME TODAY WITH AN APPOINTMENT FOR SURGERY LATER, RESUME ELIQUIS TOMORROW MORNING  3.  CARDIAC DIET  4.  FOLLOW-UP WITH ME AT THE OFFICE IN 1 WEEK      Left Ventriculogram Summary 9.23.22:  The ejection fraction was calculated to be 55%.  The left ventricular systolic function was normal.  The left ventricular end diastolic pressure was elevated.  The pre-procedure left ventricular end diastolic pressure was 23.  The estimated blood loss was none.  There was three vessel coronary artery disease.  There was no mitral valve regurgitation.  There was no aortic valve stenosis.    TTE 9.22.22:  The study quality is average.   The left ventricle is decreased in size. Global left ventricular systolic function is normal. The left ventricular ejection fraction is 55%. The left ventricle diastolic function is impaired (Grade I) with normal left atrial pressure. Noted left ventricular hypertrophy. It is moderate.   Mild to moderate (1-2+) mitral regurgitation. Mild mitral annular calcification is noted. The posterior mitral leaflet is mildly thickened.  Mild calcification and diffuse thickening of the aortic valve cusps is noted with normal cuspal excursion.  The pulmonary artery systolic pressure is 17 mmHg.       Review of Systems   Respiratory:  Negative for  shortness of breath.    Cardiovascular:  Negative for chest pain and palpitations.   Musculoskeletal: Negative.    Skin: Negative.    Psychiatric/Behavioral: Negative.       Objective:     Vital Signs (Most Recent):  Temp: 98 °F (36.7 °C) (10/19/22 0904)  Pulse: 75 (10/19/22 0717)  Resp: 18 (10/19/22 0717)  BP: (!) 185/70 (10/19/22 0717)  SpO2: 97 % (10/19/22 0717)   Vital Signs (24h Range):  Temp:  [98 °F (36.7 °C)-98.3 °F (36.8 °C)] 98 °F (36.7 °C)  Pulse:  [69-79] 75  Resp:  [15-20] 18  SpO2:  [92 %-97 %] 97 %  BP: (148-185)/(64-73) 185/70     Weight: 65.3 kg (143 lb 15.4 oz)  Body mass index is 24.33 kg/m².    SpO2: 97 %  O2 Device (Oxygen Therapy): room air      Intake/Output Summary (Last 24 hours) at 10/19/2022 1237  Last data filed at 10/19/2022 0543  Gross per 24 hour   Intake 420 ml   Output 1400 ml   Net -980 ml         Lines/Drains/Airways       Peripheral Intravenous Line  Duration                  Peripheral IV - Single Lumen 10/19/22 0550 20 G Left;Posterior Hand <1 day                    Significant Labs:  Recent Results (from the past 72 hour(s))   CBC with Differential    Collection Time: 10/17/22  4:00 AM   Result Value Ref Range    WBC 7.4 4.5 - 11.5 x10(3)/mcL    RBC 3.33 (L) 4.20 - 5.40 x10(6)/mcL    Hgb 10.0 (L) 12.0 - 16.0 gm/dL    Hct 30.9 (L) 37.0 - 47.0 %    MCV 92.8 80.0 - 94.0 fL    MCH 30.0 27.0 - 31.0 pg    MCHC 32.4 (L) 33.0 - 36.0 mg/dL    RDW 13.8 11.5 - 17.0 %    Platelet 188 130 - 400 x10(3)/mcL    MPV 11.7 (H) 7.4 - 10.4 fL    Neut % 65.1 %    Lymph % 17.7 %    Mono % 13.4 %    Eos % 3.1 %    Basophil % 0.4 %    Lymph # 1.32 0.6 - 4.6 x10(3)/mcL    Neut # 4.8 2.1 - 9.2 x10(3)/mcL    Mono # 1.00 0.1 - 1.3 x10(3)/mcL    Eos # 0.23 0 - 0.9 x10(3)/mcL    Baso # 0.03 0 - 0.2 x10(3)/mcL    IG# 0.02 0 - 0.04 x10(3)/mcL    IG% 0.3 %    NRBC% 0.0 %   Comprehensive Metabolic Panel    Collection Time: 10/17/22  4:33 AM   Result Value Ref Range    Sodium Level 142 136 - 145 mmol/L     Potassium Level 3.0 (L) 3.5 - 5.1 mmol/L    Chloride 105 98 - 107 mmol/L    Carbon Dioxide 27 23 - 31 mmol/L    Glucose Level 124 (H) 82 - 115 mg/dL    Blood Urea Nitrogen 24.0 (H) 9.8 - 20.1 mg/dL    Creatinine 0.84 0.55 - 1.02 mg/dL    Calcium Level Total 8.2 (L) 8.4 - 10.2 mg/dL    Protein Total 4.5 (L) 5.8 - 7.6 gm/dL    Albumin Level 2.3 (L) 3.4 - 4.8 gm/dL    Globulin 2.2 (L) 2.4 - 3.5 gm/dL    Albumin/Globulin Ratio 1.0 (L) 1.1 - 2.0 ratio    Bilirubin Total 0.7 <=1.5 mg/dL    Alkaline Phosphatase 42 40 - 150 unit/L    Alanine Aminotransferase 14 0 - 55 unit/L    Aspartate Aminotransferase 16 5 - 34 unit/L    eGFR >60 mls/min/1.73/m2   Magnesium    Collection Time: 10/17/22  4:33 AM   Result Value Ref Range    Magnesium Level 1.80 1.60 - 2.60 mg/dL   POCT glucose    Collection Time: 10/17/22  5:50 AM   Result Value Ref Range    POCT Glucose 111 (H) 70 - 110 mg/dL   POCT glucose    Collection Time: 10/17/22  9:10 PM   Result Value Ref Range    POCT Glucose 101 70 - 110 mg/dL   POCT glucose    Collection Time: 10/18/22  5:24 AM   Result Value Ref Range    POCT Glucose 115 (H) 70 - 110 mg/dL   Basic Metabolic Panel    Collection Time: 10/18/22 10:39 AM   Result Value Ref Range    Sodium Level 143 136 - 145 mmol/L    Potassium Level 3.4 (L) 3.5 - 5.1 mmol/L    Chloride 107 98 - 107 mmol/L    Carbon Dioxide 26 23 - 31 mmol/L    Glucose Level 129 (H) 82 - 115 mg/dL    Blood Urea Nitrogen 24.0 (H) 9.8 - 20.1 mg/dL    Creatinine 0.88 0.55 - 1.02 mg/dL    BUN/Creatinine Ratio 27     Calcium Level Total 9.1 8.4 - 10.2 mg/dL    Anion Gap 10.0 mEq/L    eGFR >60 mls/min/1.73/m2   Magnesium    Collection Time: 10/18/22 10:39 AM   Result Value Ref Range    Magnesium Level 1.90 1.60 - 2.60 mg/dL   POCT glucose    Collection Time: 10/18/22 10:47 AM   Result Value Ref Range    POCT Glucose 191 (H) 70 - 110 mg/dL   POCT glucose    Collection Time: 10/18/22  4:55 PM   Result Value Ref Range    POCT Glucose 117 (H) 70 - 110 mg/dL    POCT glucose    Collection Time: 10/18/22  8:58 PM   Result Value Ref Range    POCT Glucose 111 (H) 70 - 110 mg/dL   Comprehensive Metabolic Panel    Collection Time: 10/19/22  4:28 AM   Result Value Ref Range    Sodium Level 141 136 - 145 mmol/L    Potassium Level 4.4 3.5 - 5.1 mmol/L    Chloride 104 98 - 107 mmol/L    Carbon Dioxide 27 23 - 31 mmol/L    Glucose Level 107 82 - 115 mg/dL    Blood Urea Nitrogen 32.4 (H) 9.8 - 20.1 mg/dL    Creatinine 1.38 (H) 0.55 - 1.02 mg/dL    Calcium Level Total 9.2 8.4 - 10.2 mg/dL    Protein Total 5.3 (L) 5.8 - 7.6 gm/dL    Albumin Level 2.6 (L) 3.4 - 4.8 gm/dL    Globulin 2.7 2.4 - 3.5 gm/dL    Albumin/Globulin Ratio 1.0 (L) 1.1 - 2.0 ratio    Bilirubin Total 0.8 <=1.5 mg/dL    Alkaline Phosphatase 46 40 - 150 unit/L    Alanine Aminotransferase 15 0 - 55 unit/L    Aspartate Aminotransferase 16 5 - 34 unit/L    eGFR 39 mls/min/1.73/m2   Magnesium    Collection Time: 10/19/22  4:28 AM   Result Value Ref Range    Magnesium Level 2.00 1.60 - 2.60 mg/dL   POCT glucose    Collection Time: 10/19/22  5:25 AM   Result Value Ref Range    POCT Glucose 107 70 - 110 mg/dL   POCT glucose    Collection Time: 10/19/22 10:44 AM   Result Value Ref Range    POCT Glucose 137 (H) 70 - 110 mg/dL       Significant Imaging:      EKG:        Telemetry:  SR with HR 69 bpm at 0948    Physical Exam  Constitutional:       Appearance: Normal appearance.   HENT:      Head: Normocephalic and atraumatic.      Nose: Nose normal.   Eyes:      Extraocular Movements: Extraocular movements intact.   Cardiovascular:      Rate and Rhythm: Normal rate and regular rhythm.   Pulmonary:      Effort: Pulmonary effort is normal.   Abdominal:      Palpations: Abdomen is soft.   Musculoskeletal:         General: Normal range of motion.   Skin:     General: Skin is warm and dry.      Capillary Refill: Capillary refill takes less than 2 seconds.   Neurological:      Mental Status: She is alert and oriented to person, place,  and time.   Psychiatric:         Mood and Affect: Mood normal.         Behavior: Behavior normal.       Home Medications:   Current Facility-Administered Medications on File Prior to Encounter   Medication Dose Route Frequency Provider Last Rate Last Admin    0.9%  NaCl infusion   Intravenous Once Abner Mitchell MD        diazePAM tablet 10 mg  10 mg Oral On Call Procedure Abner Mitchell MD   10 mg at 09/23/22 0823    diphenhydrAMINE capsule 50 mg  50 mg Oral On Call Procedure Abner Mitchell MD   50 mg at 09/23/22 0823    sodium chloride 0.9% flush 10 mL  10 mL Intravenous PRN Abner Mitchell MD         Current Outpatient Medications on File Prior to Encounter   Medication Sig Dispense Refill    amLODIPine (NORVASC) 10 MG tablet Take 5 mg by mouth once daily.      ELIQUIS 5 mg Tab Take 5 mg by mouth 2 (two) times daily.      metoprolol succinate (TOPROL-XL) 50 MG 24 hr tablet Take 50 mg by mouth once daily.      multivitamin capsule Take 1 capsule by mouth once daily.      rosuvastatin (CRESTOR) 20 MG tablet Take 20 mg by mouth every evening.      sertraline (ZOLOFT) 50 MG tablet Take 50 mg by mouth once daily.      terazosin (HYTRIN) 2 MG capsule Take 2 mg by mouth once daily.      azelastine (ASTELIN) 137 mcg (0.1 %) nasal spray by Nasal route.      co-enzyme Q-10 30 mg capsule Take 200 mg by mouth once daily.         Current Inpatient Medications:    Current Facility-Administered Medications:     0.9%  NaCl infusion (for blood administration), , Intravenous, Q24H PRN, Bety Thompson MD    acetaminophen oral solution 650 mg, 650 mg, Per OG tube, Q6H PRN, HOLDEN Cristina    albumin human 5% bottle 12.5 g, 12.5 g, Intravenous, PRN, HOLDEN Cristina, Stopped at 10/12/22 0310    amiodarone 360 mg/200 mL (1.8 mg/mL) infusion, 0.5 mg/min, Intravenous, Continuous, HOLDEN Cristina, Last Rate: 16.7 mL/hr at 10/16/22 0017, 0.5 mg/min at 10/16/22 0017    amiodarone 360 mg/200 mL (1.8 mg/mL)  infusion, 0.5 mg/min, Intravenous, Continuous, Adrián Q. Heather, NP    amLODIPine tablet 10 mg, 10 mg, Oral, Daily, Arlene Martin, FNP, 10 mg at 10/19/22 0901    aspirin EC tablet 81 mg, 81 mg, Oral, Daily, HOLDEN Cristina, 81 mg at 10/19/22 0904    atorvastatin tablet 40 mg, 40 mg, Oral, Daily, Marline Adams, LUIS ENRIQUE, 40 mg at 10/18/22 1608    bisacodyL suppository 10 mg, 10 mg, Rectal, Daily PRN, HOLDEN Mendoza    calcium gluconate 1 g in NS IVPB (premixed), 1 g, Intravenous, PRN, HOLDEN Cristina    calcium gluconate 1 g in NS IVPB (premixed), 2 g, Intravenous, PRN, HOLDEN Cristina    calcium gluconate 1 g in NS IVPB (premixed), 3 g, Intravenous, PRN, HOLDEN Cristina    dextrose 10% bolus 125 mL, 12.5 g, Intravenous, PRN, Bety Thompson MD    dextrose 10% bolus 250 mL, 25 g, Intravenous, PRN, Bety Thompson MD    dextrose 50% injection 12.5 g, 12.5 g, Intravenous, PRN, HOLDEN Cristina    dextrose 50% injection 25 g, 25 g, Intravenous, PRN, HOLDEN Cristina    docusate sodium capsule 100 mg, 100 mg, Oral, BID, HOLDEN Cristina, 100 mg at 10/19/22 0905    enoxaparin injection 40 mg, 40 mg, Subcutaneous, Daily, HOLDEN Mendoza, 40 mg at 10/18/22 1609    famotidine tablet 40 mg, 40 mg, Oral, Daily, HOLDEN Cristina, 40 mg at 10/19/22 0902    folic acid tablet 1 mg, 1 mg, Oral, Daily, HOLDEN Cristina, 1 mg at 10/19/22 0901    glucagon (human recombinant) injection 1 mg, 1 mg, Intramuscular, PRN, Jackie Rush MD    glucose chewable tablet 16 g, 16 g, Oral, PRN, Jackie Rush MD    glucose chewable tablet 24 g, 24 g, Oral, PRN, Jackie Rush MD    hydrALAZINE injection 10 mg, 10 mg, Intravenous, Q4H PRN, Marline Adams, ANP, 10 mg at 10/18/22 0113    hydroCHLOROthiazide tablet 25 mg, 25 mg, Oral, Daily, Arlene Martin, FNP, 25 mg at 10/19/22 0903    HYDROcodone-acetaminophen 5-325 mg per tablet 1 tablet, 1 tablet, Oral, Q4H PRN, Nehemias WOLF  HOLDEN Elkins, 1 tablet at 10/14/22 0600    insulin aspart U-100 injection 0-5 Units, 0-5 Units, Subcutaneous, QID (AC + HS) PRN, aJckie Rush MD, 2 Units at 10/14/22 1109    lactulose 10 gram/15 ml solution 20 g, 20 g, Oral, Q6H PRN, HOLDEN Cristina, 20 g at 10/16/22 0949    loperamide capsule 2 mg, 2 mg, Oral, Continuous PRN, HOLDEN Cristina    magnesium sulfate 2g in water 50mL IVPB (premix), 2 g, Intravenous, PRN, HOLDEN Cristina    magnesium sulfate 2g in water 50mL IVPB (premix), 4 g, Intravenous, PRN, HOLDEN Cristina    metoclopramide HCl injection 5 mg, 5 mg, Intravenous, Q6H PRN, HOLDEN Cristina    metoprolol injection 5 mg, 5 mg, Intravenous, Q1H PRN, Dieon Marroquin, ANA    metoprolol succinate (TOPROL-XL) 24 hr tablet 75 mg, 75 mg, Oral, Daily, Arlene Martin, HEATH, 75 mg at 10/19/22 0859    morphine injection 4 mg, 4 mg, Intravenous, Q4H PRN, HOLDEN Cristina, 2 mg at 10/11/22 1423    ondansetron injection 4 mg, 4 mg, Intravenous, Q4H PRN, HOLDEN Cristina, 4 mg at 10/12/22 0545    oxyCODONE immediate release tablet 10 mg, 10 mg, Oral, Q4H PRN, HOLDEN Cristina, 5 mg at 10/12/22 0410    polyethylene glycol packet 17 g, 17 g, Oral, Daily PRN, HOLDEN Mendoza    prazosin capsule 1 mg, 1 mg, Oral, Daily, Bety Thompson MD, 1 mg at 10/19/22 0857    sertraline tablet 50 mg, 50 mg, Oral, Daily, HOLDEN Mendoza, 50 mg at 10/19/22 0901    sodium phosphate 15 mmol in dextrose 5 % 250 mL IVPB, 15 mmol, Intravenous, PRN, HOLDEN Cirstina, Stopped at 10/12/22 0156    sodium phosphate 20.01 mmol in dextrose 5 % 250 mL IVPB, 20.01 mmol, Intravenous, PRN, HOLDEN Cristina, Stopped at 10/13/22 1612    sodium phosphate 30 mmol in dextrose 5 % 250 mL IVPB, 30 mmol, Intravenous, PRN, HOLDEN Cristina    sucralfate tablet 1 g, 1 g, Oral, QID (AC & HS), HOLDEN Cristina, 1 g at 10/19/22 1235    valsartan tablet 160 mg, 160 mg, Oral,  Daily, Marline Adams, ANP, 160 mg at 10/19/22 0859    vancomycin in dextrose 5 % 1 gram/250 mL IVPB 1,000 mg, 1,000 mg, Intravenous, Once, Bety Thompson MD    zinc oxide-cod liver oil 40 % paste, , Topical (Top), BID, Bety Thompson MD, Given at 10/18/22 2046    Facility-Administered Medications Ordered in Other Encounters:     0.9%  NaCl infusion, , Intravenous, Once, Abner Mitchell MD    diazePAM tablet 10 mg, 10 mg, Oral, On Call Procedure, Abner Mitchell MD, 10 mg at 09/23/22 0823    diphenhydrAMINE capsule 50 mg, 50 mg, Oral, On Call Procedure, Abner Mitchell MD, 50 mg at 09/23/22 0823    sodium chloride 0.9% flush 10 mL, 10 mL, Intravenous, PRN, Abner Mitchell MD         VTE Risk Mitigation (From admission, onward)           Ordered     enoxaparin injection 40 mg  Daily         10/13/22 1141     IP VTE HIGH RISK PATIENT  Once         10/13/22 1141     heparin, porcine (PF) (heparin flush 100 units/mL) 100 unit/mL injection flush         10/11/22 0607                    Assessment:   CAD       - s/p CABG x 4 (LIMA to LAD, SVG to Diag 1, SVG to OM, and SVG to RCA), Ligation of left atrial appendage per Dr. Bety Thompson.    PAF - now SR       - Afib RVR on 10.15.22       - CHADsVASc Score 5 Points - 7.2% stroke risk per year  Hx of Bradycardia        - status post pacemaker implant in February 2018   Carotid Artery Stenosis        - status post CEA  Hypertension  Hyperlipidemia  CKD  Obstructive Sleep Apnea    Plan:   Continue Aspirin 81mg & atorvastatin 40 mg daily.   Resume Eliquis 5mg BID for CVA prevention. Will defer the discontinuation of eliquis to the outpatient setting.   Will not need to continue Amiodarone orally after 24 hour IV Amiodarone gtt is completed per Dr. Hernandez  Pt remains hypertensive. Discontinue metoprolol & start Coreg 12.5 mg BID.   Continue HCTZ 25 mg daily.  Cr increasing. Hold lasix.   Rehab placement pending.   Labs in am: CMP & Mg      Arlene Martin,  P  Cardiology  [facility]   10/19/2022 9:49 AM

## 2022-10-20 LAB
ALBUMIN SERPL-MCNC: 2.7 GM/DL (ref 3.4–4.8)
ALBUMIN/GLOB SERPL: 0.7 RATIO (ref 1.1–2)
ALP SERPL-CCNC: 52 UNIT/L (ref 40–150)
ALT SERPL-CCNC: 15 UNIT/L (ref 0–55)
AST SERPL-CCNC: 26 UNIT/L (ref 5–34)
BILIRUBIN DIRECT+TOT PNL SERPL-MCNC: 0.6 MG/DL
BUN SERPL-MCNC: 35.4 MG/DL (ref 9.8–20.1)
CALCIUM SERPL-MCNC: 9.5 MG/DL (ref 8.4–10.2)
CHLORIDE SERPL-SCNC: 100 MMOL/L (ref 98–107)
CO2 SERPL-SCNC: 26 MMOL/L (ref 23–31)
CREAT SERPL-MCNC: 1.4 MG/DL (ref 0.55–1.02)
GFR SERPLBLD CREATININE-BSD FMLA CKD-EPI: 38 MLS/MIN/1.73/M2
GLOBULIN SER-MCNC: 3.8 GM/DL (ref 2.4–3.5)
GLUCOSE SERPL-MCNC: 119 MG/DL (ref 82–115)
MAGNESIUM SERPL-MCNC: 1.9 MG/DL (ref 1.6–2.6)
POCT GLUCOSE: 111 MG/DL (ref 70–110)
POCT GLUCOSE: 123 MG/DL (ref 70–110)
POTASSIUM SERPL-SCNC: 4.4 MMOL/L (ref 3.5–5.1)
PROT SERPL-MCNC: 6.5 GM/DL (ref 5.8–7.6)
SODIUM SERPL-SCNC: 138 MMOL/L (ref 136–145)

## 2022-10-20 PROCEDURE — 36415 COLL VENOUS BLD VENIPUNCTURE: CPT

## 2022-10-20 PROCEDURE — 25000003 PHARM REV CODE 250: Performed by: THORACIC SURGERY (CARDIOTHORACIC VASCULAR SURGERY)

## 2022-10-20 PROCEDURE — 25000003 PHARM REV CODE 250: Performed by: NURSE PRACTITIONER

## 2022-10-20 PROCEDURE — 97110 THERAPEUTIC EXERCISES: CPT

## 2022-10-20 PROCEDURE — 99024 PR POST-OP FOLLOW-UP VISIT: ICD-10-PCS | Mod: ,,, | Performed by: PHYSICIAN ASSISTANT

## 2022-10-20 PROCEDURE — 25000003 PHARM REV CODE 250: Performed by: INTERNAL MEDICINE

## 2022-10-20 PROCEDURE — 25000003 PHARM REV CODE 250: Performed by: PHYSICIAN ASSISTANT

## 2022-10-20 PROCEDURE — 25000003 PHARM REV CODE 250

## 2022-10-20 PROCEDURE — 80053 COMPREHEN METABOLIC PANEL: CPT

## 2022-10-20 PROCEDURE — 97535 SELF CARE MNGMENT TRAINING: CPT

## 2022-10-20 PROCEDURE — 94760 N-INVAS EAR/PLS OXIMETRY 1: CPT

## 2022-10-20 PROCEDURE — 21400001 HC TELEMETRY ROOM

## 2022-10-20 PROCEDURE — 99024 POSTOP FOLLOW-UP VISIT: CPT | Mod: ,,, | Performed by: PHYSICIAN ASSISTANT

## 2022-10-20 PROCEDURE — 83735 ASSAY OF MAGNESIUM: CPT

## 2022-10-20 RX ORDER — AMLODIPINE BESYLATE 5 MG/1
5 TABLET ORAL 2 TIMES DAILY
Status: DISCONTINUED | OUTPATIENT
Start: 2022-10-21 | End: 2022-10-21

## 2022-10-20 RX ORDER — SODIUM CHLORIDE 9 MG/ML
INJECTION, SOLUTION INTRAVENOUS CONTINUOUS
Status: DISCONTINUED | OUTPATIENT
Start: 2022-10-20 | End: 2022-10-21

## 2022-10-20 RX ADMIN — APIXABAN 5 MG: 5 TABLET, FILM COATED ORAL at 08:10

## 2022-10-20 RX ADMIN — HYDROCHLOROTHIAZIDE 25 MG: 25 TABLET ORAL at 09:10

## 2022-10-20 RX ADMIN — FOLIC ACID 1 MG: 1 TABLET ORAL at 09:10

## 2022-10-20 RX ADMIN — FAMOTIDINE 40 MG: 20 TABLET, FILM COATED ORAL at 09:10

## 2022-10-20 RX ADMIN — SUCRALFATE 1 G: 1 TABLET ORAL at 08:10

## 2022-10-20 RX ADMIN — ASPIRIN 81 MG: 81 TABLET, COATED ORAL at 09:10

## 2022-10-20 RX ADMIN — CARVEDILOL 12.5 MG: 12.5 TABLET, FILM COATED ORAL at 09:10

## 2022-10-20 RX ADMIN — Medication: at 09:10

## 2022-10-20 RX ADMIN — SUCRALFATE 1 G: 1 TABLET ORAL at 05:10

## 2022-10-20 RX ADMIN — SUCRALFATE 1 G: 1 TABLET ORAL at 04:10

## 2022-10-20 RX ADMIN — DOCUSATE SODIUM 100 MG: 100 CAPSULE, LIQUID FILLED ORAL at 09:10

## 2022-10-20 RX ADMIN — SODIUM CHLORIDE: 9 INJECTION, SOLUTION INTRAVENOUS at 03:10

## 2022-10-20 RX ADMIN — DOCUSATE SODIUM 100 MG: 100 CAPSULE, LIQUID FILLED ORAL at 08:10

## 2022-10-20 RX ADMIN — APIXABAN 5 MG: 5 TABLET, FILM COATED ORAL at 10:10

## 2022-10-20 RX ADMIN — VALSARTAN 160 MG: 80 TABLET, FILM COATED ORAL at 09:10

## 2022-10-20 RX ADMIN — CARVEDILOL 12.5 MG: 12.5 TABLET, FILM COATED ORAL at 08:10

## 2022-10-20 RX ADMIN — AMLODIPINE BESYLATE 10 MG: 5 TABLET ORAL at 09:10

## 2022-10-20 RX ADMIN — SUCRALFATE 1 G: 1 TABLET ORAL at 10:10

## 2022-10-20 RX ADMIN — PRAZOSIN HYDROCHLORIDE 1 MG: 1 CAPSULE ORAL at 09:10

## 2022-10-20 RX ADMIN — SERTRALINE HYDROCHLORIDE 50 MG: 50 TABLET ORAL at 09:10

## 2022-10-20 RX ADMIN — ATORVASTATIN CALCIUM 40 MG: 40 TABLET, FILM COATED ORAL at 04:10

## 2022-10-20 NOTE — PT/OT/SLP PROGRESS
Physical Therapy      Patient Name:  Jessica Elias   MRN:  [id number]    Patient not seen today secondary to working with cardiac rehab at this time. PT will follow up as schedule allows.

## 2022-10-20 NOTE — PROGRESS NOTES
10/20/22 1400   Pre Exercise Vitals   /70  (standing bp 98/64)   Pulse 91   Supplemental O2? No   SpO2 96 %   During Exercise Vitals   Pulse 94   Supplemental O2? No   SpO2 95 %   Distance Walked 75 feet   Post Exercise Vitals   /68   Pulse 69   Supplemental O2? No   SpO2 96 %   Modality   Modality Walker   Min assist. Sternal precautions maintained. Gait steady, slow with rollator. Denies dizziness or weakness. Tolerated well. Encouraged incentive spirometer q 1 hour. Performed 750.

## 2022-10-20 NOTE — PROGRESS NOTES
[facility] - 3rd Floor Medical Telemetry  Cardiology  Progress Note    Patient Name: Jessica Elias  MRN: [id number]  Admission Date: 10/11/2022  Hospital Length of Stay: 9 days  Code Status: Full Code   Attending Provider:  Dr. Bety Thompson  Consulting Provider: HEATH George  Primary Care Physician: Tarik Gee MD  Principal Problem:<principal problem not specified>    Patient information was obtained from ER records.     Subjective:     Chief Complaint:  Consult:  Atrial Fibrillation     HPI:  82-year-old female known to both Dr. Dos Santos and Dr. Sánchez with a past medical history of CAD, PAF, carotid artery stenosis status post CEA, hypertension, hyperlipidemia, CKD, obstructive sleep apnea, and recurrent bradycardia status post pacemaker implant in February 2018.  Patient underwent outpatient coronary angiogram which revealed triple-vessel disease.  She was electively admitted on October 11, 2022 and underwent CABG x 4 (LIMA to LAD, SVG to Diag 1, SVG to OM, and SVG to RCA), Ligation of left atrial appendage per Dr. Bety Thompson.   Patient was noted to be in Atrial Fibrillation RVR earlier this morning,  therefore [facility] has been consulted for further evaluation of Atrial Fibrillation.      10.16.22:  NAD noted.  Denies SOB.  Reports incisional soreness.    10.17.22: NAD. Denies SOB or palps. Reports incisional soreness. SR on tele. Remains hypertensive.   10.18.22: NAD. Denies SOB or palps. + incisional CP. SR on tele. +11 lbs over baseline weight. Remains hypertensive.   10.19.22: NAD. Denies CP, SOB, or palps. Remains hypertensive. Cr. 1.38. Inaccurate I&O's.   10.20.22: NAD. Denies CP, SOB, or palps. Sitting up in the chair. BP labile. Cr 1.4. Weight is approaching baseline.     PMH: CAD, PAF, RUKHSANA, HTN, HLD, MP, Recurrent Bradycardia, CKD  PSH: CEA, Cardiac Pacemaker, Colonoscopy, Coronary Angiogram, Lipoma Removal, Cataract Surgery, Hysterectomy, Bladder Surgery, Appendectomy  Family  History: Father - MI, HTN; Mother - CVA, HTN, CABG  Social History: Former Smoker.  Denies Alcohol Use.  Denies Illicit Drug Use.     Previous Cardiac Diagnostics:  LEFT HEART CATHETERIZATION, SELECTIVE CORONARY ANGIOGRAPHY AND LEFT VENTRICULOGRAM 9.23.22:  1. LEFT MAIN:  HIGHLY CALCIFIED WITHOUT OBSTRUCTIVE LESIONS  2. LAD:  HEAVILY CALCIFIED 70-80% STENOSIS PROXIMALLY (LONG LESION) AND 70% DISTAL AT THE BIFURCATION WITH THE 2ND DIAGONAL BRANCH.  THE 1ST DIAGONAL BRANCH SHOWS DISEASE OF 90%.  3. LEFT CIRCUMFLEX:  CALCIFIED, 99% STENOSIS PROXIMALLY  4.  RIGHT CORONARY ARTERY:  HIGHLY CALCIFIED, 99% STENOSIS OF THE OSTIUM FOLLOWED BY 90% STENOSIS PROXIMALLY     RECOMMENDATIONS:  1. RECOMMEND CARDIOTHORACIC SURGICAL EVALUATION BY DR. HENDRIX CORONARY ARTERY BYPASS SURGERY  2. IF SHE GOES HOME TODAY WITH AN APPOINTMENT FOR SURGERY LATER, RESUME ELIQUIS TOMORROW MORNING  3.  CARDIAC DIET  4.  FOLLOW-UP WITH ME AT THE OFFICE IN 1 WEEK      Left Ventriculogram Summary 9.23.22:  The ejection fraction was calculated to be 55%.  The left ventricular systolic function was normal.  The left ventricular end diastolic pressure was elevated.  The pre-procedure left ventricular end diastolic pressure was 23.  The estimated blood loss was none.  There was three vessel coronary artery disease.  There was no mitral valve regurgitation.  There was no aortic valve stenosis.    TTE 9.22.22:  The study quality is average.   The left ventricle is decreased in size. Global left ventricular systolic function is normal. The left ventricular ejection fraction is 55%. The left ventricle diastolic function is impaired (Grade I) with normal left atrial pressure. Noted left ventricular hypertrophy. It is moderate.   Mild to moderate (1-2+) mitral regurgitation. Mild mitral annular calcification is noted. The posterior mitral leaflet is mildly thickened.  Mild calcification and diffuse thickening of the aortic valve cusps is noted with normal cuspal  excursion.  The pulmonary artery systolic pressure is 17 mmHg.       Review of Systems   Respiratory:  Negative for shortness of breath.    Cardiovascular:  Negative for chest pain and palpitations.   Musculoskeletal: Negative.    Skin: Negative.    Psychiatric/Behavioral: Negative.       Objective:     Vital Signs (Most Recent):  Temp: 98.2 °F (36.8 °C) (10/20/22 1124)  Pulse: 68 (10/20/22 1200)  Resp: 16 (10/20/22 1200)  BP: 136/77 (10/20/22 1124)  SpO2: (!) 94 % (10/20/22 1200)   Vital Signs (24h Range):  Temp:  [97.8 °F (36.6 °C)-98.6 °F (37 °C)] 98.2 °F (36.8 °C)  Pulse:  [68-75] 68  Resp:  [16-20] 16  SpO2:  [91 %-94 %] 94 %  BP: (130-172)/(58-80) 136/77     Weight: 65.3 kg (143 lb 15.4 oz)  Body mass index is 24.33 kg/m².    SpO2: (!) 94 %  O2 Device (Oxygen Therapy): room air      Intake/Output Summary (Last 24 hours) at 10/20/2022 1327  Last data filed at 10/20/2022 0608  Gross per 24 hour   Intake 540 ml   Output 400 ml   Net 140 ml         Lines/Drains/Airways       None                   Significant Labs:  Recent Results (from the past 72 hour(s))   POCT glucose    Collection Time: 10/17/22  9:10 PM   Result Value Ref Range    POCT Glucose 101 70 - 110 mg/dL   POCT glucose    Collection Time: 10/18/22  5:24 AM   Result Value Ref Range    POCT Glucose 115 (H) 70 - 110 mg/dL   Basic Metabolic Panel    Collection Time: 10/18/22 10:39 AM   Result Value Ref Range    Sodium Level 143 136 - 145 mmol/L    Potassium Level 3.4 (L) 3.5 - 5.1 mmol/L    Chloride 107 98 - 107 mmol/L    Carbon Dioxide 26 23 - 31 mmol/L    Glucose Level 129 (H) 82 - 115 mg/dL    Blood Urea Nitrogen 24.0 (H) 9.8 - 20.1 mg/dL    Creatinine 0.88 0.55 - 1.02 mg/dL    BUN/Creatinine Ratio 27     Calcium Level Total 9.1 8.4 - 10.2 mg/dL    Anion Gap 10.0 mEq/L    eGFR >60 mls/min/1.73/m2   Magnesium    Collection Time: 10/18/22 10:39 AM   Result Value Ref Range    Magnesium Level 1.90 1.60 - 2.60 mg/dL   POCT glucose    Collection Time:  10/18/22 10:47 AM   Result Value Ref Range    POCT Glucose 191 (H) 70 - 110 mg/dL   POCT glucose    Collection Time: 10/18/22  4:55 PM   Result Value Ref Range    POCT Glucose 117 (H) 70 - 110 mg/dL   POCT glucose    Collection Time: 10/18/22  8:58 PM   Result Value Ref Range    POCT Glucose 111 (H) 70 - 110 mg/dL   Comprehensive Metabolic Panel    Collection Time: 10/19/22  4:28 AM   Result Value Ref Range    Sodium Level 141 136 - 145 mmol/L    Potassium Level 4.4 3.5 - 5.1 mmol/L    Chloride 104 98 - 107 mmol/L    Carbon Dioxide 27 23 - 31 mmol/L    Glucose Level 107 82 - 115 mg/dL    Blood Urea Nitrogen 32.4 (H) 9.8 - 20.1 mg/dL    Creatinine 1.38 (H) 0.55 - 1.02 mg/dL    Calcium Level Total 9.2 8.4 - 10.2 mg/dL    Protein Total 5.3 (L) 5.8 - 7.6 gm/dL    Albumin Level 2.6 (L) 3.4 - 4.8 gm/dL    Globulin 2.7 2.4 - 3.5 gm/dL    Albumin/Globulin Ratio 1.0 (L) 1.1 - 2.0 ratio    Bilirubin Total 0.8 <=1.5 mg/dL    Alkaline Phosphatase 46 40 - 150 unit/L    Alanine Aminotransferase 15 0 - 55 unit/L    Aspartate Aminotransferase 16 5 - 34 unit/L    eGFR 39 mls/min/1.73/m2   Magnesium    Collection Time: 10/19/22  4:28 AM   Result Value Ref Range    Magnesium Level 2.00 1.60 - 2.60 mg/dL   POCT glucose    Collection Time: 10/19/22  5:25 AM   Result Value Ref Range    POCT Glucose 107 70 - 110 mg/dL   POCT glucose    Collection Time: 10/19/22 10:44 AM   Result Value Ref Range    POCT Glucose 137 (H) 70 - 110 mg/dL   POCT glucose    Collection Time: 10/19/22  4:29 PM   Result Value Ref Range    POCT Glucose 120 (H) 70 - 110 mg/dL   POCT glucose    Collection Time: 10/19/22  8:28 PM   Result Value Ref Range    POCT Glucose 108 70 - 110 mg/dL   Comprehensive Metabolic Panel    Collection Time: 10/20/22  4:51 AM   Result Value Ref Range    Sodium Level 138 136 - 145 mmol/L    Potassium Level 4.4 3.5 - 5.1 mmol/L    Chloride 100 98 - 107 mmol/L    Carbon Dioxide 26 23 - 31 mmol/L    Glucose Level 119 (H) 82 - 115 mg/dL     Blood Urea Nitrogen 35.4 (H) 9.8 - 20.1 mg/dL    Creatinine 1.40 (H) 0.55 - 1.02 mg/dL    Calcium Level Total 9.5 8.4 - 10.2 mg/dL    Protein Total 6.5 5.8 - 7.6 gm/dL    Albumin Level 2.7 (L) 3.4 - 4.8 gm/dL    Globulin 3.8 (H) 2.4 - 3.5 gm/dL    Albumin/Globulin Ratio 0.7 (L) 1.1 - 2.0 ratio    Bilirubin Total 0.6 <=1.5 mg/dL    Alkaline Phosphatase 52 40 - 150 unit/L    Alanine Aminotransferase 15 0 - 55 unit/L    Aspartate Aminotransferase 26 5 - 34 unit/L    eGFR 38 mls/min/1.73/m2   Magnesium    Collection Time: 10/20/22  4:51 AM   Result Value Ref Range    Magnesium Level 1.90 1.60 - 2.60 mg/dL   POCT glucose    Collection Time: 10/20/22 10:51 AM   Result Value Ref Range    POCT Glucose 123 (H) 70 - 110 mg/dL       Significant Imaging:      EKG:        Telemetry:  SR with HR 70    Physical Exam  Constitutional:       Appearance: Normal appearance.   HENT:      Head: Normocephalic and atraumatic.      Nose: Nose normal.   Eyes:      Extraocular Movements: Extraocular movements intact.   Cardiovascular:      Rate and Rhythm: Normal rate and regular rhythm.      Pulses: Normal pulses.      Heart sounds: Normal heart sounds.   Pulmonary:      Effort: Pulmonary effort is normal.   Abdominal:      Palpations: Abdomen is soft.   Musculoskeletal:         General: Normal range of motion.   Skin:     General: Skin is warm and dry.      Capillary Refill: Capillary refill takes less than 2 seconds.      Comments: Chest midline incision c/d/i   Neurological:      Mental Status: She is alert and oriented to person, place, and time.   Psychiatric:         Mood and Affect: Mood normal.         Behavior: Behavior normal.       Home Medications:   Current Facility-Administered Medications on File Prior to Encounter   Medication Dose Route Frequency Provider Last Rate Last Admin    0.9%  NaCl infusion   Intravenous Once Abner Mitchell MD        diazePAM tablet 10 mg  10 mg Oral On Call Procedure Abner Mitchell MD   10 mg at  09/23/22 0823    diphenhydrAMINE capsule 50 mg  50 mg Oral On Call Procedure Abner Mitchell MD   50 mg at 09/23/22 0823    sodium chloride 0.9% flush 10 mL  10 mL Intravenous PRN Abner Mitchell MD         Current Outpatient Medications on File Prior to Encounter   Medication Sig Dispense Refill    amLODIPine (NORVASC) 10 MG tablet Take 5 mg by mouth once daily.      ELIQUIS 5 mg Tab Take 5 mg by mouth 2 (two) times daily.      metoprolol succinate (TOPROL-XL) 50 MG 24 hr tablet Take 50 mg by mouth once daily.      multivitamin capsule Take 1 capsule by mouth once daily.      rosuvastatin (CRESTOR) 20 MG tablet Take 20 mg by mouth every evening.      sertraline (ZOLOFT) 50 MG tablet Take 50 mg by mouth once daily.      terazosin (HYTRIN) 2 MG capsule Take 2 mg by mouth once daily.      azelastine (ASTELIN) 137 mcg (0.1 %) nasal spray by Nasal route.      co-enzyme Q-10 30 mg capsule Take 200 mg by mouth once daily.         Current Inpatient Medications:    Current Facility-Administered Medications:     0.9%  NaCl infusion (for blood administration), , Intravenous, Q24H PRN, Bety Thompson MD    acetaminophen oral solution 650 mg, 650 mg, Per OG tube, Q6H PRN, HOLDEN Cristina    albumin human 5% bottle 12.5 g, 12.5 g, Intravenous, PRN, HOLDEN Cristina, Stopped at 10/12/22 0310    [START ON 10/21/2022] amLODIPine tablet 5 mg, 5 mg, Oral, BID, HEATH George    apixaban tablet 5 mg, 5 mg, Oral, BID, HEATH George, 5 mg at 10/20/22 1046    aspirin EC tablet 81 mg, 81 mg, Oral, Daily, HOLDEN Cristina, 81 mg at 10/20/22 0921    atorvastatin tablet 40 mg, 40 mg, Oral, Daily, LUIS ENRIQUE Gomez, 40 mg at 10/19/22 1606    bisacodyL suppository 10 mg, 10 mg, Rectal, Daily PRN, HOLDEN Mendoza    calcium gluconate 1 g in NS IVPB (premixed), 1 g, Intravenous, PRN, Nehemias Elkins, PA    calcium gluconate 1 g in NS IVPB (premixed), 2 g, Intravenous, PRN, Nehemias Elkins,  PA    calcium gluconate 1 g in NS IVPB (premixed), 3 g, Intravenous, PRN, HOLDEN Cristina    carvediloL tablet 12.5 mg, 12.5 mg, Oral, BID, MIKE GeorgeP, 12.5 mg at 10/20/22 0921    dextrose 10% bolus 125 mL, 12.5 g, Intravenous, PRN, Bety Thompson MD    dextrose 10% bolus 250 mL, 25 g, Intravenous, PRN, Bety Thompson MD    dextrose 50% injection 12.5 g, 12.5 g, Intravenous, PRN, HOLDEN Cristina    dextrose 50% injection 25 g, 25 g, Intravenous, PRN, HOLDEN Cristina    docusate sodium capsule 100 mg, 100 mg, Oral, BID, HOLDEN Cristina, 100 mg at 10/20/22 0922    famotidine tablet 40 mg, 40 mg, Oral, Daily, HOLDEN Cristina, 40 mg at 10/20/22 0921    folic acid tablet 1 mg, 1 mg, Oral, Daily, HOLDEN Cristina, 1 mg at 10/20/22 0921    glucagon (human recombinant) injection 1 mg, 1 mg, Intramuscular, PRN, Jackie Rush MD    glucose chewable tablet 16 g, 16 g, Oral, PRN, Jackie Rush MD    glucose chewable tablet 24 g, 24 g, Oral, PRN, Jackie Rush MD    hydrALAZINE injection 10 mg, 10 mg, Intravenous, Q4H PRN, Marline Adams, ANP, 10 mg at 10/18/22 0113    hydroCHLOROthiazide tablet 25 mg, 25 mg, Oral, Daily, MIKE GeorgeP, 25 mg at 10/20/22 0921    HYDROcodone-acetaminophen 5-325 mg per tablet 1 tablet, 1 tablet, Oral, Q4H PRN, HOLDEN Cristina, 1 tablet at 10/14/22 0600    insulin aspart U-100 injection 0-5 Units, 0-5 Units, Subcutaneous, QID (AC + HS) PRN, Jackie Rush MD, 2 Units at 10/14/22 1109    lactulose 10 gram/15 ml solution 20 g, 20 g, Oral, Q6H PRN, HOLDEN Cristina, 20 g at 10/16/22 0949    loperamide capsule 2 mg, 2 mg, Oral, Continuous PRN, HOLDEN Cristina    magnesium sulfate 2g in water 50mL IVPB (premix), 2 g, Intravenous, PRN, HOLDEN Cristina    magnesium sulfate 2g in water 50mL IVPB (premix), 4 g, Intravenous, PRN, HOLDEN Cristina    metoclopramide HCl injection 5 mg, 5 mg, Intravenous, Q6H  PRN, HOLDEN Cristina    metoprolol injection 5 mg, 5 mg, Intravenous, Q1H PRN, Deion Marroquin NP    morphine injection 4 mg, 4 mg, Intravenous, Q4H PRN, HOLDEN Cristina, 2 mg at 10/11/22 1423    ondansetron injection 4 mg, 4 mg, Intravenous, Q4H PRN, HOLDEN Cristina, 4 mg at 10/12/22 0545    oxyCODONE immediate release tablet 10 mg, 10 mg, Oral, Q4H PRN, HOLDEN Cristina, 5 mg at 10/12/22 0410    polyethylene glycol packet 17 g, 17 g, Oral, Daily PRN, HOLDEN Mendoza    prazosin capsule 1 mg, 1 mg, Oral, Daily, Bety Thompson MD, 1 mg at 10/20/22 0921    sertraline tablet 50 mg, 50 mg, Oral, Daily, HOLDEN Mendoza, 50 mg at 10/20/22 0921    sodium phosphate 15 mmol in dextrose 5 % 250 mL IVPB, 15 mmol, Intravenous, PRN, HOLDEN Cristina, Stopped at 10/12/22 0156    sodium phosphate 20.01 mmol in dextrose 5 % 250 mL IVPB, 20.01 mmol, Intravenous, PRN, HOLDEN Cristina, Stopped at 10/13/22 1612    sodium phosphate 30 mmol in dextrose 5 % 250 mL IVPB, 30 mmol, Intravenous, PRN, HOLDEN Cristina    sucralfate tablet 1 g, 1 g, Oral, QID (AC & HS), HOLDEN Cristina, 1 g at 10/20/22 1046    valsartan tablet 160 mg, 160 mg, Oral, Daily, LUIS ENRIQUE Gomez, 160 mg at 10/20/22 0921    vancomycin in dextrose 5 % 1 gram/250 mL IVPB 1,000 mg, 1,000 mg, Intravenous, Once, Bety Thompson MD    zinc oxide-cod liver oil 40 % paste, , Topical (Top), BID, Bety Thompson MD, Given at 10/20/22 0900    Facility-Administered Medications Ordered in Other Encounters:     0.9%  NaCl infusion, , Intravenous, Once, Abner Mitchell MD    diazePAM tablet 10 mg, 10 mg, Oral, On Call Procedure, Abner Mitchell MD, 10 mg at 09/23/22 0823    diphenhydrAMINE capsule 50 mg, 50 mg, Oral, On Call Procedure, Abner Mitchell MD, 50 mg at 09/23/22 0823    sodium chloride 0.9% flush 10 mL, 10 mL, Intravenous, PRN, Abner Mitchell MD         VTE Risk Mitigation (From admission,  onward)           Ordered     apixaban tablet 5 mg  2 times daily         10/20/22 0919     IP VTE HIGH RISK PATIENT  Once         10/13/22 1141     heparin, porcine (PF) (heparin flush 100 units/mL) 100 unit/mL injection flush         10/11/22 0607                    Assessment:   CAD       - s/p CABG x 4 (LIMA to LAD, SVG to Diag 1, SVG to OM, and SVG to RCA), Ligation of left atrial appendage per Dr. Bety Thompson.    PAF - now SR       - Afib RVR on 10.15.22       - CHADsVASc Score 5 Points - 7.2% stroke risk per year  Hx of Bradycardia        - status post pacemaker implant in February 2018   Carotid Artery Stenosis        - status post CEA  Hypertension  Hyperlipidemia  CKD  Obstructive Sleep Apnea    Plan:   Continue Aspirin 81mg & atorvastatin 40 mg daily.   Eliquis resumed for CVA prevention in the setting of PAF. Will defer the discontinuation of eliquis to the outpatient setting.   Will not need to continue Amiodarone orally after 24 hour IV Amiodarone gtt is completed per Dr. Hernandez  Pt's BP labile. Coreg started yesterday. BP higher in the evening than morning. Will change to amlodipine 5 mg BID instead of 10 mg daily.   Continue HCTZ 25 mg daily.  Cr slightly increased.   Rehab placement pending.   Labs in am: MAURISIO Martin, MIKEP  Cardiology  [facility]   10/20/2022 9:49 AM

## 2022-10-20 NOTE — PROGRESS NOTES
10/20/22 1000   Pre Exercise Vitals   /70   Pulse 78  (paced)   Supplemental O2? No   SpO2 99 %   During Exercise Vitals   Supplemental O2? No   Distance Walked 6 feet   Post Exercise Vitals   /70   Pulse 82   Supplemental O2? No   SpO2 98 %   Modality   Modality   (rollator)   Communicated with nurse prior to activity.   Min assist sit to stand.  Ambulated navneet. 6 feet and stated she was feeling dizzy.  Returned to bedside chair.  /70, paced rhythm noted.  Legs elevated, bilat knee high TEDS applied.  BP rechecked 111/65.  Reported to nurse.     PAIN/STIFFNESS/BACK PAIN

## 2022-10-20 NOTE — PROGRESS NOTES
"Inpatient Nutrition Evaluation    Admit Date: 10/11/2022   Total duration of encounter: 9 days    Nutrition Recommendation/Prescription     Continue current diet as tolerated.    Nutrition Assessment     Chart Review    Reason Seen: continuous nutrition monitoring and follow-up    Diagnosis:  Multivessel CAD s/p CABG x4 and SHAVON ligation     Relevant Medical History: CKD, CAD, HLD, HTN    Nutrition-Related Medications: docusate sodium, famotidine, folic acid, HCTZ, sucralfate    Nutrition-Related Labs:  10/13 Na 133, BUN 21.5, Crea 1.08, Glu 184, Mg 3.2  10/20 BUN 35.4, Crea 1.4, GFR 38, Gluc 119, Alb 2.7    Diet Order: Diet Adult Regular  Oral Supplement Order: none at this time  Appetite/Oral Intake: good/% of meals  Factors Affecting Nutritional Intake: none identified at this time  Food/Moravian/Cultural Preferences: none reported    Skin Integrity: incision, wound  Wound(s):      Altered Skin Integrity 10/12/22 1356 Buttocks Purple or maroon localized area of discolored intact skin or non-intact skin or a blood-filled blister.-Tissue loss description: Partial thickness incision noted    Comments    10/13/22: Pt with good appetite, tolerating meals.  10/20/22: Good appetite and intake of meals. No n/v/d/c.     Anthropometrics    Height: 5' 4.5" (163.8 cm) Height Method: Stated  Last Weight: 65.3 kg (143 lb 15.4 oz) (10/19/22 0543) Weight Method: Standard Scale  BMI (Calculated): 24.3  BMI Classification: overweight (BMI 25-29.9)     Ideal Body Weight (IBW), Female: 122.5 lb     % Ideal Body Weight, Female (lb): 111.94 %                             Usual Weight Provided By: not applicable    Wt Readings from Last 5 Encounters:   10/19/22 65.3 kg (143 lb 15.4 oz)   09/23/22 63.1 kg (139 lb 1.8 oz)   03/26/21 64.5 kg (142 lb 3.2 oz)     Weight Change(s) Since Admission:  Admit Weight: 62.2 kg (137 lb 2 oz) (10/11/22 0556)  10/13/22: 68.5kg (post-op CABG)  10/20/22: 65.3kg wt yesterday    Patient " Education    Not applicable.    Monitoring & Evaluation     Dietitian will monitor energy intake.  Nutrition Risk/Follow-Up: low (follow-up in 5-7 days)  Patients assigned 'low nutrition risk' status do not qualify for a full nutritional assessment but will be monitored and re-evaluated in a 5-7 day time period.

## 2022-10-20 NOTE — PT/OT/SLP PROGRESS
Occupational Therapy   Treatment    Name: Jessica Elias  MRN: [id number]  Admitting Diagnosis:  CABG  Recent Surgery: Procedure(s) (LRB):  CORONARY ARTERY BYPASS GRAFT (CABG) (N/A) 9 Days Post-Op    Recommendations:     Discharge Recommendations: home with HH and 24/7 care vs rehab (note: pt currently requesting rehab placement)  Discharge Equipment Recommendations: walker, rolling  Barriers to discharge: medical dx, placement    Assessment:     Jessica Elias is a 82 y.o. female with a medical diagnosis of of MVCAD s/p CABG x 4. Performance deficits affecting function are weakness, impaired endurance, impaired self care skills, impaired functional mobility, and decreased safety awareness.    Rehab Prognosis: Good; patient would benefit from acute skilled OT services to address these deficits and reach maximum level of function.       Plan:     Patient to be seen 5 x/week, 6 x/week to address the above listed problems via self-care/home management, therapeutic activities  Plan of Care Expires: 10/27/22  Plan of Care Reviewed with: patient    Subjective     Pain/Comfort:  Pt denied any pain during session.     Objective:     Communicated with: RN prior to session. Patient found up in chair with pulse ox (continuous), Purewick, and telemetry upon OT entry to room.    General Precautions: Standard, fall, sternal   Respiratory Status: Room air  Vitals:  BP: 146/74  PA: 77-78  O2: 92-93%     Occupational Performance:     Functional Mobility/Transfers:  Patient performed Sit <> Stand Transfer with min A and with BUEs positioned across chest.  Functional Mobility: Pt performed long distance gait trial in hallway (<> RN station) using RW with CGA provided. Pt also ambulated <> bathroom using RW with CGA provided.    ADLs:  - Grooming: Pt washed hands while standing at sink with SBA provided.  - LB dressing: Pt doffed/donned brief with min A provided.  - Toileting: Pt performed task with mod A provided.    Patient left  up in chair with all lines intact and call bell in reach.    GOALS:   Multidisciplinary Problems       Occupational Therapy Goals          Problem: Occupational Therapy    Goal Priority Disciplines Outcome Interventions   Occupational Therapy Goal     OT, PT/OT Ongoing, Progressing    Description: STG: to be met in 2 weeks  1. UB dressing Mod I.  2. LB dressing Mod I.  3. Toileting, toilet t/f Mod I with LRAD.  4. Shower t/f Mod I.                        Time Tracking:     OT Date of Treatment: 10/20/22  OT Start Time: 1519  OT Stop Time: 1546  OT Total Time (min): 27 min    Billable Minutes: Self Care/Home Mgmt 27 mins    OT/JACKIE: OT     JACKIE Visit Number: [phone]    10/20/2022

## 2022-10-21 LAB
ANION GAP SERPL CALC-SCNC: 11 MEQ/L
BUN SERPL-MCNC: 27.3 MG/DL (ref 9.8–20.1)
CALCIUM SERPL-MCNC: 8.9 MG/DL (ref 8.4–10.2)
CHLORIDE SERPL-SCNC: 102 MMOL/L (ref 98–107)
CO2 SERPL-SCNC: 27 MMOL/L (ref 23–31)
CREAT SERPL-MCNC: 0.98 MG/DL (ref 0.55–1.02)
CREAT/UREA NIT SERPL: 28
GFR SERPLBLD CREATININE-BSD FMLA CKD-EPI: 58 MLS/MIN/1.73/M2
GLUCOSE SERPL-MCNC: 110 MG/DL (ref 82–115)
POCT GLUCOSE: 111 MG/DL (ref 70–110)
POCT GLUCOSE: 118 MG/DL (ref 70–110)
POCT GLUCOSE: 127 MG/DL (ref 70–110)
POCT GLUCOSE: 131 MG/DL (ref 70–110)
POTASSIUM SERPL-SCNC: 3.6 MMOL/L (ref 3.5–5.1)
SODIUM SERPL-SCNC: 140 MMOL/L (ref 136–145)

## 2022-10-21 PROCEDURE — 25000003 PHARM REV CODE 250: Performed by: NURSE PRACTITIONER

## 2022-10-21 PROCEDURE — 25000003 PHARM REV CODE 250: Performed by: INTERNAL MEDICINE

## 2022-10-21 PROCEDURE — 25000003 PHARM REV CODE 250: Performed by: THORACIC SURGERY (CARDIOTHORACIC VASCULAR SURGERY)

## 2022-10-21 PROCEDURE — 97530 THERAPEUTIC ACTIVITIES: CPT

## 2022-10-21 PROCEDURE — 80048 BASIC METABOLIC PNL TOTAL CA: CPT | Performed by: PHYSICIAN ASSISTANT

## 2022-10-21 PROCEDURE — 25000003 PHARM REV CODE 250: Performed by: PHYSICIAN ASSISTANT

## 2022-10-21 PROCEDURE — 21400001 HC TELEMETRY ROOM

## 2022-10-21 PROCEDURE — 97168 OT RE-EVAL EST PLAN CARE: CPT

## 2022-10-21 PROCEDURE — 94761 N-INVAS EAR/PLS OXIMETRY MLT: CPT

## 2022-10-21 PROCEDURE — 25000003 PHARM REV CODE 250

## 2022-10-21 PROCEDURE — 97164 PT RE-EVAL EST PLAN CARE: CPT

## 2022-10-21 PROCEDURE — 36415 COLL VENOUS BLD VENIPUNCTURE: CPT | Performed by: PHYSICIAN ASSISTANT

## 2022-10-21 RX ORDER — AMLODIPINE BESYLATE 5 MG/1
10 TABLET ORAL DAILY
Status: DISCONTINUED | OUTPATIENT
Start: 2022-10-22 | End: 2022-10-21

## 2022-10-21 RX ORDER — AMLODIPINE BESYLATE 5 MG/1
5 TABLET ORAL 2 TIMES DAILY
Status: DISCONTINUED | OUTPATIENT
Start: 2022-10-21 | End: 2022-10-25 | Stop reason: HOSPADM

## 2022-10-21 RX ADMIN — CARVEDILOL 12.5 MG: 12.5 TABLET, FILM COATED ORAL at 08:10

## 2022-10-21 RX ADMIN — APIXABAN 5 MG: 5 TABLET, FILM COATED ORAL at 08:10

## 2022-10-21 RX ADMIN — APIXABAN 5 MG: 5 TABLET, FILM COATED ORAL at 09:10

## 2022-10-21 RX ADMIN — ASPIRIN 81 MG: 81 TABLET, COATED ORAL at 09:10

## 2022-10-21 RX ADMIN — HYDROCHLOROTHIAZIDE 25 MG: 25 TABLET ORAL at 09:10

## 2022-10-21 RX ADMIN — PRAZOSIN HYDROCHLORIDE 1 MG: 1 CAPSULE ORAL at 09:10

## 2022-10-21 RX ADMIN — FOLIC ACID 1 MG: 1 TABLET ORAL at 09:10

## 2022-10-21 RX ADMIN — Medication: at 09:10

## 2022-10-21 RX ADMIN — CARVEDILOL 12.5 MG: 12.5 TABLET, FILM COATED ORAL at 09:10

## 2022-10-21 RX ADMIN — SODIUM CHLORIDE: 9 INJECTION, SOLUTION INTRAVENOUS at 06:10

## 2022-10-21 RX ADMIN — SUCRALFATE 1 G: 1 TABLET ORAL at 08:10

## 2022-10-21 RX ADMIN — FAMOTIDINE 40 MG: 20 TABLET, FILM COATED ORAL at 09:10

## 2022-10-21 RX ADMIN — ATORVASTATIN CALCIUM 40 MG: 40 TABLET, FILM COATED ORAL at 05:10

## 2022-10-21 RX ADMIN — AMLODIPINE BESYLATE 5 MG: 5 TABLET ORAL at 08:10

## 2022-10-21 RX ADMIN — SUCRALFATE 1 G: 1 TABLET ORAL at 06:10

## 2022-10-21 RX ADMIN — SERTRALINE HYDROCHLORIDE 50 MG: 50 TABLET ORAL at 09:10

## 2022-10-21 RX ADMIN — DOCUSATE SODIUM 100 MG: 100 CAPSULE, LIQUID FILLED ORAL at 09:10

## 2022-10-21 RX ADMIN — AMLODIPINE BESYLATE 5 MG: 5 TABLET ORAL at 09:10

## 2022-10-21 RX ADMIN — VALSARTAN 160 MG: 80 TABLET, FILM COATED ORAL at 09:10

## 2022-10-21 RX ADMIN — DOCUSATE SODIUM 100 MG: 100 CAPSULE, LIQUID FILLED ORAL at 08:10

## 2022-10-21 NOTE — PROGRESS NOTES
[facility] - 3rd Floor [facility] Telemetry  Cardiology  Progress Note    Patient Name: Jessica Elias  MRN: [id number]  Admission Date: 10/11/2022  Hospital Length of Stay: 10 days  Code Status: Full Code   Attending Provider:  Dr. Bety Thompson  Consulting Provider: HEATH Khalil  Primary Care Physician: Tarik Gee MD  Principal Problem:<principal problem not specified>    Patient information was obtained from ER records.     Subjective:     Chief Complaint:  Consult:  Atrial Fibrillation     HPI:  82-year-old female known to both Dr. Dos Santos and Dr. Sánchez with a past medical history of CAD, PAF, carotid artery stenosis status post CEA, hypertension, hyperlipidemia, CKD, obstructive sleep apnea, and recurrent bradycardia status post pacemaker implant in February 2018.  Patient underwent outpatient coronary angiogram which revealed triple-vessel disease.  She was electively admitted on October 11, 2022 and underwent CABG x 4 (LIMA to LAD, SVG to Diag 1, SVG to OM, and SVG to RCA), Ligation of left atrial appendage per Dr. Bety Thompson.   Patient was noted to be in Atrial Fibrillation RVR earlier this morning,  therefore [facility] has been consulted for further evaluation of Atrial Fibrillation.      10.16.22:  NAD noted.  Denies SOB.  Reports incisional soreness.    10.17.22: NAD. Denies SOB or palps. Reports incisional soreness. SR on tele. Remains hypertensive.   10.18.22: NAD. Denies SOB or palps. + incisional CP. SR on tele. +11 lbs over baseline weight. Remains hypertensive.   10.19.22: NAD. Denies CP, SOB, or palps. Remains hypertensive. Cr. 1.38. Inaccurate I&O's.   10.20.22: NAD. Denies CP, SOB, or palps. Sitting up in the chair. BP labile. Cr 1.4. Weight is approaching baseline.   10.21.22: NAD Noted. On Room Air. BP Stable. SR on Tele.    PMH: CAD, PAF, RUKHSANA, HTN, HLD, MP, Recurrent Bradycardia, CKD  PSH: CEA, Cardiac Pacemaker, Colonoscopy, Coronary Angiogram, Lipoma Removal, Cataract Surgery,  Hysterectomy, Bladder Surgery, Appendectomy  Family History: Father - MI, HTN; Mother - CVA, HTN, CABG  Social History: Former Smoker.  Denies Alcohol Use.  Denies Illicit Drug Use.     Previous Cardiac Diagnostics:  LEFT HEART CATHETERIZATION, SELECTIVE CORONARY ANGIOGRAPHY AND LEFT VENTRICULOGRAM 9.23.22:  1. LEFT MAIN:  HIGHLY CALCIFIED WITHOUT OBSTRUCTIVE LESIONS  2. LAD:  HEAVILY CALCIFIED 70-80% STENOSIS PROXIMALLY (LONG LESION) AND 70% DISTAL AT THE BIFURCATION WITH THE 2ND DIAGONAL BRANCH.  THE 1ST DIAGONAL BRANCH SHOWS DISEASE OF 90%.  3. LEFT CIRCUMFLEX:  CALCIFIED, 99% STENOSIS PROXIMALLY  4.  RIGHT CORONARY ARTERY:  HIGHLY CALCIFIED, 99% STENOSIS OF THE OSTIUM FOLLOWED BY 90% STENOSIS PROXIMALLY     RECOMMENDATIONS:  1. RECOMMEND CARDIOTHORACIC SURGICAL EVALUATION BY DR. HENDRIX CORONARY ARTERY BYPASS SURGERY  2. IF SHE GOES HOME TODAY WITH AN APPOINTMENT FOR SURGERY LATER, RESUME ELIQUIS TOMORROW MORNING  3.  CARDIAC DIET  4.  FOLLOW-UP WITH ME AT THE OFFICE IN 1 WEEK     Left Ventriculogram Summary 9.23.22:  The ejection fraction was calculated to be 55%.  The left ventricular systolic function was normal.  The left ventricular end diastolic pressure was elevated.  The pre-procedure left ventricular end diastolic pressure was 23.  The estimated blood loss was none.  There was three vessel coronary artery disease.  There was no mitral valve regurgitation.  There was no aortic valve stenosis.    TTE 9.22.22:  The study quality is average.   The left ventricle is decreased in size. Global left ventricular systolic function is normal. The left ventricular ejection fraction is 55%. The left ventricle diastolic function is impaired (Grade I) with normal left atrial pressure. Noted left ventricular hypertrophy. It is moderate.   Mild to moderate (1-2+) mitral regurgitation. Mild mitral annular calcification is noted. The posterior mitral leaflet is mildly thickened.  Mild calcification and diffuse thickening of  the aortic valve cusps is noted with normal cuspal excursion.  The pulmonary artery systolic pressure is 17 mmHg.       Review of Systems   Respiratory:  Negative for chest tightness and shortness of breath.    Cardiovascular:  Negative for chest pain.   Musculoskeletal:         Left Foot Pain     Objective:     Vital Signs (Most Recent):  Temp: 98.2 °F (36.8 °C) (10/21/22 1127)  Pulse: 81 (10/21/22 1200)  Resp: 16 (10/21/22 1200)  BP: 125/69 (10/21/22 1127)  SpO2: 99 % (10/21/22 1200)   Vital Signs (24h Range):  Temp:  [98 °F (36.7 °C)-98.8 °F (37.1 °C)] 98.2 °F (36.8 °C)  Pulse:  [60-81] 81  Resp:  [16-18] 16  SpO2:  [92 %-99 %] 99 %  BP: (125-176)/(61-77) 125/69     Weight: 65.3 kg (143 lb 15.4 oz)  Body mass index is 24.33 kg/m².    SpO2: 99 %  O2 Device (Oxygen Therapy): room air      Intake/Output Summary (Last 24 hours) at 10/21/2022 1345  Last data filed at 10/20/2022 2200  Gross per 24 hour   Intake 680 ml   Output 300 ml   Net 380 ml         Lines/Drains/Airways       Peripheral Intravenous Line  Duration                  Peripheral IV - Single Lumen 10/20/22 1500 20 G Anterior;Left Forearm <1 day                    Significant Labs:  Recent Results (from the past 72 hour(s))   POCT glucose    Collection Time: 10/18/22  4:55 PM   Result Value Ref Range    POCT Glucose 117 (H) 70 - 110 mg/dL   POCT glucose    Collection Time: 10/18/22  8:58 PM   Result Value Ref Range    POCT Glucose 111 (H) 70 - 110 mg/dL   Comprehensive Metabolic Panel    Collection Time: 10/19/22  4:28 AM   Result Value Ref Range    Sodium Level 141 136 - 145 mmol/L    Potassium Level 4.4 3.5 - 5.1 mmol/L    Chloride 104 98 - 107 mmol/L    Carbon Dioxide 27 23 - 31 mmol/L    Glucose Level 107 82 - 115 mg/dL    Blood Urea Nitrogen 32.4 (H) 9.8 - 20.1 mg/dL    Creatinine 1.38 (H) 0.55 - 1.02 mg/dL    Calcium Level Total 9.2 8.4 - 10.2 mg/dL    Protein Total 5.3 (L) 5.8 - 7.6 gm/dL    Albumin Level 2.6 (L) 3.4 - 4.8 gm/dL    Globulin 2.7  2.4 - 3.5 gm/dL    Albumin/Globulin Ratio 1.0 (L) 1.1 - 2.0 ratio    Bilirubin Total 0.8 <=1.5 mg/dL    Alkaline Phosphatase 46 40 - 150 unit/L    Alanine Aminotransferase 15 0 - 55 unit/L    Aspartate Aminotransferase 16 5 - 34 unit/L    eGFR 39 mls/min/1.73/m2   Magnesium    Collection Time: 10/19/22  4:28 AM   Result Value Ref Range    Magnesium Level 2.00 1.60 - 2.60 mg/dL   POCT glucose    Collection Time: 10/19/22  5:25 AM   Result Value Ref Range    POCT Glucose 107 70 - 110 mg/dL   POCT glucose    Collection Time: 10/19/22 10:44 AM   Result Value Ref Range    POCT Glucose 137 (H) 70 - 110 mg/dL   POCT glucose    Collection Time: 10/19/22  4:29 PM   Result Value Ref Range    POCT Glucose 120 (H) 70 - 110 mg/dL   POCT glucose    Collection Time: 10/19/22  8:28 PM   Result Value Ref Range    POCT Glucose 108 70 - 110 mg/dL   Comprehensive Metabolic Panel    Collection Time: 10/20/22  4:51 AM   Result Value Ref Range    Sodium Level 138 136 - 145 mmol/L    Potassium Level 4.4 3.5 - 5.1 mmol/L    Chloride 100 98 - 107 mmol/L    Carbon Dioxide 26 23 - 31 mmol/L    Glucose Level 119 (H) 82 - 115 mg/dL    Blood Urea Nitrogen 35.4 (H) 9.8 - 20.1 mg/dL    Creatinine 1.40 (H) 0.55 - 1.02 mg/dL    Calcium Level Total 9.5 8.4 - 10.2 mg/dL    Protein Total 6.5 5.8 - 7.6 gm/dL    Albumin Level 2.7 (L) 3.4 - 4.8 gm/dL    Globulin 3.8 (H) 2.4 - 3.5 gm/dL    Albumin/Globulin Ratio 0.7 (L) 1.1 - 2.0 ratio    Bilirubin Total 0.6 <=1.5 mg/dL    Alkaline Phosphatase 52 40 - 150 unit/L    Alanine Aminotransferase 15 0 - 55 unit/L    Aspartate Aminotransferase 26 5 - 34 unit/L    eGFR 38 mls/min/1.73/m2   Magnesium    Collection Time: 10/20/22  4:51 AM   Result Value Ref Range    Magnesium Level 1.90 1.60 - 2.60 mg/dL   POCT glucose    Collection Time: 10/20/22 10:51 AM   Result Value Ref Range    POCT Glucose 123 (H) 70 - 110 mg/dL   POCT glucose    Collection Time: 10/20/22  4:16 PM   Result Value Ref Range    POCT Glucose 111  (H) 70 - 110 mg/dL   POCT glucose    Collection Time: 10/20/22  8:12 PM   Result Value Ref Range    POCT Glucose 118 (H) 70 - 110 mg/dL   Basic Metabolic Panel    Collection Time: 10/21/22  5:45 AM   Result Value Ref Range    Sodium Level 140 136 - 145 mmol/L    Potassium Level 3.6 3.5 - 5.1 mmol/L    Chloride 102 98 - 107 mmol/L    Carbon Dioxide 27 23 - 31 mmol/L    Glucose Level 110 82 - 115 mg/dL    Blood Urea Nitrogen 27.3 (H) 9.8 - 20.1 mg/dL    Creatinine 0.98 0.55 - 1.02 mg/dL    BUN/Creatinine Ratio 28     Calcium Level Total 8.9 8.4 - 10.2 mg/dL    Anion Gap 11.0 mEq/L    eGFR 58 mls/min/1.73/m2   POCT glucose    Collection Time: 10/21/22  6:14 AM   Result Value Ref Range    POCT Glucose 111 (H) 70 - 110 mg/dL   POCT glucose    Collection Time: 10/21/22 11:08 AM   Result Value Ref Range    POCT Glucose 131 (H) 70 - 110 mg/dL           Telemetry:  Sinus Rhythm    Physical Exam  Constitutional:       Appearance: Normal appearance.   HENT:      Head: Normocephalic and atraumatic.      Nose: Nose normal.   Eyes:      Extraocular Movements: Extraocular movements intact.   Cardiovascular:      Rate and Rhythm: Normal rate and regular rhythm.      Pulses: Normal pulses.      Heart sounds: Normal heart sounds.   Pulmonary:      Effort: Pulmonary effort is normal.   Abdominal:      Palpations: Abdomen is soft.   Musculoskeletal:         General: Normal range of motion.   Skin:     General: Skin is warm and dry.      Capillary Refill: Capillary refill takes less than 2 seconds.   Neurological:      Mental Status: She is alert and oriented to person, place, and time.   Psychiatric:         Mood and Affect: Mood normal.         Behavior: Behavior normal.       Home Medications:   Current Facility-Administered Medications on File Prior to Encounter   Medication Dose Route Frequency Provider Last Rate Last Admin    0.9%  NaCl infusion   Intravenous Once Abner Mitchell MD        diazePAM tablet 10 mg  10 mg Oral On  Call Procedure Abner Mitchell MD   10 mg at 09/23/22 0823    diphenhydrAMINE capsule 50 mg  50 mg Oral On Call Procedure Abner Mitchell MD   50 mg at 09/23/22 0823    sodium chloride 0.9% flush 10 mL  10 mL Intravenous PRN Abner Mitchell MD         Current Outpatient Medications on File Prior to Encounter   Medication Sig Dispense Refill    amLODIPine (NORVASC) 10 MG tablet Take 5 mg by mouth once daily.      ELIQUIS 5 mg Tab Take 5 mg by mouth 2 (two) times daily.      metoprolol succinate (TOPROL-XL) 50 MG 24 hr tablet Take 50 mg by mouth once daily.      multivitamin capsule Take 1 capsule by mouth once daily.      rosuvastatin (CRESTOR) 20 MG tablet Take 20 mg by mouth every evening.      sertraline (ZOLOFT) 50 MG tablet Take 50 mg by mouth once daily.      terazosin (HYTRIN) 2 MG capsule Take 2 mg by mouth once daily.      azelastine (ASTELIN) 137 mcg (0.1 %) nasal spray by Nasal route.      co-enzyme Q-10 30 mg capsule Take 200 mg by mouth once daily.         Current Inpatient Medications:    Current Facility-Administered Medications:     0.9%  NaCl infusion (for blood administration), , Intravenous, Q24H PRN, Bety Thompson MD    0.9%  NaCl infusion, , Intravenous, Continuous, Dhaval Ibrahim MD, Last Rate: 75 mL/hr at 10/21/22 0613, New Bag at 10/21/22 0613    acetaminophen oral solution 650 mg, 650 mg, Per OG tube, Q6H PRN, HOLDEN Cristina    albumin human 5% bottle 12.5 g, 12.5 g, Intravenous, PRN, HOLDEN Cristina, Stopped at 10/12/22 0310    amLODIPine tablet 5 mg, 5 mg, Oral, BID, HEATH George, 5 mg at 10/21/22 0938    apixaban tablet 5 mg, 5 mg, Oral, BID, HEATH George, 5 mg at 10/21/22 0937    aspirin EC tablet 81 mg, 81 mg, Oral, Daily, HOLDEN Cristina, 81 mg at 10/21/22 0938    atorvastatin tablet 40 mg, 40 mg, Oral, Daily, Marline Adams, LUIS ENRIQUE, 40 mg at 10/20/22 1615    bisacodyL suppository 10 mg, 10 mg, Rectal, Daily PRN, Val Nieves PA     calcium gluconate 1 g in NS IVPB (premixed), 1 g, Intravenous, PRN, HOLDEN Cristina    calcium gluconate 1 g in NS IVPB (premixed), 2 g, Intravenous, PRN, HOLDEN Cristina    calcium gluconate 1 g in NS IVPB (premixed), 3 g, Intravenous, PRN, HOLDEN Cristina    carvediloL tablet 12.5 mg, 12.5 mg, Oral, BID, MIKE GeorgeP, 12.5 mg at 10/21/22 0937    dextrose 10% bolus 125 mL, 12.5 g, Intravenous, PRN, Bety Thompson MD    dextrose 10% bolus 250 mL, 25 g, Intravenous, PRN, Bety Thompson MD    dextrose 50% injection 12.5 g, 12.5 g, Intravenous, PRN, HOLDEN Cristina    dextrose 50% injection 25 g, 25 g, Intravenous, PRN, HOLDEN Cristina    docusate sodium capsule 100 mg, 100 mg, Oral, BID, HOLDEN Cristina, 100 mg at 10/21/22 0937    famotidine tablet 40 mg, 40 mg, Oral, Daily, HOLDEN Cristina, 40 mg at 10/21/22 0937    folic acid tablet 1 mg, 1 mg, Oral, Daily, HOLDEN Cristina, 1 mg at 10/21/22 0938    glucagon (human recombinant) injection 1 mg, 1 mg, Intramuscular, PRN, Jackie Rush MD    glucose chewable tablet 16 g, 16 g, Oral, PRN, Jackie Rush MD    glucose chewable tablet 24 g, 24 g, Oral, PRN, Jackie Rush MD    hydrALAZINE injection 10 mg, 10 mg, Intravenous, Q4H PRN, Marline Adams ANP, 10 mg at 10/18/22 0113    hydroCHLOROthiazide tablet 25 mg, 25 mg, Oral, Daily, MIKE GeorgeP, 25 mg at 10/21/22 0937    HYDROcodone-acetaminophen 5-325 mg per tablet 1 tablet, 1 tablet, Oral, Q4H PRN, HOLDEN Cristina, 1 tablet at 10/14/22 0600    insulin aspart U-100 injection 0-5 Units, 0-5 Units, Subcutaneous, QID (AC + HS) PRN, Jackie Rush MD, 2 Units at 10/14/22 1109    lactulose 10 gram/15 ml solution 20 g, 20 g, Oral, Q6H PRN, HOLDEN Cristina, 20 g at 10/16/22 0949    loperamide capsule 2 mg, 2 mg, Oral, Continuous PRN, HOLDEN Cristina    magnesium sulfate 2g in water 50mL IVPB (premix), 2 g, Intravenous, PRN, Nehemias  HOLDEN Penn    magnesium sulfate 2g in water 50mL IVPB (premix), 4 g, Intravenous, PRN, HOLDEN Cristina    metoclopramide HCl injection 5 mg, 5 mg, Intravenous, Q6H PRN, HOLDEN Cristina    metoprolol injection 5 mg, 5 mg, Intravenous, Q1H PRN, Deion Marroquin, ANA    morphine injection 4 mg, 4 mg, Intravenous, Q4H PRN, HOLDEN Cristina, 2 mg at 10/11/22 1423    ondansetron injection 4 mg, 4 mg, Intravenous, Q4H PRN, HOLDEN Cristina, 4 mg at 10/12/22 0545    oxyCODONE immediate release tablet 10 mg, 10 mg, Oral, Q4H PRN, HOLDEN Cristina, 5 mg at 10/12/22 0410    polyethylene glycol packet 17 g, 17 g, Oral, Daily PRN, HOLDEN Mendoza    prazosin capsule 1 mg, 1 mg, Oral, Daily, Bety Thompson MD, 1 mg at 10/21/22 0937    sertraline tablet 50 mg, 50 mg, Oral, Daily, HOLDEN Mendoza, 50 mg at 10/21/22 0938    sodium phosphate 15 mmol in dextrose 5 % 250 mL IVPB, 15 mmol, Intravenous, PRN, HOLDEN Cristina, Stopped at 10/12/22 0156    sodium phosphate 20.01 mmol in dextrose 5 % 250 mL IVPB, 20.01 mmol, Intravenous, PRN, HOLDEN Cristina, Stopped at 10/13/22 1612    sodium phosphate 30 mmol in dextrose 5 % 250 mL IVPB, 30 mmol, Intravenous, PRN, HOLDEN Cristina    sucralfate tablet 1 g, 1 g, Oral, QID (AC & HS), HOLDEN Cristina, 1 g at 10/21/22 0610    valsartan tablet 160 mg, 160 mg, Oral, Daily, LUIS ENRIQUE Gomez, 160 mg at 10/21/22 0937    vancomycin in dextrose 5 % 1 gram/250 mL IVPB 1,000 mg, 1,000 mg, Intravenous, Once, Bety Thompson MD    zinc oxide-cod liver oil 40 % paste, , Topical (Top), BID, Bety Thompson MD, Given at 10/21/22 0900    Facility-Administered Medications Ordered in Other Encounters:     0.9%  NaCl infusion, , Intravenous, Once, Abner Mitchell MD    diazePAM tablet 10 mg, 10 mg, Oral, On Call Procedure, Abner Mitchell MD, 10 mg at 09/23/22 0823    diphenhydrAMINE capsule 50 mg, 50 mg, Oral, On Call  Procedure, Abner Mitchell MD, 50 mg at 09/23/22 0823    sodium chloride 0.9% flush 10 mL, 10 mL, Intravenous, PRN, Abner Mitchell MD         VTE Risk Mitigation (From admission, onward)           Ordered     apixaban tablet 5 mg  2 times daily         10/20/22 0919     IP VTE HIGH RISK PATIENT  Once         10/13/22 1141     heparin, porcine (PF) (heparin flush 100 units/mL) 100 unit/mL injection flush         10/11/22 0607                  Assessment:   CAD    - S/P CABG x 4 (LIMA to LAD, SVG to Diag 1, SVG to OM, and SVG to RCA)      - EF 55%  PAF - Now Sinus Rhythm    - Status Post SHAVON Ligation    - CHADsVASc Score 5 Points - 7.2% stroke risk per year    - On Eliquis  Sick Sinus Syndrome    - Status Post Pacemaker Placement  Carotid Artery Stenosis     - History of CEA  Hypertension  Hyperlipidemia    - On Statin  CKD (Stable)  Obstructive Sleep Apnea    Plan:   Continue Current Cardiac Medications.   On Eliquis for Stroke Risk Reduction. Also has SHAVON Ligation.  Will need SL NTG at discharge  Awaiting Transfer to Rehab  Follow up with [facility]. Will be available.     HEATH Kahlil  Cardiology  [facility]   10/21/2022 9:49 AM

## 2022-10-21 NOTE — PLAN OF CARE
Rachel with [facility] called and stated they are submitting to insurance.  Pt's nurse called me and stated pt stated she did not prefer to go to [facility] since this is where her  . I spoke with pt and her 1st choice is [facility] rehab. I spoke to Libra with [facility] rehab and she stated the earliest date would be Tuesday next week. She stated she will talk with Dr Herzog and f/u.

## 2022-10-21 NOTE — PLAN OF CARE
Problem: Occupational Therapy  Goal: Occupational Therapy Goal  Description:     1. UB dressing Mod I.  2. LB dressing Mod I.  3. Toileting, toilet t/f Mod I with LRAD.  4. Shower t/f Mod I.     Outcome: Ongoing, Progressing

## 2022-10-21 NOTE — PROGRESS NOTES
There is a 82-year-old female known to me for CKD 3 with a past medical history sick sinus syndrome status post pacemaker placement, hypertension, hyperlipidemia, obstructive sleep apnea, and carotid artery to the status post previous endarterectomy who had recently been experiencing dizziness and palpitation.  She underwent a PET scan which revealed significant anemia and subsequent heart catheterization revealed triple-vessel disease.  She was admitted today on 10/11/2022 and underwent CABG x3    She has been doing well postoperatively but yesterday urine output was low and started on IV fluids.    She is feeling great today.  No shortness of breath.  No chest pain.    DC IV fluids after the current bag is completed.    Patient can follow up in the office as previously scheduled.  Please call if needed.

## 2022-10-21 NOTE — PROGRESS NOTES
Patient is able to tolerate 3 hrs of therapy a day for 5 days a week    Scott Balderrama PA-C  Cardiothoracic Surgery  [facility]

## 2022-10-21 NOTE — PT/OT/SLP RE-EVAL
Occupational Therapy   Re-evaluation    Name: Jessica Elias  MRN: [id number]  Admitting Diagnosis: Multivessel CAD s/p CABG x3  Recent Surgery: Procedure(s) (LRB):  CORONARY ARTERY BYPASS GRAFT (CABG) (N/A) 10 Days Post-Op    Recommendations:     Discharge Recommendations: rehabilitation facility (2/2 decreased caregiver support)  Discharge Equipment Recommendations: walker, rolling  Barriers to discharge: Placement    Assessment:     Jessica Elias is a 82 y.o. female with a medical diagnosis of Multivessel CAD s/p CABG x3.  She presents with c/o L foot pain and soreness on L thenar eminence. Performance deficits affecting function are weakness, impaired endurance, impaired self care skills, impaired functional mobility, impaired balance, decreased safety awareness, pain.      Rehab Prognosis: Good; patient would benefit from acute skilled OT services to address these deficits and reach maximum level of function.       Plan:     Patient to be seen 6 x/week to address the above listed problems via self-care/home management, therapeutic activities, therapeutic exercises  Plan of Care Expires: 11/04/22  Plan of Care Reviewed with: patient    Subjective     Chief Complaint: Pt c/o L foot pain 8/10 (possibly due to bedrail while sleeping per pt. RN notified) and soreness on L thenar eminence (which began right after sx and has gradually gotten better per pt).  Patient/Family stated goals: Return to PLOF.  Communicated with: RN prior to session.    Objective:     Communicated with: RN prior to session. Patient found up in chair with: peripheral IV, telemetry, pulse ox (continuous), blood pressure cuff upon OT entry to room.    General Precautions: Standard, fall, sternal   Respiratory Status: Room air  Vitals:  BP: 107/64  TX: 69-70  O2: 91-93%    Occupational Performance:    Functional Mobility/Transfers:  Patient completed multiple Sit <> Stand Transfers (2x) from chair with min-mod A provided and with BUEs  positioned across chest, holding cardiac bear.   Functional Mobility: Pt ambulated to toilet with CGA provided, using RW. Abnormal gait pattern noted 2/2 L foot pain.    Activities of Daily Living:  Feeding: Setup A provided to open food container. Pt demonstrated good ability to grasp/stabilize container using L hand while grasping/manipulating eating utensil to t/f food item from container to mouth using R hand.  Toileting: moderate assistance provided, requiring assist to wipe buttocks and don brief over buttocks.    Cognitive/Visual Perceptual:  Cognitive/Psychosocial Skills:     -       Oriented to: Person, Place, Time, and Situation   -       Follows Commands/attention:Follows one-step commands and Follows two-step commands  -       Safety awareness/insight to disability: impaired     Physical Exam:  UE ROM and MMT: WFL within sternal pxns.    Patient left with all lines intact and CNA present.    GOALS:   Multidisciplinary Problems       Occupational Therapy Goals          Problem: Occupational Therapy    Goal Priority Disciplines Outcome Interventions   Occupational Therapy Goal     OT, PT/OT Ongoing, Progressing    Description:   1. UB dressing Mod I.  2. LB dressing Mod I.  3. Toileting, toilet t/f Mod I with LRAD.  4. Shower t/f Mod I.                        History:     Past Medical History:   Diagnosis Date    Chronic kidney disease, unspecified     stage 3    Coronary artery disease     History of carotid artery disease     Hyperlipidemia     Hypertension     Sleep apnea          Past Surgical History:   Procedure Laterality Date    APPENDECTOMY N/A     BLADDER SURGERY N/A     CAROTID ENDARTERECTOMY N/A     CATARACT EXTRACTION N/A     CORONARY ARTERY BYPASS GRAFT (CABG) N/A 10/11/2022    Procedure: CORONARY ARTERY BYPASS GRAFT (CABG);  Surgeon: Bety Thompson MD;  Location: [facility];  Service: Cardiothoracic;  Laterality: N/A;    HYSTERECTOMY N/A     INSERTION OF PERMANENT PACEMAKER N/A     LEFT HEART  CATHETERIZATION Left 09/23/2022    Procedure: CATHETERIZATION, HEART, LEFT;  Surgeon: Abner Mitchell MD;  Location: [facility] CATH LAB;  Service: Cardiology;  Laterality: Left;  LHC VIA RRA    lump removed from right shoulder         Time Tracking:     OT Date of Treatment: 10/21/22  OT Start Time: 1159  OT Stop Time: 1216  OT Total Time (min): 17 min    Billable Minutes:Re-eval 17 mins    10/21/2022

## 2022-10-21 NOTE — PT/OT/SLP RE-EVAL
Physical Therapy Re-evaluation    Patient Name:  Jessica Elias   MRN:  [id number]    Recommendations:     Discharge Recommendations:  rehabilitation facility (per pt request 2/2 living alone and limited fam help)   Discharge Equipment Recommendations: walker, rolling   Barriers to discharge:  medical dx    Assessment:     Jessica Elias is a 82 y.o. female admitted with a medical diagnosis of s/p CABG.  She presents with the following impairments/functional limitations:  weakness, impaired balance, decreased lower extremity function, impaired endurance, impaired cardiopulmonary response to activity, impaired functional mobility.    Rehab Prognosis:  good; patient would benefit from acute skilled PT services to address these deficits and reach maximum level of function.      Recent Surgery: Procedure(s) (LRB):  CORONARY ARTERY BYPASS GRAFT (CABG) (N/A) 10 Days Post-Op    Plan:     During this hospitalization, patient to be seen 6 x/week to address the above listed problems via gait training, therapeutic activities, therapeutic exercises  Plan of Care Expires:  11/12/22  Plan of Care Reviewed with: patient    Subjective     Communicated with NSG prior to session.  Patient found HOB elevated with peripheral IV, pulse ox (continuous), telemetry upon PT entry to room, agreeable to evaluation.      Chief Complaint: n/a  Patient comments/goals: to get stronger  Pain/Comfort:  Pain Rating 1: 0/10    Patients cultural, spiritual, Scientologist conflicts given the current situation: no      Objective:     Patient found with: peripheral IV, pulse ox (continuous), telemetry     General Precautions: Standard, sternal   Orthopedic Precautions:N/A   Braces: N/A  Respiratory Status: Room air  Vitals   B{: 166/67   HR: 69    Exams:  Cognitive Exam:  Patient is oriented to Person, Place, Time, and Situation  RLE Strength: WFL  LLE Strength: WFL    Functional Mobility:  Bed Mobility:     Supine to Sit: minimum assistance  Transfers:      Sit to Stand:  minimum assistance with rolling walker; x1 from EOB, x1 from toilet  Gait: pt ambulated 10 feet to the toilet and then was able to ambulate approx 120 feet with use of RW and Lila; slow but steady step-through pattern    AM-PAC 6 CLICK MOBILITY  Total Score:18       Additional information:  Patient with soiled brief upon arrival. Patient t/f to toilet and with +void, +small BM. Required assistance from PT for donna-care. New brief donned.      Patient left up in chair with all lines intact, call button in reach, and RN present.    GOALS:   Multidisciplinary Problems       Physical Therapy Goals          Problem: Physical Therapy    Goal Priority Disciplines Outcome Goal Variances Interventions   Physical Therapy Goal     PT, PT/OT      Description: Goals to be met by: 2022     Patient will increase functional independence with mobility by performin. Supine to sit with Modified [place]  2. Sit to supine with Modified [place]  3. Sit to stand transfer with Modified [place]  4. Gait  x 500 feet with Modified [place] using Rolling Walker vs LRAD.                          History:     Past Medical History:   Diagnosis Date    Chronic kidney disease, unspecified     stage 3    Coronary artery disease     History of carotid artery disease     Hyperlipidemia     Hypertension     Sleep apnea        Past Surgical History:   Procedure Laterality Date    APPENDECTOMY N/A     BLADDER SURGERY N/A     CAROTID ENDARTERECTOMY N/A     CATARACT EXTRACTION N/A     CORONARY ARTERY BYPASS GRAFT (CABG) N/A 10/11/2022    Procedure: CORONARY ARTERY BYPASS GRAFT (CABG);  Surgeon: Bety Thompson MD;  Location: [facility] OR;  Service: Cardiothoracic;  Laterality: N/A;    HYSTERECTOMY N/A     INSERTION OF PERMANENT PACEMAKER N/A     LEFT HEART CATHETERIZATION Left 2022    Procedure: CATHETERIZATION, HEART, LEFT;  Surgeon: Abner Mitchell MD;  Location: [facility] CATH LAB;  Service: Cardiology;   Laterality: Left;  LHC VIA RRA    lump removed from right shoulder         Time Tracking:     PT Received On: 10/21/22  PT Start Time: 0918     PT Stop Time: 0941  PT Total Time (min): 23 min     Billable Minutes: Re-eval 1 and Therapeutic Activity 1      10/21/2022

## 2022-10-22 LAB
POCT GLUCOSE: 106 MG/DL (ref 70–110)
POCT GLUCOSE: 110 MG/DL (ref 70–110)
POCT GLUCOSE: 110 MG/DL (ref 70–110)
POCT GLUCOSE: 113 MG/DL (ref 70–110)
POCT GLUCOSE: 178 MG/DL (ref 70–110)

## 2022-10-22 PROCEDURE — 21400001 HC TELEMETRY ROOM

## 2022-10-22 PROCEDURE — 25000003 PHARM REV CODE 250: Performed by: PHYSICIAN ASSISTANT

## 2022-10-22 PROCEDURE — 25000003 PHARM REV CODE 250: Performed by: NURSE PRACTITIONER

## 2022-10-22 PROCEDURE — 25000003 PHARM REV CODE 250: Performed by: THORACIC SURGERY (CARDIOTHORACIC VASCULAR SURGERY)

## 2022-10-22 PROCEDURE — 25000003 PHARM REV CODE 250

## 2022-10-22 RX ADMIN — Medication: at 09:10

## 2022-10-22 RX ADMIN — SUCRALFATE 1 G: 1 TABLET ORAL at 04:10

## 2022-10-22 RX ADMIN — SUCRALFATE 1 G: 1 TABLET ORAL at 06:10

## 2022-10-22 RX ADMIN — AMLODIPINE BESYLATE 5 MG: 5 TABLET ORAL at 08:10

## 2022-10-22 RX ADMIN — CARVEDILOL 12.5 MG: 12.5 TABLET, FILM COATED ORAL at 08:10

## 2022-10-22 RX ADMIN — ASPIRIN 81 MG: 81 TABLET, COATED ORAL at 08:10

## 2022-10-22 RX ADMIN — SUCRALFATE 1 G: 1 TABLET ORAL at 10:10

## 2022-10-22 RX ADMIN — FOLIC ACID 1 MG: 1 TABLET ORAL at 08:10

## 2022-10-22 RX ADMIN — ATORVASTATIN CALCIUM 40 MG: 40 TABLET, FILM COATED ORAL at 04:10

## 2022-10-22 RX ADMIN — DOCUSATE SODIUM 100 MG: 100 CAPSULE, LIQUID FILLED ORAL at 08:10

## 2022-10-22 RX ADMIN — SUCRALFATE 1 G: 1 TABLET ORAL at 08:10

## 2022-10-22 RX ADMIN — VALSARTAN 160 MG: 80 TABLET, FILM COATED ORAL at 08:10

## 2022-10-22 RX ADMIN — APIXABAN 5 MG: 5 TABLET, FILM COATED ORAL at 08:10

## 2022-10-22 RX ADMIN — PRAZOSIN HYDROCHLORIDE 1 MG: 1 CAPSULE ORAL at 08:10

## 2022-10-22 RX ADMIN — SERTRALINE HYDROCHLORIDE 50 MG: 50 TABLET ORAL at 08:10

## 2022-10-22 RX ADMIN — FAMOTIDINE 40 MG: 20 TABLET, FILM COATED ORAL at 08:10

## 2022-10-22 RX ADMIN — HYDROCHLOROTHIAZIDE 25 MG: 25 TABLET ORAL at 08:10

## 2022-10-22 NOTE — PT/OT/SLP PROGRESS
"Occupational Therapy      Patient Name:  Jessica Elias   MRN:  [id number]    Patient not seen today secondary to c/o pain in LLE, Pt stated "I can't walk, I am in too much pain!" Spoke with nsg and waiting for MD to round. Will follow-up tomorrow.    10/22/2022  "

## 2022-10-23 LAB
POCT GLUCOSE: 114 MG/DL (ref 70–110)
POCT GLUCOSE: 116 MG/DL (ref 70–110)

## 2022-10-23 PROCEDURE — 97116 GAIT TRAINING THERAPY: CPT | Mod: CQ

## 2022-10-23 PROCEDURE — 21400001 HC TELEMETRY ROOM

## 2022-10-23 PROCEDURE — 25000003 PHARM REV CODE 250: Performed by: NURSE PRACTITIONER

## 2022-10-23 PROCEDURE — 97530 THERAPEUTIC ACTIVITIES: CPT | Mod: CQ

## 2022-10-23 PROCEDURE — 25000003 PHARM REV CODE 250: Performed by: THORACIC SURGERY (CARDIOTHORACIC VASCULAR SURGERY)

## 2022-10-23 PROCEDURE — 99024 POSTOP FOLLOW-UP VISIT: CPT | Mod: ,,, | Performed by: PHYSICIAN ASSISTANT

## 2022-10-23 PROCEDURE — 25000003 PHARM REV CODE 250: Performed by: PHYSICIAN ASSISTANT

## 2022-10-23 PROCEDURE — 97535 SELF CARE MNGMENT TRAINING: CPT | Mod: CO

## 2022-10-23 PROCEDURE — 99024 PR POST-OP FOLLOW-UP VISIT: ICD-10-PCS | Mod: ,,, | Performed by: PHYSICIAN ASSISTANT

## 2022-10-23 PROCEDURE — 25000003 PHARM REV CODE 250

## 2022-10-23 RX ADMIN — CARVEDILOL 12.5 MG: 12.5 TABLET, FILM COATED ORAL at 08:10

## 2022-10-23 RX ADMIN — DOCUSATE SODIUM 100 MG: 100 CAPSULE, LIQUID FILLED ORAL at 08:10

## 2022-10-23 RX ADMIN — PRAZOSIN HYDROCHLORIDE 1 MG: 1 CAPSULE ORAL at 08:10

## 2022-10-23 RX ADMIN — HYDROCHLOROTHIAZIDE 25 MG: 25 TABLET ORAL at 08:10

## 2022-10-23 RX ADMIN — SUCRALFATE 1 G: 1 TABLET ORAL at 05:10

## 2022-10-23 RX ADMIN — ASPIRIN 81 MG: 81 TABLET, COATED ORAL at 08:10

## 2022-10-23 RX ADMIN — AMLODIPINE BESYLATE 5 MG: 5 TABLET ORAL at 08:10

## 2022-10-23 RX ADMIN — Medication: at 09:10

## 2022-10-23 RX ADMIN — APIXABAN 5 MG: 5 TABLET, FILM COATED ORAL at 08:10

## 2022-10-23 RX ADMIN — SUCRALFATE 1 G: 1 TABLET ORAL at 08:10

## 2022-10-23 RX ADMIN — SUCRALFATE 1 G: 1 TABLET ORAL at 04:10

## 2022-10-23 RX ADMIN — ATORVASTATIN CALCIUM 40 MG: 40 TABLET, FILM COATED ORAL at 04:10

## 2022-10-23 RX ADMIN — SERTRALINE HYDROCHLORIDE 50 MG: 50 TABLET ORAL at 08:10

## 2022-10-23 RX ADMIN — VALSARTAN 160 MG: 80 TABLET, FILM COATED ORAL at 08:10

## 2022-10-23 RX ADMIN — FOLIC ACID 1 MG: 1 TABLET ORAL at 08:10

## 2022-10-23 RX ADMIN — FAMOTIDINE 40 MG: 20 TABLET, FILM COATED ORAL at 08:10

## 2022-10-23 NOTE — PT/OT/SLP PROGRESS
Occupational Therapy   Treatment    Name: Jessica Elias  MRN: [id number]  Admitting Diagnosis:  Multivessel CAD s/p CABG x3  12 Days Post-Op    Recommendations:     Discharge Recommendations: rehabilitation facility  Discharge Equipment Recommendations:  walker, rolling  Barriers to discharge:  Decreased caregiver support    Assessment:     Jessica Elias is a 82 y.o. female with a medical diagnosis of CABG.  She presents with decreased pain today in LLE. Performance deficits affecting function are weakness, impaired endurance, impaired self care skills, impaired functional mobility, orthopedic precautions.     Rehab Prognosis:  Good; patient would benefit from acute skilled OT services to address these deficits and reach maximum level of function.       Plan:     Patient to be seen 6 x/week to address the above listed problems via self-care/home management, therapeutic activities, therapeutic exercises  Plan of Care Expires: 11/04/22  Plan of Care Reviewed with: patient    Subjective     Pain/Comfort:  Pain Rating 1: 0/10    Objective:     Communicated with: RN prior to session.  Patient found up in chair with PureWick upon OT entry to room.    General Precautions: Standard, fall, sternal   Orthopedic Precautions:N/A   Braces: N/A  Respiratory Status: Room air     Occupational Performance:     Functional Mobility/Transfers:  Patient completed Sit <> Stand Transfer with minimum assistance with rolling walker and cardiac bear  Patient completed Toilet Transfer Step Transfer technique with minimum assistance with rolling walker and cardiac bear  Functional Mobility: Min A for safety to and from bathroom    Activities of Daily Living:  Grooming: contact guard assistance for safety with balance while washing B hands  Toileting: maximal assistance for brief mgt and change purewick.     Treatment & Education:  Educated Pt on OT goals and POC.    Patient left up in chair with all lines intact and call button in  reach    GOALS:   Multidisciplinary Problems       Occupational Therapy Goals          Problem: Occupational Therapy    Goal Priority Disciplines Outcome Interventions   Occupational Therapy Goal     OT, PT/OT Ongoing, Progressing    Description:   1. UB dressing Mod I.  2. LB dressing Mod I.  3. Toileting, toilet t/f Mod I with LRAD.  4. Shower t/f Mod I.                        Time Tracking:     OT Date of Treatment: 10/23/22  OT Start Time: 1126  OT Stop Time: 1144  OT Total Time (min): 18 min    Billable Minutes:Self Care/Home Management 18    OT/JACKIE: JACKIE     JACKIE Visit Number: 1    10/23/2022

## 2022-10-23 NOTE — PT/OT/SLP PROGRESS
Physical Therapy Treatment    Patient Name:  Jessica Elias   MRN:  [id number]    Recommendations:     Discharge Recommendations:  rehabilitation facility (per pt request 2/2 living alone and limited fam help)   Discharge Equipment Recommendations: walker, rolling     Subjective     Patient awake.     Communicated with NSG prior to session to see if Pt is appropriate for therapy today. Pt greeted and agreed to session.    Objective:     General Precautions: Standard, sternal   Orthopedic Precautions:N/A   Braces:    Respiratory Status: Room air     Functional Mobility:    Bed Mobility: Min Assist  Sit to Stand: Min Assist  Transfers: Min Assist  Gait: 130 Ft. Min Assist   Equipment Used: Gait belt, Rolling walker    Assessment:     Jessica Elias is a 82 y.o. female admitted with a medical diagnosis of <principal problem not specified>.     Treatment Encounter Note:  Patient actively participated with treatment today with no adverse effects. Patient performed sit to stand mobility with min assist while maintaining sternal precautions; pt tolerated standing x 9 mins. Pt ambulated 130 ft with chair in tow and slow von. Patient left up in chair with all lines intact and call button in reach.    Rehab Prognosis: Good; patient would benefit from acute skilled PT services to address these deficits and reach maximum level of function.    Recent Surgery: Procedure(s) (LRB):  CORONARY ARTERY BYPASS GRAFT (CABG) (N/A) 12 Days Post-Op  GOALS:   Multidisciplinary Problems       Physical Therapy Goals          Problem: Physical Therapy    Goal Priority Disciplines Outcome Goal Variances Interventions   Physical Therapy Goal     PT, PT/OT      Description: Goals to be met by: 2022     Patient will increase functional independence with mobility by performin. Supine to sit with Modified [place]  2. Sit to supine with Modified [place]  3. Sit to stand transfer with Modified [place]  4. Gait  x 500  feet with Modified [place] using Rolling Walker vs LRAD.                          Plan:     During this hospitalization, patient to be seen 6 x/week to address the identified rehab impairments via gait training, therapeutic activities, therapeutic exercises and progress toward the following goals:    Plan of Care Expires:  11/12/22    Billable Minutes     Billable Minutes: Gait Training 12 and Therapeutic Activity 12    Treatment Type: Treatment  PT/PTA: PTA     PTA Visit Number: 1     10/23/2022

## 2022-10-24 LAB — POCT GLUCOSE: 164 MG/DL (ref 70–110)

## 2022-10-24 PROCEDURE — 99024 POSTOP FOLLOW-UP VISIT: CPT | Mod: ,,, | Performed by: PHYSICIAN ASSISTANT

## 2022-10-24 PROCEDURE — 97110 THERAPEUTIC EXERCISES: CPT

## 2022-10-24 PROCEDURE — 25000003 PHARM REV CODE 250: Performed by: NURSE PRACTITIONER

## 2022-10-24 PROCEDURE — 25000003 PHARM REV CODE 250: Performed by: THORACIC SURGERY (CARDIOTHORACIC VASCULAR SURGERY)

## 2022-10-24 PROCEDURE — 21400001 HC TELEMETRY ROOM

## 2022-10-24 PROCEDURE — 94761 N-INVAS EAR/PLS OXIMETRY MLT: CPT

## 2022-10-24 PROCEDURE — 97116 GAIT TRAINING THERAPY: CPT | Mod: CQ

## 2022-10-24 PROCEDURE — 99024 PR POST-OP FOLLOW-UP VISIT: ICD-10-PCS | Mod: ,,, | Performed by: PHYSICIAN ASSISTANT

## 2022-10-24 PROCEDURE — 25000003 PHARM REV CODE 250: Performed by: PHYSICIAN ASSISTANT

## 2022-10-24 PROCEDURE — 94760 N-INVAS EAR/PLS OXIMETRY 1: CPT

## 2022-10-24 PROCEDURE — 25000003 PHARM REV CODE 250

## 2022-10-24 PROCEDURE — 97535 SELF CARE MNGMENT TRAINING: CPT

## 2022-10-24 RX ADMIN — SUCRALFATE 1 G: 1 TABLET ORAL at 12:10

## 2022-10-24 RX ADMIN — Medication: at 08:10

## 2022-10-24 RX ADMIN — ATORVASTATIN CALCIUM 40 MG: 40 TABLET, FILM COATED ORAL at 04:10

## 2022-10-24 RX ADMIN — CARVEDILOL 12.5 MG: 12.5 TABLET, FILM COATED ORAL at 08:10

## 2022-10-24 RX ADMIN — SUCRALFATE 1 G: 1 TABLET ORAL at 08:10

## 2022-10-24 RX ADMIN — HYDROCHLOROTHIAZIDE 25 MG: 25 TABLET ORAL at 08:10

## 2022-10-24 RX ADMIN — ASPIRIN 81 MG: 81 TABLET, COATED ORAL at 08:10

## 2022-10-24 RX ADMIN — AMLODIPINE BESYLATE 5 MG: 5 TABLET ORAL at 08:10

## 2022-10-24 RX ADMIN — FAMOTIDINE 40 MG: 20 TABLET, FILM COATED ORAL at 08:10

## 2022-10-24 RX ADMIN — SUCRALFATE 1 G: 1 TABLET ORAL at 04:10

## 2022-10-24 RX ADMIN — FOLIC ACID 1 MG: 1 TABLET ORAL at 08:10

## 2022-10-24 RX ADMIN — DOCUSATE SODIUM 100 MG: 100 CAPSULE, LIQUID FILLED ORAL at 08:10

## 2022-10-24 RX ADMIN — PRAZOSIN HYDROCHLORIDE 1 MG: 1 CAPSULE ORAL at 08:10

## 2022-10-24 RX ADMIN — APIXABAN 5 MG: 5 TABLET, FILM COATED ORAL at 08:10

## 2022-10-24 RX ADMIN — SERTRALINE HYDROCHLORIDE 50 MG: 50 TABLET ORAL at 08:10

## 2022-10-24 RX ADMIN — SUCRALFATE 1 G: 1 TABLET ORAL at 05:10

## 2022-10-24 RX ADMIN — VALSARTAN 160 MG: 80 TABLET, FILM COATED ORAL at 08:10

## 2022-10-24 NOTE — PROGRESS NOTES
10/24/22 1020   Pre Exercise Vitals   /58   Pulse 67  (paced)   Supplemental O2? No   SpO2 95 %   During Exercise Vitals   Supplemental O2? No   SpO2 95 %   Distance Walked 150 feet   Post Exercise Vitals   /72   Pulse 83   Supplemental O2? No   SpO2 95 %   Modality   Modality   (rollator)   Communicated with nurse prior to activity.   Pt up in chair, sitting sideways to relieve pressure on sacral wound.  Purewick in place.  Min assist sit to stand. Gait slow but steady with rollator.  Upon return to room assisted back to bed to alleviate pressure on sacral wound.  Instructed pt on frequent position changes as well as need to increase activity.  Discussed care with pt's nurse. Also informed nurse that pt's diaper and Purewick needs change, as well as wound care to reassess wound per pt/family request.

## 2022-10-24 NOTE — PT/OT/SLP PROGRESS
Occupational Therapy   Treatment    Name: Jessica Elias  MRN: [id number]  Admitting Diagnosis:  CABG  Recent Surgery: Procedure(s) (LRB):  CORONARY ARTERY BYPASS GRAFT (CABG) (N/A) 13 Days Post-Op    Recommendations:     Discharge Recommendations: Rehabilitation facility  Discharge Equipment Recommendations: walker, rolling  Barriers to discharge: Placement    Assessment:     Jessica Elias is a 82 y.o. female with a medical diagnosis of of MVCAD s/p CABG x 4. Performance deficits affecting function are weakness, impaired endurance, impaired self care skills, impaired functional mobility, and decreased safety awareness.    Rehab Prognosis: Good; patient would benefit from acute skilled OT services to address these deficits and reach maximum level of function.       Plan:     Patient to be seen 6 x/week to address the above listed problems via self-care/home management, therapeutic activities, therapeutic exercises  Plan of Care Expires: 11/04/22  Plan of Care Reviewed with: patient, daughter    Subjective     Pain/Comfort:  Pt denied any pain during session.     Objective:     Communicated with: RN prior to session. Patient found lying with HOB elevated with pulse ox (continuous), Purewick, and telemetry upon OT entry to room.    General Precautions: Standard, fall, sternal   Respiratory Status: Room air  Vitals:  BP: 151/72  ND: 74  O2: 96-97%     Occupational Performance:     Bed Mobility/Functional Mobility/Transfers:  Pt performed supine to sit with mod A provided, requiring assist to lift trunk. Pt holding cardiac bear during t/f in order to increase pt's safety.  Pt performed sit to supine with SBA provided, holding cardiac bear.  Patient performed Sit <> Stand Transfers x3 with CGA progressing to SBA provided with BUEs positioned across chest.  Functional Mobility: Pt ambulated <> bathroom using RW with CGA provided. Pt then performed long distance gait trial in hallway using RW for balance with CGA  progressing to SBA provided. Pt required vcs to properly manage RW during turns in order to increase pt's safety.    ADLs:  - Grooming: Pt washed hands, combed hair, and performed oral hygiene task while standing at sink with SBA provided.  - Toileting: Pt performed task with min A and vcs provided. Pt able to maintain static standing balance with SBA provided using RW while RN applied topical cream and re-dressed pt's wound during task.  - UB dressing: Pt donned hospital gown around back (to simulate button-down shirt) with education (via demonstration and verbal instruction) and verbal and visual cues provided while seated in chair.  - Feeding: Pt required setup A.    Education:  OT provided education on weight shifting techniques while seated in chair, and educated pt that she can always call for assistance to perform weight shifting techniques or to t/f back to bed from chair in order to increase pt's skin integrity and decrease risk of pressure injuries. Pt verbalized good understanding.    Patient left lying with HOB elevated with all lines intact, Purewick, pt's family present, and call bell in reach.    GOALS:   Multidisciplinary Problems       Occupational Therapy Goals          Problem: Occupational Therapy    Goal Priority Disciplines Outcome Interventions   Occupational Therapy Goal     OT, PT/OT Ongoing, Progressing    Description:   1. UB dressing Mod I.  2. LB dressing Mod I.  3. Toileting, toilet t/f Mod I with LRAD.  4. Shower t/f Mod I.                        Time Tracking:     OT Date of Treatment: 10/24/22  OT Start Time: 1054  OT Stop Time: 1132  OT Total Time (min): 38 min    Billable Minutes: Self Care/Home Mgmt 38 mins    OT/JACKIE: OT     JACKIE Visit Number: 2    10/24/2022

## 2022-10-24 NOTE — PLAN OF CARE
Received message from TATYANA Payne at [facility]. She will be submitting to insurance for approval today.   0.5

## 2022-10-24 NOTE — PT/OT/SLP PROGRESS
Physical Therapy Treatment    Patient Name:  Jessica Elias   MRN:  [id number]    Recommendations:     Discharge Recommendations:  rehabilitation facility (per pt request 2/2 living alone and limited fam help)   Discharge Equipment Recommendations: walker, rolling   Barriers to discharge:  placement    Assessment:     Jessica Elias is a 82 y.o. female admitted with a medical diagnosis of s/p CABG.  She presents with the following impairments/functional limitations:  weakness, impaired endurance, impaired functional mobility, gait instability .    Rehab Prognosis: Good; patient would benefit from acute skilled PT services to address these deficits and reach maximum level of function.    Recent Surgery: Procedure(s) (LRB):  CORONARY ARTERY BYPASS GRAFT (CABG) (N/A) 13 Days Post-Op    Plan:     During this hospitalization, patient to be seen 6 x/week to address the identified rehab impairments via gait training, therapeutic activities, therapeutic exercises and progress toward the following goals:    Plan of Care Expires:  11/12/22    Subjective     Chief Complaint: None  Patient/Family Comments/goals: get to rehab  Pain/Comfort:         Objective:     Communicated with RN prior to session.  Patient found HOB elevated with   upon PT entry to room.     General Precautions: Standard, sternal   Orthopedic Precautions:N/A   Braces:    Respiratory Status: Room air     Functional Mobility:  Bed Mobility:     Supine to Sit: minimum assistance  Transfers:     Sit to Stand:  minimum assistance with rolling walker  Gait: Pt amb 120ft with RW Lila, chair in tow, slow von.      AM-PAC 6 CLICK MOBILITY            Patient left up in chair with all lines intact, call button in reach, and daughter present..    GOALS:   Multidisciplinary Problems       Physical Therapy Goals          Problem: Physical Therapy    Goal Priority Disciplines Outcome Goal Variances Interventions   Physical Therapy Goal     PT, PT/OT       Description: Goals to be met by: 2022     Patient will increase functional independence with mobility by performin. Supine to sit with Modified [place]  2. Sit to supine with Modified [place]  3. Sit to stand transfer with Modified [place]  4. Gait  x 500 feet with Modified [place] using Rolling Walker vs LRAD.                          Time Tracking:     PT Received On: 10/24/22  PT Start Time: 1500     PT Stop Time: 1515  PT Total Time (min): 15 min     Billable Minutes: Gait Training 15    Treatment Type: Treatment  PT/PTA: PTA     PTA Visit Number: 2     10/24/2022

## 2022-10-24 NOTE — CONSULTS
[facility] - Rehab Inpatient Services  Inpatient Rehab Prescreen    PATIENT INFORMATION     Assessment date/time: 10/24/2022 10:14 AM    Name: Jessica Morales Phone: [phone]   Address:   [address] [place], [place] [address] [place] [address] SSN:    YOB: 1940 Age: 82 y.o. Gender: female   Race: White   Marital Status:    Advance Directives: Full code     COVERAGE INFORMATION     Patient Medicare #:      Primary Insurance Type:  / [facility] [employer]age [place] Medicare Secondary Insurance Type:  /    Policy #:  [id number] Policy #:     Insurance contact name/number:  Insurance contact name/number:    Authorization #:  Authorization #:    Verified Coverage: Insurance Carrier   Pending Coverage:    Prescription Coverage:  Insurance details/comments:      PHYSICIAN/REFERRAL INFORMATION     Primary Care Physician: Tarik Gee MD Attending Physician: Demetri Heredia MD   Consulting physician/specialist:  Referring Physician:    Referring facility: [facility] Referring contact name/phone:    Physician details/comments:      CONTACT INFORMATION   Extended Emergency Contact Information  Primary Emergency Contact: renzo morales  Mobile Phone: [phone]  Relation: Daughter   needed? No  Secondary Emergency Contact: Ashok Morales  Mobile Phone: [phone]  Relation: Son  Preferred language: English   needed? No    PRIOR LIVING SITUATION         Bedroom location/setup: 1st floor   Bathroom location/setup: 1st floor, walk-in shower with shower chair, grab bars   Equipment at home: shower chair, grab bars   Prior device use:       PRIOR LEVEL OF FUNCTION     Did a helper need to assist with the following activities prior to the current illness, exacerbation, or injury?   Self-care:  No, independent   Indoor mobility:  No, independent   Stairs: No, independent   Functional Cognition: No, independent   Comments:    Caregivers providing assistance:   Pre-hospital home  care service:  Name of Agency:    Home/personal responsibilities: Pt. Was independent with ADLs and mobility prior to hospitalization   Pre-hospital vocational category: Retired for age   Pre-hospital vocational effort: Retired for age   Occupation/profession:  Return to work/school plan:    Educational history:    Hobbies/leisure activities:  Resources used prior to admission:    Available resources:  Resource information comments:      REHABILITATION DIAGNOSIS     History of present illness: HPI:  82-year-old female known to both Dr. Dos Santos and Dr. Sánchez with a past medical history of CAD, PAF, carotid artery stenosis status post CEA, hypertension, hyperlipidemia, CKD, obstructive sleep apnea, and recurrent bradycardia status post pacemaker implant in February 2018.  Patient underwent outpatient coronary angiogram which revealed triple-vessel disease.  She was electively admitted on October 11, 2022 and underwent CABG x 4 (LIMA to LAD, SVG to Diag 1, SVG to OM, and SVG to RCA), Ligation of left atrial appendage per Dr. Bety Thompson.   Patient was noted to be in Atrial Fibrillation RVR during her acute stay.  She is now in sinus. She is on Eliquis. Pt. Is participating with therapies.  She will benefit from an inpatient rehab stay. This will help to increase her functional independence with ADLS and mobility prior to return home.     Pt. Requires acute inpatient rehab admission with 24-hour nursing and active physician oversight to monitor and manage acute medical comorbid conditions, labs, pain, and functional deficits.  Patient/family will also require teaching and integration of improving functional skills into daily living.  She will also require individualized, interdisciplinary approach to his care, receiving PT,OT services 3 hours per day, 5 days per week.    Required care cannot be provided at lower level of care. Patient is anticipated to require approximately 10-12 days LOS with expected discharge home with  HH.   Impairment group (IGC):   Cardiac 09 Etiologic diagnosis/description:  severe triple vessel cad   Date of onset:  (Not on file)10/11/2022 Date of surgery: 10/11/2022   Allergies: Penicillin and Sulfa (sulfonamide antibiotics)   Comorbid condition: Atrial fibrillation and Hypertension   Medical/functional conditions requiring inpatient rehabilitation: This patient requires medical management/24-hour nursing?of complex    comorbidities (HTN, a.fib ), labs,medications (see medications list), pain, incision care, sleep  hygiene, anticoagulation, nutrition, hydration, neurological,    pulmonary, and cardiac status, and preventive healthcare.        This patient requires intense therapy and an integrated,    interdisciplinary approach to address safety, impaired mobility,    impaired ADLs (dressing, toileting, grooming, showering),  pulmonary insufficiency, preventive healthcare, medication management, integration of  improving functional skills into daily living, and home caregiver    support and training.    Risk for medical/clinical complications: This patient is at risk for the following complications: DVT/PE, pneumonia,malnutrition, neurological decline, respiratory insufficiency,  worsening activity intolerance, complications from anticoagulation,   Incision infection, skin breakdown, inadequate  sleep,  and constipation.      SPECIAL REHABILITATION NEEDS     IV: 20 gauge anterior left forearm Preferred Language: English        Peripheral IV - Single Lumen 10/20/22 1500 20 G Anterior;Left Forearm (Active)   Site Assessment Clean;Dry;Intact;No redness;No swelling 10/23/22 2000   Extremity Assessment Distal to IV No abnormal discoloration;No redness;No swelling 10/23/22 2000   Line Status Flushed;Saline locked 10/23/22 2000   Dressing Status Clean;Dry;Intact 10/23/22 2000   Dressing Intervention Integrity maintained 10/23/22 2000          PRECAUTIONS     Safety/fall precautions: High fall risk and Sternal  "precautions: CABG x4     PAST MEDICAL, SOCIAL, FAMILY HISTORY     Pertinent past medical history:   Past Medical History:   Diagnosis Date    Chronic kidney disease, unspecified     stage 3    Coronary artery disease     History of carotid artery disease     Hyperlipidemia     Hypertension     Sleep apnea       Has the patient had two or more falls in the past year or any fall with injury in the past year: NO   Has the patient had major surgery during the 100 days prior to admission: YES   Family Medical History: No family history on file.   Substance use history:   Social History     Substance and Sexual Activity   Alcohol Use Not Currently     Social History     Tobacco Use   Smoking Status Never   Smokeless Tobacco Never     Social History     Substance and Sexual Activity   Drug Use Never      VITALS   BP:  (!) 167/68     Temp: 98.5 °F (36.9 °C)     HR: 75     Resp: 18     SpO2: 96 %     Height: 5' 4.5" (1.638 m)     Weight: 62.9 kg (138 lb 10.7 oz)     BMI: 23.4          MED/LABS/DIAGNOSITICS   No current facility-administered medications for this encounter.     No current outpatient medications on file.     Facility-Administered Medications Ordered in Other Encounters   Medication Dose Route Frequency Provider Last Rate Last Admin    0.9%  NaCl infusion (for blood administration)   Intravenous Q24H PRN Bety Thompson MD        0.9%  NaCl infusion   Intravenous Once Abner Mitchell MD        acetaminophen oral solution 650 mg  650 mg Per OG tube Q6H PRN HOLDEN Cristina        albumin human 5% bottle 12.5 g  12.5 g Intravenous PRN HOLDEN Cristina   Stopped at 10/12/22 0310    amLODIPine tablet 5 mg  5 mg Oral BID Ruma Trejo, MIKEP   5 mg at 10/24/22 0856    apixaban tablet 5 mg  5 mg Oral BID MIKE GeorgeP   5 mg at 10/24/22 0856    aspirin EC tablet 81 mg  81 mg Oral Daily HOLDEN Cristina   81 mg at 10/24/22 0856    atorvastatin tablet 40 mg  40 mg Oral Daily Marline Adams, " ANP   40 mg at 10/23/22 1612    bisacodyL suppository 10 mg  10 mg Rectal Daily PRN HOLDEN Mendoza        calcium gluconate 1 g in NS IVPB (premixed)  1 g Intravenous PRN HOLDEN Cristina        calcium gluconate 1 g in NS IVPB (premixed)  2 g Intravenous PRN HOLDEN Cristina        calcium gluconate 1 g in NS IVPB (premixed)  3 g Intravenous PRN HOLDEN Cristina        carvediloL tablet 12.5 mg  12.5 mg Oral BID Arlene Martin, FNP   12.5 mg at 10/24/22 0856    dextrose 10% bolus 125 mL  12.5 g Intravenous PRN Bety Thompson MD        dextrose 10% bolus 250 mL  25 g Intravenous PRN Bety Thompson MD        dextrose 50% injection 12.5 g  12.5 g Intravenous PRN HOLDEN Cristina        dextrose 50% injection 25 g  25 g Intravenous PRN HOLDEN Cristina        diazePAM tablet 10 mg  10 mg Oral On Call Procedure Abner Mitchell MD   10 mg at 09/23/22 0823    diphenhydrAMINE capsule 50 mg  50 mg Oral On Call Procedure Abner Mitchell MD   50 mg at 09/23/22 0823    docusate sodium capsule 100 mg  100 mg Oral BID HOLDEN Cristina   100 mg at 10/24/22 0856    famotidine tablet 40 mg  40 mg Oral Daily HOLDEN Cristina   40 mg at 10/24/22 0856    folic acid tablet 1 mg  1 mg Oral Daily HOLDEN Cristina   1 mg at 10/24/22 0856    glucagon (human recombinant) injection 1 mg  1 mg Intramuscular PRN Jackie Rush MD        glucose chewable tablet 16 g  16 g Oral PRN Jackie Rush MD        glucose chewable tablet 24 g  24 g Oral PRN Jackie Rush MD        hydrALAZINE injection 10 mg  10 mg Intravenous Q4H PRN LUIS ENRIQUE Gomez   10 mg at 10/18/22 0113    hydroCHLOROthiazide tablet 25 mg  25 mg Oral Daily Arlene Martin, FNP   25 mg at 10/24/22 0856    HYDROcodone-acetaminophen 5-325 mg per tablet 1 tablet  1 tablet Oral Q4H PRN HOLDEN Cristina   1 tablet at 10/14/22 0600    insulin aspart U-100 injection 0-5 Units  0-5 Units Subcutaneous QID (AC + HS)  PRN Jackie Rush MD   2 Units at 10/14/22 1109    lactulose 10 gram/15 ml solution 20 g  20 g Oral Q6H PRN HOLDEN Cristina   20 g at 10/16/22 0949    loperamide capsule 2 mg  2 mg Oral Continuous PRN HOLDEN Cristina        magnesium sulfate 2g in water 50mL IVPB (premix)  2 g Intravenous PRN HOLDEN Cristina        magnesium sulfate 2g in water 50mL IVPB (premix)  4 g Intravenous PRN HOLDEN Cristina        metoclopramide HCl injection 5 mg  5 mg Intravenous Q6H PRN HOLDEN Cristina        metoprolol injection 5 mg  5 mg Intravenous Q1H PRN Deion Marroquin NP        morphine injection 4 mg  4 mg Intravenous Q4H PRN HOLDEN Cristina   2 mg at 10/11/22 1423    ondansetron injection 4 mg  4 mg Intravenous Q4H PRN HOLDEN Cristina   4 mg at 10/12/22 0545    oxyCODONE immediate release tablet 10 mg  10 mg Oral Q4H PRN HOLDEN Cristina   5 mg at 10/12/22 0410    polyethylene glycol packet 17 g  17 g Oral Daily PRN HOLDEN Mendoza        prazosin capsule 1 mg  1 mg Oral Daily Bety Thompson MD   1 mg at 10/24/22 0856    sertraline tablet 50 mg  50 mg Oral Daily HOLDEN Mendoza   50 mg at 10/24/22 0856    sodium chloride 0.9% flush 10 mL  10 mL Intravenous PRN Abner Mitchell MD        sodium phosphate 15 mmol in dextrose 5 % 250 mL IVPB  15 mmol Intravenous PRN HOLDEN Cristina   Stopped at 10/12/22 0156    sodium phosphate 20.01 mmol in dextrose 5 % 250 mL IVPB  20.01 mmol Intravenous PRN HOLDEN Cristina   Stopped at 10/13/22 1612    sodium phosphate 30 mmol in dextrose 5 % 250 mL IVPB  30 mmol Intravenous PRN HOLDEN Cristina        sucralfate tablet 1 g  1 g Oral QID (AC & HS) HOLDEN Cristina   1 g at 10/24/22 0510    valsartan tablet 160 mg  160 mg Oral Daily LUIS ENRIQUE Gomez   160 mg at 10/24/22 0855    vancomycin in dextrose 5 % 1 gram/250 mL IVPB 1,000 mg  1,000 mg Intravenous Once Bety Thompson MD        zinc  oxide-cod liver oil 40 % paste   Topical (Top) BID Bety Thompson MD   Given at 10/24/22 0856      Pertinent lab results:   Lab Results   Component Value Date    WBC 7.4 10/17/2022    HGB 10.0 (L) 10/17/2022    HCT 30.9 (L) 10/17/2022     10/17/2022     10/21/2022    K 3.6 10/21/2022    BUN 27.3 (H) 10/21/2022    CO2 27 10/21/2022      Pertinent diagnostic studies:    Additional labs and diagnostic studies required prior to admission:      QI: GG Care Tool     QI: GG Care Tool  Therapy Evaluation   Swallowing/  Nutritional Status   Diet/Feeding/  Swallowing    Eating       Oral Hygiene   Grooming Contact guard assistance for safety with balance while washing bilateral hands   Toileting Hygiene       Shower/Bathe   Bathing    Upper Body Dressing   Dressing Upper    Lower Body Dressing   Dressing Lower    Putting On/Taking Off Footwear       Toilet Transfer   Toileting Max assistance for brief management   Bladder Continence Status   Bladder     Bowel Continence Status   Bowel     Roll Left and Right   Bed Mobility Min assist   Sit to Lying       Lying to Sitting on Side of Bed       Sit to Stand       Chair/Bed-to- Chair   Transfers   Sit to and from stand min assist RW  Toilet transfer min assist with RW   Car Transfer       Walk 10 Feet   Equipment   RW   Walk 50 Feet with Two Turns   Balance    Walk 150 Feet   Endurance    Walk 10 Feet on Uneven Surfaces   Gait 130 ft RW min assist   Picking up an Object       Wheel 50 Feet with Two Turns   Wheelchair    Wheel 150 Feet       1 Step (Curb)   Stairs    4 Steps       12 Steps       Expression of Ideas and Wants   Communication    Understanding  Verbal and Non-Verbal Content       Cognitive Patterns   Cognition       Safety Precautions       Lower Extremity      Strength       ROM       Upper Extremity      Strength       ROM      Care Score Value Definitions  6: Independent. [place] provides no assistance with tasks. A device may or may not have been  used.  5: Set-up or clean-up assistance. [place] sets up or cleans up, but does not assist with tasks. [place] may have assisted prior to or following the activity.  4: Supervision or touching assistance. [place] provides verbal cues or touching/steadying or contact guard assistance. Assistance may be provided throughout the activity or intermittently.  3: Partial/moderate assistance. [place] does less than half the effort. [place] lifts, holds, or supports trunk or limbs, but provides less than half the effort.  2: Substantial/maximal assistance. [place] does more than half the effort. [place] lifts or holds trunk or limbs, and provides more than half the effort.  1: Dependent. [place] does all of the effort, or the assistance of two or more helpers is required for the patient to complete the activity.  Care Score Activity Not Attempted Value Definitions  7: Patient refused.  9: Not applicable. Not attempted and the patient did not perform this activity prior to the current illness, exacerbation, or injury.  10: Not attempted due to environmental limitations (e.g., lack of equipment, weather constraints).  88: Not attempted due to medical condition or safety concerns.    DISCHARGE GOALS/ANTICPATED INTERVENTIONS/SERVICES     Expected Level of Improvement for Safe Discharge: Anticipate discharge home at or near baseline level of functioning and/or decreased burden of care.           Patient/Family/Caregiver Goals: To return home independently   Required Treatments/Services: Rehabilitation Nursing, Dietitian/nutrition, Social , and Case Management   Required Therapy Therapy Type Min/Day Days/Week Duration of Therapy   Physical Therapy 90 5 10-12 days   Occupational Therapy 90 5 10-12 days   Speech and Language Pathology      Prosthetic/Orthotics      Recreational Therapy      Anticipated Services upon Discharge:  Home Health vs outpatient PT/OT   Additional rehabilitation needs:  N/A   Expected Discharge Destination:  Home  with HH   Barriers to Discharge: None   Discharge Support:  pt. Lives alone and should be able to return home alone. She has children available to assist   Patient/Family/Caregiver Orientation: Patient/family/caregiver oriented and agreeable to inpatient rehabilitation plan   Estimated Length of Stay: 10-12 days   Projected Admission Date: 10/24/2022 or 10/25/2022   Medical Prognosis: good   Physicians Review and Admission Determination:   I have discussed patient with Nurse Liaison. I have reviewed the prescreen. Patient is in need of, appropriate for and should tolerate and benefit from an aggressive IRF stay.

## 2022-10-25 ENCOUNTER — HOSPITAL ENCOUNTER (INPATIENT)
Facility: HOSPITAL | Age: 82
LOS: 9 days | Discharge: HOME HEALTH | DRG: 302 | End: 2022-11-03
Attending: INTERNAL MEDICINE | Admitting: INTERNAL MEDICINE
Payer: MEDICARE

## 2022-10-25 VITALS
WEIGHT: 138.69 LBS | TEMPERATURE: 98 F | DIASTOLIC BLOOD PRESSURE: 71 MMHG | HEART RATE: 78 BPM | OXYGEN SATURATION: 96 % | HEIGHT: 65 IN | SYSTOLIC BLOOD PRESSURE: 128 MMHG | RESPIRATION RATE: 18 BRPM | BODY MASS INDEX: 23.11 KG/M2

## 2022-10-25 DIAGNOSIS — I25.10 CAD (CORONARY ARTERY DISEASE): ICD-10-CM

## 2022-10-25 DIAGNOSIS — Z86.79 HISTORY OF CAROTID ARTERY DISEASE: Primary | ICD-10-CM

## 2022-10-25 PROBLEM — F41.9 ANXIETY: Status: ACTIVE | Noted: 2022-10-25

## 2022-10-25 PROBLEM — N18.30 STAGE 3 CHRONIC KIDNEY DISEASE: Status: ACTIVE | Noted: 2022-10-25

## 2022-10-25 PROBLEM — Z95.1 S/P CABG X 4: Status: ACTIVE | Noted: 2022-10-25

## 2022-10-25 PROBLEM — R15.9 INCONTINENCE OF FECES: Status: ACTIVE | Noted: 2022-10-25

## 2022-10-25 PROBLEM — R32 URINARY INCONTINENCE: Status: ACTIVE | Noted: 2022-10-25

## 2022-10-25 PROBLEM — E78.5 HYPERLIPIDEMIA: Status: ACTIVE | Noted: 2022-10-25

## 2022-10-25 PROBLEM — Z95.0 PRESENCE OF CARDIAC PACEMAKER: Status: ACTIVE | Noted: 2022-10-25

## 2022-10-25 PROBLEM — F32.A DEPRESSIVE DISORDER: Status: ACTIVE | Noted: 2022-10-25

## 2022-10-25 PROBLEM — R73.9 HYPERGLYCEMIA: Status: ACTIVE | Noted: 2022-10-25

## 2022-10-25 PROBLEM — M15.9 GENERALIZED OSTEOARTHRITIS: Status: ACTIVE | Noted: 2022-10-25

## 2022-10-25 PROBLEM — I10 HYPERTENSION: Status: ACTIVE | Noted: 2022-10-25

## 2022-10-25 LAB
GLUCOSE SERPL-MCNC: 115 MG/DL (ref 70–110)
GLUCOSE SERPL-MCNC: 126 MG/DL (ref 70–110)
POCT GLUCOSE: 115 MG/DL (ref 70–110)
POCT GLUCOSE: 120 MG/DL (ref 70–110)
POCT GLUCOSE: 126 MG/DL (ref 70–110)
POCT GLUCOSE: 131 MG/DL (ref 70–110)
POCT GLUCOSE: 150 MG/DL (ref 70–110)
SARS-COV-2 RDRP RESP QL NAA+PROBE: NEGATIVE

## 2022-10-25 PROCEDURE — 25000003 PHARM REV CODE 250: Performed by: NURSE PRACTITIONER

## 2022-10-25 PROCEDURE — 25000003 PHARM REV CODE 250: Performed by: THORACIC SURGERY (CARDIOTHORACIC VASCULAR SURGERY)

## 2022-10-25 PROCEDURE — 99900035 HC TECH TIME PER 15 MIN (STAT)

## 2022-10-25 PROCEDURE — 25000003 PHARM REV CODE 250

## 2022-10-25 PROCEDURE — 99223 1ST HOSP IP/OBS HIGH 75: CPT | Mod: ,,, | Performed by: PHYSICAL MEDICINE & REHABILITATION

## 2022-10-25 PROCEDURE — 94761 N-INVAS EAR/PLS OXIMETRY MLT: CPT

## 2022-10-25 PROCEDURE — 99024 POSTOP FOLLOW-UP VISIT: CPT | Mod: ,,, | Performed by: PHYSICIAN ASSISTANT

## 2022-10-25 PROCEDURE — 99223 PR INITIAL HOSPITAL CARE,LEVL III: ICD-10-PCS | Mod: ,,, | Performed by: PHYSICAL MEDICINE & REHABILITATION

## 2022-10-25 PROCEDURE — 87635 SARS-COV-2 COVID-19 AMP PRB: CPT | Performed by: THORACIC SURGERY (CARDIOTHORACIC VASCULAR SURGERY)

## 2022-10-25 PROCEDURE — 97535 SELF CARE MNGMENT TRAINING: CPT

## 2022-10-25 PROCEDURE — 25000003 PHARM REV CODE 250: Performed by: PHYSICIAN ASSISTANT

## 2022-10-25 PROCEDURE — 99024 PR POST-OP FOLLOW-UP VISIT: ICD-10-PCS | Mod: ,,, | Performed by: PHYSICIAN ASSISTANT

## 2022-10-25 PROCEDURE — 11800000 HC REHAB PRIVATE ROOM

## 2022-10-25 PROCEDURE — 94799 UNLISTED PULMONARY SVC/PX: CPT

## 2022-10-25 RX ORDER — HYDRALAZINE HYDROCHLORIDE 20 MG/ML
10 INJECTION INTRAMUSCULAR; INTRAVENOUS EVERY 4 HOURS PRN
Status: DISCONTINUED | OUTPATIENT
Start: 2022-10-25 | End: 2022-11-03 | Stop reason: HOSPADM

## 2022-10-25 RX ORDER — BENZONATATE 100 MG/1
100 CAPSULE ORAL 3 TIMES DAILY PRN
Status: DISCONTINUED | OUTPATIENT
Start: 2022-10-25 | End: 2022-11-03 | Stop reason: HOSPADM

## 2022-10-25 RX ORDER — GLUCAGON 1 MG
1 KIT INJECTION
Status: DISCONTINUED | OUTPATIENT
Start: 2022-10-25 | End: 2022-10-26

## 2022-10-25 RX ORDER — PRAZOSIN HYDROCHLORIDE 1 MG/1
1 CAPSULE ORAL DAILY
Status: DISCONTINUED | OUTPATIENT
Start: 2022-10-25 | End: 2022-10-25

## 2022-10-25 RX ORDER — NITROGLYCERIN 0.4 MG/1
0.4 TABLET SUBLINGUAL EVERY 5 MIN PRN
Status: DISCONTINUED | OUTPATIENT
Start: 2022-10-25 | End: 2022-11-03 | Stop reason: HOSPADM

## 2022-10-25 RX ORDER — SUCRALFATE 1 G/1
1 TABLET ORAL
Status: DISCONTINUED | OUTPATIENT
Start: 2022-10-25 | End: 2022-11-03 | Stop reason: HOSPADM

## 2022-10-25 RX ORDER — ALPRAZOLAM 0.25 MG/1
0.25 TABLET ORAL 3 TIMES DAILY PRN
Status: DISCONTINUED | OUTPATIENT
Start: 2022-10-25 | End: 2022-11-03 | Stop reason: HOSPADM

## 2022-10-25 RX ORDER — ATORVASTATIN CALCIUM 40 MG/1
40 TABLET, FILM COATED ORAL DAILY
Qty: 90 TABLET | Refills: 3 | Status: SHIPPED | OUTPATIENT
Start: 2022-10-25 | End: 2022-10-25 | Stop reason: HOSPADM

## 2022-10-25 RX ORDER — PROMETHAZINE HYDROCHLORIDE 25 MG/1
25 TABLET ORAL EVERY 6 HOURS PRN
Status: DISCONTINUED | OUTPATIENT
Start: 2022-10-25 | End: 2022-11-03 | Stop reason: HOSPADM

## 2022-10-25 RX ORDER — HYDROCHLOROTHIAZIDE 25 MG/1
25 TABLET ORAL DAILY
Status: DISCONTINUED | OUTPATIENT
Start: 2022-10-25 | End: 2022-10-25

## 2022-10-25 RX ORDER — METOPROLOL TARTRATE 1 MG/ML
10 INJECTION, SOLUTION INTRAVENOUS
Status: DISCONTINUED | OUTPATIENT
Start: 2022-10-25 | End: 2022-11-03 | Stop reason: HOSPADM

## 2022-10-25 RX ORDER — IBUPROFEN 200 MG
16 TABLET ORAL
Status: DISCONTINUED | OUTPATIENT
Start: 2022-10-25 | End: 2022-10-26

## 2022-10-25 RX ORDER — CARVEDILOL 6.25 MG/1
12.5 TABLET ORAL 2 TIMES DAILY
Status: DISCONTINUED | OUTPATIENT
Start: 2022-10-25 | End: 2022-11-03 | Stop reason: HOSPADM

## 2022-10-25 RX ORDER — ONDANSETRON 4 MG/1
4 TABLET, ORALLY DISINTEGRATING ORAL EVERY 6 HOURS PRN
Status: DISCONTINUED | OUTPATIENT
Start: 2022-10-25 | End: 2022-11-03 | Stop reason: HOSPADM

## 2022-10-25 RX ORDER — ASPIRIN 81 MG/1
81 TABLET ORAL DAILY
Qty: 30 TABLET | Refills: 0 | Status: ON HOLD | OUTPATIENT
Start: 2022-10-26 | End: 2022-11-03 | Stop reason: SDUPTHER

## 2022-10-25 RX ORDER — LABETALOL HCL 20 MG/4 ML
10 SYRINGE (ML) INTRAVENOUS EVERY 4 HOURS PRN
Status: DISCONTINUED | OUTPATIENT
Start: 2022-10-25 | End: 2022-11-03 | Stop reason: HOSPADM

## 2022-10-25 RX ORDER — AMLODIPINE BESYLATE 5 MG/1
5 TABLET ORAL DAILY
Status: DISCONTINUED | OUTPATIENT
Start: 2022-10-26 | End: 2022-10-27

## 2022-10-25 RX ORDER — BISACODYL 10 MG
10 SUPPOSITORY, RECTAL RECTAL DAILY PRN
Status: DISCONTINUED | OUTPATIENT
Start: 2022-10-25 | End: 2022-11-03 | Stop reason: HOSPADM

## 2022-10-25 RX ORDER — VALSARTAN 160 MG/1
160 TABLET ORAL DAILY
Qty: 90 TABLET | Refills: 3 | Status: ON HOLD | OUTPATIENT
Start: 2022-10-26 | End: 2022-11-03 | Stop reason: SDUPTHER

## 2022-10-25 RX ORDER — HYDROCODONE BITARTRATE AND ACETAMINOPHEN 5; 325 MG/1; MG/1
1 TABLET ORAL EVERY 4 HOURS PRN
Status: DISCONTINUED | OUTPATIENT
Start: 2022-10-25 | End: 2022-11-03 | Stop reason: HOSPADM

## 2022-10-25 RX ORDER — VALSARTAN 160 MG/1
160 TABLET ORAL DAILY
Status: DISCONTINUED | OUTPATIENT
Start: 2022-10-27 | End: 2022-11-03 | Stop reason: HOSPADM

## 2022-10-25 RX ORDER — ASPIRIN 81 MG/1
81 TABLET ORAL DAILY
Status: DISCONTINUED | OUTPATIENT
Start: 2022-10-27 | End: 2022-11-03 | Stop reason: HOSPADM

## 2022-10-25 RX ORDER — ATORVASTATIN CALCIUM 40 MG/1
40 TABLET, FILM COATED ORAL DAILY
Status: DISCONTINUED | OUTPATIENT
Start: 2022-10-26 | End: 2022-11-03 | Stop reason: HOSPADM

## 2022-10-25 RX ORDER — METHOCARBAMOL 500 MG/1
500 TABLET, FILM COATED ORAL 3 TIMES DAILY PRN
Status: DISCONTINUED | OUTPATIENT
Start: 2022-10-25 | End: 2022-11-03 | Stop reason: HOSPADM

## 2022-10-25 RX ORDER — DOCUSATE SODIUM 100 MG/1
100 CAPSULE, LIQUID FILLED ORAL 2 TIMES DAILY
Status: DISCONTINUED | OUTPATIENT
Start: 2022-10-25 | End: 2022-10-27

## 2022-10-25 RX ORDER — CARVEDILOL 12.5 MG/1
12.5 TABLET ORAL 2 TIMES DAILY
Qty: 60 TABLET | Refills: 11 | Status: ON HOLD | OUTPATIENT
Start: 2022-10-25 | End: 2022-11-03 | Stop reason: SDUPTHER

## 2022-10-25 RX ORDER — IBUPROFEN 200 MG
24 TABLET ORAL
Status: DISCONTINUED | OUTPATIENT
Start: 2022-10-25 | End: 2022-10-26

## 2022-10-25 RX ORDER — FAMOTIDINE 20 MG/1
20 TABLET, FILM COATED ORAL DAILY
Status: DISCONTINUED | OUTPATIENT
Start: 2022-10-26 | End: 2022-11-03 | Stop reason: HOSPADM

## 2022-10-25 RX ORDER — HYDROXYZINE PAMOATE 50 MG/1
50 CAPSULE ORAL NIGHTLY PRN
Status: DISCONTINUED | OUTPATIENT
Start: 2022-10-25 | End: 2022-10-27

## 2022-10-25 RX ORDER — POLYETHYLENE GLYCOL 3350 17 G/17G
17 POWDER, FOR SOLUTION ORAL 2 TIMES DAILY PRN
Status: DISCONTINUED | OUTPATIENT
Start: 2022-10-25 | End: 2022-11-03 | Stop reason: HOSPADM

## 2022-10-25 RX ORDER — AMLODIPINE BESYLATE 5 MG/1
10 TABLET ORAL DAILY
Status: DISCONTINUED | OUTPATIENT
Start: 2022-10-25 | End: 2022-10-25

## 2022-10-25 RX ORDER — ACETAMINOPHEN 325 MG/1
650 TABLET ORAL EVERY 4 HOURS PRN
Status: DISCONTINUED | OUTPATIENT
Start: 2022-10-25 | End: 2022-11-03 | Stop reason: HOSPADM

## 2022-10-25 RX ORDER — SERTRALINE HYDROCHLORIDE 50 MG/1
50 TABLET, FILM COATED ORAL DAILY
Status: DISCONTINUED | OUTPATIENT
Start: 2022-10-26 | End: 2022-11-03 | Stop reason: HOSPADM

## 2022-10-25 RX ORDER — FOLIC ACID 1 MG/1
1 TABLET ORAL DAILY
Status: DISCONTINUED | OUTPATIENT
Start: 2022-10-25 | End: 2022-11-03 | Stop reason: HOSPADM

## 2022-10-25 RX ADMIN — DOCUSATE SODIUM 100 MG: 100 CAPSULE, LIQUID FILLED ORAL at 09:10

## 2022-10-25 RX ADMIN — SUCRALFATE 1 G: 1 TABLET ORAL at 05:10

## 2022-10-25 RX ADMIN — AMLODIPINE BESYLATE 5 MG: 5 TABLET ORAL at 09:10

## 2022-10-25 RX ADMIN — APIXABAN 5 MG: 5 TABLET, FILM COATED ORAL at 09:10

## 2022-10-25 RX ADMIN — CARVEDILOL 12.5 MG: 12.5 TABLET, FILM COATED ORAL at 09:10

## 2022-10-25 RX ADMIN — VALSARTAN 160 MG: 80 TABLET, FILM COATED ORAL at 09:10

## 2022-10-25 RX ADMIN — Medication: at 10:10

## 2022-10-25 RX ADMIN — FOLIC ACID 1 MG: 1 TABLET ORAL at 09:10

## 2022-10-25 RX ADMIN — ASPIRIN 81 MG: 81 TABLET, COATED ORAL at 09:10

## 2022-10-25 RX ADMIN — SERTRALINE HYDROCHLORIDE 50 MG: 50 TABLET ORAL at 09:10

## 2022-10-25 RX ADMIN — SODIUM CHLORIDE 500 ML: 9 INJECTION, SOLUTION INTRAVENOUS at 02:10

## 2022-10-25 RX ADMIN — SUCRALFATE 1 G: 1 TABLET ORAL at 10:10

## 2022-10-25 RX ADMIN — FAMOTIDINE 40 MG: 20 TABLET, FILM COATED ORAL at 09:10

## 2022-10-25 RX ADMIN — PRAZOSIN HYDROCHLORIDE 1 MG: 1 CAPSULE ORAL at 10:10

## 2022-10-25 RX ADMIN — SUCRALFATE 1 G: 1 TABLET ORAL at 09:10

## 2022-10-25 RX ADMIN — HYDROCHLOROTHIAZIDE 25 MG: 25 TABLET ORAL at 09:10

## 2022-10-25 RX ADMIN — CARVEDILOL 12.5 MG: 6.25 TABLET, FILM COATED ORAL at 09:10

## 2022-10-25 NOTE — PT/OT/SLP PROGRESS
Occupational Therapy   Treatment    Name: Jessica Elias  MRN: [id number]  Admitting Diagnosis:  CABG  Recent Surgery: Procedure(s) (LRB):  CORONARY ARTERY BYPASS GRAFT (CABG) (N/A) 14 Days Post-Op    Recommendations:     Discharge Recommendations: Rehabilitation facility  Discharge Equipment Recommendations: walker, rolling  Barriers to discharge: None    Assessment:     Jessica Elias is a 82 y.o. female with a medical diagnosis of of MVCAD s/p CABG x 4. Performance deficits affecting function are weakness, impaired endurance, impaired self care skills, impaired functional mobility, and decreased safety awareness.    Rehab Prognosis: Good; patient would benefit from acute skilled OT services to address these deficits and reach maximum level of function.       Plan:     Patient to be seen 6 x/week to address the above listed problems via self-care/home management, therapeutic activities, therapeutic exercises  Plan of Care Expires: 11/04/22  Plan of Care Reviewed with: patient    Subjective     Pain/Comfort:  Pt denied any pain during session.    Objective:     Communicated with: RN prior to session. Patient found lying with HOB elevated with pulse ox (continuous), Purewick, and telemetry upon OT entry to room.    General Precautions: Standard, fall, sternal   Respiratory Status: Room air  Vitals:  BP: 169/70. However, pt's BP re-checked and measured 159/72.  DC: 76, 71  O2: 95-98%     Occupational Performance:     Bed Mobility/Functional Mobility/Transfers:  Pt performed supine to sit with mod A provided, requiring assist to lift trunk. Pt holding cardiac bear during t/f in order to increase pt's safety.  Pt performed sit to supine with min A provided while holding cardiac bear. Pt required assist to lift LLE.  Patient performed Sit <> Stand Transfers with SBA provided with BUEs positioned across chest.  Functional Mobility: Pt ambulated <> bathroom and performed long distance gait trial in hallway using RW  for balance with SBA and vcs provided.     ADLs:  - Grooming: Pt washed hands and face, combed hair, and performed oral hygiene task while standing at sink with SBA provided.  - Toileting: Pt performed task with min A and vcs provided, requiring assist to wipe buttocks to be sure area was clean.  - LB dressing: Pt doffed/donned brief with verbal cues provided to adhere to sternal pxns and demonstration provided on how to thread LE in order to increase pt's safety and independence with task.    Education:  OT provided re-education on sternal pxns during session to increase pt's safety during ADL/IADL participation. Pt verbalized good understanding.    Patient left lying with HOB elevated with all lines intact and call bell in reach.    GOALS:   Multidisciplinary Problems       Occupational Therapy Goals          Problem: Occupational Therapy    Goal Priority Disciplines Outcome Interventions   Occupational Therapy Goal     OT, PT/OT Ongoing, Progressing    Description:   1. UB dressing Mod I.  2. LB dressing Mod I.  3. Toileting, toilet t/f Mod I with LRAD.  4. Shower t/f Mod I.                        Time Tracking:     OT Date of Treatment: 10/25/22  OT Start Time: 0937  OT Stop Time: 1017  OT Total Time (min): 40 min    Billable Minutes: Self Care/Home Mgmt 40 mins    OT/JACKIE: OT     JACKIE Visit Number: 3    10/25/2022

## 2022-10-25 NOTE — CONSULTS
Chief Complaint  S/p CABG x 4    Reason for Consultation  Physiatry    History of Present Illness  Admit MD: Demetri Heredia MD  Consult Physiatry: Tonio Herzog MD  HPI: 83yo WF with PMH of CAD, PAF, carotid artery stenosis s/p CEA, hypertension, hyperlipidemia, CKD, obstructive sleep apnea, and recurrent bradycardia s/p pacemaker implant (February 2018) presented to [facility] on 10/11/22 for scheduled surgery.  Patient had underwent outpatient coronary angiogram which revealed triple-vessel disease.  She was electively admitted on October 11, 2022 and underwent CABG x 4 (LIMA to LAD, SVG to Diag 1, SVG to OM, and SVG to RCA), Ligation of left atrial appendage per Dr. Bety Thompson. Patient was noted to be in Atrial Fibrillation RVR during her acute stay.  She is now in sinus. She is on Eliquis. Participating with therapy. Functional status includes bed mobility and transfers requiring minimal assistance. Amb w/RW for 130ft. Patient was evaluated, accepted, and admitted to inpatient rehab to improve functional status. Transferred to [facility] on 10/25 without incident.   10/27: Seen in patient bathroom, seated on commode with CNA. States that she is having loose stools and asked to DC stool softeners. Discontinued Colace. Reports urinary incontinence as well, but chronic. States that she has had 2 surgeries for it without improvement. Wears a pad in brief for every time she stands up. Denies pain. Amb w/RW to sink to wash her hands. Looking for tissue as her nose has been running. Reports good appetite as she does not eat a whole lot typically. Discussed increased nutritional needs for healing. States that she cannot drink the Manish in water, but may be able to stomach with OJ. Will discuss with dietician. Therapy evaluating. VSSAF with noted SBP elevation this morning. IM following.        Review of Systems  Barriers to Discharge:  Social: Has a Masters in Guidance/Counseling. No  history. Pt. Is a retired  Guidance Counselor.  Pt.is . She was completely independent, living alone. She will have family available to assist her if needed after discharge from rehab. Children:(4/3 living).   Medical: s/p CABG x 4,Ligation of SHAVON, Atrial Fibrillation RVR, CAD, PAF, carotid artery stenosis s/p CEA, hypertension, hyperlipidemia, CKD, obstructive sleep apnea, and recurrent bradycardia s/p pacemaker implant    Functional:   Prior Level of Fx:  Independent ADLs, drives, cooks, does chores, does laundry, grocery shops, manages finances, and manages own meds. She hires someone for lawn maintenance. Carrillo thinks pt. Has a medic alert.    Residence:  Pt. Lives alone in [place] in a single-story home with a threshold to enter the residence. She uses a walk-in shower with a small threshold, shower chair, and grab bars. She has an elevated toilet with grab bars adjacent.    DME: Shower chair and grab bars.    Psychiatric:  Hx mental health/substance abuse: Pt. Has a history of depression. No history of substance abuse.    Depression/Anxiety:      sertraline tablet 50 mg qd  ALPRAZolam tablet 0.25 mg TID PRN Anxiety  Pain: denies   acetaminophen tablet 650 mg q4hr PRN mild pain  HYDROcodone-acetaminophen 5-325 mg 1 tablet q4hr PRN moderate pain   methocarbamoL tablet 500 mg TID PRN muscle spasm/pain  Bowels/Bladder: last BM 10/27, 10/26 x 2 (loose, explosive per patient)   Renetta DC'd   Appetite: good. Does not typically eat a whole lot    Sleep: good          Physical Exam  General: well-developed, well-nourished, in no acute distress  Eye: EOMI, clear conjunctiva, eyelids normal  HENT: normocephalic, oropharynx and nasal mucosal surfaces moist  Neck: full range of motion, supple  Respiratory: equal chest rise, no SOB, no audible wheeze  Cardiovascular: regular rate and rhythm, no edema  Gastrointestinal: soft, non-tender, non-distended   Musculoskeletal: full range of motion of all extremities/spine with generalized  weakness  Integumentary: no rashes or skin lesions present, midsternal [id number]-[id number]-g/d/i, Chest tube sutures removed, LLE EVH [id number]-[id number]-i/d/I, sacral pressure wound-foam dressing  Neurologic: cranial nerves intact, no signs of peripheral neurological deficit, motor/sensory function intact  *MD performed and documented physical examination     Labs:   Latest Reference Range & Units 10/26/22 05:48 10/27/22 05:42   WBC 4.5 - 11.5 x10(3)/mcL 7.9    RBC 4.20 - 5.40 x10(6)/mcL 3.51 (L)    Hemoglobin 12.0 - 16.0 gm/dL 10.5 (L)    Hematocrit 37.0 - 47.0 % 32.3 (L)    MCV 80.0 - 94.0 fL 92.0    MCH 27.0 - 31.0 pg 29.9    MCHC 33.0 - 36.0 mg/dL 32.5 (L)    RDW 11.5 - 17.0 % 13.2    Platelets 130 - 400 x10(3)/mcL 422 (H)    MPV 7.4 - 10.4 fL 10.8 (H)    Neut % % 62.4    LYMPH % % 23.5    Mono % % 8.4    Eosinophil % % 4.8    Basophil % % 0.6    Immature Granulocytes % 0.3    Neut # 2.1 - 9.2 x10(3)/mcL 4.9    Lymph # 0.6 - 4.6 x10(3)/mcL 1.85    Mono # 0.1 - 1.3 x10(3)/mcL 0.66    Eos # 0 - 0.9 x10(3)/mcL 0.38    Baso # 0 - 0.2 x10(3)/mcL 0.05    Immature Grans (Abs) 0 - 0.04 x10(3)/mcL 0.02    nRBC % 0.0    Iron 50 - 170 ug/dL 30 (L)    TIBC 250 - 450 ug/dL 139 (L)    Iron Binding Capacity Unsaturated 70 - 310 ug/dL 109    Transferrin mg/dL 127    Ferritin 4.63 - 204.00 ng/mL 900.44 (H)    Iron Saturation 20 - 50 % 22    Sodium 136 - 145 mmol/L 142 145   Potassium 3.5 - 5.1 mmol/L 2.9 (L) 4.8   Chloride 98 - 107 mmol/L 104 108 (H)   CO2 23 - 31 mmol/L 31 28   Anion Gap mEq/L  9.0   BUN 9.8 - 20.1 mg/dL 28.1 (H) 25.6 (H)   Creatinine 0.55 - 1.02 mg/dL 0.99 1.01   BUN/CREAT RATIO   25   eGFR mls/min/1.73/m2 58 56   Glucose 82 - 115 mg/dL 115 107   Calcium 8.4 - 10.2 mg/dL 9.3 9.5   Phosphorus 2.3 - 4.7 mg/dL 3.6    Magnesium 1.60 - 2.60 mg/dL 1.70    Alkaline Phosphatase 40 - 150 unit/L 52    PROTEIN TOTAL 5.8 - 7.6 gm/dL 6.5    Albumin 3.4 - 4.8 gm/dL 2.5 (L)    Albumin/Globulin Ratio 1.1 - 2.0 ratio 0.6 (L)    Prealbumin  14.0 - 37.0 mg/dL 12.0 (L)    BILIRUBIN TOTAL <=1.5 mg/dL 0.5    AST 5 - 34 unit/L 17    ALT 0 - 55 unit/L 8    Globulin, Total 2.4 - 3.5 gm/dL 4.0 (H)    (L): Data is abnormally low  (H): Data is abnormally high    Diagnostics:  XR CHEST AP PORTABLE  Impression:  Expected postoperative changes of median sternotomy  Date:                                            10/11/2022  Time:                                           12:22    LEFT HEART CATHETERIZATION, SELECTIVE CORONARY ANGIOGRAPHY AND LEFT VENTRICULOGRAM 9.23.22:  1. LEFT MAIN:  HIGHLY CALCIFIED WITHOUT OBSTRUCTIVE LESIONS  2. LAD:  HEAVILY CALCIFIED 70-80% STENOSIS PROXIMALLY (LONG LESION) AND 70% DISTAL AT THE BIFURCATION WITH THE 2ND DIAGONAL BRANCH.  THE 1ST DIAGONAL BRANCH SHOWS DISEASE OF 90%.  3. LEFT CIRCUMFLEX:  CALCIFIED, 99% STENOSIS PROXIMALLY  4.  RIGHT CORONARY ARTERY:  HIGHLY CALCIFIED, 99% STENOSIS OF THE OSTIUM FOLLOWED BY 90% STENOSIS PROXIMALLY    XR CHEST PA AND LATERAL  Impression:  Interval removal of support catheters with evidence of a small left apical pneumothorax.  Bilateral pleural effusions  No other focal consolidative changes  Date:                                            10/16/2022  Time:                                           09:11    Assessment/Plan  Hospital   S/P CABG x 4   Arteriosclerosis of coronary artery     Non-Hospital   Nonspecific abnormal results of basal metabolism function study   Hypertension   Hyperlipidemia   Stage 3 chronic kidney disease   Hyperglycemia   Urinary incontinence   Incontinence of feces   Depressive disorder   Generalized osteoarthritis   Presence of cardiac pacemaker   Anxiety   Sleep apnea   History of carotid artery disease       Wounds: midsternal unsjqwbj-YGP-o/d/i, Chest tube sutures removed, LLE EVH [id number]-OGK-y/d/I, sacral pressure wound-foam dressing  S/p CABG x 4 (LIMA to LAD, SVG to Diag 1, SVG to OM, and SVG to RCA), Ligation of left atrial appendage per Dr. Bety Thompson  on 10/11  Precautions: sternal   Bracing/AD: RW  Swallowing:   Function: Tolerating therapy. Continue PT/OT  VTE Prophylaxis:   apixaban tablet 5 mg BID  Code Status: FULL CODE   Discharge:Lives alone in [place] in a single-story home with a threshold to enter the residence. Has a Masters in Guidance/Counseling. No  history. Pt. Is a retired Guidance Counselor.  Pt.is . She was completely independent, living alone. She will have family available to assist her if needed after discharge from rehab. Children:(4/3 living). Date pending.    Dos 10/25/22  I have evaluated the patient on initial IRF admit. I have been involved in rehab prescreen. I have reviewed records.I have reviewed admit orders.I have discussed case with IM team.  I find the patient appropriate for, in need of and should tolerate an aggressive IRF program with good potential for functional improvement.  I am in agreement with initial Plan of Care  I have been involved in the creation of this initial PM&R consult with Maricruz Winter NP, conducted additional independent physical examination and assisted with medical documentation.

## 2022-10-25 NOTE — PROGRESS NOTES
Discharge instructions given to patient. Follow appointments made and reviewed. Medication changes reviewed. All questions answered. IV left in for transport to rehab. Telemetry discontinued. [facility].

## 2022-10-25 NOTE — DISCHARGE SUMMARY
[facility] - 3rd Floor Medical Telemetry  Cardiothoracic Surgery  Discharge Summary      Patient Name: Jessica Elias  MRN: [id number]  Admission Date: 10/11/2022  Hospital Length of Stay: 14 days  Discharge Date and Time:  10/25/2022 9:39 AM  Attending Physician: Bety Thompson MD   Discharging Provider: HOLDEN Cristina  Primary Care Provider: Tarik Gee MD    HPI:   No notes on file    Procedure(s) (LRB):  CORONARY ARTERY BYPASS GRAFT (CABG) (N/A)      Indwelling Lines/Drains at time of discharge:   Lines/Drains/Airways     None               Hospital Course: No notes on file    Goals of Care Treatment Preferences:  Code Status: Full Code      Consults (From admission, onward)        Status Ordering Provider     Inpatient consult to Social Work/Case Management  Once        Provider:  (Not yet assigned)    Acknowledged DENISE MALAVE     Inpatient consult to Nephrology  Once        Provider:  Dhaval Ibrahim MD    Acknowledged BETY THOMPSON     Inpatient consult to Social Work/Case Management  Once        Provider:  (Not yet assigned)    Acknowledged EMORY SEYMOUR     Inpatient consult to Social Work/Case Management  Once        Provider:  (Not yet assigned)    Acknowledged BETY THOMPSON     Inpatient consult to Cardiology  Once        Provider:  Abisai Mclain MD    Acknowledged BETY THOMPSON     Inpatient consult to Cardiology  Once        Provider:  Peterson Sánchez MD    Acknowledged MIR YOUNG     Inpatient consult to Cardiac Rehab  Once        Provider:  (Not yet assigned)    Acknowledged EMORY SEYMOUR     Consult to Case Management/Social Work  Once        Provider:  (Not yet assigned)    Acknowledged EMORY SEYMOUR     Inpatient consult to Social Work/Case Management  Once        Provider:  (Not yet assigned)    Acknowledged BETY THOMPSON     Inpatient consult to Nephrology  Once        Provider:  Dhaval Ibrahim MD    Completed BETY THOMPSON           Significant Diagnostic Studies: Labs: All labs within the past 24 hours have been reviewed    Pending Diagnostic Studies:     Procedure Component Value Units Date/Time    COVID-19 Rapid Screening [[id number]]     Order Status: Sent Lab Status: No result     Specimen: Nasal Swab           No new Assessment & Plan notes have been filed under this hospital service since the last note was generated.  Service: Cardiothoracic Surgery    Final Active Diagnoses:    Diagnosis Date Noted POA    PRINCIPAL PROBLEM:  Nonspecific abnormal results of basal metabolism function study [R94.8] 09/23/2022 Yes      Problems Resolved During this Admission:      Discharged Condition: good    Disposition: Home or Self Care    Follow Up:   Follow-up Information     Peterson Sánchez MD Follow up.    Specialty: Cardiology  Why: Hospital Follow Up  Contact information:  [address] [facility] [address] [phone]                       Patient Instructions:   No discharge procedures on file.  Medications:  Reconciled Home Medications:      Medication List      START taking these medications    aspirin 81 MG EC tablet  Commonly known as: ECOTRIN  Take 1 tablet (81 mg total) by mouth once daily.  Start taking on: October 26, 2022     atorvastatin 40 MG tablet  Commonly known as: LIPITOR  Take 1 tablet (40 mg total) by mouth once daily.     carvediloL 12.5 MG tablet  Commonly known as: COREG  Take 1 tablet (12.5 mg total) by mouth 2 (two) times daily.     valsartan 160 MG tablet  Commonly known as: DIOVAN  Take 1 tablet (160 mg total) by mouth once daily.  Start taking on: October 26, 2022     zinc oxide-cod liver oil 40 % Pste paste  Commonly known as: DESITIN  Apply topically 2 (two) times a day.        CONTINUE taking these medications    amLODIPine 10 MG tablet  Commonly known as: NORVASC  Take 5 mg by mouth once daily.     azelastine 137 mcg (0.1 %) nasal spray  Commonly known as: ASTELIN  by Nasal route.     co-enzyme  Q-10 30 mg capsule  Take 200 mg by mouth once daily.     ELIQUIS 5 mg Tab  Generic drug: apixaban  Take 5 mg by mouth 2 (two) times daily.     metoprolol succinate 50 MG 24 hr tablet  Commonly known as: TOPROL-XL  Take 50 mg by mouth once daily.     multivitamin capsule  Take 1 capsule by mouth once daily.     rosuvastatin 20 MG tablet  Commonly known as: CRESTOR  Take 20 mg by mouth every evening.     sertraline 50 MG tablet  Commonly known as: ZOLOFT  Take 50 mg by mouth once daily.     terazosin 2 MG capsule  Commonly known as: HYTRIN  Take 2 mg by mouth once daily.          Time spent on the discharge of patient: 22 minutes    HOLDEN Cristina  Cardiothoracic Surgery  [facility] - 3rd Floor Medical Telemetry

## 2022-10-25 NOTE — PLAN OF CARE
Problem: Rehabilitation (IRF) Plan of Care  Goal: Plan of Care Review  Outcome: Ongoing, Progressing  Goal: Patient-Specific Goal (Individualized)  Outcome: Ongoing, Progressing  Goal: Absence of New-Onset Illness or Injury  Outcome: Ongoing, Progressing  Goal: Optimal Comfort and Wellbeing  Outcome: Ongoing, Progressing  Goal: Readiness for Transition of Care  Outcome: Ongoing, Progressing     Problem: Impaired Wound Healing  Goal: Optimal Wound Healing  Outcome: Ongoing, Progressing     Problem: Skin Injury Risk Increased  Goal: Skin Health and Integrity  Outcome: Ongoing, Progressing     Problem: Fall Injury Risk  Goal: Absence of Fall and Fall-Related Injury  Outcome: Ongoing, Progressing     Problem: Infection  Goal: Absence of Infection Signs and Symptoms  Outcome: Ongoing, Progressing

## 2022-10-25 NOTE — H&P
[facility] ([facility])  Rehab Admission H&P    Patient Name: Jessica Elias  MRN: [id number]  Age: 82 y.o. Sex: female  : 1940  Hospital Length of Stay: 0 days  Date of Service: 10/25/2022   Chief Complaint:  CAD s/p CABG x4 (LIMA to LAD, SVG to Diag 1, SVG to OM, and SVG to RCA), Ligation of left atrial appendage on 10/11/2022    Subjective:   History of Present Illness   82-year-old white female presented to [facility] on 10/11/2022 for scheduled CABG x4 2/2 multivessel CAD.  PMH significant for multi vessel CAD, paroxysmal atrial fibrillation, carotid artery stenosis, MP, recurrent bradycardia, HLD, and HTN.  Tolerated CABG x4 (LIMA to LAD, SVG to Diag 1, SVG to OM, and SVG to RCA), Ligation of left atrial appendage on 10/11 without perioperative complications.  Diuresed postoperatively. Chest tubes discontinued on 10/15.  Atrial fib RVR reported on 10/15.  Amiodarone drip initiated.  Eliquis 5 mg b.i.d. resumed on 10/16.  Cardiology recommended not continuing amiodarone.  Metoprolol discontinued on 10/19 and Coreg 12.5 mg b.i.d. initiated.  Tolerated transfer to [facility] inpatient rehab unit on 10/25 without incident.  Sitting comfortably in bed.  Reports good sleep.  Appetite fair.  Last 10/24.  BP low on admission.  Recent lab work reviewed.  Repeat imaging reported below.    Past Medical History: Multivessel CAD s/p CABG x4 on 10/11/2022, carotid artery stenosis s/p CEA, HTN, paroxysmal atrial fibrillation, GERD, anxiety, HLD, normocytic anemia, MP, SSS s/p PPM  Procedure history:  CABG x4 on 10/11/2022, CEA, pacemaker, lipoma removal, cataract extraction, hysterectomy, bladder suspension, appendectomy  Family History:  Father:  CAD +MI +HTN + at age 84.  Mother:  CVA +HTN +CAD s/p CABG + at age 84  Social History:   (-) TOB.   (-) ETOH.   (-) Illicit drug use.   Completed master's degree and guidance/counseling.  Retired guidance counselor.  .   Completely independent, lives alone.    Prior level of function:  Independent ADLs, drives, cooks, does chores, does laundry, grocery shops, manages finances, and manages own medications.  She hires someone from lawn maintenance  Residence:  Lives alone in [place], [place] in a single-story home with a threshold to enter the residence.  She uses a walk-in shower with a small threshold, shower chair, and grab bars.  She has an elevated toilet with grab bars adjacent.  DME:  Shower chair, grab bars in shower, elevated toilet, grab bars adjacent toilet  Anticipated discharge destination:  Home with home health    Review of patient's allergies indicates:   Allergen Reactions    Penicillin     Sulfa (sulfonamide antibiotics)       No current facility-administered medications for this encounter.    Current Outpatient Medications:     [START ON 10/26/2022] aspirin (ECOTRIN) 81 MG EC tablet, Take 1 tablet (81 mg total) by mouth once daily., Disp: 30 tablet, Rfl: 0    atorvastatin (LIPITOR) 40 MG tablet, Take 1 tablet (40 mg total) by mouth once daily., Disp: 90 tablet, Rfl: 3    carvediloL (COREG) 12.5 MG tablet, Take 1 tablet (12.5 mg total) by mouth 2 (two) times daily., Disp: 60 tablet, Rfl: 11    [START ON 10/26/2022] valsartan (DIOVAN) 160 MG tablet, Take 1 tablet (160 mg total) by mouth once daily., Disp: 90 tablet, Rfl: 3    zinc oxide-cod liver oil (DESITIN) 40 % Pste paste, Apply topically 2 (two) times a day., Disp: 1 g, Rfl: 0    Facility-Administered Medications Ordered in Other Encounters:     0.9%  NaCl infusion (for blood administration), , Intravenous, Q24H PRN, Bety Thompson MD    0.9%  NaCl infusion, , Intravenous, Once, Abner Mitchell MD    acetaminophen oral solution 650 mg, 650 mg, Per OG tube, Q6H PRN, HOLDEN Cristina    albumin human 5% bottle 12.5 g, 12.5 g, Intravenous, PRN, HOLDEN Cristina, Stopped at 10/12/22 0310    amLODIPine tablet 5 mg, 5 mg, Oral, BID, Rein T Maya, FNP, 5 mg at  10/25/22 0932    apixaban tablet 5 mg, 5 mg, Oral, BID, Arlene Martin, FNP, 5 mg at 10/25/22 0932    aspirin EC tablet 81 mg, 81 mg, Oral, Daily, HOLDEN Cristina, 81 mg at 10/25/22 0933    atorvastatin tablet 40 mg, 40 mg, Oral, Daily, LUIS ENRIQUE Gomez, 40 mg at 10/24/22 1622    bisacodyL suppository 10 mg, 10 mg, Rectal, Daily PRN, HOLDEN Mendoza    calcium gluconate 1 g in NS IVPB (premixed), 1 g, Intravenous, PRN, HOLDEN Cristina    calcium gluconate 1 g in NS IVPB (premixed), 2 g, Intravenous, PRN, HOLDEN Cristina    calcium gluconate 1 g in NS IVPB (premixed), 3 g, Intravenous, PRN, HOLDEN Cristina    carvediloL tablet 12.5 mg, 12.5 mg, Oral, BID, Arlene Martin, FNP, 12.5 mg at 10/25/22 0933    dextrose 10% bolus 125 mL, 12.5 g, Intravenous, PRN, eBty Thompson MD    dextrose 10% bolus 250 mL, 25 g, Intravenous, PRN, Bety Thompson MD    dextrose 50% injection 12.5 g, 12.5 g, Intravenous, PRN, HOLDEN Cristina    dextrose 50% injection 25 g, 25 g, Intravenous, PRN, HOLDEN Cristina    diazePAM tablet 10 mg, 10 mg, Oral, On Call Procedure, Abner Mitchell MD, 10 mg at 09/23/22 0823    diphenhydrAMINE capsule 50 mg, 50 mg, Oral, On Call Procedure, Abner Mitchell MD, 50 mg at 09/23/22 0823    docusate sodium capsule 100 mg, 100 mg, Oral, BID, HOLDEN Cristina, 100 mg at 10/25/22 0931    famotidine tablet 40 mg, 40 mg, Oral, Daily, HOLDEN Cristina, 40 mg at 10/25/22 0932    folic acid tablet 1 mg, 1 mg, Oral, Daily, HOLDEN Cristina, 1 mg at 10/25/22 0933    glucagon (human recombinant) injection 1 mg, 1 mg, Intramuscular, PRN, Jackie Rush MD    glucose chewable tablet 16 g, 16 g, Oral, PRN, Jackie Rush MD    glucose chewable tablet 24 g, 24 g, Oral, PRN, Jackie Rush MD    hydrALAZINE injection 10 mg, 10 mg, Intravenous, Q4H PRN, LUIS ENRIQUE Gomez, 10 mg at 10/18/22 0113    hydroCHLOROthiazide tablet 25 mg, 25 mg,  Oral, Daily, Arlene Martin, MIKEP, 25 mg at 10/25/22 0931    HYDROcodone-acetaminophen 5-325 mg per tablet 1 tablet, 1 tablet, Oral, Q4H PRN, HOLDEN Cristina, 1 tablet at 10/14/22 0600    insulin aspart U-100 injection 0-5 Units, 0-5 Units, Subcutaneous, QID (AC + HS) PRN, Jackie Rush MD, 2 Units at 10/14/22 1109    lactulose 10 gram/15 ml solution 20 g, 20 g, Oral, Q6H PRN, HOLDEN Cristina, 20 g at 10/16/22 0949    loperamide capsule 2 mg, 2 mg, Oral, Continuous PRN, HOLDEN Cristina    magnesium sulfate 2g in water 50mL IVPB (premix), 2 g, Intravenous, PRN, HOLDEN Cristina    magnesium sulfate 2g in water 50mL IVPB (premix), 4 g, Intravenous, PRN, HOLDEN Cristina    metoclopramide HCl injection 5 mg, 5 mg, Intravenous, Q6H PRN, HOLDEN Cristina    metoprolol injection 5 mg, 5 mg, Intravenous, Q1H PRN, Deion Marroquin NP    morphine injection 4 mg, 4 mg, Intravenous, Q4H PRN, HOLDEN Cristina, 2 mg at 10/11/22 1423    ondansetron injection 4 mg, 4 mg, Intravenous, Q4H PRN, HOLDEN Cristina, 4 mg at 10/12/22 0545    oxyCODONE immediate release tablet 10 mg, 10 mg, Oral, Q4H PRN, HOLDEN Cristina, 5 mg at 10/12/22 0410    polyethylene glycol packet 17 g, 17 g, Oral, Daily PRN, HOLDEN Mendoza    prazosin capsule 1 mg, 1 mg, Oral, Daily, Bety Thompson MD, 1 mg at 10/25/22 1059    sertraline tablet 50 mg, 50 mg, Oral, Daily, HOLDEN Mendoza, 50 mg at 10/25/22 0933    sodium chloride 0.9% flush 10 mL, 10 mL, Intravenous, PRN, Abner Mitchell MD    sodium phosphate 15 mmol in dextrose 5 % 250 mL IVPB, 15 mmol, Intravenous, PRN, HOLDEN Cristina, Stopped at 10/12/22 0156    sodium phosphate 20.01 mmol in dextrose 5 % 250 mL IVPB, 20.01 mmol, Intravenous, PRN, HOLDEN Cristina, Stopped at 10/13/22 1612    sodium phosphate 30 mmol in dextrose 5 % 250 mL IVPB, 30 mmol, Intravenous, PRN, HOLDEN Cristina    sucralfate tablet 1 g, 1 g,  "Oral, QID (AC & HS), HOLDEN Cristina, 1 g at 10/25/22 1034    valsartan tablet 160 mg, 160 mg, Oral, Daily, LUIS ENRIQUE Gomez, 160 mg at 10/25/22 0932    vancomycin in dextrose 5 % 1 gram/250 mL IVPB 1,000 mg, 1,000 mg, Intravenous, Once, Bety Thompson MD    zinc oxide-cod liver oil 40 % paste, , Topical (Top), BID, Bety Thompson MD, Given at 10/25/22 1035     Review of Systems   Complete 12-point review of symptoms negative except for what's mentioned in HPI     Objective:     /60   Pulse 77   Temp 98.9 °F (37.2 °C)   Resp 17   Ht 5' 4.5" (1.638 m)   Wt 62 kg (136 lb 11 oz)   SpO2 95%   BMI 23.10 kg/m²     No intake or output data in the 24 hours ending 10/25/22 8197    Physical Exam  Constitutional:       Appearance: Normal appearance.   HENT:      Head: Normocephalic.      Mouth/Throat:      Mouth: Mucous membranes are moist.   Eyes:      Pupils: Pupils are equal, round, and reactive to light.   Cardiovascular:      Rate and Rhythm: Normal rate and regular rhythm.      Heart sounds: Normal heart sounds.      Comments: Sternal incision clean and intact  Pulmonary:      Effort: Pulmonary effort is normal.      Breath sounds: Normal breath sounds.   Abdominal:      General: Bowel sounds are normal.      Palpations: Abdomen is soft.   Musculoskeletal:      Cervical back: Neck supple.      Comments: Generalized weakness and muscle atrophy   Skin:     General: Skin is warm and dry.   Neurological:      General: No focal deficit present.      Mental Status: She is alert and oriented to person, place, and time.   Psychiatric:         Mood and Affect: Mood normal.         Behavior: Behavior normal.         Thought Content: Thought content normal.         Judgment: Judgment normal.   *MD performed and documented physical examination         Lines/Drains/Airways       Peripheral Intravenous Line  Duration                  Peripheral IV - Single Lumen 10/20/22 1500 20 G Anterior;Left Forearm 4 " days                    Labs  Recent Results (from the past 48 hour(s))   POCT glucose    Collection Time: 10/23/22  8:23 PM   Result Value Ref Range    POCT Glucose 116 (H) 70 - 110 mg/dL   POCT glucose    Collection Time: 10/24/22 12:49 PM   Result Value Ref Range    POCT Glucose 164 (H) 70 - 110 mg/dL   POCT glucose    Collection Time: 10/24/22  7:17 PM   Result Value Ref Range    POCT Glucose 126 (H) 70 - 110 mg/dL   POCT glucose    Collection Time: 10/24/22 10:00 PM   Result Value Ref Range    POC Glucose 126 (A) 70 - 110 MG/DL   POCT glucose    Collection Time: 10/25/22  3:46 AM   Result Value Ref Range    POCT Glucose 115 (H) 70 - 110 mg/dL   POCT Glucose, Hand-Held Device    Collection Time: 10/25/22  4:13 AM   Result Value Ref Range    POC Glucose 115 (A) 70 - 110 MG/DL   COVID-19 Rapid Screening    Collection Time: 10/25/22  9:37 AM   Result Value Ref Range    SARS COV-2 MOLECULAR Negative Negative        Radiology  CXR two view on 10/16/2022 at 9:11 a.m., IMPRESSION: Interval removal of support catheters with evidence of a small left apical pneumothorax. Bilateral pleural effusions. No other focal consolidative changes.   Radiology  Transthoracic echo on 09/23/2022: The ejection fraction was calculated to be 55%.  The left ventricular systolic function was normal.  The left ventricular end diastolic pressure was elevated. The pre-procedure left ventricular end diastolic pressure was 23. The estimated blood loss was none. There was three vessel coronary artery disease. There was no mitral valve regurgitation. There was no aortic valve stenosis.  Diagnostic study  [facility] on 09/23/2022-left main: Highly calcified without obstructive lesions.  Lad: Heavily calcified 70-80% stenosis proximally (long lesion) in 70% distal at the bifurcation with the 2nd diagonal branch.  The 1st diagonal branch shows disease of 90%.  Left circumflex:  Calcified, 99% stenosis proximally.  RCA:  Highly calcified, 99% stenosis of the  ostium followed by 90% stenosis proximally.    Assessment/Plan:     82 y.o. WF admitted on 10/25/2022    CAD (multivessel)  - s/p CABG x4 (LIMA to LAD, SVG to Diag 1, SVG to OM, and SVG to RCA), Ligation of left atrial appendage on 10/11/2022  - denies recent chest pain or discomfort  - continue   Coreg 12.5 mg b.i.d.     Valsartan 100 mg daily    Lipitor 40 mg daily    Aspirin 81 mg daily   Carafate 1 g q.i.d.  Norco 5 mg/325 mg q.4 hours p.r.n.   - ECG and Nitro PRN  - not on Plavix  - continue cardiac diet  - to follow-up with cardiology outpatient    Debility   - 2/2 above  - Consult physiatry for rehab and pain management  - PT/OT/RT/ST to eval and treat     Carotid artery stenosis   - s/p CEA  - continue   Lipitor 40 mg daily    Aspirin 81 mg daily    Eliquis 5 mg b.i.d.  - to follow-up with vascular surgery outpatient    Sick sinus syndrome   - s/p PPM  - continue   Coreg 12.5 mg b.i.d.   - to follow-up with cardiology outpatient    HTN  - BP low on admission  - discontinued   HCTZ 25 mg daily on 10/25   Prazosin 1 mg daily on 10/25  - initiate    mL bolus x 1  - continue   Coreg 12.5 mg b.i.d.     Valsartan 100 mg daily    Norvasc 10 mg daily    Hydralazine 10 mg every 2 hours as needed for BP > 160/90   Labetalol 10 mg every 2 hours as needed for BP > 160/90    Paroxysmal atrial fibrillation   - rate control   - no evidence of bleeding  - continue    Eliquis 5 mg b.i.d.    Coreg 12.5 mg b.i.d.    - to follow-up with cardiology outpatient    GERD  - Avoid spicy foods, and nothing to eat or drink within x2 hours of bedtime or laying flat (water is ok)   - Avoid NSAIDs (Advil, ibuprofen, naproxen...) and brown-2 inhibitors (Mobic, Celebrex)    - continue   Pepcid 40 mg daily  Colace 100 mg b.i.d.     Anxiety    - stable  - continue  Zoloft 50 mg daily    HLD  - FLP at goal!!  - continue   Lipitor 40 mg daily     Normocytic anemia  - asymptomatic  - H/H stable   - continue   Folic acid 1 mg daily   - no  evidence of active bleeds  - will closely monitor and transfuse if needed     MP   - Compliant with CPAP at home    Chronic stage III sacral ulcer  - current   - Wound Care to evaluate and treat   - currently with home border dressing  - change acute will nutrition    MEDICAL PLAN:  - Admit to [facility]  - Medical reconciliation completed and included in the electronic health record  - Draw admit labs including CBC, CMP, and Prealbumin  - Maintain weightbearing status as ordered  - Monitor postoperative surgical incision changing dressing daily  - Teach skin protection and wound prevention techniques  - Initiate fall precaution  - Initiate bowel training program  - Initiate bladder training as indicated  - Pain management  - IS q2 hours while awake    THERAPEUTIC PLAN:  - Physical therapy 60-90 minutes 5 to week to increase functional mobility, maintain weightbearing precautions, increase lower extremity restrengthening, increase overall activity tolerance, teach balance and safety measures as well as fall precautions, make equipment and orthotic recommendations, be involved in family training and discharge planning, monitor pain levels and vital signs during therapy adjusting treatment accordingly  - Occupational therapy 60-90 minutes 5 times a week to increase functional independence with activities of daily living, maintain weightbearing precautions, teach use of adaptive equipment, increase upper extremity restrengthening, increase overall activity tolerance, teach balance and safety measures as well as fall precautions, make equipment and orthotic recommendations, be involved in family training and discharge planning, monitor pain levels and vital signs during therapy adjusting treatment accordingly  - Speech and language pathology will do an in-depth evaluation of patient's cognition, phonation, and swallowing. They will initiate therapy 30- 60 minutes 5 times a  week as indicated  - Recreational therapy will incorporate all patient's functional abilities  into recreational activities that will require visual, spatial, and perceptual abilities; gross and fine motor coordination skills; the cognition to follow multistep commands; dynamic and static balance and reaching abilities. They will also incorporate animal assisted therapy into their weekly program  - Rehabilitation nursing will act as patient family physician liaison. They will integrate patient's functional abilities, after discussions with therapist, into everyday activities with the CNA's,  LPN's and RNs that will include but are not limited to dressing, bathing, feeding, grooming, toileting, transfers, hygiene etc. They will administer medications watching for wanted as well as unwanted effects. They will initiate DVT/VTE prophylaxis as ordered. Will monitor vital signs, lab values, and pain levels, making appropriate physician notification. They will monitor skin integrity including postop incision, making daily dressing changes. They will teach skin protection and wound prevention techniques. They will initiate bowel training They will initiate bladder training as indicated. They will initiate fall precautions  - Rehabilitation counseling/case management will act as patient and family liaison. They will set up family conferences, family training, aid in discharge planning, and aid in the procurement of assistive devices  - Patient will have 24 hour availability to physiatric care  - Patient will have nutritional services involved, monitoring oral input and visceral protein stores. They will make dietary and supplement recommendations and follow-up as necessary  - Patient will have weekly interdisciplinary team conferences  - Patient will have initial family conference and follow up conferences as indicated  - Patient will have family training prior to discharge     Estimated length of stay - 14-17  days  Anticipated discharge destination - Home with   Medical status - stabilizing postoperatively; will need to monitor pain levels, postoperative skin integrity, watch for evidence of deep vein thrombosis, monitor postoperative H&H, monitor vital signs closely.  Medical prognosis - Good for post-op healing and functional improvement  Previous functional Status:  Independent  Present Functional Status:  Min assist    VTE Prophylaxis:  Eliquis 5 mg b.i.d.  COVID-19 testing:  Negative on 10/25/2002  COVID-19 vaccination status:  Vaccinated (Moderna) 01/20/2021 and 02/17/2021    POA: No  Living will: No  Contacts: Carrillo Elias (daughter) [phone]-lives in [place]      Kiara Elias (niece) [phone]    CODE STATUS: Full Code  Internal Medicine (attending): Demetri Heredia MD  Physiatry (consulting):  Tonio Herzog MD    OUTPATIENT PROVIDERS  PCP: Tarik Gee MD  Cardiology:  Peterson Sánchez MD  CV surgery:  Earl Thompson MD  Nephrology: Dhavla Ibrahim MD    DISPOSITION: Condition stable. Tolerated transfer.  Recent labs and imaging reviewed.  Rehab admission orders initiated.  Med reconciliation completed.  Consult physiatry for rehab and pain management.  PT/OT/RT/ST to eval and treat.  Blood pressure low on admission.  Initiate normal saline 500 mL bolus x1.  Discontinue prazosin 1 mg and HCTZ.  Decrease Norvasc 5 mg daily.  Obtain admission lab work in the morning.  Obtain daily weights.  Monitor closely.  Notify of acute changes.      PHYSICIAN ATTESTATION: I have been involved in the patient's rehabilitation prescreening process and I have now completed the post admission physician evaluation. Patient is in need of, appropriate for, and should tolerate the above outlined inpatient rehabilitation plan. I believe this is necessary to reach maximal postoperative healing and maximal functional abilities. I do not believe this would be possible in a timely fashion in a less intensive setting      Christian Mccollum  NP conducted independent physical examination and assisted with medical documentation.    Total time spent on this encounter including chart review and direct MD + NP 1-on-1 patient interaction: 114 minutes   Over 50% of this time was spent in counseling and coordination of care

## 2022-10-25 NOTE — PLAN OF CARE
10/25/22 1105   Final Note   Assessment Type Final Discharge Note   Anticipated Discharge Disposition Rehab  ([facility]   Post-Acute Status   Post-Acute Authorization Placement   Post-Acute Placement Status Set-up Complete/Auth obtained   Nurse updated and provided with information for report. Transportation arranged with [place] via van at 1 pm.

## 2022-10-26 LAB
ALBUMIN SERPL-MCNC: 2.5 GM/DL (ref 3.4–4.8)
ALBUMIN/GLOB SERPL: 0.6 RATIO (ref 1.1–2)
ALP SERPL-CCNC: 52 UNIT/L (ref 40–150)
ALT SERPL-CCNC: 8 UNIT/L (ref 0–55)
AST SERPL-CCNC: 17 UNIT/L (ref 5–34)
BASOPHILS # BLD AUTO: 0.05 X10(3)/MCL (ref 0–0.2)
BASOPHILS NFR BLD AUTO: 0.6 %
BILIRUBIN DIRECT+TOT PNL SERPL-MCNC: 0.5 MG/DL
BUN SERPL-MCNC: 28.1 MG/DL (ref 9.8–20.1)
CALCIUM SERPL-MCNC: 9.3 MG/DL (ref 8.4–10.2)
CHLORIDE SERPL-SCNC: 104 MMOL/L (ref 98–107)
CO2 SERPL-SCNC: 31 MMOL/L (ref 23–31)
CREAT SERPL-MCNC: 0.99 MG/DL (ref 0.55–1.02)
EOSINOPHIL # BLD AUTO: 0.38 X10(3)/MCL (ref 0–0.9)
EOSINOPHIL NFR BLD AUTO: 4.8 %
ERYTHROCYTE [DISTWIDTH] IN BLOOD BY AUTOMATED COUNT: 13.2 % (ref 11.5–17)
FERRITIN SERPL-MCNC: 900.44 NG/ML (ref 4.63–204)
GFR SERPLBLD CREATININE-BSD FMLA CKD-EPI: 58 MLS/MIN/1.73/M2
GLOBULIN SER-MCNC: 4 GM/DL (ref 2.4–3.5)
GLUCOSE SERPL-MCNC: 115 MG/DL (ref 82–115)
HCT VFR BLD AUTO: 32.3 % (ref 37–47)
HGB BLD-MCNC: 10.5 GM/DL (ref 12–16)
IMM GRANULOCYTES # BLD AUTO: 0.02 X10(3)/MCL (ref 0–0.04)
IMM GRANULOCYTES NFR BLD AUTO: 0.3 %
IRON SATN MFR SERPL: 22 % (ref 20–50)
IRON SERPL-MCNC: 30 UG/DL (ref 50–170)
LYMPHOCYTES # BLD AUTO: 1.85 X10(3)/MCL (ref 0.6–4.6)
LYMPHOCYTES NFR BLD AUTO: 23.5 %
MAGNESIUM SERPL-MCNC: 1.7 MG/DL (ref 1.6–2.6)
MCH RBC QN AUTO: 29.9 PG (ref 27–31)
MCHC RBC AUTO-ENTMCNC: 32.5 MG/DL (ref 33–36)
MCV RBC AUTO: 92 FL (ref 80–94)
MONOCYTES # BLD AUTO: 0.66 X10(3)/MCL (ref 0.1–1.3)
MONOCYTES NFR BLD AUTO: 8.4 %
NEUTROPHILS # BLD AUTO: 4.9 X10(3)/MCL (ref 2.1–9.2)
NEUTROPHILS NFR BLD AUTO: 62.4 %
NRBC BLD AUTO-RTO: 0 %
PHOSPHATE SERPL-MCNC: 3.6 MG/DL (ref 2.3–4.7)
PLATELET # BLD AUTO: 422 X10(3)/MCL (ref 130–400)
PMV BLD AUTO: 10.8 FL (ref 7.4–10.4)
POCT GLUCOSE: 111 MG/DL (ref 70–110)
POTASSIUM SERPL-SCNC: 2.9 MMOL/L (ref 3.5–5.1)
PREALB SERPL-MCNC: 12 MG/DL (ref 14–37)
PROT SERPL-MCNC: 6.5 GM/DL (ref 5.8–7.6)
RBC # BLD AUTO: 3.51 X10(6)/MCL (ref 4.2–5.4)
SODIUM SERPL-SCNC: 142 MMOL/L (ref 136–145)
TIBC SERPL-MCNC: 109 UG/DL (ref 70–310)
TIBC SERPL-MCNC: 139 UG/DL (ref 250–450)
TRANSFERRIN SERPL-MCNC: 127 MG/DL
WBC # SPEC AUTO: 7.9 X10(3)/MCL (ref 4.5–11.5)

## 2022-10-26 PROCEDURE — 83735 ASSAY OF MAGNESIUM: CPT | Performed by: NURSE PRACTITIONER

## 2022-10-26 PROCEDURE — 97166 OT EVAL MOD COMPLEX 45 MIN: CPT

## 2022-10-26 PROCEDURE — 97535 SELF CARE MNGMENT TRAINING: CPT

## 2022-10-26 PROCEDURE — 85025 COMPLETE CBC W/AUTO DIFF WBC: CPT | Performed by: NURSE PRACTITIONER

## 2022-10-26 PROCEDURE — 94799 UNLISTED PULMONARY SVC/PX: CPT

## 2022-10-26 PROCEDURE — 82728 ASSAY OF FERRITIN: CPT | Performed by: NURSE PRACTITIONER

## 2022-10-26 PROCEDURE — 83540 ASSAY OF IRON: CPT | Performed by: NURSE PRACTITIONER

## 2022-10-26 PROCEDURE — 94761 N-INVAS EAR/PLS OXIMETRY MLT: CPT

## 2022-10-26 PROCEDURE — 97162 PT EVAL MOD COMPLEX 30 MIN: CPT

## 2022-10-26 PROCEDURE — 80053 COMPREHEN METABOLIC PANEL: CPT | Performed by: NURSE PRACTITIONER

## 2022-10-26 PROCEDURE — 97530 THERAPEUTIC ACTIVITIES: CPT

## 2022-10-26 PROCEDURE — 84100 ASSAY OF PHOSPHORUS: CPT | Performed by: NURSE PRACTITIONER

## 2022-10-26 PROCEDURE — 25000003 PHARM REV CODE 250: Performed by: NURSE PRACTITIONER

## 2022-10-26 PROCEDURE — 97110 THERAPEUTIC EXERCISES: CPT

## 2022-10-26 PROCEDURE — 36415 COLL VENOUS BLD VENIPUNCTURE: CPT | Performed by: NURSE PRACTITIONER

## 2022-10-26 PROCEDURE — 84134 ASSAY OF PREALBUMIN: CPT | Performed by: NURSE PRACTITIONER

## 2022-10-26 PROCEDURE — 11800000 HC REHAB PRIVATE ROOM

## 2022-10-26 PROCEDURE — 99900035 HC TECH TIME PER 15 MIN (STAT)

## 2022-10-26 RX ORDER — POTASSIUM CHLORIDE 20 MEQ/1
40 TABLET, EXTENDED RELEASE ORAL 3 TIMES DAILY
Status: COMPLETED | OUTPATIENT
Start: 2022-10-26 | End: 2022-10-26

## 2022-10-26 RX ORDER — POTASSIUM CHLORIDE 20 MEQ/1
40 TABLET, EXTENDED RELEASE ORAL 3 TIMES DAILY
Status: DISCONTINUED | OUTPATIENT
Start: 2022-10-26 | End: 2022-10-26

## 2022-10-26 RX ADMIN — APIXABAN 5 MG: 5 TABLET, FILM COATED ORAL at 08:10

## 2022-10-26 RX ADMIN — FOLIC ACID 1 MG: 1 TABLET ORAL at 09:10

## 2022-10-26 RX ADMIN — POTASSIUM CHLORIDE 40 MEQ: 20 TABLET, EXTENDED RELEASE ORAL at 09:10

## 2022-10-26 RX ADMIN — SUCRALFATE 1 G: 1 TABLET ORAL at 04:10

## 2022-10-26 RX ADMIN — SUCRALFATE 1 G: 1 TABLET ORAL at 12:10

## 2022-10-26 RX ADMIN — FAMOTIDINE 20 MG: 20 TABLET ORAL at 09:10

## 2022-10-26 RX ADMIN — CARVEDILOL 12.5 MG: 6.25 TABLET, FILM COATED ORAL at 09:10

## 2022-10-26 RX ADMIN — POTASSIUM CHLORIDE 40 MEQ: 20 TABLET, EXTENDED RELEASE ORAL at 02:10

## 2022-10-26 RX ADMIN — ATORVASTATIN CALCIUM 40 MG: 40 TABLET, FILM COATED ORAL at 09:10

## 2022-10-26 RX ADMIN — DOCUSATE SODIUM 100 MG: 100 CAPSULE, LIQUID FILLED ORAL at 09:10

## 2022-10-26 RX ADMIN — CARVEDILOL 12.5 MG: 6.25 TABLET, FILM COATED ORAL at 08:10

## 2022-10-26 RX ADMIN — POTASSIUM CHLORIDE 40 MEQ: 20 TABLET, EXTENDED RELEASE ORAL at 08:10

## 2022-10-26 RX ADMIN — SUCRALFATE 1 G: 1 TABLET ORAL at 05:10

## 2022-10-26 RX ADMIN — SUCRALFATE 1 G: 1 TABLET ORAL at 08:10

## 2022-10-26 RX ADMIN — APIXABAN 5 MG: 5 TABLET, FILM COATED ORAL at 09:10

## 2022-10-26 RX ADMIN — SERTRALINE HYDROCHLORIDE 50 MG: 50 TABLET ORAL at 09:10

## 2022-10-26 RX ADMIN — AMLODIPINE BESYLATE 5 MG: 5 TABLET ORAL at 09:10

## 2022-10-26 NOTE — PT/OT/SLP PROGRESS
Physical Therapy Inpatient Rehab Treatment    Patient Name:  Jessica Elias   MRN:  [id number]    Recommendations:     Discharge Recommendations:      Discharge Equipment Recommendations:     Barriers to discharge: Decreased caregiver support    Assessment:     Jessica Elias is a 82 y.o. female admitted with a medical diagnosis of S/P CABG x 4.  She presents with the following impairments/functional limitations:  weakness, impaired endurance, impaired functional mobility, gait instability, pain, edema.    Rehab Diagnosis: s/p CABG x 4; Pacemaker; Sacral Ulcer    Recent Surgery: * No surgery found *      General Precautions: Standard, fall, sternal     Orthopedic Precautions:N/A     Braces: N/A    Rehab Prognosis: Good; patient would benefit from acute skilled PT services to address these deficits and reach maximum level of function.      History:     Past Medical History:   Diagnosis Date    Chronic kidney disease, unspecified     stage 3    Coronary artery disease     History of carotid artery disease     Hyperlipidemia     Hypertension     Sleep apnea        Past Surgical History:   Procedure Laterality Date    APPENDECTOMY N/A     BLADDER SURGERY N/A     CAROTID ENDARTERECTOMY N/A     CATARACT EXTRACTION N/A     CORONARY ARTERY BYPASS GRAFT (CABG) N/A 10/11/2022    Procedure: CORONARY ARTERY BYPASS GRAFT (CABG);  Surgeon: Bety Thompson MD;  Location: [facility] OR;  Service: Cardiothoracic;  Laterality: N/A;    HYSTERECTOMY N/A     INSERTION OF PERMANENT PACEMAKER N/A     LEFT HEART CATHETERIZATION Left 09/23/2022    Procedure: CATHETERIZATION, HEART, LEFT;  Surgeon: Abner Mitchell MD;  Location: [facility] CATH LAB;  Service: Cardiology;  Laterality: Left;  LHC VIA RRA    lump removed from right shoulder         Subjective     Chief Complaint: N/A    Respiratory Status: Room air    Patients cultural, spiritual, Advent conflicts given the current situation: no      Objective:     Communicated with RN prior to  session.  Patient found supine with peripheral IV  upon PT entry to room.    Pt is Oriented x3 and Alert, Cooperative, and Motivated.    Vitals   Vitals at Rest  BP    HR    O2 Sat    Pain      Vitals With Activity  BP    HR    O2 Sat    Pain        Functional Mobility:   Toilet t/f: stand step t/f with RW to toilet; overall CGA to trunk; Incontinent episode of BM + void     Current   Status   Discharge   Goal   Functional Area: Care Score:     Roll Left and Right   Independent   Sit to Lying 4 HOB flat; overall CGA to trunk Independent   Lying to Sitting on Side of Bed 3 HOB flat; overall Mod A to trunk; VC for sequencing; Pt unable to maintain sternal precautions Set-up/clean-up   Sit to Stand 4 With RW; overall CGA to trunk Independent   Chair/Bed-to-Chair Transfer 4 Bed <> w/c using stand step t/f with RW; overall CGA to trunk Independent   Car Transfer   Independent   Walk 10 Feet 4  Independent   Walk 50 Feet with Two Turns 4  Independent   Walk 150 Feet   Independent   Walk 10 Feet Uneven Surface   Independent   1 Step (Curb)   Independent   4 Steps   Independent   12 Steps   Independent   Picking Up Object   Independent   Wheel 50 Feet with Two Turns   Not applicable   Wheel 150 Feet   Not applicable       Therapeutic Activities and Exercises:    Balance Training in // bars: Performed without BUE support; 2 sets of 30 seconds BLE EO & EC   Single leg stance   Double leg stance    Gait Training: ~100 ft with no AD; overall CGA to trunk; Pt demonstrated slight LOB; PT recommends RW for safe ambulation    Activity Tolerance: Excellent    Patient left supine with all lines intact, call button in reach, and bed alarm on.    Education provided: roles and goals of PT/PTA, transfer training, bed mob, gait training, safety awareness, body mechanics, assistive device, strengthening exercises, and fall prevention    Expected compliance: High compliance    GOALS:   Multidisciplinary Problems       Physical Therapy Goals           Problem: Physical Therapy    Goal Priority Disciplines Outcome Goal Variances Interventions   Physical Therapy Goal     PT, PT/OT Ongoing, Progressing     Description: Bed Mobility:  Roll left and right with setup/clean-up assist.  Sit to supine transfer with setup/clean-up assist.  Supine to sit transfer with supervision/touching assist.    Transfers:  Sit to stand transfer with setup/clean-up assist using LRAD.  Bed to chair transfer with setup/clean-up assist using LRAD.  Car transfer with setup/clean-up assist using LRAD.   an object from the ground in standing position with setup/clean-up assist using LRAD.    Mobility:  Ambulate 360 feet with supervision/touching assist using LRAD.  Ambulate 10 feet on uneven surfaces/ramps with setup/clean-up assist using LRAD.  Ascend/descend a 4 inch curb with setup/clean-up assist using LRAD.   Ascend/descend 12 stairs with supervision/touching assist using bilateral handrails.                        Plan:     During this hospitalization, patient to be seen 5 x/week to address the identified rehab impairments via gait training, therapeutic activities, therapeutic exercises, neuromuscular re-education and progress toward the following goals:    Plan of Care Expires:  11/01/22  PT Next Visit Date: 11/01/22  Plan of Care reviewed with: patient    Additional Information:         Time Tracking:     Therapy Time  PT Received On: 10/26/22  PT Start Time: 1330  PT Stop Time: 1400  PT Total Time (min): 30 min   PT Individual: 30  Missed Time:    Time Missed due to:      Billable Minutes: Therapeutic Activity 30    10/26/2022

## 2022-10-26 NOTE — PLAN OF CARE
Problem: Occupational Therapy  Goal: Occupational Therapy Goal  Description: ADLs:  Pt to perform UB dressing with set up   Pt to perform LB dressing with set up   Pt to perform putting on/off footwear task with set up  Pt to perform toileting with independent using sternal precautions    Functional Transfers:  Pt to perform toilet transfers with set up.  Pt to perform a tub transfer with set up  Pt to perform a walk-in shower transfer with set up     IADLs:  Pt to perform simple meal with SBA and RW.    Balance, Strengthening, Endurance, Balance:  Pt to consistently demonstrate adherence to orthopedic precautions during all ADL's as instructed by OT.  Pt to demonstrate consistent adherence to breathing control and energy conservation techniques as educated by OT.   Outcome: Ongoing, Progressing      PT DAILY TREATMENT NOTE     Patient Name: Valma Harada  Date:2018  : 1954  [x]  Patient  Verified  Payor: [employer] / Plan: Dalton Naranjo [id number] PPO / Product Type: PPO /    In time: 420  Out time: 455  Total Treatment Time (min): 35  Visit #: 5 of 12    Treatment Area: Pain in right knee [M25.561]  Unilateral primary osteoarthritis, right knee [M17.11]    SUBJECTIVE  Pain Level (0-10 scale): 0  Any medication changes, allergies to medications, adverse drug reactions, diagnosis change, or new procedure performed?: [x] No    [] Yes (see summary sheet for update)  Subjective functional status/changes:   [] No changes reported  \"I don't have any pain right now, but I've had pain behind my knee since my last visit. \"    OBJECTIVE    Modality rationale: patient declined   Min Type Additional Details    [] Estim:  []Unatt       []IFC  []Premod                        []Other:  []w/ice   []w/heat  Position:  Location:    [] Estim: []Att    []TENS instruct  []NMES                    []Other:  []w/US   []w/ice   []w/heat  Position:  Location:    []  Traction: [] Cervical       []Lumbar                       [] Prone          []Supine                       []Intermittent   []Continuous Lbs:  [] before manual  [] after manual    []  Ultrasound: []Continuous   [] Pulsed                           []1MHz   []3MHz W/cm2:  Location:    []  Iontophoresis with dexamethasone         Location: [] Take home patch   [] In clinic    []  Ice     []  heat  []  Ice massage  []  Laser   []  Anodyne Position:  Location:    []  Laser with stim  []  Other:  Position:  Location:    []  Vasopneumatic Device Pressure:       [] lo [] med [] hi   Temperature: [] lo [] med [] hi   [] Skin assessment post-treatment:  []intact []redness- no adverse reaction    []redness  adverse reaction:     10 min Therapeutic Exercise:  [x] See flow sheet :   Rationale: increase ROM and increase strength to improve the patients ability to perform ADLs    25 min Neuromuscular Re-education:  [x]  See flow sheet : quad re-ed, stair training   Rationale: increase ROM, increase strength, improve coordination, improve balance and increase proprioception  to improve the patients ability to normalize gait and balance        With   [x] TE   [] TA   [x] neuro   [] other: Patient Education: [x] Review HEP    [] Progressed/Changed HEP based on:   [] positioning   [] body mechanics   [] transfers   [] heat/ice application    [] other:      Other Objective/Functional Measures:      Pain Level (0-10 scale) post treatment: 1    ASSESSMENT/Changes in Function: Pt is making progress with her knee flexion, but still lacking some motion. She reports elevated pain in posterior knee following her last visit which was about 4/10 according to the patient. Improving with quad activation. Patient will continue to benefit from skilled PT services to modify and progress therapeutic interventions, address functional mobility deficits, address ROM deficits, address strength deficits, analyze and address soft tissue restrictions, analyze and cue movement patterns, analyze and modify body mechanics/ergonomics, assess and modify postural abnormalities, address imbalance/dizziness and instruct in home and community integration to attain remaining goals. [x]  See Plan of Care  []  See progress note/recertification  []  See Discharge Summary         Progress towards goals / Updated goals:  Short Term Goals: To be accomplished in 2 weeks:  1.  Pt to report [place] with HEP. Eval status: HEP issued and reviewed physically/verbally with pt   MET  Long Term Goals: To be accomplished in 4 weeks. 1.   Pt to report score of 63 on FOTO, indicating improved tolerance to activity and reduced pain. Eval status: 44 on initial FOTO   Assess at 5th visit  2.   Pt to report max level of pain <3/10 with activity, indicating reduced pain and improved mobility.    Eval status: max level of pain 10/10   Pain is declining  3.  Pt to demonstrate full R knee AROM/PROM from 0 degrees extension to 125 degrees flexion to improve ambulation and reduce fall risk. Eval status: R knee AROM lacking 6 degrees from full extension, 88 degrees flexion   100 deg flexion AAROM  4.  Pt to demonstrate full 5/5 R knee strength with MMT, indicating improved tolerance to activity and reduced fall risk. Eval status: 4+/5 hip flexion, 4+/5 knee flexion, 4+/5 knee extension, 5/5 DF   Assess at NV  5.  Pt  to be able to ambulate without gait deviations with no AD to improve mobility and reduce fall risk. Eval status: pt currently ambulates with SPC and slight antalgic pattern   Continues to have deviations  6.  Pt to be able to return to full PLOF at home and in the community, including her job at [employer] improved mobility, strength and stability, and reduced fall risk. Eval status: limited   Remains limited  7.  Pt to be able to ascend/descend 12 stairs with reciprocal gait pattern, indicating improved mobility in the home and community and reduced fall risk.    Eval status: pt unable to use reciprocal pattern   No change    PLAN  []  Upgrade activities as tolerated     [x]  Continue plan of care  []  Update interventions per flow sheet       []  Discharge due to:_  []  Other:_      Umair Gomez PTA, CSCS 6/29/2018  4:58 PM    Future Appointments  Date Time Provider Katharine Cerna   6/29/2018 4:30 PM [facility]   7/3/2018 4:30 PM Rima Clarke, PT [facility]   7/5/2018 4:30 PM [facility]   7/9/2018 4:00 PM Chriss Ga PA-C [facility] SCHED   7/11/2018 10:45 AM IO NURSE VISIT [facility] SCHED   7/18/2018 10:30 AM Rafi Wise NP [facility]   9/10/2018 3:00 PM Opal Flores MD [address] [place] [address]   12/3/2018 8:00 AM Anil Fernandes MD [facility]

## 2022-10-26 NOTE — PT/OT/SLP EVAL
Recreational Therapy Evaluation      Date of Treatment: 10/26/22  Start Time: 1130  Stop Time: 1200  Total Time: 30 min  Missed Time:     Assessment      Jessica Elias is a 82 y.o. female admitted with a medical diagnosis of S/P CABG x 4.  She presents with the following impairments/functional limitations:  weakness, impaired endurance .    Rehab Diagnosis:     Recent Surgery: * No surgery found *      General Precautions: Standard, fall, sternal     Orthopedic Precautions:N/A     Braces: N/A    Rehab Prognosis: Good; patient would benefit from acute skilled Recreational Therapy services to address these deficits and reach maximum level of function.      Impairments: Endurance deficits and Strength deficits  Rehab Potential: Good  Treatment Recommendations: Continue with Skill TR Service to facilitate functional independence and address impairments/limitations   Treatment Diagnosis: CABG x4, 3rd spacing, volume overload, a-fib, CKD, SSS, pacemaker, HTN, MP, CEA  Orientation: Oriented x4  Affect/Behavior: Appropriate and Cooperative  Safety/Judgement: intact   Basic Command Following: intact  Spiritual Cultural: no        History     Past Medical History:   Diagnosis Date    Chronic kidney disease, unspecified     stage 3    Coronary artery disease     History of carotid artery disease     Hyperlipidemia     Hypertension     Sleep apnea        Past Surgical History:   Procedure Laterality Date    APPENDECTOMY N/A     BLADDER SURGERY N/A     CAROTID ENDARTERECTOMY N/A     CATARACT EXTRACTION N/A     CORONARY ARTERY BYPASS GRAFT (CABG) N/A 10/11/2022    Procedure: CORONARY ARTERY BYPASS GRAFT (CABG);  Surgeon: Bety Thompson MD;  Location: [facility] OR;  Service: Cardiothoracic;  Laterality: N/A;    HYSTERECTOMY N/A     INSERTION OF PERMANENT PACEMAKER N/A     LEFT HEART CATHETERIZATION Left 09/23/2022    Procedure: CATHETERIZATION, HEART, LEFT;  Surgeon: Abner Mitchell MD;  Location: [facility] CATH LAB;  Service:  "Cardiology;  Laterality: Left;  LHC VIA RRA    lump removed from right shoulder         Home Environment     Admit Date: 10/25/22  Living Situation  Lives With: alone  Lives in: house  Patients Responsibilities: , Financial management, Health and wellness, Laundry, Leisure/play/hobbies, Meal preparation, Retired, Shopping, Social participation  Number of Children: 3  Occupation:Retired: Guidance Counselor    Instrumental Activities of Daily Living     Previous Hand Dominance: Right Current Hand Dominance: Right     Other iADL Information:        Cognitive Skills Building                          Comment:      Dynamic Activities      Activity Assist Position Equipment Response   Activity 1 Bocce ball supervision Standing Rolling walker and Bocce balls good   Comment: Bed to w/c transfer was supervision. Sit to stand was supervision as was dynamic standing balance/reaching. Standing tolerance was 3 minutes at a time. UE coordination was I as were direction following skills. Cooperative and determined       Fine Motor Activities      Activity Assist Position Equipment Response           Comment:        Goals     Patient Goals  Patient Goal 1: "To be self sufficient"    Short Term Goals    Goal  Goal Status   Will increase activity tolerance to supervision Initiated   Will improve dynamic standing balance/reaching to setup Initiated                 Long Term Goals    Goal Goal Status   Will increase standing tolerance to 5,10 minutes Initiated   Will improve dynamic standing balance/reaching shila I Initiated                     Plan       Patient to be seen: Daily  Duration: 2 weeks  Treatments planned: Energy conservation training  Treatment plan/goals established with Patient/Caregiver: Yes     "

## 2022-10-26 NOTE — PLAN OF CARE
PHQ-2 completed (score=0 negative).  Pt feels her antidepressant has been working well for years and continues to do so.  No issues reported.  She says she has remained positive the entire episode.    Discussed discharge planning and my role in that process.  Answered questions re: insurance approval, HH, etc.

## 2022-10-26 NOTE — PROGRESS NOTES
Inpatient Nutrition Assessment    Admit Date: 10/25/2022   Total duration of encounter: 1 day     Nutrition Recommendation/Prescription     - Continue Heart Healthy diet   - Monitor electrolytes - replete K+  - Order Manish BID to promote wound healing.   - Consider adding MVI + 500mg vitamin C + 220 mg zinc sulfate to aid in wound healing.    Communication of Recommendations: reviewed with patient/caregiver    Nutrition Assessment     Malnutrition Assessment/Nutrition-Focused Physical Exam    Malnutrition in the context of acute illness or injury  Degree of Malnutrition: does not meet criteria  Energy Intake: does not meet criteria  Interpretation of Weight Loss: does not meet criteria  Body Fat:well nourished  Area of Body Fat Loss: not applicable  Muscle Mass Loss: well nourished  Area of Muscle Mass Loss: not applicable  Fluid Accumulation: unable to obtain  Edema: not applicable   Reduced  Strength: unable to obtain  A minimum of two characteristics is recommended for diagnosis of either severe or non-severe malnutrition.    Chart Review    Reason Seen: continuous nutrition monitoring / New Rehab    Diagnosis:  CAD (multivessel)  Debility   Carotid artery stenosis   Sick sinus syndrome  HTN  Paroxysmal atrial fibrillation   GERD  Anxiety  MP  Sacral ulcer III  Hypokalemia    Relevant Medical History: multi vessel CAD, paroxysmal atrial fibrillation, carotid artery stenosis, MP, recurrent bradycardia, HLD, and HTN    Nutrition-Related Medications: docusate Na, Folic Acid, Pepcid, KCL, sucralfate  Calorie Containing IV Medications: no significant kcals from medications at this time    Nutrition-Related Labs:  10/26: K 2.9, BUN 28, GFR 58    Diet/PN Order: Diet heart healthy  Oral Supplement Order: none  Tube Feeding Order: none  Appetite/Oral Intake: good/50-75% of meals  Factors Affecting Nutritional Intake: none identified  Food/Congregation/Cultural Preferences: none reported  Food Allergies: none  "reported    Skin Integrity: incision, wound  Wound(s): Stage III sacral pressure ulcer    Comments    Previous facility RD note:   10/13/22: Pt with good appetite, tolerating meals.    10/26: Pt is a new rehab admit. Pt reports good appetite; noted  50-75% consumed on 10/25. Per previous RD note above, pt had good oral intake. MD addressing electrolyte abnormalities (hypokalemia). Noted slight wt decrease via EMR x 1 day. Pt reports fluid loss after getting up to 150lbs. Noted stage III sacral ulcer, offered Manish - pt agreed to trial. Complains of burning with Bms, notified nsg.     Anthropometrics    Height: 5' 4.5" (163.8 cm)    Last Weight: 62 kg (136 lb 11 oz) (10/25/22 1400) Weight Method: Standard Scale  BMI (Calculated): 23.1  BMI Classification: normal (BMI 18.5-24.9)     Ideal Body Weight (IBW), Female: 122.5 lb     % Ideal Body Weight, Female (lb): 111.58 %    Usual Weight Provided By: EMR weight history    Wt Readings from Last 5 Encounters:   10/25/22 62 kg (136 lb 11 oz)   10/24/22 62.9 kg (138 lb 10.7 oz)   09/23/22 63.1 kg (139 lb 1.8 oz)   03/26/21 64.5 kg (142 lb 3.2 oz)     Weight Change(s) Since Admission: 0%  Admit Weight: 62 kg (136 lb 11 oz) (10/25/22 1400)    Estimated Needs    Weight Used For Calorie Calculations: 62 kg (136 lb 11 oz)  Energy Calorie Requirements (kcal): 1550kcals (25kcal/kg)  Energy Need Method: Kcal/kg  Weight Used For Protein Calculations: 62 kg (136 lb 11 oz)  Protein Requirements: 74gm (1.2gm /kg)  Fluid Requirements (mL): 1550mL  Temp: 98.6 °F (37 °C)       Enteral Nutrition    Patient not receiving enteral nutrition at this time.    Parenteral Nutrition    Patient not receiving parenteral nutrition support at this time.    Evaluation of Received Nutrient Intake    Calories: meeting estimated needs  Protein: meeting estimated needs    Patient Education    Not applicable.    Nutrition Diagnosis     PES: Increased nutrient needs related to increased protein needs as " evidenced by Stage III pressure ulcer   New    Interventions/Goals     Intervention(s): modified composition of meals/snacks and collaboration with other providers  Goal: Meet greater than 75% of nutritional needs by follow-up. (new)    Monitoring & Evaluation     Dietitian will monitor food and beverage intake, weight change, and electrolyte/renal panel.  Nutrition Risk/Follow-Up: low (follow-up in 5-7 days)   Please consult if re-assessment needed sooner.

## 2022-10-26 NOTE — PLAN OF CARE
Short term goals updated as appropriate         Problem: Physical Therapy  Goal: Physical Therapy Goal  Description: Bed Mobility:  Roll left and right with setup/clean-up assist.  Sit to supine transfer with setup/clean-up assist.  Supine to sit transfer with supervision/touching assist.    Transfers:  Sit to stand transfer with setup/clean-up assist using LRAD.  Bed to chair transfer with setup/clean-up assist using LRAD.  Car transfer with setup/clean-up assist using LRAD.   an object from the ground in standing position with setup/clean-up assist using LRAD.    Mobility:  Ambulate 360 feet with supervision/touching assist using LRAD.  Ambulate 10 feet on uneven surfaces/ramps with setup/clean-up assist using LRAD.  Ascend/descend a 4 inch curb with setup/clean-up assist using LRAD.   Ascend/descend 12 stairs with supervision/touching assist using bilateral handrails.   Outcome: Ongoing, Progressing

## 2022-10-26 NOTE — PROGRESS NOTES
"   10/26/22 1130   Rec Therapy Time Calculation   Date of Treatment 10/26/22   Rec Start Time 1130   Rec Stop Time 1200   Rec Total Time (min) 30 min   Time   Treatment time 2 units   Precautions   General Precautions fall;sternal   Orthopedic Precautions  N/A   Braces N/A   Pain/Comfort   Pain Rating 1 no pain   Vital Signs   Pulse 76   /83   OTHER   Treatment Diagnosis CABG x4, 3rd spacing, volume overload, a-fib, CKD, SSS, pacemaker, HTN, MP, CEA   Rehab identified problem list/impairments weakness;impaired endurance   Values/Beliefs/Spiritual Care   Spiritual, Cultural Beliefs, Druze Practices, Values that Affect Care no   Prior Living/ADLs   Admit Date 10/25/22   Lives With alone   Living Arrangements house   Patient responsibilites: ;Financial management;Health and wellness;Laundry;Leisure/play/hobbies;Meal preparation;Retired;Shopping;Social participation   Number of Children 3   Occupation Retired: Guidance Counselor   Previous Hand Domiance Right   Current Hand Dominance Right   Overall Level of Functioning   Activity Tolerance Mod Indep   Dynamic Sitting Balance/Reaching Does not occur   Dynamic Standing Balance/Reaching Mod Indep   Right UE Coodination/Dexterity Independent   Left UE Coordination/Dexterity Independent   Problem Solving/Sequencing Skills Independent   Memory Recall Independent   R/L Neglect/Inattention Does not occur   Attention Span Independent   Patient Goals   Patient Goal 1 "To be self sufficient"   Recreational Therapy Short Term Goals   Short Term Goal 1 Will increase activity tolerance to supervision   Short Term Goal 1 Progression Initiated   Short Term Goal 2 Will improve dynamic standing balance/reaching to setup   Short Term Goal 2 Progression Initiated   Recreational Therapy Long Term Goals   Long Term Goal 1 Will increase standing tolerance to 5,10 minutes   Long Term Goal 1 Progression Initiated   Long Term Goal 2 Will improve dynamic standing " balance/reaching shila I   Long Term Goal 2 Progression Initiated   Plan   Patient to be seen Daily   Planned Duration 2 weeks   Treatments Planned Energy conservation training   Treatment plan/goals estblished with Patient/Caregiver Yes

## 2022-10-26 NOTE — PT/OT/SLP EVAL
"Physical Therapy Rehab Evaluation    Patient Name:  Jessica Elias   MRN:  [id number]    Recommendations:     Discharge Recommendations:      Discharge Equipment Recommendations:     Barriers to discharge: Decreased caregiver support    Assessment:     Jessica Elias is a 82 y.o. female admitted with a medical diagnosis of S/P CABG x 4.  She presents with the following impairments/functional limitations:  weakness, impaired endurance, impaired functional mobility, gait instability, pain, edema.    Rehab Diagnosis: s/p CABG x 4; Pacemaker; Sacral Ulcer    Recent Surgery: * No surgery found *      General Precautions: Standard, fall, sternal     Orthopedic Precautions: N/A     Braces: N/A    Rehab Prognosis: Good; patient would benefit from acute skilled PT services to address these deficits and reach maximum level of function.      History:     Past Medical History:   Diagnosis Date    Chronic kidney disease, unspecified     stage 3    Coronary artery disease     History of carotid artery disease     Hyperlipidemia     Hypertension     Sleep apnea        Past Surgical History:   Procedure Laterality Date    APPENDECTOMY N/A     BLADDER SURGERY N/A     CAROTID ENDARTERECTOMY N/A     CATARACT EXTRACTION N/A     CORONARY ARTERY BYPASS GRAFT (CABG) N/A 10/11/2022    Procedure: CORONARY ARTERY BYPASS GRAFT (CABG);  Surgeon: Bety Thompson MD;  Location: [facility] OR;  Service: Cardiothoracic;  Laterality: N/A;    HYSTERECTOMY N/A     INSERTION OF PERMANENT PACEMAKER N/A     LEFT HEART CATHETERIZATION Left 09/23/2022    Procedure: CATHETERIZATION, HEART, LEFT;  Surgeon: Abner Mitchell MD;  Location: [facility] CATH LAB;  Service: Cardiology;  Laterality: Left;  LHC VIA RRA    lump removed from right shoulder         Subjective     Chief Complaint: N/A  Patient/Family Comments/goals: "I want to be able to do what I used to do."    Patients cultural, spiritual, Gnosticism conflicts given the current situation: no       Living " Environment  Lives With: alone  Equipment Currently Used at Home: shower chair, grab bar    Prior Level of Function  Ambulation Skills: independent  Stairs: independent (Pt stated that she avoids stairs in the community.)  Transfer Skills: independent  ADL Skills: independent  Work/Leisure Activity: independent  Cognitive Communication: independent    Equipment used at home: shower chair, grab bar.  DME owned (not currently used): none.      Upon discharge, patient will have assistance from unknown.    Objective:     Communicated with RN prior to session.  Patient found up in chair with peripheral IV  upon PT entry to room.    Vitals   Vitals at Rest  BP (!) 160/70     HR 77   O2 Sat     Pain Pain Rating 1: 0/10     Vitals With Activity  /64   HR 70   O2 Sat     Pain Pain Rating 1: 0/10     Respiratory Status: Room air    Exams    Tests and Measures:      PROM  AROM    Right  LE    WNL    Left    LE    WNL       Lower Extremity Strength:    Right LE  Left LE    Knee extension: 4+ Knee extension: 4+   Knee flexion: 4+ Knee flexion: 4+   Hip flexion: 4+ Hip flexion: 4+   Hip extension:  Not Tested Hip extension: Not Tested   Hip abduction: 4+ Hip abduction: 4+   Hip adduction: 4+ Hip adduction: 4+   Ankle dorsiflexion: 4+ Ankle dorsiflexion: 4+   Ankle plantarflexion: 4+ Ankle plantarflexion: 4+         Functional Mobility      GGs   Admit Current   Status  Goal   Functional Area: Care Score: Care Score:  Care Score:   Roll Left and Right 4 4 HOB flat; overall supervision Independent   Sit to Lying 4 4 HOB flat; overall CGA to trunk Independent   Lying to Sitting on Side of Bed 3 3 HOB flat; overall Mod A to trunk; VC for sequencing and to maintain sternal precautions Set-up/clean-up   Sit to Stand 4 4 With RW; overall CGA to trunk Independent   Chair/Bed-to-Chair Transfer 4 4 Bed <> w/c using stand step t/f with RW; overall CGA to trunk; VC to bring RW closer during t/f Independent   Car Transfer 4 4 Stand step  t/f with RW; overall CGA to trunk; VC for sequencing and safety Independent   Walk 10 Feet 4 4  Independent   Walk 50 Feet with Two Turns 4 4  Independent   Walk 150 Feet 4 4 ~180 ft with RW using step through gait pattern; overall CGA to trunk; VC to keep RW close Independent   Walk 10 Feet Uneven Surface 4 4 10 ft on ramp with RW; overall CGA to trunk; VC for sequencing and safety Independent   1 Step (Curb) 4 4  Independent   4 Steps 4 4 8 steps with B rails; overall CGA to trunk; VC for sequencing and to maintain sternal precautions Independent   12 Steps 88 88  Independent   Picking Up Object 4 4 Reacher and RW; overall CGA to trunk Independent   Wheel 50 Feet with Two Turns 9 9  Not applicable   Wheel 150 Feet 9 9  Not applicable     Therapeutic Activities and Exercises:  Seated TherX: seated in w/c with BUE support; 2 x 15; 2# BLE   Knee extension    Knee flexion with TheraBand   Hip flexion    Ankle pumps    Hip adduction with ball    Hip abduction with Theraband        Activity Tolerance: Excellent    Patient left  supine with wedge under R side to offload sacrum  with all lines intact, call button in reach, and bed alarm on.    Education Provided: roles and goals of PT/PTA, transfer training, bed mob, gait training, stair training, balance training, safety awareness, body mechanics, assistive device, strengthening exercises, fall prevention, and sternal precautions.    Expected compliance: High compliance    GOALS:   Multidisciplinary Problems       Physical Therapy Goals          Problem: Physical Therapy    Goal Priority Disciplines Outcome Goal Variances Interventions   Physical Therapy Goal     PT, PT/OT Ongoing, Progressing     Description: Bed Mobility:  Roll left and right with setup/clean-up assist.  Sit to supine transfer with setup/clean-up assist.  Supine to sit transfer with supervision/touching assist.    Transfers:  Sit to stand transfer with setup/clean-up assist using RW.  Bed to chair  transfer with setup/clean-up assist using RW.  Car transfer with setup/clean-up assist using RW.   an object from the ground in standing position with setup/clean-up assist using RW.    Mobility:  Ambulate 360 feet with supervision/touching assist using RW.  Ambulate 10 feet on uneven surfaces/ramps with setup/clean-up assist using RW.  Ascend/descend a 4 inch curb with setup/clean-up assist using RW.   Ascend/descend 12 stairs with supervision/touching assist using bilateral handrails.                        Plan:     During this hospitalization, patient to be seen 5 x/week to address the identified rehab impairments via gait training, therapeutic activities, therapeutic exercises, neuromuscular re-education and progress toward the following goals:    Plan of Care Expires:  11/01/22  PT Next Visit Date: 11/01/22  Plan of Care reviewed with: patient      Additional Infomation:           Time Tracking:     Therapy Time   PT Received On: 10/26/22  PT Start Time: 0930  PT Stop Time: 1100  PT Total Time (min): 90 min  PT Individual: 90  Missed Time:    Time Missed due to:      Billable Minutes: Evaluation 30, Therapeutic Activity 35, and Therapeutic Exercise 25    10/26/2022

## 2022-10-26 NOTE — PT/OT/SLP EVAL
"Occupational Therapy Inpatient Rehab Evaluation    Name: Jessica Elias  MRN: [id number]    Recommendations:     Discharge Recommendations:  home with home health   Discharge Equipment Recommendations: other (see comments) (Hand held shower)   Barriers to discharge: None    Assessment:  Jessica Elias is a 82 y.o. female admitted with a medical diagnosis of S/P CABG x 4.  She presents with the following impairments/functional limitations:  weakness, impaired endurance, impaired sensation, impaired self care skills, impaired functional mobility, gait instability, impaired balance, decreased safety awareness.    General Precautions: Standard, fall, sternal     Orthopedic Precautions:N/A     Braces: N/A    Rehab Prognosis: Good; patient would benefit from acute skilled OT services to address these deficits and reach maximum level of function.      History:     Past Medical History:   Diagnosis Date    Chronic kidney disease, unspecified     stage 3    Coronary artery disease     History of carotid artery disease     Hyperlipidemia     Hypertension     Sleep apnea        Past Surgical History:   Procedure Laterality Date    APPENDECTOMY N/A     BLADDER SURGERY N/A     CAROTID ENDARTERECTOMY N/A     CATARACT EXTRACTION N/A     CORONARY ARTERY BYPASS GRAFT (CABG) N/A 10/11/2022    Procedure: CORONARY ARTERY BYPASS GRAFT (CABG);  Surgeon: Bety Thompson MD;  Location: [facility] OR;  Service: Cardiothoracic;  Laterality: N/A;    HYSTERECTOMY N/A     INSERTION OF PERMANENT PACEMAKER N/A     LEFT HEART CATHETERIZATION Left 09/23/2022    Procedure: CATHETERIZATION, HEART, LEFT;  Surgeon: Abner Mitchell MD;  Location: [facility] CATH LAB;  Service: Cardiology;  Laterality: Left;  LHC VIA RRA    lump removed from right shoulder         Subjective     Orientation: Oriented x4    Chief Complaint: no complaints    Patient/Family Comments/goals: "to do what I used to do again"    Vitals  Vitals at Rest  BP  146/87   HR  87   O2 Sat   "   Pain Pain Rating 1: 0/10     Respiratory Status: Room air    Patients cultural, spiritual, Yazdanism conflicts given the current situation: no       Living Environment   Living Environment  Lives With: alone  Living Arrangements: house  Home Accessibility: wheelchair accessible  Number of Stairs, Main Entrance: one  Stair Railings, Main Entrance: none  Home Layout: Able to live on 1st floor  Transportation Anticipated: car, drives self  Equipment Currently Used at Home: shower chair, grab bar  Shower Setup: Walk-in shower    Prior Level of Function  BADL: Independent    IADL: Independent    Equipment used at home: shower chair, grab bar, walker, rolling.  DME owned (not currently used): none.      Upon discharge, patient will have assistance from family.    Objective:     Patient found up in chair with peripheral IV  upon OT entry to room.    Mobility   Patient completed:  Sit to Stand Transfer with supervision with rolling walker  Stand to Sit Transfer with supervision with rolling walker  Toilet Transfer Step Transfer technique with supervision with  rolling walker  Tub Transfer Stand Pivot technique with supervision with rolling walker    Functional Mobility   In room mobility with SPV with RW for ADLs    ADLs     Current Status   Eating 6   Oral Hygiene 5   Shower, Bathe Self 4   Upper Body Dressing 3   Lower Body Dressing 4   Toileting Hygiene 4   Toilet Transfer 4   Putting On, Taking Off Footwear 4     Limiting Factors for ADLs: motor, endurance, limited ROM, weakness, and safety awareness    Exams     ROM:          -       WFL    Hand Dominance: Right    ROM Hand  Left Hand: WFL  Right Hand: WFL    Strength  Overall Strength:          -       WFL     Strength:   WFL    Sensation  Left:          -       WNL  Right:          -       WNL    Coordination:      -       Intact    Tone  Left: WNL  Right: WNL    Visual/Perceptual  Intact    Cognition:   WFL    Balance    Sitting  Sitting Surface: TTB  Static:  No UE Support, Normal  Dynamic: No UE extremity support, Good (-)    Standing  Static: Bilateral UE Extremity Support, Good (+)  Dynamic: Bilateral UE extremity support, Fair (+)    Patient left up in chair with call button in reach and chair alarm on.    Education provided: Roles and goals of OT, ADLs, transfer training, bed mobility, sequencing, safety precautions, fall prevention, and equipment recommendations    Multidisciplinary Problems       Occupational Therapy Goals          Problem: Occupational Therapy    Goal Priority Disciplines Outcome Interventions   Occupational Therapy Goal     OT, PT/OT Ongoing, Progressing    Description: ADLs:  Pt to perform UB dressing with set up   Pt to perform LB dressing with set up   Pt to perform putting on/off footwear task with set up  Pt to perform toileting with independent using sternal precautions    Functional Transfers:  Pt to perform toilet transfers with set up.  Pt to perform a tub transfer with set up  Pt to perform a walk-in shower transfer with set up     IADLs:  Pt to perform simple meal with SBA and RW.    Balance, Strengthening, Endurance, Balance:  Pt to consistently demonstrate adherence to orthopedic precautions during all ADL's as instructed by OT.  Pt to demonstrate consistent adherence to breathing control and energy conservation techniques as educated by OT.                        Plan     During this hospitalization, patient to be seen 5 x/week to address the identified rehab impairments via self-care/home management, therapeutic activities, therapeutic exercises and progress toward the following goals:    Plan of Care Expires:  11/01/22    Time Tracking     OT Received On: 10/26/22  Time In 0800     Time Out 0900  Total Time 60 min  Therapy Time: OT Individual: 60  Missed Time:    Missed Time Reason:      Billable Minutes: Evaluation 30 and Self Care/Home Management 30    10/26/2022

## 2022-10-26 NOTE — PROGRESS NOTES
[facility] ([facility])  Rehab Progress Note    Patient Name: Jessica Elias  MRN: [id number]  Age: 82 y.o. Sex: female  : 1940  Hospital Length of Stay: 1 days  Date of Service: 10/26/2022   Chief Complaint: CAD s/p CABG x4 (LIMA to LAD, SVG to Diag 1, SVG to OM, and SVG to RCA), Ligation of left atrial appendage on 10/11/2022    Subjective:     Basic Information  Admit Information: 82-year-old white female presented to [facility] on 10/11/2022 for scheduled CABG x4 2/2 multivessel CAD.  PMH significant for multi vessel CAD, paroxysmal atrial fibrillation, carotid artery stenosis, MP, recurrent bradycardia, HLD, and HTN.  Tolerated CABG x4 (LIMA to LAD, SVG to Diag 1, SVG to OM, and SVG to RCA), Ligation of left atrial appendage on 10/11 without perioperative complications.  Diuresed postoperatively. Chest tubes discontinued on 10/15.  Atrial fib RVR reported on 10/15.  Amiodarone drip initiated.  Eliquis 5 mg b.i.d. resumed on 10/16.  Cardiology recommended not continuing amiodarone.  Metoprolol discontinued on 10/19 and Coreg 12.5 mg b.i.d. initiated.  Tolerated transfer to [facility] inpatient rehab unit on 10/25 without incident.  Today's Information: No acute events overnight.  Mobilizing in wheelchair.  Reports good sleep and appetite.  Last BM 10/24.  BP moderately controlled.  Cbc unremarkable.  Potassium 2.9.  Magnesium 1.7.  Albumin 2.5.  Prealbumin 12.  CBG unremarkable.  No imaging today.    Review of patient's allergies indicates:   Allergen Reactions    Penicillin     Sulfa (sulfonamide antibiotics)         Current Facility-Administered Medications:     acetaminophen tablet 650 mg, 650 mg, Oral, Q4H PRN, Toño A Veda, FNP    ALPRAZolam tablet 0.25 mg, 0.25 mg, Oral, TID PRN, Toño A Veda, FNP    amLODIPine tablet 5 mg, 5 mg, Oral, Daily, Toño A Veda, FNP, 5 mg at 10/26/22 0915    apixaban tablet 5 mg, 5 mg, Oral, BID, HEATH Welch, 5 mg  at 10/26/22 0915    [START ON 10/27/2022] aspirin EC tablet 81 mg, 81 mg, Oral, Daily, Toño A Veda, FNP    atorvastatin tablet 40 mg, 40 mg, Oral, Daily, Toño A Veda, FNP, 40 mg at 10/26/22 0915    benzonatate capsule 100 mg, 100 mg, Oral, TID PRN, Toño A Veda, FNP    bisacodyL suppository 10 mg, 10 mg, Rectal, Daily PRN, Toño A Veda, FNP    carvediloL tablet 12.5 mg, 12.5 mg, Oral, BID, Toño A Veda, FNP, 12.5 mg at 10/26/22 0915    dextrose 10% bolus 125 mL, 12.5 g, Intravenous, PRN, Toño A Veda, FNP    dextrose 10% bolus 250 mL, 25 g, Intravenous, PRN, Toño A Veda, FNP    docusate sodium capsule 100 mg, 100 mg, Oral, BID, Toño A Veda, FNP, 100 mg at 10/26/22 0915    famotidine tablet 20 mg, 20 mg, Oral, Daily, Toño A Veda, FNP, 20 mg at 10/26/22 0915    folic acid tablet 1 mg, 1 mg, Oral, Daily, Toño A Veda, FNP, 1 mg at 10/26/22 0915    glucagon (human recombinant) injection 1 mg, 1 mg, Intramuscular, PRN, Toño A Veda, FNP    glucose chewable tablet 16 g, 16 g, Oral, PRN, Toño A Veda, FNP    glucose chewable tablet 24 g, 24 g, Oral, PRN, Toño A Veda, FNP    hydrALAZINE injection 10 mg, 10 mg, Intravenous, Q4H PRN, Toño A Veda, FNP    HYDROcodone-acetaminophen 5-325 mg per tablet 1 tablet, 1 tablet, Oral, Q4H PRN, Toño A Veda, FNP    hydrOXYzine pamoate capsule 50 mg, 50 mg, Oral, Nightly PRN, Toño A Veda, FNP    labetalol 20 mg/4 mL (5 mg/mL) IV syring, 10 mg, Intravenous, Q4H PRN, Toño Mccollum, MIKEP    methocarbamoL tablet 500 mg, 500 mg, Oral, TID PRN, HEATH Lopez    metoprolol injection 10 mg, 10 mg, Intravenous, Q2H PRN, Toño Mccollum, MIKEP    nitroGLYCERIN SL tablet 0.4 mg, 0.4 mg, Sublingual, Q5 Min PRN, Toño Mccollum, MIKEP    ondansetron disintegrating tablet 4 mg, 4 mg, Oral, Q6H PRN, Toño Mccollum, MIKEP    polyethylene glycol packet 17 g, 17 g,  "Oral, BID PRN, Toño A Veda, FNP    potassium chloride SA CR tablet 40 mEq, 40 mEq, Oral, TID, Toño A Veda, FNP, 40 mEq at 10/26/22 0915    promethazine tablet 25 mg, 25 mg, Oral, Q6H PRN, Toño A Veda, FNP    sertraline tablet 50 mg, 50 mg, Oral, Daily, Toño A Veda, FNP, 50 mg at 10/26/22 0915    sucralfate tablet 1 g, 1 g, Oral, QID (AC & HS), Toño A Veda, FNP, 1 g at 10/26/22 0545    [START ON 10/27/2022] valsartan tablet 160 mg, 160 mg, Oral, Daily, Toño A Veda, FNP    Facility-Administered Medications Ordered in Other Encounters:     0.9%  NaCl infusion, , Intravenous, Once, Abner Mitchell MD    diazePAM tablet 10 mg, 10 mg, Oral, On Call Procedure, Abner Mitchell MD, 10 mg at 09/23/22 0823    diphenhydrAMINE capsule 50 mg, 50 mg, Oral, On Call Procedure, Abner Mitchell MD, 50 mg at 09/23/22 0823    sodium chloride 0.9% flush 10 mL, 10 mL, Intravenous, PRN, Abner Mitchell MD     Review of Systems   Complete 12-point review of symptoms negative except for what's mentioned in HPI     Objective:     BP (!) 153/66   Pulse 62   Temp 98.3 °F (36.8 °C) (Oral)   Resp 20   Ht 5' 4.5" (1.638 m)   Wt 62 kg (136 lb 11 oz)   SpO2 (!) 91%   BMI 23.10 kg/m²        Intake/Output Summary (Last 24 hours) at 10/26/2022 1047  Last data filed at 10/26/2022 0800  Gross per 24 hour   Intake 600 ml   Output 1 ml   Net 599 ml       Physical Exam  Constitutional:       Appearance: Normal appearance.   HENT:      Head: Normocephalic.      Mouth/Throat:      Mouth: Mucous membranes are moist.   Eyes:      Pupils: Pupils are equal, round, and reactive to light.   Cardiovascular:      Rate and Rhythm: Normal rate and regular rhythm.      Heart sounds: Normal heart sounds.      Comments: Sternal incision  Pulmonary:      Effort: Pulmonary effort is normal.      Breath sounds: Normal breath sounds.   Abdominal:      General: Bowel sounds are normal.      Palpations: Abdomen is soft. "   Musculoskeletal:      Cervical back: Neck supple.      Comments: Generalized weakness and muscle atrophy   Skin:     General: Skin is warm and dry.   Neurological:      General: No focal deficit present.      Mental Status: She is alert and oriented to person, place, and time.   Psychiatric:         Mood and Affect: Mood normal.         Behavior: Behavior normal.         Thought Content: Thought content normal.         Judgment: Judgment normal.   *MD performed and documented physical examination       Lines/Drains/Airways       Peripheral Intravenous Line  Duration                  Peripheral IV - Single Lumen 10/20/22 1500 20 G Anterior;Left Forearm 5 days                    Labs  Admission on 10/25/2022   Component Date Value Ref Range Status    POCT Glucose 10/25/2022 120 (H)  70 - 110 mg/dL Final    POCT Glucose 10/25/2022 131 (H)  70 - 110 mg/dL Final    Sodium Level 10/26/2022 142  136 - 145 mmol/L Final    Potassium Level 10/26/2022 2.9 (L)  3.5 - 5.1 mmol/L Final    Chloride 10/26/2022 104  98 - 107 mmol/L Final    Carbon Dioxide 10/26/2022 31  23 - 31 mmol/L Final    Glucose Level 10/26/2022 115  82 - 115 mg/dL Final    Blood Urea Nitrogen 10/26/2022 28.1 (H)  9.8 - 20.1 mg/dL Final    Creatinine 10/26/2022 0.99  0.55 - 1.02 mg/dL Final    Calcium Level Total 10/26/2022 9.3  8.4 - 10.2 mg/dL Final    Protein Total 10/26/2022 6.5  5.8 - 7.6 gm/dL Final    Albumin Level 10/26/2022 2.5 (L)  3.4 - 4.8 gm/dL Final    Globulin 10/26/2022 4.0 (H)  2.4 - 3.5 gm/dL Final    Albumin/Globulin Ratio 10/26/2022 0.6 (L)  1.1 - 2.0 ratio Final    Bilirubin Total 10/26/2022 0.5  <=1.5 mg/dL Final    Alkaline Phosphatase 10/26/2022 52  40 - 150 unit/L Final    Alanine Aminotransferase 10/26/2022 8  0 - 55 unit/L Final    Aspartate Aminotransferase 10/26/2022 17  5 - 34 unit/L Final    eGFR 10/26/2022 58  mls/min/1.73/m2 Final    Magnesium Level 10/26/2022 1.70  1.60 - 2.60 mg/dL Final    Phosphorus Level 10/26/2022 3.6   2.3 - 4.7 mg/dL Final    Prealbumin 10/26/2022 12.0 (L)  14.0 - 37.0 mg/dL Final    Ferritin Level 10/26/2022 900.44 (H)  4.63 - 204.00 ng/mL Final    Iron Binding Capacity Unsaturated 10/26/2022 109  70 - 310 ug/dL Final    Iron Level 10/26/2022 30 (L)  50 - 170 ug/dL Final    Transferrin 10/26/2022 127  mg/dL Final    Iron Binding Capacity Total 10/26/2022 139 (L)  250 - 450 ug/dL Final    Iron Saturation 10/26/2022 22  20 - 50 % Final    WBC 10/26/2022 7.9  4.5 - 11.5 x10(3)/mcL Final    RBC 10/26/2022 3.51 (L)  4.20 - 5.40 x10(6)/mcL Final    Hgb 10/26/2022 10.5 (L)  12.0 - 16.0 gm/dL Final    Hct 10/26/2022 32.3 (L)  37.0 - 47.0 % Final    MCV 10/26/2022 92.0  80.0 - 94.0 fL Final    MCH 10/26/2022 29.9  27.0 - 31.0 pg Final    MCHC 10/26/2022 32.5 (L)  33.0 - 36.0 mg/dL Final    RDW 10/26/2022 13.2  11.5 - 17.0 % Final    Platelet 10/26/2022 422 (H)  130 - 400 x10(3)/mcL Final    MPV 10/26/2022 10.8 (H)  7.4 - 10.4 fL Final    Neut % 10/26/2022 62.4  % Final    Lymph % 10/26/2022 23.5  % Final    Mono % 10/26/2022 8.4  % Final    Eos % 10/26/2022 4.8  % Final    Basophil % 10/26/2022 0.6  % Final    Lymph # 10/26/2022 1.85  0.6 - 4.6 x10(3)/mcL Final    Neut # 10/26/2022 4.9  2.1 - 9.2 x10(3)/mcL Final    Mono # 10/26/2022 0.66  0.1 - 1.3 x10(3)/mcL Final    Eos # 10/26/2022 0.38  0 - 0.9 x10(3)/mcL Final    Baso # 10/26/2022 0.05  0 - 0.2 x10(3)/mcL Final    IG# 10/26/2022 0.02  0 - 0.04 x10(3)/mcL Final    IG% 10/26/2022 0.3  % Final    NRBC% 10/26/2022 0.0  % Final    POCT Glucose 10/26/2022 111 (H)  70 - 110 mg/dL Final     Radiology  CXR two view on 10/16/2022 at 9:11 a.m., IMPRESSION: Interval removal of support catheters with evidence of a small left apical pneumothorax. Bilateral pleural effusions. No other focal consolidative changes.   Radiology  Transthoracic echo on 09/23/2022: The ejection fraction was calculated to be 55%.  The left ventricular systolic function was normal.  The left ventricular  end diastolic pressure was elevated. The pre-procedure left ventricular end diastolic pressure was 23. The estimated blood loss was none. There was three vessel coronary artery disease. There was no mitral valve regurgitation. There was no aortic valve stenosis.  Diagnostic study  [facility] on 09/23/2022-left main: Highly calcified without obstructive lesions.  Lad: Heavily calcified 70-80% stenosis proximally (long lesion) in 70% distal at the bifurcation with the 2nd diagonal branch.  The 1st diagonal branch shows disease of 90%.  Left circumflex:  Calcified, 99% stenosis proximally.  RCA:  Highly calcified, 99% stenosis of the ostium followed by 90% stenosis proximally.    Assessment/Plan:     82 y.o. WF admitted on 10/25/2022    CAD (multivessel)  - s/p CABG x4 (LIMA to LAD, SVG to Diag 1, SVG to OM, and SVG to RCA), Ligation of left atrial appendage on 10/11/2022  - denies recent chest pain or discomfort  - continue                Coreg 12.5 mg b.i.d.                       Valsartan 100 mg daily                 Lipitor 40 mg daily                 Aspirin 81 mg daily   Carafate 1 g q.i.d.  Norco 5 mg/325 mg q.4 hours p.r.n.   - ECG and Nitro PRN  - not on Plavix  - continue cardiac diet  - to follow-up with cardiology outpatient     Debility   - 2/2 above  - defer to physiatry for rehab and pain management  - PT/OT/RT/ST following     Carotid artery stenosis   - s/p CEA  - continue                Lipitor 40 mg daily                 Aspirin 81 mg daily                 Eliquis 5 mg b.i.d.  - to follow-up with vascular surgery outpatient     Sick sinus syndrome   - s/p PPM  - continue                Coreg 12.5 mg b.i.d.   - to follow-up with cardiology outpatient     HTN  - BP at goal!!  - discontinued                HCTZ 25 mg daily on 10/25                Prazosin 1 mg daily on 10/25  - continue                Coreg 12.5 mg b.i.d.                       Valsartan 100 mg daily                 Norvasc 10 mg daily                  Hydralazine 10 mg every 2 hours as needed for BP > 160/90                Labetalol 10 mg every 2 hours as needed for BP > 160/90     Paroxysmal atrial fibrillation   - rate control   - no evidence of bleeding  - continue                Eliquis 5 mg b.i.d.                Coreg 12.5 mg b.i.d.    - to follow-up with cardiology outpatient     GERD  - Avoid spicy foods, and nothing to eat or drink within x2 hours of bedtime or laying flat (water is ok)   - Avoid NSAIDs (Advil, ibuprofen, naproxen...) and brown-2 inhibitors (Mobic, Celebrex)    - continue                Pepcid 40 mg daily  Colace 100 mg b.i.d.     Anxiety    - stable  - continue  Zoloft 50 mg daily     HLD  - FLP at goal!!  - continue                Lipitor 40 mg daily      Normocytic anemia  - asymptomatic  - H/H stable   - continue                Folic acid 1 mg daily   - no evidence of active bleeds  - will closely monitor and transfuse if needed     MP   - Compliant with CPAP at home     Chronic stage III sacral ulcer  - current   - Wound Care following  - currently with home border dressing  - change acute will nutrition    Hypokalemia  - potassium 2.9   - initiate  K-Dur 40 mEq x3 doses   - Repeat lab work in the morning     VTE Prophylaxis:  Eliquis 5 mg b.i.d.  COVID-19 testing:  Negative on 10/25/2002  COVID-19 vaccination status:  Vaccinated (Moderna) 01/20/2021 and 02/17/2021     POA: No  Living will: No  Contacts: Carrillo Elias (daughter) [phone]-lives in [place]                      Kiara Elias (niece) [phone]     CODE STATUS: Full Code  Internal Medicine (attending): Demetri Heredia MD  Physiatry (consulting):  Tonio Herzog MD     OUTPATIENT PROVIDERS  PCP: Tarik Gee MD  Cardiology:  Peterson Sánchez MD  CV surgery:  Earl Thompson MD  Nephrology: Dhaval Ibrahim MD     DISPOSITION: Condition stable.  Sleep hygiene, bowel maintenance, and appetite at goal.  BP moderately controlled.  CBC unremarkable.  Potassium 2.9-replace.   Magnesium 1.7.  CBG at goal.  No imaging today.  Continue aggressive mobilization as tolerated.  Monitor closely.  Notify of acute changes.     Christian Mccollum NP conducted independent physical examination and assisted with medical documentation.     Total time spent on this encounter including chart review and direct MD + NP 1-on-1 patient interaction: 46 minutes   Over 50% of this time was spent in counseling and coordination of care

## 2022-10-27 LAB
ANION GAP SERPL CALC-SCNC: 9 MEQ/L
BUN SERPL-MCNC: 25.6 MG/DL (ref 9.8–20.1)
CALCIUM SERPL-MCNC: 9.5 MG/DL (ref 8.4–10.2)
CHLORIDE SERPL-SCNC: 108 MMOL/L (ref 98–107)
CO2 SERPL-SCNC: 28 MMOL/L (ref 23–31)
CREAT SERPL-MCNC: 1.01 MG/DL (ref 0.55–1.02)
CREAT/UREA NIT SERPL: 25
GFR SERPLBLD CREATININE-BSD FMLA CKD-EPI: 56 MLS/MIN/1.73/M2
GLUCOSE SERPL-MCNC: 107 MG/DL (ref 82–115)
POTASSIUM SERPL-SCNC: 4.8 MMOL/L (ref 3.5–5.1)
SODIUM SERPL-SCNC: 145 MMOL/L (ref 136–145)

## 2022-10-27 PROCEDURE — 97110 THERAPEUTIC EXERCISES: CPT

## 2022-10-27 PROCEDURE — 97530 THERAPEUTIC ACTIVITIES: CPT

## 2022-10-27 PROCEDURE — 25000003 PHARM REV CODE 250: Performed by: NURSE PRACTITIONER

## 2022-10-27 PROCEDURE — 99900035 HC TECH TIME PER 15 MIN (STAT)

## 2022-10-27 PROCEDURE — 36415 COLL VENOUS BLD VENIPUNCTURE: CPT | Performed by: NURSE PRACTITIONER

## 2022-10-27 PROCEDURE — 94799 UNLISTED PULMONARY SVC/PX: CPT

## 2022-10-27 PROCEDURE — 11800000 HC REHAB PRIVATE ROOM

## 2022-10-27 PROCEDURE — 80048 BASIC METABOLIC PNL TOTAL CA: CPT | Performed by: NURSE PRACTITIONER

## 2022-10-27 PROCEDURE — 25000242 PHARM REV CODE 250 ALT 637 W/ HCPCS: Performed by: NURSE PRACTITIONER

## 2022-10-27 RX ORDER — CETIRIZINE HYDROCHLORIDE 10 MG/1
10 TABLET ORAL NIGHTLY
Status: DISCONTINUED | OUTPATIENT
Start: 2022-10-27 | End: 2022-11-03 | Stop reason: HOSPADM

## 2022-10-27 RX ORDER — FLUTICASONE PROPIONATE 50 MCG
2 SPRAY, SUSPENSION (ML) NASAL DAILY
Status: DISPENSED | OUTPATIENT
Start: 2022-10-27 | End: 2022-10-30

## 2022-10-27 RX ORDER — GABAPENTIN 300 MG/1
300 CAPSULE ORAL NIGHTLY
Status: DISCONTINUED | OUTPATIENT
Start: 2022-10-27 | End: 2022-11-03 | Stop reason: HOSPADM

## 2022-10-27 RX ORDER — AMLODIPINE BESYLATE 5 MG/1
10 TABLET ORAL DAILY
Status: DISCONTINUED | OUTPATIENT
Start: 2022-10-27 | End: 2022-11-03 | Stop reason: HOSPADM

## 2022-10-27 RX ADMIN — SUCRALFATE 1 G: 1 TABLET ORAL at 08:10

## 2022-10-27 RX ADMIN — SUCRALFATE 1 G: 1 TABLET ORAL at 11:10

## 2022-10-27 RX ADMIN — FOLIC ACID 1 MG: 1 TABLET ORAL at 08:10

## 2022-10-27 RX ADMIN — SUCRALFATE 1 G: 1 TABLET ORAL at 05:10

## 2022-10-27 RX ADMIN — ATORVASTATIN CALCIUM 40 MG: 40 TABLET, FILM COATED ORAL at 09:10

## 2022-10-27 RX ADMIN — CARVEDILOL 12.5 MG: 6.25 TABLET, FILM COATED ORAL at 08:10

## 2022-10-27 RX ADMIN — SERTRALINE HYDROCHLORIDE 50 MG: 50 TABLET ORAL at 09:10

## 2022-10-27 RX ADMIN — FAMOTIDINE 20 MG: 20 TABLET ORAL at 09:10

## 2022-10-27 RX ADMIN — AMLODIPINE BESYLATE 10 MG: 5 TABLET ORAL at 09:10

## 2022-10-27 RX ADMIN — APIXABAN 5 MG: 5 TABLET, FILM COATED ORAL at 08:10

## 2022-10-27 RX ADMIN — APIXABAN 5 MG: 5 TABLET, FILM COATED ORAL at 09:10

## 2022-10-27 RX ADMIN — ASPIRIN 81 MG: 81 TABLET, COATED ORAL at 09:10

## 2022-10-27 RX ADMIN — VALSARTAN 160 MG: 160 TABLET, FILM COATED ORAL at 08:10

## 2022-10-27 RX ADMIN — SUCRALFATE 1 G: 1 TABLET ORAL at 04:10

## 2022-10-27 RX ADMIN — GABAPENTIN 300 MG: 300 CAPSULE ORAL at 08:10

## 2022-10-27 RX ADMIN — CARVEDILOL 12.5 MG: 6.25 TABLET, FILM COATED ORAL at 09:10

## 2022-10-27 RX ADMIN — CETIRIZINE HYDROCHLORIDE 10 MG: 10 TABLET, FILM COATED ORAL at 08:10

## 2022-10-27 RX ADMIN — FLUTICASONE PROPIONATE 100 MCG: 50 SPRAY, METERED NASAL at 09:10

## 2022-10-27 NOTE — PROGRESS NOTES
10/27/22 1100   Rec Therapy Time Calculation   Date of Treatment 10/27/22   Rec Start Time 1100   Rec Stop Time 1130   Rec Total Time (min) 30 min   Time   Treatment time 2 units   Precautions   General Precautions fall;sternal   Orthopedic Precautions  N/A   Braces N/A   Pain/Comfort   Pain Rating 1 no pain   Vital Signs   Pulse 68   /82   OTHER   Rehab identified problem list/impairments impaired endurance;weakness   Values/Beliefs/Spiritual Care   Spiritual, Cultural Beliefs, Alevism Practices, Values that Affect Care no   Overall Level of Functioning   Activity Tolerance Mod Indep   Dynamic Sitting Balance/Reaching Does not occur   Dynamic Standing Balance/Reaching Mod Indep   Right UE Coodination/Dexterity Independent   Left UE Coordination/Dexterity Independent   Problem Solving/Sequencing Skills Independent   Memory Recall Independent   R/L Neglect/Inattention Does not occur   Attention Span Independent   Recreational Therapy Short Term Goals   Short Term Goal 1 Progression Met   Short Term Goal 2 Progression Met   Recreational Therapy Long Term Goals   Long Term Goal 1 Progression Progressing   Long Term Goal 2 Progression Progressing   Plan   Patient to be seen Daily   Planned Duration 1 week   Treatments Planned Energy conservation training   Treatment plan/goals estblished with Patient/Caregiver Yes

## 2022-10-27 NOTE — PT/OT/SLP PROGRESS
Physical Therapy Inpatient Rehab Treatment    Patient Name:  Jessica Elias   MRN:  [id number]    Recommendations:     Discharge Recommendations:      Discharge Equipment Recommendations:     Barriers to discharge: None    Assessment:     Jessica Elias is a 82 y.o. female admitted with a medical diagnosis of S/P CABG x 4.  She presents with the following impairments/functional limitations:  weakness, impaired endurance, impaired functional mobility, gait instability, pain, edema.    Rehab Diagnosis: s/p CABG x 4; Pacemaker; Sacral Ulcer    Recent Surgery: * No surgery found *      General Precautions: Standard, fall, sternal     Orthopedic Precautions:N/A     Braces: N/A    Rehab Prognosis: Good; patient would benefit from acute skilled PT services to address these deficits and reach maximum level of function.      History:     Past Medical History:   Diagnosis Date    Chronic kidney disease, unspecified     stage 3    Coronary artery disease     History of carotid artery disease     Hyperlipidemia     Hypertension     Sleep apnea        Past Surgical History:   Procedure Laterality Date    APPENDECTOMY N/A     BLADDER SURGERY N/A     CAROTID ENDARTERECTOMY N/A     CATARACT EXTRACTION N/A     CORONARY ARTERY BYPASS GRAFT (CABG) N/A 10/11/2022    Procedure: CORONARY ARTERY BYPASS GRAFT (CABG);  Surgeon: Bety Thompson MD;  Location: [facility] OR;  Service: Cardiothoracic;  Laterality: N/A;    HYSTERECTOMY N/A     INSERTION OF PERMANENT PACEMAKER N/A     LEFT HEART CATHETERIZATION Left 09/23/2022    Procedure: CATHETERIZATION, HEART, LEFT;  Surgeon: Abner Mitchell MD;  Location: [facility] CATH LAB;  Service: Cardiology;  Laterality: Left;  LHC VIA RRA    lump removed from right shoulder         Subjective     Chief Complaint: N/A    Respiratory Status: Room air    Patients cultural, spiritual, Sikh conflicts given the current situation: no      Objective:     Communicated with RN prior to session.  Patient found  up in chair with peripheral IV  upon PT entry to room.    Pt is Oriented x3 and Alert, Cooperative, and Motivated.    Vitals   Vitals at Rest  /66   HR 67   O2 Sat    Pain No pain     Vitals With Activity  /68   HR 64   O2 Sat    Pain        Functional Mobility:        Current   Status   Discharge   Goal   Functional Area: Care Score:     Roll Left and Right    Independent   Sit to Lying 4 HOB flat; overall supervision Independent   Lying to Sitting on Side of Bed    Set-up/clean-up   Sit to Stand 4 With RW; overall CGA to trunk; occasional Vc for proper sternal pxns and proper hand placement Independent   Chair/Bed-to-Chair Transfer 4 W/c > bed using stand step t/f with RW; overall CGA to trunk Independent   Car Transfer    Independent   Walk 10 Feet    Independent   Walk 50 Feet with Two Turns    Independent   Walk 150 Feet    Independent   Walk 10 Feet Uneven Surface 4 ~220 ft outdoors with RW using step to gait pattern; overall CGA to trunk; VC for safety and navigating uneven terrains Independent   1 Step (Curb) 4  Independent   4 Steps 4 4 step ups on outside curb ~4 in with RW; overall CGA to trunk; VC for sequencing Independent   12 Steps    Independent   Picking Up Object    Independent   Wheel 50 Feet with Two Turns    Not applicable   Wheel 150 Feet    Not applicable       Therapeutic Activities and Exercises:  Seated TherX: seated in w/c with BUE support; 2 x 15 BLE 2#   Knee extension    Knee flexion with TheraBand   Hip flexion    Ankle pumps    Hip adduction with ball    Hip abduction with Theraband       Standing Exercises: performed in // bars with BUE support; 2 x 15   Marching   Hip extension   Hip Abduction   Mini Squats      Activity Tolerance: Excellent    Patient left supine with all lines intact, call button in reach, and bed alarm on.    Education provided: roles and goals of PT/PTA, transfer training, gait training, stair training, balance training, safety awareness, body  mechanics, assistive device, strengthening exercises, and fall prevention    Expected compliance: High compliance    GOALS:   Multidisciplinary Problems       Physical Therapy Goals          Problem: Physical Therapy    Goal Priority Disciplines Outcome Goal Variances Interventions   Physical Therapy Goal     PT, PT/OT Ongoing, Progressing     Description: Bed Mobility:  Roll left and right with setup/clean-up assist.  Sit to supine transfer with setup/clean-up assist.  Supine to sit transfer with supervision/touching assist.    Transfers:  Sit to stand transfer with setup/clean-up assist using LRAD.  Bed to chair transfer with setup/clean-up assist using LRAD.  Car transfer with setup/clean-up assist using LRAD.   an object from the ground in standing position with setup/clean-up assist using LRAD.    Mobility:  Ambulate 360 feet with supervision/touching assist using LRAD.  Ambulate 10 feet on uneven surfaces/ramps with setup/clean-up assist using LRAD.  Ascend/descend a 4 inch curb with setup/clean-up assist using LRAD.   Ascend/descend 12 stairs with supervision/touching assist using bilateral handrails.                        Plan:     During this hospitalization, patient to be seen 5 x/week to address the identified rehab impairments via gait training, therapeutic activities, therapeutic exercises, neuromuscular re-education and progress toward the following goals:    Plan of Care Expires:  11/01/22  PT Next Visit Date: 11/01/22  Plan of Care reviewed with: patient    Additional Information:         Time Tracking:     Therapy Time  PT Received On: 10/27/22  PT Start Time: 1300  PT Stop Time: 1430  PT Total Time (min): 90 min   PT Individual: 90  Missed Time:    Time Missed due to:      Billable Minutes: Therapeutic Activity 45 and Therapeutic Exercise 45    10/27/2022

## 2022-10-27 NOTE — PT/OT/SLP PROGRESS
Occupational Therapy Inpatient Rehab Treatment    Name: Jessica Elias  MRN: [id number]    Assessment:  Jessica Elias is a 82 y.o. female admitted with a medical diagnosis of S/P CABG x 4.  She presents with the following impairments/functional limitations:  impaired endurance, impaired functional mobility, impaired self care skills, gait instability, impaired balance, pain, decreased safety awareness, decreased lower extremity function.    General Precautions: Standard, fall, sternal     Orthopedic Precautions:N/A     Braces: N/A    Rehab Prognosis: Good; patient would benefit from acute skilled OT services to address these deficits and reach maximum level of function.      History:     Past Medical History:   Diagnosis Date    Chronic kidney disease, unspecified     stage 3    Coronary artery disease     History of carotid artery disease     Hyperlipidemia     Hypertension     Sleep apnea        Past Surgical History:   Procedure Laterality Date    APPENDECTOMY N/A     BLADDER SURGERY N/A     CAROTID ENDARTERECTOMY N/A     CATARACT EXTRACTION N/A     CORONARY ARTERY BYPASS GRAFT (CABG) N/A 10/11/2022    Procedure: CORONARY ARTERY BYPASS GRAFT (CABG);  Surgeon: Bety Thompson MD;  Location: [facility];  Service: Cardiothoracic;  Laterality: N/A;    HYSTERECTOMY N/A     INSERTION OF PERMANENT PACEMAKER N/A     LEFT HEART CATHETERIZATION Left 09/23/2022    Procedure: CATHETERIZATION, HEART, LEFT;  Surgeon: Abner Mitchell MD;  Location: [facility] CATH LAB;  Service: Cardiology;  Laterality: Left;  LHC VIA RRA    lump removed from right shoulder         Subjective     Orientation: Oriented x4    Chief Complaint: fatigue    Patient/Family Comments/goals: return home independent    Vitals       Respiratory Status: Room air    Patients cultural, spiritual, Druze conflicts given the current situation: no       Objective:     Patient found up in chair with    upon OT entry to room.    Mobility   Patient  completed:  Sit to Stand Transfer with setup with rolling walker  Stand to Sit Transfer with setup with rolling walker    Functional Mobility  Pt performed dynamic standing activity with BUE support to improve endurance needed for improved ADL performance    Therapeutic Exercise  UBE x5 min forward and backward at 1.5W with 2-3 minute seated rest break in between        Additional Treatments: pt reports she has a WIS with 1 grab bar to cross threshold. Edu pt on transfer safety within sternal pxns. Pt verbalizes understanding. Reports too fatigued to perform this session.   At end of session pt mobilized with RW in room to bed, able to transfer into bed with no assist    LifeStyle Change and Education:             Patient left up in chair with all lines intact and call button in reach.     Education provided: Roles and goals of OT, ADLs, transfer training, and home safety    Multidisciplinary Problems       Occupational Therapy Goals          Problem: Occupational Therapy    Goal Priority Disciplines Outcome Interventions   Occupational Therapy Goal     OT, PT/OT Ongoing, Progressing    Description: ADLs:  Pt to perform UB dressing with set up   Pt to perform LB dressing with set up   Pt to perform putting on/off footwear task with set up  Pt to perform toileting with independent using sternal precautions    Functional Transfers:  Pt to perform toilet transfers with set up.  Pt to perform a tub transfer with set up  Pt to perform a walk-in shower transfer with set up     IADLs:  Pt to perform simple meal with SBA and RW.    Balance, Strengthening, Endurance, Balance:  Pt to consistently demonstrate adherence to orthopedic precautions during all ADL's as instructed by OT.  Pt to demonstrate consistent adherence to breathing control and energy conservation techniques as educated by OT.                        Time Tracking     OT Received On: 10/27/22  Time In 1430     Time Out 1500  Total Time 30 min  Therapy Time: OT  Individual: 30  Missed Time:    Missed Time Reason:      Billable Minutes: Therapeutic Activity 15 and Therapeutic Exercise 15    10/27/2022

## 2022-10-27 NOTE — PT/OT/SLP PROGRESS
Physical Therapy Inpatient Rehab Treatment    Patient Name:  Jessica Elias   MRN:  [id number]    Recommendations:     Discharge Recommendations:      Discharge Equipment Recommendations:     Barriers to discharge: None    Assessment:     Jessica Elias is a 82 y.o. female admitted with a medical diagnosis of S/P CABG x 4.  She presents with the following impairments/functional limitations:  weakness, impaired endurance, impaired functional mobility, gait instability, pain, edema.    Rehab Diagnosis: s/p CABG x 4; Pacemaker; Sacral Ulcer    Recent Surgery: * No surgery found *      General Precautions: Standard, fall, sternal     Orthopedic Precautions:N/A     Braces: N/A    Rehab Prognosis: Good; patient would benefit from acute skilled PT services to address these deficits and reach maximum level of function.      History:     Past Medical History:   Diagnosis Date    Chronic kidney disease, unspecified     stage 3    Coronary artery disease     History of carotid artery disease     Hyperlipidemia     Hypertension     Sleep apnea        Past Surgical History:   Procedure Laterality Date    APPENDECTOMY N/A     BLADDER SURGERY N/A     CAROTID ENDARTERECTOMY N/A     CATARACT EXTRACTION N/A     CORONARY ARTERY BYPASS GRAFT (CABG) N/A 10/11/2022    Procedure: CORONARY ARTERY BYPASS GRAFT (CABG);  Surgeon: Bety Thompson MD;  Location: [facility] OR;  Service: Cardiothoracic;  Laterality: N/A;    HYSTERECTOMY N/A     INSERTION OF PERMANENT PACEMAKER N/A     LEFT HEART CATHETERIZATION Left 09/23/2022    Procedure: CATHETERIZATION, HEART, LEFT;  Surgeon: Abner Mitchell MD;  Location: [facility] CATH LAB;  Service: Cardiology;  Laterality: Left;  LHC VIA RRA    lump removed from right shoulder         Subjective     Chief Complaint: Swelling in LLE    Respiratory Status: Room air    Patients cultural, spiritual, Restorationism conflicts given the current situation: no      Objective:     Communicated with RN prior to session.   Patient found up in chair with peripheral IV  upon PT entry to room.    Pt is Oriented x3 and Alert, Cooperative, and Motivated.    Vitals   Vitals at Rest  /67   HR 69   O2 Sat    Pain NO PAIN     Vitals With Activity  /73   HR 77   O2 Sat    Pain        Functional Mobility:   - Timed Up and Go score: 20 seconds with RW  - CONNOR Balance score: 32/56 indicating a medium fall risk     Current   Status   Discharge   Goal   Functional Area: Care Score:     Roll Left and Right    Independent   Sit to Lying 4 HOB flat; overall CGA to trunk; VC for sequencing Independent   Lying to Sitting on Side of Bed    Set-up/clean-up   Sit to Stand 4 With RW; overall CGA to trunk; VC for sequencing and to maintain sternal precautions Independent   Chair/Bed-to-Chair Transfer 4 W/c > bed using stand step t/f with RW; overall CGA to trunk Independent   Car Transfer    Independent   Walk 10 Feet    Independent   Walk 50 Feet with Two Turns    Independent   Walk 150 Feet    Independent   Walk 10 Feet Uneven Surface    Independent   1 Step (Curb)    Independent   4 Steps    Independent   12 Steps    Independent   Picking Up Object    Independent   Wheel 50 Feet with Two Turns    Not applicable   Wheel 150 Feet    Not applicable       Therapeutic Activities and Exercises:    Balance training in // bars: 2 sets of 30 seconds on BLE on Airexfoam with 1 UE support   Single leg stance: EO/EC    Double limb stance: EO/EC    Side stepping over cones: 2 trials over 4 cones; 1 x therapist support, 1 x without therapist support       Activity Tolerance: Excellent    Patient left supine with all lines intact, call button in reach, and bed alarm on.    Education provided: roles and goals of PT/PTA, transfer training, bed mob, balance training, safety awareness, body mechanics, assistive device, fall prevention, and sternal precautions.    Expected compliance: High compliance    GOALS:   Multidisciplinary Problems       Physical Therapy  Goals          Problem: Physical Therapy    Goal Priority Disciplines Outcome Goal Variances Interventions   Physical Therapy Goal     PT, PT/OT Ongoing, Progressing     Description: Bed Mobility:  Roll left and right with setup/clean-up assist.  Sit to supine transfer with setup/clean-up assist.  Supine to sit transfer with supervision/touching assist.    Transfers:  Sit to stand transfer with setup/clean-up assist using LRAD.  Bed to chair transfer with setup/clean-up assist using LRAD.  Car transfer with setup/clean-up assist using LRAD.   an object from the ground in standing position with setup/clean-up assist using LRAD.    Mobility:  Ambulate 360 feet with supervision/touching assist using LRAD.  Ambulate 10 feet on uneven surfaces/ramps with setup/clean-up assist using LRAD.  Ascend/descend a 4 inch curb with setup/clean-up assist using LRAD.   Ascend/descend 12 stairs with supervision/touching assist using bilateral handrails.                        Plan:     During this hospitalization, patient to be seen 5 x/week to address the identified rehab impairments via gait training, therapeutic activities, therapeutic exercises, neuromuscular re-education and progress toward the following goals:    Plan of Care Expires:  11/01/22  PT Next Visit Date: 11/01/22  Plan of Care reviewed with: patient    Additional Information:         Time Tracking:     Therapy Time  PT Received On: 10/27/22  PT Start Time: 0830  PT Stop Time: 0930  PT Total Time (min): 60 min   PT Individual: 60  Missed Time:    Time Missed due to:      Billable Minutes: Therapeutic Activity 60    10/27/2022

## 2022-10-27 NOTE — PT/OT/SLP PROGRESS
Recreational Therapy Treatment    Date of Treatment: 10/27/22  Start Time: 1100  Stop Time: 1130  Total Time: 30 min  Missed Time    General Precautions: fall, sternal  Ortho Precautions: N/A  Braces: N/A    Vitals   Vitals at Rest  /82   HR 68   O2 Sat    Pain      Vitals With Activity  /74   HR 71   O2 Sat    Pain        Treatment     Cognitive Skills Building   Cognitive Observation Activity Assist Position Equipment Response            Comment:          Dynamic Activities   Activity Assist Position Equipment Response   Activity 1 Horseshoes modified independence and minimum assistance Standing Rolling walker and Rubber horseshoes good   Comment: Sit to stand was setup as was dynamic standing balance/reaching. Standing tolerance was 3 minutes. UE coordination and sequencing skills were I. Said she enjoyed Horseshoes         Fine Motor Activities   Activity Assist Position Equipment Response           Comment:          Additional Info: Bed to w/c transfer was min.     Goals     Short Term Goals    Goal  Goal Status   Will increase activity tolerance to supervision Met   Will improve dynamic standing balance/reaching to setup Met                 Long Term Goals    Goal Goal Status   Will increase standing tolerance to 5,10 minutes Progressing   Will improve dynamic standing balance/reaching shila I Progressing

## 2022-10-27 NOTE — PROGRESS NOTES
[facility] ([facility])  Rehab Progress Note    Patient Name: Jessica Elias  MRN: [id number]  Age: 82 y.o. Sex: female  : 1940  Hospital Length of Stay: 2 days  Date of Service: 10/27/2022   Chief Complaint: CAD s/p CABG x4 (LIMA to LAD, SVG to Diag 1, SVG to OM, and SVG to RCA), Ligation of left atrial appendage on 10/11/2022    Subjective:     Basic Information  Admit Information: 82-year-old white female presented to [facility] on 10/11/2022 for scheduled CABG x4 2/2 multivessel CAD.  PMH significant for multi vessel CAD, paroxysmal atrial fibrillation, carotid artery stenosis, MP, recurrent bradycardia, HLD, and HTN.  Tolerated CABG x4 (LIMA to LAD, SVG to Diag 1, SVG to OM, and SVG to RCA), Ligation of left atrial appendage on 10/11 without perioperative complications.  Diuresed postoperatively. Chest tubes discontinued on 10/15.  Atrial fib RVR reported on 10/15.  Amiodarone drip initiated.  Eliquis 5 mg b.i.d. resumed on 10/16.  Cardiology recommended not continuing amiodarone.  Metoprolol discontinued on 10/19 and Coreg 12.5 mg b.i.d. initiated.  Tolerated transfer to [facility] inpatient rehab unit on 10/25 without incident.  Today's Information: No acute events overnight.  Sitting up in chair.  Reports good sleep.  Appetite fair.  Last BM 10/27-loose.  BP moderately controlled.  BMP unremarkable.  No imaging today.    Review of patient's allergies indicates:   Allergen Reactions    Penicillin     Sulfa (sulfonamide antibiotics)         Current Facility-Administered Medications:     acetaminophen tablet 650 mg, 650 mg, Oral, Q4H PRN, Toño A Veda, FNP    ALPRAZolam tablet 0.25 mg, 0.25 mg, Oral, TID PRN, Toño A Veda, FNP    amLODIPine tablet 10 mg, 10 mg, Oral, Daily, Toño A Veda, FNP    apixaban tablet 5 mg, 5 mg, Oral, BID, Toño A Veda, FNP, 5 mg at 10/26/22 2014    aspirin EC tablet 81 mg, 81 mg, Oral, Daily, Toño Mccollum, MIKEP     atorvastatin tablet 40 mg, 40 mg, Oral, Daily, Toño Kendallart, FNP, 40 mg at 10/26/22 0915    benzonatate capsule 100 mg, 100 mg, Oral, TID PRN, Toño Mccollum, FNP    bisacodyL suppository 10 mg, 10 mg, Rectal, Daily PRN, Toño Kendallart, FNP    carvediloL tablet 12.5 mg, 12.5 mg, Oral, BID, Toño Lundehart, FNP, 12.5 mg at 10/26/22 2014    docusate sodium capsule 100 mg, 100 mg, Oral, BID, Toño Lundehart, FNP, 100 mg at 10/26/22 0915    famotidine tablet 20 mg, 20 mg, Oral, Daily, Toño Kendallart, FNP, 20 mg at 10/26/22 0915    folic acid tablet 1 mg, 1 mg, Oral, Daily, Toño Lundehart, FNP, 1 mg at 10/26/22 0915    hydrALAZINE injection 10 mg, 10 mg, Intravenous, Q4H PRN, Toño Kendallart, FNP    HYDROcodone-acetaminophen 5-325 mg per tablet 1 tablet, 1 tablet, Oral, Q4H PRN, Toño Mccollum, FNP    hydrOXYzine pamoate capsule 50 mg, 50 mg, Oral, Nightly PRN, Toño Kendallart, FNP    labetalol 20 mg/4 mL (5 mg/mL) IV syring, 10 mg, Intravenous, Q4H PRN, Toño Kendallart, FNP    methocarbamoL tablet 500 mg, 500 mg, Oral, TID PRN, Michelle Winter, FNP    metoprolol injection 10 mg, 10 mg, Intravenous, Q2H PRN, Toño Kendallart, FNP    nitroGLYCERIN SL tablet 0.4 mg, 0.4 mg, Sublingual, Q5 Min PRN, Toño Mccollum, FNP    ondansetron disintegrating tablet 4 mg, 4 mg, Oral, Q6H PRN, Toño Kendallart, FNP    polyethylene glycol packet 17 g, 17 g, Oral, BID PRN, Toño Kendallart, FNP    promethazine tablet 25 mg, 25 mg, Oral, Q6H PRN, Toño Mccollum, FNP    sertraline tablet 50 mg, 50 mg, Oral, Daily, Toño Mccollum, FNP, 50 mg at 10/26/22 0915    sucralfate tablet 1 g, 1 g, Oral, QID (AC & HS), Toño Mccollum, FNP, 1 g at 10/27/22 0548    valsartan tablet 160 mg, 160 mg, Oral, Daily, MIKE WelchP    Facility-Administered Medications Ordered in Other Encounters:     0.9%  NaCl infusion, , Intravenous, Once, Abner Mitchell MD     "diazePAM tablet 10 mg, 10 mg, Oral, On Call Procedure, Abnre Mitchell MD, 10 mg at 09/23/22 0823    diphenhydrAMINE capsule 50 mg, 50 mg, Oral, On Call Procedure, Abner Mitchell MD, 50 mg at 09/23/22 0823    sodium chloride 0.9% flush 10 mL, 10 mL, Intravenous, PRN, Abner Mitchell MD     Review of Systems   Complete 12-point review of symptoms negative except for what's mentioned in HPI     Objective:     BP (!) 171/67   Pulse 60   Temp 97.9 °F (36.6 °C) (Oral)   Resp 18   Ht 5' 4.5" (1.638 m)   Wt 62 kg (136 lb 11 oz)   SpO2 (!) 94%   BMI 23.10 kg/m²        Intake/Output Summary (Last 24 hours) at 10/27/2022 0610  Last data filed at 10/26/2022 2000  Gross per 24 hour   Intake 960 ml   Output --   Net 960 ml         Physical Exam  Constitutional:       Appearance: Normal appearance.   HENT:      Head: Normocephalic.      Mouth/Throat:      Mouth: Mucous membranes are moist.   Eyes:      Pupils: Pupils are equal, round, and reactive to light.   Cardiovascular:      Rate and Rhythm: Normal rate and regular rhythm.      Heart sounds: Normal heart sounds.      Comments: Sternal incision  Pulmonary:      Effort: Pulmonary effort is normal.      Breath sounds: Normal breath sounds.   Abdominal:      General: Bowel sounds are normal.      Palpations: Abdomen is soft.   Musculoskeletal:      Cervical back: Neck supple.      Comments: Generalized weakness and muscle atrophy   Skin:     General: Skin is warm and dry.   Neurological:      General: No focal deficit present.      Mental Status: She is alert and oriented to person, place, and time.   Psychiatric:         Mood and Affect: Mood normal.         Behavior: Behavior normal.         Thought Content: Thought content normal.         Judgment: Judgment normal.   *MD performed and documented physical examination       Lines/Drains/Airways       Peripheral Intravenous Line  Duration                  Peripheral IV - Single Lumen 10/20/22 1500 20 G Anterior;Left " Forearm 6 days                    Labs  Recent Results (from the past 24 hour(s))   Basic Metabolic Panel    Collection Time: 10/27/22  5:42 AM   Result Value Ref Range    Sodium Level 145 136 - 145 mmol/L    Potassium Level 4.8 3.5 - 5.1 mmol/L    Chloride 108 (H) 98 - 107 mmol/L    Carbon Dioxide 28 23 - 31 mmol/L    Glucose Level 107 82 - 115 mg/dL    Blood Urea Nitrogen 25.6 (H) 9.8 - 20.1 mg/dL    Creatinine 1.01 0.55 - 1.02 mg/dL    BUN/Creatinine Ratio 25     Calcium Level Total 9.5 8.4 - 10.2 mg/dL    Anion Gap 9.0 mEq/L    eGFR 56 mls/min/1.73/m2       Radiology  CXR two view on 10/16/2022 at 9:11 a.m., IMPRESSION: Interval removal of support catheters with evidence of a small left apical pneumothorax. Bilateral pleural effusions. No other focal consolidative changes.   Radiology  Transthoracic echo on 09/23/2022: The ejection fraction was calculated to be 55%.  The left ventricular systolic function was normal.  The left ventricular end diastolic pressure was elevated. The pre-procedure left ventricular end diastolic pressure was 23. The estimated blood loss was none. There was three vessel coronary artery disease. There was no mitral valve regurgitation. There was no aortic valve stenosis.  Diagnostic study  [facility] on 09/23/2022-left main: Highly calcified without obstructive lesions.  Lad: Heavily calcified 70-80% stenosis proximally (long lesion) in 70% distal at the bifurcation with the 2nd diagonal branch.  The 1st diagonal branch shows disease of 90%.  Left circumflex:  Calcified, 99% stenosis proximally.  RCA:  Highly calcified, 99% stenosis of the ostium followed by 90% stenosis proximally.    Assessment/Plan:     82 y.o. WF admitted on 10/25/2022    CAD (multivessel)  - s/p CABG x4 (LIMA to LAD, SVG to Diag 1, SVG to OM, and SVG to RCA), Ligation of left atrial appendage on 10/11/2022  - denies recent chest pain or discomfort  - continue                Coreg 12.5 mg b.i.d.                        Valsartan 100 mg daily                 Lipitor 40 mg daily                 Aspirin 81 mg daily   Carafate 1 g q.i.d.  Norco 5 mg/325 mg q.4 hours p.r.n.   - ECG and Nitro PRN  - not on Plavix  - continue cardiac diet  - to follow-up with cardiology outpatient     Debility   - 2/2 above  - defer to physiatry for rehab and pain management  - PT/OT/RT/ST following     Carotid artery stenosis   - s/p CEA  - continue                Lipitor 40 mg daily                 Aspirin 81 mg daily                 Eliquis 5 mg b.i.d.  - to follow-up with vascular surgery outpatient     Sick sinus syndrome   - s/p PPM  - continue                Coreg 12.5 mg b.i.d.   - to follow-up with cardiology outpatient     HTN  - BP mod control  - discontinued                HCTZ 25 mg daily on 10/25                Prazosin 1 mg daily on 10/25  - continue                Coreg 12.5 mg b.i.d.                       Valsartan 100 mg daily                 Norvasc 10 mg daily (increased 10/27)                Hydralazine 10 mg every 2 hours as needed for BP > 160/90                Labetalol 10 mg every 2 hours as needed for BP > 160/90     Paroxysmal atrial fibrillation   - rate control   - no evidence of bleeding  - continue                Eliquis 5 mg b.i.d.                Coreg 12.5 mg b.i.d.    - to follow-up with cardiology outpatient     GERD  - Avoid spicy foods, and nothing to eat or drink within x2 hours of bedtime or laying flat (water is ok)   - Avoid NSAIDs (Advil, ibuprofen, naproxen...) and brown-2 inhibitors (Mobic, Celebrex)    - continue                Pepcid 40 mg daily  Colace 100 mg b.i.d.     Anxiety    - stable  - continue  Zoloft 50 mg daily     HLD  - FLP at goal!!  - continue                Lipitor 40 mg daily      Normocytic anemia  - asymptomatic  - H/H stable   - continue                Folic acid 1 mg daily   - no evidence of active bleeds  - will closely monitor and transfuse if needed     MP   - Compliant with CPAP at  home     Chronic stage III sacral ulcer  - current   - Wound Care following  - currently with home border dressing  - change acute will nutrition     VTE Prophylaxis:  Eliquis 5 mg b.i.d.  COVID-19 testing:  Negative on 10/25/2002  COVID-19 vaccination status:  Vaccinated (Moderna) 01/20/2021 and 02/17/2021     POA: No  Living will: No  Contacts: Carrillo Elias (daughter) [phone]-lives in [place]                      Kiara Elias (niece) [phone]     CODE STATUS: Full Code  Internal Medicine (attending): Demetri Heredia MD  Physiatry (consulting):  Tonio Herzog MD     OUTPATIENT PROVIDERS  PCP: Tarik Gee MD  Cardiology:  Peterson Sánchez MD  CV surgery:  Earl Thompson MD  Nephrology: Dhaval Ibrahim MD     DISPOSITION: Condition stable.  Sleep hygiene fair, bowel maintenance, and appetite at goal.  BP moderately controlled.  CBC unremarkable. CBG at goal.  BMP unremarkable.  Increase Norvasc 10 mg daily.  No imaging today.  Continue aggressive mobilization as tolerated.  Monitor closely.  Notify of acute changes.     Christian Mccollum NP conducted independent physical examination and assisted with medical documentation.     Total time spent on this encounter including chart review and direct MD + NP 1-on-1 patient interaction: 43 minutes   Over 50% of this time was spent in counseling and coordination of care

## 2022-10-28 PROCEDURE — 97110 THERAPEUTIC EXERCISES: CPT

## 2022-10-28 PROCEDURE — 25000003 PHARM REV CODE 250: Performed by: NURSE PRACTITIONER

## 2022-10-28 PROCEDURE — 11800000 HC REHAB PRIVATE ROOM

## 2022-10-28 PROCEDURE — 97530 THERAPEUTIC ACTIVITIES: CPT

## 2022-10-28 PROCEDURE — 94799 UNLISTED PULMONARY SVC/PX: CPT

## 2022-10-28 RX ADMIN — SERTRALINE HYDROCHLORIDE 50 MG: 50 TABLET ORAL at 08:10

## 2022-10-28 RX ADMIN — FOLIC ACID 1 MG: 1 TABLET ORAL at 08:10

## 2022-10-28 RX ADMIN — APIXABAN 5 MG: 5 TABLET, FILM COATED ORAL at 08:10

## 2022-10-28 RX ADMIN — SUCRALFATE 1 G: 1 TABLET ORAL at 11:10

## 2022-10-28 RX ADMIN — SUCRALFATE 1 G: 1 TABLET ORAL at 08:10

## 2022-10-28 RX ADMIN — VALSARTAN 160 MG: 160 TABLET, FILM COATED ORAL at 08:10

## 2022-10-28 RX ADMIN — FAMOTIDINE 20 MG: 20 TABLET ORAL at 08:10

## 2022-10-28 RX ADMIN — GABAPENTIN 300 MG: 300 CAPSULE ORAL at 08:10

## 2022-10-28 RX ADMIN — SUCRALFATE 1 G: 1 TABLET ORAL at 06:10

## 2022-10-28 RX ADMIN — ASPIRIN 81 MG: 81 TABLET, COATED ORAL at 08:10

## 2022-10-28 RX ADMIN — CARVEDILOL 12.5 MG: 6.25 TABLET, FILM COATED ORAL at 08:10

## 2022-10-28 RX ADMIN — CETIRIZINE HYDROCHLORIDE 10 MG: 10 TABLET, FILM COATED ORAL at 08:10

## 2022-10-28 RX ADMIN — SUCRALFATE 1 G: 1 TABLET ORAL at 04:10

## 2022-10-28 RX ADMIN — AMLODIPINE BESYLATE 10 MG: 5 TABLET ORAL at 08:10

## 2022-10-28 RX ADMIN — ATORVASTATIN CALCIUM 40 MG: 40 TABLET, FILM COATED ORAL at 08:10

## 2022-10-28 NOTE — PLAN OF CARE
Problem: Skin Injury Risk Increased  Goal: Skin Health and Integrity  Outcome: Ongoing, Progressing  Intervention: Optimize Skin Protection  Flowsheets (Taken 10/28/2022 0117)  Pressure Reduction Techniques:   frequent weight shift encouraged   pressure points protected  Head of Bed (HOB) Positioning: HOB at 30-45 degrees     Problem: Fall Injury Risk  Goal: Absence of Fall and Fall-Related Injury  Outcome: Ongoing, Progressing  Intervention: Promote Injury-Free Environment  Flowsheets (Taken 10/28/2022 0117)  Safety Promotion/Fall Prevention:   assistive device/personal item within reach   bed alarm set   Fall Risk signage in place   lighting adjusted   nonskid shoes/socks when out of bed   side rails raised x 3   instructed to call staff for mobility

## 2022-10-28 NOTE — PROGRESS NOTES
[facility] ([facility])  Rehab Progress Note    Patient Name: Jessica Elias  MRN: [id number]  Age: 82 y.o. Sex: female  : 1940  Hospital Length of Stay: 3 days  Date of Service: 10/28/2022   Chief Complaint: CAD s/p CABG x4 (LIMA to LAD, SVG to Diag 1, SVG to OM, and SVG to RCA), Ligation of left atrial appendage on 10/11/2022    Subjective:     Basic Information  Admit Information: 82-year-old white female presented to [facility] on 10/11/2022 for scheduled CABG x4 2/2 multivessel CAD.  PMH significant for multi vessel CAD, paroxysmal atrial fibrillation, carotid artery stenosis, MP, recurrent bradycardia, HLD, and HTN.  Tolerated CABG x4 (LIMA to LAD, SVG to Diag 1, SVG to OM, and SVG to RCA), Ligation of left atrial appendage on 10/11 without perioperative complications.  Diuresed postoperatively. Chest tubes discontinued on 10/15.  Atrial fib RVR reported on 10/15.  Amiodarone drip initiated.  Eliquis 5 mg b.i.d. resumed on 10/16.  Cardiology recommended not continuing amiodarone.  Metoprolol discontinued on 10/19 and Coreg 12.5 mg b.i.d. initiated.  Tolerated transfer to [facility] inpatient rehab unit on 10/25 without incident.  Today's Information: No acute events overnight.  Sitting up in chair.  Reports good sleep and appetite.  Last BM 10/27.  BP moderately controlled.  Next BP on the low side while doing therapy today.  No labs or imaging today.    Review of patient's allergies indicates:   Allergen Reactions    Penicillin     Sulfa (sulfonamide antibiotics)         Current Facility-Administered Medications:     acetaminophen tablet 650 mg, 650 mg, Oral, Q4H PRN, Toño A Veda, FNP    ALPRAZolam tablet 0.25 mg, 0.25 mg, Oral, TID PRN, Toño A Veda, FNP    amLODIPine tablet 10 mg, 10 mg, Oral, Daily, Toño A Veda, FNP, 10 mg at 10/27/22 0913    apixaban tablet 5 mg, 5 mg, Oral, BID, Toño A Veda, FNP, 5 mg at 10/27/22 2043    aspirin EC tablet 81  mg, 81 mg, Oral, Daily, Toño JERNIGAN Veda, FNP, 81 mg at 10/27/22 0913    atorvastatin tablet 40 mg, 40 mg, Oral, Daily, Toño A Veda, FNP, 40 mg at 10/27/22 0912    benzonatate capsule 100 mg, 100 mg, Oral, TID PRN, Toño A Veda, FNP    bisacodyL suppository 10 mg, 10 mg, Rectal, Daily PRN, Toño Lundehart, FNP    carvediloL tablet 12.5 mg, 12.5 mg, Oral, BID, Toño A Veda, FNP, 12.5 mg at 10/27/22 2043    cetirizine tablet 10 mg, 10 mg, Oral, QHS, Michelle Crumpte, FNP, 10 mg at 10/27/22 2043    famotidine tablet 20 mg, 20 mg, Oral, Daily, Toño A Veda, FNP, 20 mg at 10/27/22 0913    fluticasone propionate 50 mcg/actuation nasal spray 100 mcg, 2 spray, Each Nostril, Daily, Michelle Crumpte, FNP, 100 mcg at 10/27/22 0945    folic acid tablet 1 mg, 1 mg, Oral, Daily, Toño A Veda, FNP, 1 mg at 10/27/22 0800    gabapentin capsule 300 mg, 300 mg, Oral, QHS, Toño A Veda, FNP, 300 mg at 10/27/22 2043    hydrALAZINE injection 10 mg, 10 mg, Intravenous, Q4H PRN, Toño Lundehart, FNP    HYDROcodone-acetaminophen 5-325 mg per tablet 1 tablet, 1 tablet, Oral, Q4H PRN, Toño Lundehart, FNP    labetalol 20 mg/4 mL (5 mg/mL) IV syring, 10 mg, Intravenous, Q4H PRN, Toño Lundehart, FNP    methocarbamoL tablet 500 mg, 500 mg, Oral, TID PRN, Michelle Crumpte, FNP    metoprolol injection 10 mg, 10 mg, Intravenous, Q2H PRN, Toño Lundehart, FNP    nitroGLYCERIN SL tablet 0.4 mg, 0.4 mg, Sublingual, Q5 Min PRN, Toño A Veda, FNP    ondansetron disintegrating tablet 4 mg, 4 mg, Oral, Q6H PRN, Toño A Veda, FNP    polyethylene glycol packet 17 g, 17 g, Oral, BID PRN, Toño A Veda, FNP    promethazine tablet 25 mg, 25 mg, Oral, Q6H PRN, Toño A Veda, FNP    sertraline tablet 50 mg, 50 mg, Oral, Daily, Toño A Veda, FNP, 50 mg at 10/27/22 0913    sucralfate tablet 1 g, 1 g, Oral, QID (AC & HS), Toño A Veda, FNP, 1 g at 10/28/22  "0645    valsartan tablet 160 mg, 160 mg, Oral, Daily, Toño Mccollum, MIKEP, 160 mg at 10/27/22 0800    Facility-Administered Medications Ordered in Other Encounters:     0.9%  NaCl infusion, , Intravenous, Once, Abner Mitchell MD    diazePAM tablet 10 mg, 10 mg, Oral, On Call Procedure, Abner Mitchell MD, 10 mg at 09/23/22 0823    diphenhydrAMINE capsule 50 mg, 50 mg, Oral, On Call Procedure, Abner Mitchell MD, 50 mg at 09/23/22 0823    sodium chloride 0.9% flush 10 mL, 10 mL, Intravenous, PRN, Abner Mitchell MD     Review of Systems   Complete 12-point review of symptoms negative except for what's mentioned in HPI     Objective:     BP (!) 152/61   Pulse 78   Temp 98.1 °F (36.7 °C) (Oral)   Resp 18   Ht 5' 4.5" (1.638 m)   Wt 62 kg (136 lb 11 oz)   SpO2 96%   BMI 23.10 kg/m²        Intake/Output Summary (Last 24 hours) at 10/28/2022 0724  Last data filed at 10/27/2022 1844  Gross per 24 hour   Intake 720 ml   Output --   Net 720 ml         Physical Exam  Constitutional:       Appearance: Normal appearance.   HENT:      Head: Normocephalic.      Mouth/Throat:      Mouth: Mucous membranes are moist.   Eyes:      Pupils: Pupils are equal, round, and reactive to light.   Cardiovascular:      Rate and Rhythm: Normal rate and regular rhythm.      Heart sounds: Normal heart sounds.      Comments: Sternal incision  Pulmonary:      Effort: Pulmonary effort is normal.      Breath sounds: Normal breath sounds.   Abdominal:      General: Bowel sounds are normal.      Palpations: Abdomen is soft.   Musculoskeletal:      Cervical back: Neck supple.      Comments: Generalized weakness and muscle atrophy   Skin:     General: Skin is warm and dry.   Neurological:      General: No focal deficit present.      Mental Status: She is alert and oriented to person, place, and time.   Psychiatric:         Mood and Affect: Mood normal.         Behavior: Behavior normal.         Thought Content: Thought content normal.    "      Judgment: Judgment normal.   *MD performed and documented physical examination       Lines/Drains/Airways       Peripheral Intravenous Line  Duration                  Peripheral IV - Single Lumen 10/20/22 1500 20 G Anterior;Left Forearm 7 days                  Labs  No results found for this or any previous visit (from the past 24 hour(s)).    Radiology  CXR two view on 10/16/2022 at 9:11 a.m., IMPRESSION: Interval removal of support catheters with evidence of a small left apical pneumothorax. Bilateral pleural effusions. No other focal consolidative changes.   Radiology  Transthoracic echo on 09/23/2022: The ejection fraction was calculated to be 55%.  The left ventricular systolic function was normal.  The left ventricular end diastolic pressure was elevated. The pre-procedure left ventricular end diastolic pressure was 23. The estimated blood loss was none. There was three vessel coronary artery disease. There was no mitral valve regurgitation. There was no aortic valve stenosis.  Diagnostic study  [facility] on 09/23/2022-left main: Highly calcified without obstructive lesions.  Lad: Heavily calcified 70-80% stenosis proximally (long lesion) in 70% distal at the bifurcation with the 2nd diagonal branch.  The 1st diagonal branch shows disease of 90%.  Left circumflex:  Calcified, 99% stenosis proximally.  RCA:  Highly calcified, 99% stenosis of the ostium followed by 90% stenosis proximally.    Assessment/Plan:     82 y.o. WF admitted on 10/25/2022    CAD (multivessel)  - s/p CABG x4 (LIMA to LAD, SVG to Diag 1, SVG to OM, and SVG to RCA), Ligation of left atrial appendage on 10/11/2022  - denies recent chest pain or discomfort  - continue                Coreg 12.5 mg b.i.d.                       Valsartan 100 mg daily                 Lipitor 40 mg daily                 Aspirin 81 mg daily   Carafate 1 g q.i.d.  Norco 5 mg/325 mg q.4 hours p.r.n.   - ECG and Nitro PRN  - not on Plavix  - continue cardiac diet  - to  follow-up with cardiology outpatient     Debility   - 2/2 above  - defer to physiatry for rehab and pain management  - PT/OT/RT/ST following     Carotid artery stenosis   - s/p CEA  - continue                Lipitor 40 mg daily                 Aspirin 81 mg daily                 Eliquis 5 mg b.i.d.  - to follow-up with vascular surgery outpatient     Sick sinus syndrome   - s/p PPM  - continue                Coreg 12.5 mg b.i.d.   - to follow-up with cardiology outpatient     HTN  - BP mod control  - discontinued                HCTZ 25 mg daily on 10/25                Prazosin 1 mg daily on 10/25  - continue                Coreg 12.5 mg b.i.d.                       Valsartan 100 mg daily                 Norvasc 10 mg daily (increased 10/27)                Hydralazine 10 mg every 2 hours as needed for BP > 160/90                Labetalol 10 mg every 2 hours as needed for BP > 160/90     Paroxysmal atrial fibrillation   - rate control   - no evidence of bleeding  - continue                Eliquis 5 mg b.i.d.                Coreg 12.5 mg b.i.d.    - to follow-up with cardiology outpatient     GERD  - Avoid spicy foods, and nothing to eat or drink within x2 hours of bedtime or laying flat (water is ok)   - Avoid NSAIDs (Advil, ibuprofen, naproxen...) and brown-2 inhibitors (Mobic, Celebrex)    - continue                Pepcid 40 mg daily  Colace 100 mg b.i.d.     Anxiety    - stable  - continue  Zoloft 50 mg daily     HLD  - FLP at goal!!  - continue                Lipitor 40 mg daily      Normocytic anemia  - asymptomatic  - H/H stable   - continue                Folic acid 1 mg daily   - no evidence of active bleeds  - will closely monitor and transfuse if needed     MP   - Compliant with CPAP at home     Chronic stage III sacral ulcer  - current   - Wound Care following  - currently with home border dressing  - change acute will nutrition     VTE Prophylaxis:  Eliquis 5 mg b.i.d.  COVID-19 testing:  Negative on  10/25/2002  COVID-19 vaccination status:  Vaccinated (Moderna) 01/20/2021 and 02/17/2021     POA: No  Living will: No  Contacts: Carrillo Elias (daughter) [phone]-lives in [place]                      Kiara Elias (niece) [phone]     CODE STATUS: Full Code  Internal Medicine (attending): Demetri Heredia MD  Physiatry (consulting):  Tonio Herzog MD     OUTPATIENT PROVIDERS  PCP: Tarik Gee MD  Cardiology:  Peterson Sánchez MD  CV surgery:  Earl Thompson MD  Nephrology: Dhaval Ibrahim MD     DISPOSITION: Condition stable.  Sleep hygiene fair, bowel maintenance, and appetite at goal.  BP improving.  Vital signs at goal with no recorded fevers.  No labs or imaging today.  Continues to participate in progress in therapy.  Lower extremity swelling improved.  Continue aggressive mobilization as tolerated.  Monitor closely.  Notify of acute changes.     Christian Mccollum NP conducted independent physical examination and assisted with medical documentation.     Total time spent on this encounter including chart review and direct MD + NP 1-on-1 patient interaction: 38 minutes   Over 50% of this time was spent in counseling and coordination of care

## 2022-10-28 NOTE — PT/OT/SLP PROGRESS
Physical Therapy Inpatient Rehab Treatment    Patient Name:  Jessica Elias   MRN:  [id number]    Recommendations:     Discharge Recommendations:      Discharge Equipment Recommendations:     Barriers to discharge: None    Assessment:     Jessica Elias is a 82 y.o. female admitted with a medical diagnosis of S/P CABG x 4.  She presents with the following impairments/functional limitations:  weakness, impaired endurance, impaired functional mobility, gait instability, pain, edema.    Rehab Diagnosis: s/p CABG x 4; Pacemaker; Sacral Ulcer    Recent Surgery: * No surgery found *      General Precautions: Standard, fall, sternal     Orthopedic Precautions:N/A     Braces: N/A    Rehab Prognosis: Good; patient would benefit from acute skilled PT services to address these deficits and reach maximum level of function.      History:     Past Medical History:   Diagnosis Date    Chronic kidney disease, unspecified     stage 3    Coronary artery disease     History of carotid artery disease     Hyperlipidemia     Hypertension     Sleep apnea        Past Surgical History:   Procedure Laterality Date    APPENDECTOMY N/A     BLADDER SURGERY N/A     CAROTID ENDARTERECTOMY N/A     CATARACT EXTRACTION N/A     CORONARY ARTERY BYPASS GRAFT (CABG) N/A 10/11/2022    Procedure: CORONARY ARTERY BYPASS GRAFT (CABG);  Surgeon: Bety Thompson MD;  [facility]: [facility] OR;  Service: Cardiothoracic;  Laterality: N/A;    HYSTERECTOMY N/A     INSERTION OF PERMANENT PACEMAKER N/A     LEFT HEART CATHETERIZATION Left 09/23/2022    Procedure: CATHETERIZATION, HEART, LEFT;  Surgeon: Abner Mitchell MD;  Location: [facility] CATH LAB;  Service: Cardiology;  Laterality: Left;  LHC VIA RRA    lump removed from right shoulder         Subjective     Chief Complaint: N/A    Respiratory Status: Room air    Patients cultural, spiritual, Restorationism conflicts given the current situation: no      Objective:     Communicated with RN prior to session.  Patient found  up in chair with peripheral IV  upon PT entry to room.    Pt is Oriented x3 and Alert, Cooperative, and Motivated.    Vitals   Vitals at Rest  /68   HR 73   O2 Sat    Pain      Vitals With Activity  BP    HR    O2 Sat    Pain        Functional Mobility:        Current   Status   Discharge   Goal   Functional Area: Care Score:     Roll Left and Right    Independent   Sit to Lying 4 HOB flat; overall supervision; VC for maintaining sternal pxns Independent   Lying to Sitting on Side of Bed 3 HOB flat; overall Mod A to trunk; Performed 4 trials; Pt has difficulty maintaining sternal pxns Set-up/clean-up   Sit to Stand 5 With RW; overall setup/clean-up Independent   Chair/Bed-to-Chair Transfer 4 W/c > bed using stand step t/f with RW; overall CGA to trunk Independent   Car Transfer    Independent   Walk 10 Feet   Independent   Walk 50 Feet with Two Turns   Independent   Walk 150 Feet   Independent   Walk 10 Feet Uneven Surface    Independent   1 Step (Curb)    Independent   4 Steps    Independent   12 Steps    Independent   Picking Up Object    Independent   Wheel 50 Feet with Two Turns    Not applicable   Wheel 150 Feet    Not applicable       Therapeutic Activities and Exercises:  Bed Mobility Training: performed on bed; Emphasis on maintaining sternal precautions throughout task especially when going from Lying to sitting. Performed 4 trials. Pt had moderate difficulty performed lying to sitting. PT inquired about recliner at home. Pt stated that the recliner at home was hard to maneuver and could only operate the handle with one arm which will put her in a compromising position regarding her sternal precautions. PT will continue to assess bed mobility in the upcoming sessions.    Activity Tolerance: Good and Fair    Patient left supine with all lines intact, call button in reach, and bed alarm on.    Education provided: roles and goals of PT/PTA, transfer training, bed mob, safety awareness, body mechanics,  assistive device, fall prevention, and sternal precautions.    Expected compliance: High compliance    GOALS:   Multidisciplinary Problems       Physical Therapy Goals          Problem: Physical Therapy    Goal Priority Disciplines Outcome Goal Variances Interventions   Physical Therapy Goal     PT, PT/OT Ongoing, Progressing     Description: Bed Mobility:  Roll left and right with setup/clean-up assist.  Sit to supine transfer with setup/clean-up assist.  Supine to sit transfer with supervision/touching assist.    Transfers:  Sit to stand transfer with setup/clean-up assist using LRAD.  Bed to chair transfer with setup/clean-up assist using LRAD.  Car transfer with setup/clean-up assist using LRAD.   an object from the ground in standing position with setup/clean-up assist using LRAD.    Mobility:  Ambulate 360 feet with supervision/touching assist using LRAD.  Ambulate 10 feet on uneven surfaces/ramps with setup/clean-up assist using LRAD.  Ascend/descend a 4 inch curb with setup/clean-up assist using LRAD.   Ascend/descend 12 stairs with supervision/touching assist using bilateral handrails.                        Plan:     During this hospitalization, patient to be seen 5 x/week to address the identified rehab impairments via gait training, therapeutic activities, therapeutic exercises, neuromuscular re-education and progress toward the following goals:    Plan of Care Expires:  11/01/22  PT Next Visit Date: 11/01/22  Plan of Care reviewed with: patient    Additional Information:         Time Tracking:     Therapy Time  PT Received On: 10/28/22  PT Start Time: 1300  PT Stop Time: 1330  PT Total Time (min): 30 min   PT Individual: 30  Missed Time:    Time Missed due to:      Billable Minutes: Therapeutic Activity 30    10/28/2022

## 2022-10-28 NOTE — PT/OT/SLP PROGRESS
"Occupational Therapy Inpatient Rehab Treatment    Name: Jessica Elias  MRN: [id number]    Assessment:  Jessica Elias is a 82 y.o. female admitted with a medical diagnosis of S/P CABG x 4.  She presents with the following impairments/functional limitations:  weakness, impaired endurance, impaired self care skills, impaired functional mobility, gait instability, impaired balance, decreased upper extremity function, decreased safety awareness, pain, impaired skin.    General Precautions: Standard, fall, sternal     Orthopedic Precautions:N/A     Braces: N/A    Rehab Prognosis: Good; patient would benefit from acute skilled OT services to address these deficits and reach maximum level of function.      History:     Past Medical History:   Diagnosis Date    Chronic kidney disease, unspecified     stage 3    Coronary artery disease     History of carotid artery disease     Hyperlipidemia     Hypertension     Sleep apnea        Past Surgical History:   Procedure Laterality Date    APPENDECTOMY N/A     BLADDER SURGERY N/A     CAROTID ENDARTERECTOMY N/A     CATARACT EXTRACTION N/A     CORONARY ARTERY BYPASS GRAFT (CABG) N/A 10/11/2022    Procedure: CORONARY ARTERY BYPASS GRAFT (CABG);  Surgeon: Bety Thompson MD;  [facility];  Service: Cardiothoracic;  Laterality: N/A;    HYSTERECTOMY N/A     INSERTION OF PERMANENT PACEMAKER N/A     LEFT HEART CATHETERIZATION Left 09/23/2022    Procedure: CATHETERIZATION, HEART, LEFT;  Surgeon: Abner Mitchell MD;  Location: [facility] CATH LAB;  Service: Cardiology;  Laterality: Left;  LHC VIA RRA    lump removed from right shoulder         Subjective     Orientation: Oriented x4    Chief Complaint: no new complaints    Patient/Family Comments/goals: "I got my new car today"    Vitals   Vitals at Rest  /60   HR 71   O2 Sat    Pain      Vitals With Activity  /65   HR 82   O2 Sat    Pain      Respiratory Status: Room air    Patients cultural, spiritual, Caodaism " conflicts given the current situation: no       Objective:     Patient found up in chair with peripheral IV  upon OT entry to room.    Mobility   Patient completed:  Sit to Stand Transfer with supervision with rolling walker  Stand to Sit Transfer with supervision with rolling walker    Functional Mobility  FM with RW room 410 <> OT gym ~ 600' with SPV no LOB    Limiting Factors for ADLs: motor, endurance, weakness, and safety awareness     Therapeutic Exercise  UB strengthening using UBE 5 mins forward and 5 mins backward to improve ADL performance.    Patient left up in chair to eat lunch with all lines intact and call button in reach.     Education provided: Roles and goals of OT, ADLs, transfer training, safety precautions, and fall prevention    Multidisciplinary Problems       Occupational Therapy Goals          Problem: Occupational Therapy    Goal Priority Disciplines Outcome Interventions   Occupational Therapy Goal     OT, PT/OT Ongoing, Progressing    Description: ADLs:  Pt to perform UB dressing with set up   Pt to perform LB dressing with set up   Pt to perform putting on/off footwear task with set up  Pt to perform toileting with independent using sternal precautions    Functional Transfers:  Pt to perform toilet transfers with set up.  Pt to perform a tub transfer with set up  Pt to perform a walk-in shower transfer with set up     IADLs:  Pt to perform simple meal with SBA and RW.    Balance, Strengthening, Endurance, Balance:  Pt to consistently demonstrate adherence to orthopedic precautions during all ADL's as instructed by OT.  Pt to demonstrate consistent adherence to breathing control and energy conservation techniques as educated by OT.                        Time Tracking   Pt was scheduled to see OT from [id number] & [id number]  OT Received On: 10/28/22  Time In  (1130, 1430)     Time Out  (1200, 1500)  Total Time    Therapy Time: OT Individual: 30  Missed Time: 30 Minutes  Missed Time Reason:  Patient fatigue    Billable Minutes: Therapeutic Activity 15 and Therapeutic Exercise 15    10/28/2022

## 2022-10-28 NOTE — PT/OT/SLP PROGRESS
Physical Therapy Inpatient Rehab Treatment    Patient Name:  Jessica Elias   MRN:  [id number]    Recommendations:     Discharge Recommendations:      Discharge Equipment Recommendations:     Barriers to discharge: None    Assessment:     Jessica Elias is a 82 y.o. female admitted with a medical diagnosis of S/P CABG x 4.  She presents with the following impairments/functional limitations:  weakness, impaired endurance, impaired functional mobility, gait instability, pain, edema.    Rehab Diagnosis: s/p CABG x 4; Pacemaker; Sacral Ulcer    Recent Surgery: * No surgery found *      General Precautions: Standard, fall, sternal     Orthopedic Precautions:N/A     Braces: N/A    Rehab Prognosis: Good; patient would benefit from acute skilled PT services to address these deficits and reach maximum level of function.      History:     Past Medical History:   Diagnosis Date    Chronic kidney disease, unspecified     stage 3    Coronary artery disease     History of carotid artery disease     Hyperlipidemia     Hypertension     Sleep apnea        Past Surgical History:   Procedure Laterality Date    APPENDECTOMY N/A     BLADDER SURGERY N/A     CAROTID ENDARTERECTOMY N/A     CATARACT EXTRACTION N/A     CORONARY ARTERY BYPASS GRAFT (CABG) N/A 10/11/2022    Procedure: CORONARY ARTERY BYPASS GRAFT (CABG);  Surgeon: Bety Thompson MD;  Location: [facility] OR;  Service: Cardiothoracic;  Laterality: N/A;    HYSTERECTOMY N/A     INSERTION OF PERMANENT PACEMAKER N/A     LEFT HEART CATHETERIZATION Left 09/23/2022    Procedure: CATHETERIZATION, HEART, LEFT;  Surgeon: Abner Mitchell MD;  Location: [facility] CATH LAB;  Service: Cardiology;  Laterality: Left;  LHC VIA RRA    lump removed from right shoulder         Subjective     Chief Complaint: N/A    Respiratory Status: Room air    Patients cultural, spiritual, Anabaptism conflicts given the current situation: no      Objective:     Communicated with RN prior to session.  Patient found  supine with peripheral IV  upon PT entry to room.    Pt is Oriented x3 and Alert, Cooperative, and Motivated.    Vitals   Vitals at Rest  BP    HR    O2 Sat    Pain      Vitals With Activity  BP    HR    O2 Sat    Pain        Functional Mobility:   Toilet t/f: stand step t/f with RW to toilet; overall CGA to trunk; accidental Incontinent of bladder; + void & BM  PT doffed brief and donned fresh brief. PT cleaned and redressed sacral wound with Aquacel and applied protective barrier cream to hemorrhoids while Pt stood at RW. Pt was able to tolerate standing while PT assisted with cleaning. Pt had mild bleeding from hemorrhoids and wound. PT notified RN about PT findings.  PT assisted patient with doffing clothes to nahed new clothes. Pt was able to perform task with minimal assist from PT.  Grooming: Pt was able to comb hair and brush her teeth while standing at sink with RW and SBA of PT.        Current   Status   Discharge   Goal   Functional Area: Care Score:     Roll Left and Right    Independent   Sit to Lying    Independent   Lying to Sitting on Side of Bed 3 HOB flat; overall Mod A to trunk; VC for sequencing; Pt had difficulty maintaining sternal precautions; Will work on t/f next session Set-up/clean-up   Sit to Stand 5 With RW; overall set up/clean-up Independent   Chair/Bed-to-Chair Transfer 4 Bed > w/c using stand step t/f with RW; overall CGA to trunk Independent   Car Transfer    Independent   Walk 10 Feet 4  Independent   Walk 50 Feet with Two Turns 4  Independent   Walk 150 Feet 4 ~350 ft with RW using step through gait pattern; overall CGA to trunk Independent   Walk 10 Feet Uneven Surface    Independent   1 Step (Curb)    Independent   4 Steps    Independent   12 Steps    Independent   Picking Up Object    Independent   Wheel 50 Feet with Two Turns    Not applicable   Wheel 150 Feet    Not applicable       Therapeutic Activities and Exercises:  Standing Exercises in // bars: BUE support; 2 x 15 BLE  on Airexfoam   Step ups   Lateral step ups    Recumbent bike: 10 min at 1.2 resistance    Activity Tolerance: Good    Patient left up in chair with all lines intact, call button in reach, and chair alarm on.    Education provided: roles and goals of PT/PTA, transfer training, bed mob, balance training, safety awareness, body mechanics, strengthening exercises, fall prevention, and sternal precautions.    Expected compliance: High compliance    GOALS:   Multidisciplinary Problems       Physical Therapy Goals          Problem: Physical Therapy    Goal Priority Disciplines Outcome Goal Variances Interventions   Physical Therapy Goal     PT, PT/OT Ongoing, Progressing     Description: Bed Mobility:  Roll left and right with setup/clean-up assist.  Sit to supine transfer with setup/clean-up assist.  Supine to sit transfer with supervision/touching assist.    Transfers:  Sit to stand transfer with setup/clean-up assist using LRAD.  Bed to chair transfer with setup/clean-up assist using LRAD.  Car transfer with setup/clean-up assist using LRAD.   an object from the ground in standing position with setup/clean-up assist using LRAD.    Mobility:  Ambulate 360 feet with supervision/touching assist using LRAD.  Ambulate 10 feet on uneven surfaces/ramps with setup/clean-up assist using LRAD.  Ascend/descend a 4 inch curb with setup/clean-up assist using LRAD.   Ascend/descend 12 stairs with supervision/touching assist using bilateral handrails.                        Plan:     During this hospitalization, patient to be seen 5 x/week to address the identified rehab impairments via gait training, therapeutic activities, therapeutic exercises, neuromuscular re-education and progress toward the following goals:    Plan of Care Expires:  11/01/22  PT Next Visit Date: 11/01/22  Plan of Care reviewed with: patient    Additional Information:         Time Tracking:     Therapy Time  PT Received On: 10/28/22  PT Start Time:  0930  PT Stop Time: 1100  PT Total Time (min): 90 min   PT Individual: 90  Missed Time:    Time Missed due to:      Billable Minutes: Therapeutic Activity 55 and Therapeutic Exercise 35    10/28/2022

## 2022-10-29 PROCEDURE — 99900035 HC TECH TIME PER 15 MIN (STAT)

## 2022-10-29 PROCEDURE — 25000003 PHARM REV CODE 250: Performed by: NURSE PRACTITIONER

## 2022-10-29 PROCEDURE — 11800000 HC REHAB PRIVATE ROOM

## 2022-10-29 PROCEDURE — 94799 UNLISTED PULMONARY SVC/PX: CPT

## 2022-10-29 RX ADMIN — APIXABAN 5 MG: 5 TABLET, FILM COATED ORAL at 08:10

## 2022-10-29 RX ADMIN — CARVEDILOL 12.5 MG: 6.25 TABLET, FILM COATED ORAL at 10:10

## 2022-10-29 RX ADMIN — APIXABAN 5 MG: 5 TABLET, FILM COATED ORAL at 10:10

## 2022-10-29 RX ADMIN — CETIRIZINE HYDROCHLORIDE 10 MG: 10 TABLET, FILM COATED ORAL at 08:10

## 2022-10-29 RX ADMIN — CARVEDILOL 12.5 MG: 6.25 TABLET, FILM COATED ORAL at 08:10

## 2022-10-29 RX ADMIN — SUCRALFATE 1 G: 1 TABLET ORAL at 07:10

## 2022-10-29 RX ADMIN — ATORVASTATIN CALCIUM 40 MG: 40 TABLET, FILM COATED ORAL at 10:10

## 2022-10-29 RX ADMIN — GABAPENTIN 300 MG: 300 CAPSULE ORAL at 08:10

## 2022-10-29 RX ADMIN — SUCRALFATE 1 G: 1 TABLET ORAL at 05:10

## 2022-10-29 RX ADMIN — AMLODIPINE BESYLATE 10 MG: 5 TABLET ORAL at 10:10

## 2022-10-29 RX ADMIN — VALSARTAN 160 MG: 160 TABLET, FILM COATED ORAL at 10:10

## 2022-10-29 RX ADMIN — FAMOTIDINE 20 MG: 20 TABLET ORAL at 10:10

## 2022-10-29 RX ADMIN — SUCRALFATE 1 G: 1 TABLET ORAL at 08:10

## 2022-10-29 RX ADMIN — ASPIRIN 81 MG: 81 TABLET, COATED ORAL at 10:10

## 2022-10-29 RX ADMIN — SUCRALFATE 1 G: 1 TABLET ORAL at 11:10

## 2022-10-29 RX ADMIN — FOLIC ACID 1 MG: 1 TABLET ORAL at 10:10

## 2022-10-29 RX ADMIN — SERTRALINE HYDROCHLORIDE 50 MG: 50 TABLET ORAL at 10:10

## 2022-10-29 NOTE — PROGRESS NOTES
[facility] ([facility])  Rehab Progress Note    Patient Name: Jessica Elias  MRN: [id number]  Age: 82 y.o. Sex: female  : 1940  Hospital Length of Stay: 4 days  Date of Service: 10/29/2022   Chief Complaint: CAD s/p CABG x4 (LIMA to LAD, SVG to Diag 1, SVG to OM, and SVG to RCA), Ligation of left atrial appendage on 10/11/2022    Subjective:     Basic Information  Admit Information: 82-year-old white female presented to [facility] on 10/11/2022 for scheduled CABG x4 2/2 multivessel CAD.  PMH significant for multi vessel CAD, paroxysmal atrial fibrillation, carotid artery stenosis, MP, recurrent bradycardia, HLD, and HTN.  Tolerated CABG x4 (LIMA to LAD, SVG to Diag 1, SVG to OM, and SVG to RCA), Ligation of left atrial appendage on 10/11 without perioperative complications.  Diuresed postoperatively. Chest tubes discontinued on 10/15.  Atrial fib RVR reported on 10/15.  Amiodarone drip initiated.  Eliquis 5 mg b.i.d. resumed on 10/16.  Cardiology recommended not continuing amiodarone.  Metoprolol discontinued on 10/19 and Coreg 12.5 mg b.i.d. initiated.  Tolerated transfer to [facility] inpatient rehab unit on 10/25 without incident.  Today's Information: No acute events overnight.  Sitting up in chair.  Reports good sleep and appetite.  Last BM 10/27.  Vital signs at goal.  No labs or imaging today.    Review of patient's allergies indicates:   Allergen Reactions    Penicillin     Sulfa (sulfonamide antibiotics)         Current Facility-Administered Medications:     acetaminophen tablet 650 mg, 650 mg, Oral, Q4H PRN, Toño A Veda, FNP    ALPRAZolam tablet 0.25 mg, 0.25 mg, Oral, TID PRN, Toño A Veda, FNP    amLODIPine tablet 10 mg, 10 mg, Oral, Daily, Toño A Veda, FNP, 10 mg at 10/28/22 0838    apixaban tablet 5 mg, 5 mg, Oral, BID, Toño A Veda, FNP, 5 mg at 10/28/22 2035    aspirin EC tablet 81 mg, 81 mg, Oral, Daily, HEATH Welch, 81 mg at  10/28/22 0838    atorvastatin tablet 40 mg, 40 mg, Oral, Daily, Toño Lundehart, FNP, 40 mg at 10/28/22 0839    benzonatate capsule 100 mg, 100 mg, Oral, TID PRN, Toño Lundehart, FNP    bisacodyL suppository 10 mg, 10 mg, Rectal, Daily PRN, Toño Lundehart, FNP    carvediloL tablet 12.5 mg, 12.5 mg, Oral, BID, Toño Kendallart, FNP, 12.5 mg at 10/28/22 2034    cetirizine tablet 10 mg, 10 mg, Oral, QHS, Michelle Crumpte, FNP, 10 mg at 10/28/22 2034    famotidine tablet 20 mg, 20 mg, Oral, Daily, Toño JERNIGAN Veda, FNP, 20 mg at 10/28/22 0838    fluticasone propionate 50 mcg/actuation nasal spray 100 mcg, 2 spray, Each Nostril, Daily, Michelle Winter, FNP, 100 mcg at 10/27/22 0945    folic acid tablet 1 mg, 1 mg, Oral, Daily, Toño Lundehart, FNP, 1 mg at 10/28/22 0838    gabapentin capsule 300 mg, 300 mg, Oral, QHS, Toño Lundehart, FNP, 300 mg at 10/28/22 2034    hydrALAZINE injection 10 mg, 10 mg, Intravenous, Q4H PRN, Toño Lundehart, FNP    HYDROcodone-acetaminophen 5-325 mg per tablet 1 tablet, 1 tablet, Oral, Q4H PRN, Toño Kendallart, FNP    labetalol 20 mg/4 mL (5 mg/mL) IV syring, 10 mg, Intravenous, Q4H PRN, Toño Lundehart, FNP    methocarbamoL tablet 500 mg, 500 mg, Oral, TID PRN, Michelle Crumpte, FNP    metoprolol injection 10 mg, 10 mg, Intravenous, Q2H PRN, Toño Lundehart, FNP    nitroGLYCERIN SL tablet 0.4 mg, 0.4 mg, Sublingual, Q5 Min PRN, Toño Kendallart, FNP    ondansetron disintegrating tablet 4 mg, 4 mg, Oral, Q6H PRN, Toño JERNIGAN Veda, FNP    polyethylene glycol packet 17 g, 17 g, Oral, BID PRN, Toño JERNIGAN Veda, FNP    promethazine tablet 25 mg, 25 mg, Oral, Q6H PRN, Toño JERNIGAN Veda, FNP    sertraline tablet 50 mg, 50 mg, Oral, Daily, Toño Lundehart, FNP, 50 mg at 10/28/22 0838    sucralfate tablet 1 g, 1 g, Oral, QID (AC & HS), Toño JERNIGAN Veda, FNP, 1 g at 10/28/22 2034    valsartan tablet 160 mg, 160 mg, Oral, Daily, Toño  "HEATH Bullock, 160 mg at 10/28/22 0838    Facility-Administered Medications Ordered in Other Encounters:     0.9%  NaCl infusion, , Intravenous, Once, Abner Mitchell MD    diazePAM tablet 10 mg, 10 mg, Oral, On Call Procedure, Abner Mitchell MD, 10 mg at 09/23/22 0823    diphenhydrAMINE capsule 50 mg, 50 mg, Oral, On Call Procedure, Abner Mitchell MD, 50 mg at 09/23/22 0823    sodium chloride 0.9% flush 10 mL, 10 mL, Intravenous, PRN, Abner Mitchell MD     Review of Systems   Complete 12-point review of symptoms negative except for what's mentioned in HPI     Objective:     BP (!) 146/65   Pulse 77   Temp 97.5 °F (36.4 °C) (Oral)   Resp 18   Ht 5' 4.5" (1.638 m)   Wt 62 kg (136 lb 11 oz)   SpO2 (!) 94%   BMI 23.10 kg/m²        Intake/Output Summary (Last 24 hours) at 10/29/2022 0520  Last data filed at 10/28/2022 1700  Gross per 24 hour   Intake 720 ml   Output --   Net 720 ml         Physical Exam  Constitutional:       Appearance: Normal appearance.   HENT:      Head: Normocephalic.      Mouth/Throat:      Mouth: Mucous membranes are moist.   Eyes:      Pupils: Pupils are equal, round, and reactive to light.   Cardiovascular:      Rate and Rhythm: Normal rate and regular rhythm.      Heart sounds: Normal heart sounds.      Comments: Sternal incision  Pulmonary:      Effort: Pulmonary effort is normal.      Breath sounds: Normal breath sounds.   Abdominal:      General: Bowel sounds are normal.      Palpations: Abdomen is soft.   Musculoskeletal:      Cervical back: Neck supple.      Comments: Generalized weakness and muscle atrophy   Skin:     General: Skin is warm and dry.   Neurological:      General: No focal deficit present.      Mental Status: She is alert and oriented to person, place, and time.   Psychiatric:         Mood and Affect: Mood normal.         Behavior: Behavior normal.         Thought Content: Thought content normal.         Judgment: Judgment normal.       Lines/Drains/Airways "       Peripheral Intravenous Line  Duration                  Peripheral IV - Single Lumen 10/20/22 1500 20 G Anterior;Left Forearm 8 days                  Labs  No results found for this or any previous visit (from the past 24 hour(s)).    Radiology  CXR two view on 10/16/2022 at 9:11 a.m., IMPRESSION: Interval removal of support catheters with evidence of a small left apical pneumothorax. Bilateral pleural effusions. No other focal consolidative changes.   Radiology  Transthoracic echo on 09/23/2022: The ejection fraction was calculated to be 55%.  The left ventricular systolic function was normal.  The left ventricular end diastolic pressure was elevated. The pre-procedure left ventricular end diastolic pressure was 23. The estimated blood loss was none. There was three vessel coronary artery disease. There was no mitral valve regurgitation. There was no aortic valve stenosis.  Diagnostic study  [facility] [place] on 09/23/2022-left main: Highly calcified without obstructive lesions.  Lad: Heavily calcified 70-80% stenosis proximally (long lesion) in 70% distal at the bifurcation with the 2nd diagonal branch.  The 1st diagonal branch shows disease of 90%.  Left circumflex:  Calcified, 99% stenosis proximally.  RCA:  Highly calcified, 99% stenosis of the ostium followed by 90% stenosis proximally.    Assessment/Plan:     82 y.o. WF admitted on 10/25/2022    CAD (multivessel)  - s/p CABG x4 (LIMA to LAD, SVG to Diag 1, SVG to OM, and SVG to RCA), Ligation of left atrial appendage on 10/11/2022  - denies recent chest pain or discomfort  - continue                Coreg 12.5 mg b.i.d.                       Valsartan 100 mg daily                 Lipitor 40 mg daily                 Aspirin 81 mg daily   Carafate 1 g q.i.d.  Norco 5 mg/325 mg q.4 hours p.r.n.   - ECG and Nitro PRN  - not on Plavix  - continue cardiac diet  - to follow-up with cardiology outpatient     Debility   - 2/2 above  - defer to physiatry for rehab and pain  management  - PT/OT/RT/ST following     Carotid artery stenosis   - s/p CEA  - continue                Lipitor 40 mg daily                 Aspirin 81 mg daily                 Eliquis 5 mg b.i.d.  - to follow-up with vascular surgery outpatient     Sick sinus syndrome   - s/p PPM  - continue                Coreg 12.5 mg b.i.d.   - to follow-up with cardiology outpatient     HTN  - BP mod control  - discontinued                HCTZ 25 mg daily on 10/25                Prazosin 1 mg daily on 10/25  - continue                Coreg 12.5 mg b.i.d.                       Valsartan 100 mg daily                 Norvasc 10 mg daily (increased 10/27)                Hydralazine 10 mg every 2 hours as needed for BP > 160/90                Labetalol 10 mg every 2 hours as needed for BP > 160/90     Paroxysmal atrial fibrillation   - rate control   - no evidence of bleeding  - continue                Eliquis 5 mg b.i.d.                Coreg 12.5 mg b.i.d.    - to follow-up with cardiology outpatient     GERD  - Avoid spicy foods, and nothing to eat or drink within x2 hours of bedtime or laying flat (water is ok)   - Avoid NSAIDs (Advil, ibuprofen, naproxen...) and brown-2 inhibitors (Mobic, Celebrex)    - continue                Pepcid 40 mg daily  Colace 100 mg b.i.d.     Anxiety    - stable  - continue  Zoloft 50 mg daily     HLD  - FLP at goal!!  - continue                Lipitor 40 mg daily      Normocytic anemia  - asymptomatic  - H/H stable   - continue                Folic acid 1 mg daily   - no evidence of active bleeds  - will closely monitor and transfuse if needed     MP   - Compliant with CPAP at home     Chronic stage III sacral ulcer  - current   - Wound Care following  - currently with home border dressing  - change acute will nutrition     VTE Prophylaxis:  Eliquis 5 mg b.i.d.  COVID-19 testing:  Negative on 10/25/2002  COVID-19 vaccination status:  Vaccinated (Moderna) 01/20/2021 and 02/17/2021     POA: No  Living  will: No  Contacts: Carrillo Elias (daughter) [phone]-lives in [place]                      Kiara Elias (niece) [phone]     CODE STATUS: Full Code  Internal Medicine (attending): Demetri Heredia MD  Physiatry (consulting):  Tonio Herzog MD     OUTPATIENT PROVIDERS  PCP: Tarik Gee MD  Cardiology:  Peterson Sánchez MD  CV surgery:  Earl Thompson MD  Nephrology: Dhaval Ibrahim MD     DISPOSITION: Condition stable.  Sleep hygiene fair, bowel maintenance, and appetite at goal.  BP improving.  Vital signs at goal with no recorded fevers.  No labs or imaging today.  Continue aggressive mobilization as tolerated.  Monitor closely.  Notify of acute changes.     Total time spent on this encounter including chart review and direct 1-on-1 patient interaction: 36 minutes   Over 50% of this time was spent in counseling and coordination of care

## 2022-10-29 NOTE — PLAN OF CARE
Problem: Rehabilitation ([facility]) Plan of Care  Goal: Plan of Care Review  Outcome: Ongoing, Progressing  Flowsheets (Taken 10/29/2022 1651)  Plan of Care Reviewed With: patient  Goal: Absence of New-Onset Illness or Injury  Outcome: Ongoing, Progressing  Intervention: Prevent Fall and Fall Injury  Flowsheets (Taken 10/29/2022 1651)  Safety Promotion/Fall Prevention:   assistive device/personal item within reach   bed alarm set   chair alarm set   Fall Risk signage in place   Fall Risk reviewed with patient/family   gait belt with ambulation   high risk medications identified   lighting adjusted   medications reviewed   nonskid shoes/socks when out of bed   side rails raised x 2   instructed to call staff for mobility  Intervention: Prevent Skin Injury  Flowsheets (Taken 10/29/2022 1651)  Skin Protection:   silicone foam dressing in place   transparent dressing maintained   incontinence pads utilized  Intervention: Prevent Infection  Flowsheets (Taken 10/29/2022 1651)  Infection Prevention:   environmental surveillance performed   equipment surfaces disinfected   hand hygiene promoted   single patient room provided   personal protective equipment utilized   rest/sleep promoted  Intervention: Prevent VTE (Venous Thromboembolism)  Flowsheets (Taken 10/29/2022 1651)  VTE Prevention/Management:   ambulation promoted   bleeding precations maintained   bleeding risk assessed   bleeding risk factor(s) identified, provider notified   dorsiflexion/plantar flexion performed   fluids promoted  Goal: Optimal Comfort and Wellbeing  Outcome: Ongoing, Progressing  Intervention: Provide Person-Centered Care  Flowsheets (Taken 10/29/2022 1651)  Trust Relationship/Rapport:   care explained   choices provided   emotional support provided   empathic listening provided   questions answered   thoughts/feelings acknowledged   reassurance provided   questions encouraged  Intervention: Monitor Pain and Promote Comfort  Flowsheets (Taken  10/29/2022 1651)  Pain Management Interventions:   pain management plan reviewed with patient/caregiver   position adjusted   pillow support provided   prescribed exercises encouraged   relaxation techniques promoted   quiet environment facilitated     Problem: Impaired Wound Healing  Goal: Optimal Wound Healing  Outcome: Ongoing, Progressing  Intervention: Promote Wound Healing  Flowsheets (Taken 10/29/2022 1651)  Oral Nutrition Promotion:   physical activity promoted   rest periods promoted  Sleep/Rest Enhancement:   relaxation techniques promoted   regular sleep/rest pattern promoted  Pain Management Interventions:   pain management plan reviewed with patient/caregiver   position adjusted   pillow support provided   prescribed exercises encouraged   relaxation techniques promoted   quiet environment facilitated     Problem: Skin Injury Risk Increased  Goal: Skin Health and Integrity  Outcome: Ongoing, Progressing  Intervention: Optimize Skin Protection  Flowsheets (Taken 10/29/2022 1651)  Pressure Reduction Techniques:   frequent weight shift encouraged   weight shift assistance provided   positioned off wounds   pressure points protected   rest period provided between sit times  Skin Protection:   silicone foam dressing in place   transparent dressing maintained   incontinence pads utilized  Intervention: Promote and Optimize Oral Intake  Flowsheets (Taken 10/29/2022 1651)  Oral Nutrition Promotion:   physical activity promoted   rest periods promoted     Problem: Fall Injury Risk  Goal: Absence of Fall and Fall-Related Injury  Outcome: Ongoing, Progressing  Intervention: Identify and Manage Contributors  Flowsheets (Taken 10/29/2022 1651)  Self-Care Promotion:   independence encouraged   BADL personal objects within reach   BADL personal routines maintained   safe use of adaptive equipment encouraged  Medication Review/Management:   medications reviewed   high-risk medications identified  Intervention: Promote  Injury-Free Environment  Flowsheets (Taken 10/29/2022 1651)  Safety Promotion/Fall Prevention:   assistive device/personal item within reach   bed alarm set   chair alarm set   Fall Risk signage in place   Fall Risk reviewed with patient/family   gait belt with ambulation   high risk medications identified   lighting adjusted   medications reviewed   nonskid shoes/socks when out of bed   side rails raised x 2   instructed to call staff for mobility     Problem: Infection  Goal: Absence of Infection Signs and Symptoms  Outcome: Ongoing, Progressing  Intervention: Prevent or Manage Infection  Flowsheets (Taken 10/29/2022 1651)  Infection Management: aseptic technique maintained

## 2022-10-30 PROCEDURE — 25000003 PHARM REV CODE 250: Performed by: NURSE PRACTITIONER

## 2022-10-30 PROCEDURE — 97535 SELF CARE MNGMENT TRAINING: CPT

## 2022-10-30 PROCEDURE — 97116 GAIT TRAINING THERAPY: CPT

## 2022-10-30 PROCEDURE — 97530 THERAPEUTIC ACTIVITIES: CPT

## 2022-10-30 PROCEDURE — 94799 UNLISTED PULMONARY SVC/PX: CPT

## 2022-10-30 PROCEDURE — 97110 THERAPEUTIC EXERCISES: CPT

## 2022-10-30 PROCEDURE — 11800000 HC REHAB PRIVATE ROOM

## 2022-10-30 RX ADMIN — SERTRALINE HYDROCHLORIDE 50 MG: 50 TABLET ORAL at 09:10

## 2022-10-30 RX ADMIN — ASPIRIN 81 MG: 81 TABLET, COATED ORAL at 09:10

## 2022-10-30 RX ADMIN — CETIRIZINE HYDROCHLORIDE 10 MG: 10 TABLET, FILM COATED ORAL at 08:10

## 2022-10-30 RX ADMIN — SUCRALFATE 1 G: 1 TABLET ORAL at 08:10

## 2022-10-30 RX ADMIN — GABAPENTIN 300 MG: 300 CAPSULE ORAL at 08:10

## 2022-10-30 RX ADMIN — APIXABAN 5 MG: 5 TABLET, FILM COATED ORAL at 08:10

## 2022-10-30 RX ADMIN — SUCRALFATE 1 G: 1 TABLET ORAL at 11:10

## 2022-10-30 RX ADMIN — AMLODIPINE BESYLATE 10 MG: 5 TABLET ORAL at 09:10

## 2022-10-30 RX ADMIN — CARVEDILOL 12.5 MG: 6.25 TABLET, FILM COATED ORAL at 09:10

## 2022-10-30 RX ADMIN — CARVEDILOL 12.5 MG: 6.25 TABLET, FILM COATED ORAL at 08:10

## 2022-10-30 RX ADMIN — SUCRALFATE 1 G: 1 TABLET ORAL at 04:10

## 2022-10-30 RX ADMIN — SUCRALFATE 1 G: 1 TABLET ORAL at 06:10

## 2022-10-30 RX ADMIN — APIXABAN 5 MG: 5 TABLET, FILM COATED ORAL at 09:10

## 2022-10-30 RX ADMIN — ATORVASTATIN CALCIUM 40 MG: 40 TABLET, FILM COATED ORAL at 09:10

## 2022-10-30 RX ADMIN — FOLIC ACID 1 MG: 1 TABLET ORAL at 09:10

## 2022-10-30 RX ADMIN — VALSARTAN 160 MG: 160 TABLET, FILM COATED ORAL at 09:10

## 2022-10-30 RX ADMIN — FAMOTIDINE 20 MG: 20 TABLET ORAL at 09:10

## 2022-10-30 NOTE — PLAN OF CARE
Problem: Fall Injury Risk  Goal: Absence of Fall and Fall-Related Injury  Outcome: Ongoing, Progressing  Intervention: Promote Injury-Free Environment  Flowsheets (Taken 10/30/2022 0227)  Safety Promotion/Fall Prevention:   assistive device/personal item within reach   Fall Risk signage in place   lighting adjusted   nonskid shoes/socks when out of bed   side rails raised x 3   instructed to call staff for mobility

## 2022-10-30 NOTE — PT/OT/SLP PROGRESS
Occupational Therapy Inpatient Rehab Treatment    Name: Jessica Elias  MRN: [id number]    Assessment:  Jessica Elias is a 82 y.o. female admitted with a medical diagnosis of S/P CABG x 4.  She presents with the following impairments/functional limitations:  impaired endurance, impaired functional mobility, impaired self care skills, decreased upper extremity function.    General Precautions: Standard, sternal     Orthopedic Precautions:N/A     Braces: N/A    Rehab Prognosis: Good; patient would benefit from acute skilled OT services to address these deficits and reach maximum level of function.      History:     Past Medical History:   Diagnosis Date    Chronic kidney disease, unspecified     stage 3    Coronary artery disease     History of carotid artery disease     Hyperlipidemia     Hypertension     Sleep apnea        Past Surgical History:   Procedure Laterality Date    APPENDECTOMY N/A     BLADDER SURGERY N/A     CAROTID ENDARTERECTOMY N/A     CATARACT EXTRACTION N/A     CORONARY ARTERY BYPASS GRAFT (CABG) N/A 10/11/2022    Procedure: CORONARY ARTERY BYPASS GRAFT (CABG);  Surgeon: Bety Thompson MD;  Location: [facility];  Service: Cardiothoracic;  Laterality: N/A;    HYSTERECTOMY N/A     INSERTION OF PERMANENT PACEMAKER N/A     LEFT HEART CATHETERIZATION Left 09/23/2022    Procedure: CATHETERIZATION, HEART, LEFT;  Surgeon: Abner Mitchell MD;  Location: [facility] CATH LAB;  Service: Cardiology;  Laterality: Left;  LHC VIA RRA    lump removed from right shoulder         Subjective     Orientation: Oriented x4    Chief Complaint: none    Patient/Family Comments/goals: return to PLOF    Vitals   Vitals at Rest  /64   HR 67       Respiratory Status: Room air    Patients cultural, spiritual, Restoration conflicts given the current situation: no       Objective:     Patient found HOB elevated with peripheral IV  upon OT entry to room.    Mobility   Patient completed:  Supine to Sit with contact guard  "assistance  Sit to Stand Transfer with supervision with rolling walker  Stand to Sit Transfer with independence with rolling walker  Toilet Transfer Step Transfer technique with contact guard assistance with  rolling walker  Tub Transfer Scoot Pivot technique with setup with TTB    Functional Mobility  Pt mobilized >200 ft t/o session with RW, no LOB    ADLs   Current Status   Eating     Oral Hygiene 6   Shower, Bathe Self 5   Upper Body Dressing 5   Lower Body Dressing 5   Toileting Hygiene 5   Toilet Transfer 4   Putting On, Taking Off Footwear 5     Limiting Factors for ADLs: motor, endurance, and balance     IADLs: performed laundry management and simulated cooking/managing food into/out of oven within sternal pxns. Able to perform both tasks. Edu on use of reacher to hang clothing on higher clothing rack. Edu on sternal pxns and the implications on these tasks including limiting amount of food and reduce need for heavy container when cooking.       LifeStyle Change and Education:             Patient left up in chair with all lines intact and call button in reach.     Education provided: Roles and goals of OT, ADLs, transfer training, body mechanics, assistive device, safety precautions, fall prevention, and "move in the tube"    Multidisciplinary Problems       Occupational Therapy Goals          Problem: Occupational Therapy    Goal Priority Disciplines Outcome Interventions   Occupational Therapy Goal     OT, PT/OT Ongoing, Progressing    Description: ADLs:  Pt to perform UB dressing with set up   Pt to perform LB dressing with set up   Pt to perform putting on/off footwear task with set up  Pt to perform toileting with independent using sternal precautions    Functional Transfers:  Pt to perform toilet transfers with set up.  Pt to perform a tub transfer with set up  Pt to perform a walk-in shower transfer with set up     IADLs:  Pt to perform simple meal with SBA and RW.    Balance, Strengthening, " Endurance, Balance:  Pt to consistently demonstrate adherence to orthopedic precautions during all ADL's as instructed by OT.  Pt to demonstrate consistent adherence to breathing control and energy conservation techniques as educated by OT.                        Time Tracking     OT Received On: 10/30/22  Time In 1015     Time Out 1145  Total Time 90 min  Therapy Time: OT Individual: 90  Missed Time:    Missed Time Reason:      Billable Minutes: Self Care/Home Management 90    10/30/2022

## 2022-10-30 NOTE — PT/OT/SLP PROGRESS
Physical Therapy Inpatient Rehab Treatment    Patient Name:  Jessica Elias   MRN:  [id number]    Recommendations:     Discharge Recommendations:      Discharge Equipment Recommendations:     Barriers to discharge: Decreased caregiver support    Assessment:     Jessica Elias is a 82 y.o. female admitted with a medical diagnosis of S/P CABG x 4.  She presents with the following impairments/functional limitations:    decreased endurance. Decreased LE and UE strength.    Rehab Diagnosis: s/p CABG x 4; Pacemaker; Sacral Ulcer    Recent Surgery: * No surgery found *      General Precautions: Standard, sternal     Orthopedic Precautions:N/A     Braces: N/A    Rehab Prognosis: Good; patient would benefit from acute skilled PT services to address these deficits and reach maximum level of function.      History:     Past Medical History:   Diagnosis Date    Chronic kidney disease, unspecified     stage 3    Coronary artery disease     History of carotid artery disease     Hyperlipidemia     Hypertension     Sleep apnea        Past Surgical History:   Procedure Laterality Date    APPENDECTOMY N/A     BLADDER SURGERY N/A     CAROTID ENDARTERECTOMY N/A     CATARACT EXTRACTION N/A     CORONARY ARTERY BYPASS GRAFT (CABG) N/A 10/11/2022    Procedure: CORONARY ARTERY BYPASS GRAFT (CABG);  Surgeon: Bety Thompson MD;  Location: [facility] OR;  Service: Cardiothoracic;  Laterality: N/A;    HYSTERECTOMY N/A     INSERTION OF PERMANENT PACEMAKER N/A     LEFT HEART CATHETERIZATION Left 09/23/2022    Procedure: CATHETERIZATION, HEART, LEFT;  Surgeon: Abner Mitchell MD;  Location: [facility] CATH LAB;  Service: Cardiology;  Laterality: Left;  LHC VIA RRA    lump removed from right shoulder         Subjective     Chief Complaint: Fatigue     Respiratory Status: Room air    Patients cultural, spiritual, Hinduism conflicts given the current situation: no      Objective:     Communicated with pt  prior to session.  Patient found up in chair  "with  breakfast  upon PT entry to room.    Pt is Oriented x3 and Alert, Cooperative, and Motivated.    Vitals   Vitals at Rest  BP    HR    O2 Sat    Pain      Vitals With Activity  BP    HR    O2 Sat    Pain        Functional Mobility:   Bed Mobility:     Rolling Right: Independent  Scooting: Independent  Supine to Sit: Set-up or clean-up assistance   Sit to Supine: Independent  Transfers:     Sit to Stand: Set-up or clean-up assistance  with rolling walker  Car Transfer: Set-up or clean-up assistance  with  rolling walker  using  Step Transfer  Gait: Pt ambulates 750' with RW with Supervision or touching assistance .   4 stairs with bilateral handrails with Supervision or touching assistance   6" curb with rolling walker with Supervision or touching assistance      Current   Status  Discharge   Goal   Functional Area: Care Score:    Roll Left and Right   Independent   Sit to Lying 6 Independent   Lying to Sitting on Side of Bed 4 Set-up/clean-up   Sit to Stand 5 Independent   Chair/Bed-to-Chair Transfer 5 Independent   Car Transfer 4 Independent   Walk 10 Feet   Independent   Walk 50 Feet with Two Turns   Independent   Walk 150 Feet 4 Independent   Walk 10 Feet Uneven Surface 4 Independent   1 Step (Curb) 4 Independent   4 Steps 4 Independent   12 Steps   Independent   Picking Up Object   Independent   Wheel 50 Feet with Two Turns   Not applicable   Wheel 150 Feet   Not applicable       Therapeutic Activities and Exercises:  Gait, t/f training sts and supine to sit. Worked on supine to sit while laying on a wedge. Pt able to perform 8x independently with wedge. Knee ext, flex, add, seated marches.    Activity Tolerance: Good    Patient left supine with call button in reach.    Education provided: transfer training, bed mob, gait training, stair training, body mechanics, and strengthening exercises    Expected compliance: High compliance    GOALS:   Multidisciplinary Problems       Physical Therapy Goals          " Problem: Physical Therapy    Goal Priority Disciplines Outcome Goal Variances Interventions   Physical Therapy Goal     PT, PT/OT Ongoing, Progressing     Description: Bed Mobility:  Roll left and right with setup/clean-up assist.  Sit to supine transfer with setup/clean-up assist.  Supine to sit transfer with supervision/touching assist.    Transfers:  Sit to stand transfer with setup/clean-up assist using LRAD.  Bed to chair transfer with setup/clean-up assist using LRAD.  Car transfer with setup/clean-up assist using LRAD.   an object from the ground in standing position with setup/clean-up assist using LRAD.    Mobility:  Ambulate 360 feet with supervision/touching assist using LRAD.  Ambulate 10 feet on uneven surfaces/ramps with setup/clean-up assist using LRAD.  Ascend/descend a 4 inch curb with setup/clean-up assist using LRAD.   Ascend/descend 12 stairs with supervision/touching assist using bilateral handrails.                        Plan:     During this hospitalization, patient to be seen 5 x/week to address the identified rehab impairments via gait training, therapeutic activities, therapeutic exercises, neuromuscular re-education and progress toward the following goals:    Plan of Care Expires:  11/01/22  PT Next Visit Date: 11/01/22  Plan of Care reviewed with: patient    Additional Information:         Time Tracking:     Therapy Time  PT Received On: 10/30/22  PT Start Time: 0730  PT Stop Time: 0900  PT Total Time (min): 90 min   PT Individual: 90  Missed Time:    Time Missed due to:      Billable Minutes: Gait Training 45, Therapeutic Activity 30, and Therapeutic Exercise 15    10/30/2022

## 2022-10-30 NOTE — PLAN OF CARE
Problem: Rehabilitation ([facility]) Plan of Care  Goal: Plan of Care Review  Outcome: Ongoing, Progressing  Flowsheets (Taken 10/30/2022 1012)  Plan of Care Reviewed With: patient  Goal: Absence of New-Onset Illness or Injury  Outcome: Ongoing, Progressing  Intervention: Prevent Fall and Fall Injury  Flowsheets (Taken 10/30/2022 1012)  Safety Promotion/Fall Prevention:   assistive device/personal item within reach   bed alarm set   chair alarm set   Fall Risk reviewed with patient/family   Fall Risk signage in place   gait belt with ambulation   high risk medications identified   in recliner, wheels locked   medications reviewed   lighting adjusted   nonskid shoes/socks when out of bed   side rails raised x 2   instructed to call staff for mobility  Intervention: Prevent Skin Injury  Flowsheets (Taken 10/30/2022 1012)  Skin Protection:   incontinence pads utilized   silicone foam dressing in place   transparent dressing maintained  Intervention: Prevent Infection  Flowsheets (Taken 10/30/2022 1012)  Infection Prevention:   environmental surveillance performed   equipment surfaces disinfected   hand hygiene promoted   personal protective equipment utilized   rest/sleep promoted   single patient room provided  Intervention: Prevent VTE (Venous Thromboembolism)  Flowsheets (Taken 10/30/2022 1012)  VTE Prevention/Management:   ambulation promoted   bleeding precations maintained   bleeding risk assessed   bleeding risk factor(s) identified, provider notified   dorsiflexion/plantar flexion performed   fluids promoted  Goal: Optimal Comfort and Wellbeing  Outcome: Ongoing, Progressing  Intervention: Provide Person-Centered Care  Flowsheets (Taken 10/30/2022 1012)  Trust Relationship/Rapport:   care explained   choices provided   questions answered   questions encouraged   reassurance provided   thoughts/feelings acknowledged  Intervention: Monitor Pain and Promote Comfort  Flowsheets (Taken 10/30/2022 1012)  Pain Management  Interventions:   pain management plan reviewed with patient/caregiver   position adjusted   quiet environment facilitated   relaxation techniques promoted   prescribed exercises encouraged     Problem: Impaired Wound Healing  Goal: Optimal Wound Healing  Outcome: Ongoing, Progressing  Intervention: Promote Wound Healing  Flowsheets (Taken 10/30/2022 1012)  Oral Nutrition Promotion:   rest periods promoted   physical activity promoted  Sleep/Rest Enhancement:   relaxation techniques promoted   regular sleep/rest pattern promoted  Pain Management Interventions:   pain management plan reviewed with patient/caregiver   position adjusted   quiet environment facilitated   relaxation techniques promoted   prescribed exercises encouraged     Problem: Skin Injury Risk Increased  Goal: Skin Health and Integrity  Outcome: Ongoing, Progressing  Intervention: Optimize Skin Protection  Flowsheets (Taken 10/30/2022 1012)  Pressure Reduction Techniques:   frequent weight shift encouraged   weight shift assistance provided   pressure points protected   rest period provided between sit times  Skin Protection:   incontinence pads utilized   silicone foam dressing in place   transparent dressing maintained  Intervention: Promote and Optimize Oral Intake  Flowsheets (Taken 10/30/2022 1012)  Oral Nutrition Promotion:   rest periods promoted   physical activity promoted     Problem: Fall Injury Risk  Goal: Absence of Fall and Fall-Related Injury  Outcome: Ongoing, Progressing  Intervention: Identify and Manage Contributors  Flowsheets (Taken 10/30/2022 1012)  Self-Care Promotion:   independence encouraged   BADL personal objects within reach   BADL personal routines maintained   safe use of adaptive equipment encouraged  Medication Review/Management:   medications reviewed   high-risk medications identified  Intervention: Promote Injury-Free Environment  Flowsheets (Taken 10/30/2022 1012)  Safety Promotion/Fall Prevention:   assistive  device/personal item within reach   bed alarm set   chair alarm set   Fall Risk reviewed with patient/family   Fall Risk signage in place   gait belt with ambulation   high risk medications identified   in recliner, wheels locked   medications reviewed   lighting adjusted   nonskid shoes/socks when out of bed   side rails raised x 2   instructed to call staff for mobility     Problem: Infection  Goal: Absence of Infection Signs and Symptoms  Outcome: Ongoing, Progressing  Intervention: Prevent or Manage Infection  Flowsheets (Taken 10/30/2022 1012)  Infection Management: aseptic technique maintained

## 2022-10-30 NOTE — PROGRESS NOTES
[facility] ([facility])  Rehab Progress Note    Patient Name: Jessica Elias  MRN: [id number]  Age: 82 y.o. Sex: female  : 1940  Hospital Length of Stay: 5 days  Date of Service: 10/30/2022   Chief Complaint: CAD s/p CABG x4 (LIMA to LAD, SVG to Diag 1, SVG to OM, and SVG to RCA), Ligation of left atrial appendage on 10/11/2022    Subjective:     Basic Information  Admit Information: 82-year-old white female presented to [facility] on 10/11/2022 for scheduled CABG x4 2/2 multivessel CAD.  PMH significant for multi vessel CAD, paroxysmal atrial fibrillation, carotid artery stenosis, MP, recurrent bradycardia, HLD, and HTN.  Tolerated CABG x4 (LIMA to LAD, SVG to Diag 1, SVG to OM, and SVG to RCA), Ligation of left atrial appendage on 10/11 without perioperative complications.  Diuresed postoperatively. Chest tubes discontinued on 10/15.  Atrial fib RVR reported on 10/15.  Amiodarone drip initiated.  Eliquis 5 mg b.i.d. resumed on 10/16.  Cardiology recommended not continuing amiodarone.  Metoprolol discontinued on 10/19 and Coreg 12.5 mg b.i.d. initiated.  Tolerated transfer to [facility] inpatient rehab unit on 10/25 without incident.  Today's Information: No acute events overnight.  Sitting up in bed.  Reports good sleep and appetite.  Vital signs at goal with no recorded fevers.  No labs or imaging today.  Reports decreased urine output over the last 24 hours.    Review of patient's allergies indicates:   Allergen Reactions    Penicillin     Sulfa (sulfonamide antibiotics)         Current Facility-Administered Medications:     acetaminophen tablet 650 mg, 650 mg, Oral, Q4H PRN, Toño A Veda, FNP    ALPRAZolam tablet 0.25 mg, 0.25 mg, Oral, TID PRN, Toño A Veda, FNP    amLODIPine tablet 10 mg, 10 mg, Oral, Daily, Toño A Veda, FNP, 10 mg at 10/30/22 0907    apixaban tablet 5 mg, 5 mg, Oral, BID, Toño A Veda, FNP, 5 mg at 10/30/22 0907    aspirin EC tablet  81 mg, 81 mg, Oral, Daily, Toño JERNIGAN Veda, FNP, 81 mg at 10/30/22 0907    atorvastatin tablet 40 mg, 40 mg, Oral, Daily, Toño A Veda, FNP, 40 mg at 10/30/22 0906    benzonatate capsule 100 mg, 100 mg, Oral, TID PRN, Toño Lundehart, FNP    bisacodyL suppository 10 mg, 10 mg, Rectal, Daily PRN, Toño Lundehart, FNP    carvediloL tablet 12.5 mg, 12.5 mg, Oral, BID, Toño A Veda, FNP, 12.5 mg at 10/30/22 0907    cetirizine tablet 10 mg, 10 mg, Oral, QHS, Michelle Crumpte, FNP, 10 mg at 10/29/22 2044    famotidine tablet 20 mg, 20 mg, Oral, Daily, Toño Lundehart, FNP, 20 mg at 10/30/22 0907    folic acid tablet 1 mg, 1 mg, Oral, Daily, Toño A Veda, FNP, 1 mg at 10/30/22 0907    gabapentin capsule 300 mg, 300 mg, Oral, QHS, Toño A Veda, FNP, 300 mg at 10/29/22 2044    hydrALAZINE injection 10 mg, 10 mg, Intravenous, Q4H PRN, Toño A Veda, FNP    HYDROcodone-acetaminophen 5-325 mg per tablet 1 tablet, 1 tablet, Oral, Q4H PRN, Toño Lundehart, FNP    labetalol 20 mg/4 mL (5 mg/mL) IV syring, 10 mg, Intravenous, Q4H PRN, Toño A Veda, FNP    methocarbamoL tablet 500 mg, 500 mg, Oral, TID PRN, Michelle Crumpte, FNP    metoprolol injection 10 mg, 10 mg, Intravenous, Q2H PRN, Toño A Veda, FNP    nitroGLYCERIN SL tablet 0.4 mg, 0.4 mg, Sublingual, Q5 Min PRN, Toño A Veda, FNP    ondansetron disintegrating tablet 4 mg, 4 mg, Oral, Q6H PRN, Toño A Veda, FNP    polyethylene glycol packet 17 g, 17 g, Oral, BID PRN, Toño A Veda, FNP    promethazine tablet 25 mg, 25 mg, Oral, Q6H PRN, Toño A Veda, FNP    sertraline tablet 50 mg, 50 mg, Oral, Daily, Toño A Veda, FNP, 50 mg at 10/30/22 0907    sucralfate tablet 1 g, 1 g, Oral, QID (AC & HS), Toño A Veda, FNP, 1 g at 10/30/22 1153    valsartan tablet 160 mg, 160 mg, Oral, Daily, Toño A Veda, FNP, 160 mg at 10/30/22 0907    Facility-Administered Medications  "Ordered in Other Encounters:     0.9%  NaCl infusion, , Intravenous, Once, Abner Mitchell MD    diazePAM tablet 10 mg, 10 mg, Oral, On Call Procedure, Abner Mitchell MD, 10 mg at 09/23/22 0823    diphenhydrAMINE capsule 50 mg, 50 mg, Oral, On Call Procedure, Abner Mitchell MD, 50 mg at 09/23/22 0823    sodium chloride 0.9% flush 10 mL, 10 mL, Intravenous, PRN, Abner Mitchell MD     Review of Systems   Complete 12-point review of symptoms negative except for what's mentioned in HPI     Objective:     BP (!) 124/55   Pulse 61   Temp 98 °F (36.7 °C) (Oral)   Resp 18   Ht 5' 4.5" (1.638 m)   Wt 63.3 kg (139 lb 8.8 oz)   SpO2 96%   BMI 23.58 kg/m²        Intake/Output Summary (Last 24 hours) at 10/30/2022 1605  Last data filed at 10/30/2022 1200  Gross per 24 hour   Intake 360 ml   Output --   Net 360 ml         Physical Exam  Constitutional:       Appearance: Normal appearance.   HENT:      Head: Normocephalic.      Mouth/Throat:      Mouth: Mucous membranes are moist.   Eyes:      Pupils: Pupils are equal, round, and reactive to light.   Cardiovascular:      Rate and Rhythm: Normal rate and regular rhythm.      Heart sounds: Normal heart sounds.      Comments: Sternal incision  Pulmonary:      Effort: Pulmonary effort is normal.      Breath sounds: Normal breath sounds.   Abdominal:      General: Bowel sounds are normal.      Palpations: Abdomen is soft.   Musculoskeletal:      Cervical back: Neck supple.      Comments: Generalized weakness and muscle atrophy   Skin:     General: Skin is warm and dry.   Neurological:      General: No focal deficit present.      Mental Status: She is alert and oriented to person, place, and time.   Psychiatric:         Mood and Affect: Mood normal.         Behavior: Behavior normal.         Thought Content: Thought content normal.         Judgment: Judgment normal.       Lines/Drains/Airways       Peripheral Intravenous Line  Duration                  Peripheral IV - " Single Lumen 10/20/22 1500 20 G Anterior;Left Forearm 10 days                  Labs  No results found for this or any previous visit (from the past 24 hour(s)).    Radiology  CXR two view on 10/16/2022 at 9:11 a.m., IMPRESSION: Interval removal of support catheters with evidence of a small left apical pneumothorax. Bilateral pleural effusions. No other focal consolidative changes.   Radiology  Transthoracic echo on 09/23/2022: The ejection fraction was calculated to be 55%.  The left ventricular systolic function was normal.  The left ventricular end diastolic pressure was elevated. The pre-procedure left ventricular end diastolic pressure was 23. The estimated blood loss was none. There was three vessel coronary artery disease. There was no mitral valve regurgitation. There was no aortic valve stenosis.  Diagnostic study  [facility] on 09/23/2022-left main: Highly calcified without obstructive lesions.  Lad: Heavily calcified 70-80% stenosis proximally (long lesion) in 70% distal at the bifurcation with the 2nd diagonal branch.  The 1st diagonal branch shows disease of 90%.  Left circumflex:  Calcified, 99% stenosis proximally.  RCA:  Highly calcified, 99% stenosis of the ostium followed by 90% stenosis proximally.    Assessment/Plan:     82 y.o. WF admitted on 10/25/2022    CAD (multivessel)  - s/p CABG x4 (LIMA to LAD, SVG to Diag 1, SVG to OM, and SVG to RCA), Ligation of left atrial appendage on 10/11/2022  - denies recent chest pain or discomfort  - continue                Coreg 12.5 mg b.i.d.                       Valsartan 100 mg daily                 Lipitor 40 mg daily                 Aspirin 81 mg daily   Carafate 1 g q.i.d.  Norco 5 mg/325 mg q.4 hours p.r.n.   - ECG and Nitro PRN  - not on Plavix  - continue cardiac diet  - to follow-up with cardiology outpatient     Debility   - 2/2 above  - defer to physiatry for rehab and pain management  - PT/OT/RT/ST following     Carotid artery stenosis   - s/p CEA  -  continue                Lipitor 40 mg daily                 Aspirin 81 mg daily                 Eliquis 5 mg b.i.d.  - to follow-up with vascular surgery outpatient     Sick sinus syndrome   - s/p PPM  - continue                Coreg 12.5 mg b.i.d.   - to follow-up with cardiology outpatient     HTN  - BP mod control  - discontinued                HCTZ 25 mg daily on 10/25                Prazosin 1 mg daily on 10/25  - continue                Coreg 12.5 mg b.i.d.                       Valsartan 100 mg daily                 Norvasc 10 mg daily (increased 10/27)                Hydralazine 10 mg every 2 hours as needed for BP > 160/90                Labetalol 10 mg every 2 hours as needed for BP > 160/90     Paroxysmal atrial fibrillation   - rate control   - no evidence of bleeding  - continue                Eliquis 5 mg b.i.d.                Coreg 12.5 mg b.i.d.    - to follow-up with cardiology outpatient     GERD  - Avoid spicy foods, and nothing to eat or drink within x2 hours of bedtime or laying flat (water is ok)   - Avoid NSAIDs (Advil, ibuprofen, naproxen...) and brown-2 inhibitors (Mobic, Celebrex)    - continue                Pepcid 40 mg daily  Colace 100 mg b.i.d.     Anxiety    - stable  - continue  Zoloft 50 mg daily     HLD  - FLP at goal!!  - continue                Lipitor 40 mg daily      Normocytic anemia  - asymptomatic  - H/H stable   - continue                Folic acid 1 mg daily   - no evidence of active bleeds  - will closely monitor and transfuse if needed     MP   - Compliant with CPAP at home     Chronic stage III sacral ulcer  - current   - Wound Care following  - currently with home border dressing  - change acute will nutrition     VTE Prophylaxis:  Eliquis 5 mg b.i.d.  COVID-19 testing:  Negative on 10/25/2002  COVID-19 vaccination status:  Vaccinated (Moderna) 01/20/2021 and 02/17/2021     POA: No  Living will: No  Contacts: Carrillo Elias (daughter) [phone]-lives in [place]                       Kiara Elias ([facility]) [phone]     CODE STATUS: Full Code  Internal Medicine (attending): Demetri Heredia MD  Physiatry (consulting):  Tonio Herzog MD     OUTPATIENT PROVIDERS  PCP: Tarik Gee MD  Cardiology:  Peterson Sánchez MD  CV surgery:  Earl Thompson MD  Nephrology: Dhaval Ibrahim MD     DISPOSITION: Condition stable.  Sleep hygiene fair, bowel maintenance, and appetite at goal.  Vital signs at goal with no recorded fevers.  No labs or imaging today.  Continue aggressive mobilization as tolerated.  Monitor closely.  Notify of acute changes.  Repeat lab work in the morning     Total time spent on this encounter including chart review and direct 1-on-1 patient interaction: 36 minutes   Over 50% of this time was spent in counseling and coordination of care

## 2022-10-31 LAB
ALBUMIN SERPL-MCNC: 2.8 GM/DL (ref 3.4–4.8)
ALBUMIN/GLOB SERPL: 0.7 RATIO (ref 1.1–2)
ALP SERPL-CCNC: 63 UNIT/L (ref 40–150)
ALT SERPL-CCNC: 11 UNIT/L (ref 0–55)
AST SERPL-CCNC: 19 UNIT/L (ref 5–34)
BASOPHILS # BLD AUTO: 0.07 X10(3)/MCL (ref 0–0.2)
BASOPHILS NFR BLD AUTO: 1.1 %
BILIRUBIN DIRECT+TOT PNL SERPL-MCNC: 0.4 MG/DL
BUN SERPL-MCNC: 23.2 MG/DL (ref 9.8–20.1)
CALCIUM SERPL-MCNC: 9.3 MG/DL (ref 8.4–10.2)
CHLORIDE SERPL-SCNC: 110 MMOL/L (ref 98–107)
CO2 SERPL-SCNC: 27 MMOL/L (ref 23–31)
CREAT SERPL-MCNC: 0.89 MG/DL (ref 0.55–1.02)
EOSINOPHIL # BLD AUTO: 0.5 X10(3)/MCL (ref 0–0.9)
EOSINOPHIL NFR BLD AUTO: 8.1 %
ERYTHROCYTE [DISTWIDTH] IN BLOOD BY AUTOMATED COUNT: 13.3 % (ref 11.5–17)
GFR SERPLBLD CREATININE-BSD FMLA CKD-EPI: >60 MLS/MIN/1.73/M2
GLOBULIN SER-MCNC: 4 GM/DL (ref 2.4–3.5)
GLUCOSE SERPL-MCNC: 97 MG/DL (ref 82–115)
HCT VFR BLD AUTO: 33.6 % (ref 37–47)
HGB BLD-MCNC: 10.8 GM/DL (ref 12–16)
IMM GRANULOCYTES # BLD AUTO: 0.01 X10(3)/MCL (ref 0–0.04)
IMM GRANULOCYTES NFR BLD AUTO: 0.2 %
LYMPHOCYTES # BLD AUTO: 2.14 X10(3)/MCL (ref 0.6–4.6)
LYMPHOCYTES NFR BLD AUTO: 34.9 %
MAGNESIUM SERPL-MCNC: 1.8 MG/DL (ref 1.6–2.6)
MCH RBC QN AUTO: 29.7 PG (ref 27–31)
MCHC RBC AUTO-ENTMCNC: 32.1 MG/DL (ref 33–36)
MCV RBC AUTO: 92.3 FL (ref 80–94)
MONOCYTES # BLD AUTO: 0.57 X10(3)/MCL (ref 0.1–1.3)
MONOCYTES NFR BLD AUTO: 9.3 %
NEUTROPHILS # BLD AUTO: 2.9 X10(3)/MCL (ref 2.1–9.2)
NEUTROPHILS NFR BLD AUTO: 46.4 %
NRBC BLD AUTO-RTO: 0 %
PHOSPHATE SERPL-MCNC: 3.3 MG/DL (ref 2.3–4.7)
PLATELET # BLD AUTO: 398 X10(3)/MCL (ref 130–400)
PMV BLD AUTO: 10.9 FL (ref 7.4–10.4)
POTASSIUM SERPL-SCNC: 4.1 MMOL/L (ref 3.5–5.1)
PREALB SERPL-MCNC: 17.5 MG/DL (ref 14–37)
PROT SERPL-MCNC: 6.8 GM/DL (ref 5.8–7.6)
RBC # BLD AUTO: 3.64 X10(6)/MCL (ref 4.2–5.4)
SODIUM SERPL-SCNC: 145 MMOL/L (ref 136–145)
WBC # SPEC AUTO: 6.1 X10(3)/MCL (ref 4.5–11.5)

## 2022-10-31 PROCEDURE — 25000003 PHARM REV CODE 250: Performed by: NURSE PRACTITIONER

## 2022-10-31 PROCEDURE — 97535 SELF CARE MNGMENT TRAINING: CPT

## 2022-10-31 PROCEDURE — 99233 PR SUBSEQUENT HOSPITAL CARE,LEVL III: ICD-10-PCS | Mod: ,,, | Performed by: PHYSICAL MEDICINE & REHABILITATION

## 2022-10-31 PROCEDURE — 84100 ASSAY OF PHOSPHORUS: CPT | Performed by: NURSE PRACTITIONER

## 2022-10-31 PROCEDURE — 36415 COLL VENOUS BLD VENIPUNCTURE: CPT | Performed by: NURSE PRACTITIONER

## 2022-10-31 PROCEDURE — 84134 ASSAY OF PREALBUMIN: CPT | Performed by: NURSE PRACTITIONER

## 2022-10-31 PROCEDURE — 85025 COMPLETE CBC W/AUTO DIFF WBC: CPT | Performed by: NURSE PRACTITIONER

## 2022-10-31 PROCEDURE — 97530 THERAPEUTIC ACTIVITIES: CPT

## 2022-10-31 PROCEDURE — 11800000 HC REHAB PRIVATE ROOM

## 2022-10-31 PROCEDURE — 97168 OT RE-EVAL EST PLAN CARE: CPT

## 2022-10-31 PROCEDURE — 80053 COMPREHEN METABOLIC PANEL: CPT | Performed by: NURSE PRACTITIONER

## 2022-10-31 PROCEDURE — 83735 ASSAY OF MAGNESIUM: CPT | Performed by: NURSE PRACTITIONER

## 2022-10-31 PROCEDURE — 97110 THERAPEUTIC EXERCISES: CPT

## 2022-10-31 PROCEDURE — 94799 UNLISTED PULMONARY SVC/PX: CPT

## 2022-10-31 PROCEDURE — 99233 SBSQ HOSP IP/OBS HIGH 50: CPT | Mod: ,,, | Performed by: PHYSICAL MEDICINE & REHABILITATION

## 2022-10-31 RX ORDER — FUROSEMIDE 40 MG/1
80 TABLET ORAL ONCE
Status: DISCONTINUED | OUTPATIENT
Start: 2022-10-31 | End: 2022-10-31

## 2022-10-31 RX ORDER — POTASSIUM CHLORIDE 750 MG/1
30 TABLET, EXTENDED RELEASE ORAL ONCE
Status: DISCONTINUED | OUTPATIENT
Start: 2022-10-31 | End: 2022-10-31

## 2022-10-31 RX ADMIN — SUCRALFATE 1 G: 1 TABLET ORAL at 09:10

## 2022-10-31 RX ADMIN — CARVEDILOL 12.5 MG: 6.25 TABLET, FILM COATED ORAL at 08:10

## 2022-10-31 RX ADMIN — APIXABAN 5 MG: 5 TABLET, FILM COATED ORAL at 08:10

## 2022-10-31 RX ADMIN — CARVEDILOL 12.5 MG: 6.25 TABLET, FILM COATED ORAL at 09:10

## 2022-10-31 RX ADMIN — VALSARTAN 160 MG: 160 TABLET, FILM COATED ORAL at 08:10

## 2022-10-31 RX ADMIN — SUCRALFATE 1 G: 1 TABLET ORAL at 06:10

## 2022-10-31 RX ADMIN — APIXABAN 5 MG: 5 TABLET, FILM COATED ORAL at 09:10

## 2022-10-31 RX ADMIN — GABAPENTIN 300 MG: 300 CAPSULE ORAL at 09:10

## 2022-10-31 RX ADMIN — SERTRALINE HYDROCHLORIDE 50 MG: 50 TABLET ORAL at 08:10

## 2022-10-31 RX ADMIN — FOLIC ACID 1 MG: 1 TABLET ORAL at 08:10

## 2022-10-31 RX ADMIN — ASPIRIN 81 MG: 81 TABLET, COATED ORAL at 08:10

## 2022-10-31 RX ADMIN — ATORVASTATIN CALCIUM 40 MG: 40 TABLET, FILM COATED ORAL at 08:10

## 2022-10-31 RX ADMIN — SUCRALFATE 1 G: 1 TABLET ORAL at 04:10

## 2022-10-31 RX ADMIN — SUCRALFATE 1 G: 1 TABLET ORAL at 12:10

## 2022-10-31 RX ADMIN — FAMOTIDINE 20 MG: 20 TABLET ORAL at 08:10

## 2022-10-31 RX ADMIN — CETIRIZINE HYDROCHLORIDE 10 MG: 10 TABLET, FILM COATED ORAL at 09:10

## 2022-10-31 RX ADMIN — AMLODIPINE BESYLATE 10 MG: 5 TABLET ORAL at 08:10

## 2022-10-31 NOTE — PLAN OF CARE
Ordered HH PT/OT/nursing with [facility] via [employer].  This agency was chosen by pt on the acute floor.    Ordered RW from [facility] via [employer] and will await word on coverage, availability, and ability to deliver to room by 1030 on 11/3.

## 2022-10-31 NOTE — PLAN OF CARE
10/31/22 1105        Altered Skin Integrity 10/12/22 1356 Buttocks Purple or maroon localized area of discolored intact skin or non-intact skin or a blood-filled blister.   Date First Assessed/Time First Assessed: 10/12/22 1356   Altered Skin Integrity Present on Admission: yes  Location: Buttocks  Description of Altered Skin Integrity: Purple or maroon localized area of discolored intact skin or non-intact skin or a blo...   Description of Altered Skin Integrity Purple or maroon localized area of discolored intact skin or non-intact skin or a blood-filled blister.   Appearance Pink   Wound Length (cm) 3 cm   Wound Width (cm) 2 cm   Wound Depth (cm) 0.2 cm   Wound Volume (cm^3) 1.2 cm^3   Wound Surface Area (cm^2) 6 cm^2   Dressing Applied;Hydrocolloid   Dressing Change Due 11/05/22

## 2022-10-31 NOTE — PROGRESS NOTES
[facility] ([facility])  Rehab Progress Note  MD Telemedicine Visit      Patient Name: Jessica Elias  MRN: [id number]  Age: 82 y.o. Sex: female  : 1940  Hospital Length of Stay: 6 days  Date of Service: 10/31/2022   Chief Complaint: CAD s/p CABG x4 (LIMA to LAD, SVG to Diag 1, SVG to OM, and SVG to RCA), Ligation of left atrial appendage on 10/11/2022    Subjective:     Basic Information  Admit Information: 82-year-old white female presented to [facility] on 10/11/2022 for scheduled CABG x4 2/2 multivessel CAD.  PMH significant for multi vessel CAD, paroxysmal atrial fibrillation, carotid artery stenosis, MP, recurrent bradycardia, HLD, and HTN.  Tolerated CABG x4 (LIMA to LAD, SVG to Diag 1, SVG to OM, and SVG to RCA), Ligation of left atrial appendage on 10/11 without perioperative complications.  Diuresed postoperatively. Chest tubes discontinued on 10/15.  Atrial fib RVR reported on 10/15.  Amiodarone drip initiated.  Eliquis 5 mg b.i.d. resumed on 10/16.  Cardiology recommended not continuing amiodarone.  Metoprolol discontinued on 10/19 and Coreg 12.5 mg b.i.d. initiated.  Tolerated transfer to [facility] inpatient rehab unit on 10/25 without incident.  Today's Information: No acute events overnight.  Sitting up in bed.  Reports good sleep and appetite.  Last BM 10/29.  Vital signs at goal with no recorded fevers.  Weight trending up slightly.  Cbc unremarkable.  Albumin 2.8-trending up.  Prealbumin 17.5-trending up.  No imaging today.    Review of patient's allergies indicates:   Allergen Reactions    Penicillin     Sulfa (sulfonamide antibiotics)         Current Facility-Administered Medications:     acetaminophen tablet 650 mg, 650 mg, Oral, Q4H PRN, Toño A Veda, FNP    ALPRAZolam tablet 0.25 mg, 0.25 mg, Oral, TID PRN, Toño A Veda, FNP    amLODIPine tablet 10 mg, 10 mg, Oral, Daily, Toño A Veda, FNP, 10 mg at 10/30/22 0907    apixaban tablet 5 mg, 5  mg, Oral, BID, Toño Lundehart, FNP, 5 mg at 10/30/22 2048    aspirin EC tablet 81 mg, 81 mg, Oral, Daily, Toño A Veda, FNP, 81 mg at 10/30/22 0907    atorvastatin tablet 40 mg, 40 mg, Oral, Daily, Toño A Veda, FNP, 40 mg at 10/30/22 0906    benzonatate capsule 100 mg, 100 mg, Oral, TID PRN, Toño Lundehart, FNP    bisacodyL suppository 10 mg, 10 mg, Rectal, Daily PRN, Toño Lundehart, FNP    carvediloL tablet 12.5 mg, 12.5 mg, Oral, BID, Toño A Veda, FNP, 12.5 mg at 10/30/22 2048    cetirizine tablet 10 mg, 10 mg, Oral, QHS, Michelle JERNIGAN Moy, FNP, 10 mg at 10/30/22 2048    famotidine tablet 20 mg, 20 mg, Oral, Daily, Toño Lundehart, FNP, 20 mg at 10/30/22 0907    folic acid tablet 1 mg, 1 mg, Oral, Daily, Toño A Veda, FNP, 1 mg at 10/30/22 0907    furosemide tablet 80 mg, 80 mg, Oral, Once, Toño Kendallart, FNP    gabapentin capsule 300 mg, 300 mg, Oral, QHS, Toño A Veda, FNP, 300 mg at 10/30/22 2048    hydrALAZINE injection 10 mg, 10 mg, Intravenous, Q4H PRN, Toño Lundehart, FNP    HYDROcodone-acetaminophen 5-325 mg per tablet 1 tablet, 1 tablet, Oral, Q4H PRN, Toño Lundehart, FNP    labetalol 20 mg/4 mL (5 mg/mL) IV syring, 10 mg, Intravenous, Q4H PRN, Toño Lundehart, FNP    methocarbamoL tablet 500 mg, 500 mg, Oral, TID PRN, Michelle Crumpte, FNP    metoprolol injection 10 mg, 10 mg, Intravenous, Q2H PRN, Toño Lundehart, FNP    nitroGLYCERIN SL tablet 0.4 mg, 0.4 mg, Sublingual, Q5 Min PRN, Toño Kendallart, FNP    ondansetron disintegrating tablet 4 mg, 4 mg, Oral, Q6H PRN, Toñomadeline Kendallart, FNP    polyethylene glycol packet 17 g, 17 g, Oral, BID PRN, Toño A Veda, FNP    potassium chloride SA CR tablet 30 mEq, 30 mEq, Oral, Once, Toño Kendallart, FNP    promethazine tablet 25 mg, 25 mg, Oral, Q6H PRN, Toño A Veda, FNP    sertraline tablet 50 mg, 50 mg, Oral, Daily, Toño Kendallart, FNP, 50 mg at  "10/30/22 0907    sucralfate tablet 1 g, 1 g, Oral, QID (AC & HS), Toño Mccollum, FNP, 1 g at 10/31/22 0645    valsartan tablet 160 mg, 160 mg, Oral, Daily, Toño Mccollum FNP, 160 mg at 10/30/22 0907    Facility-Administered Medications Ordered in Other Encounters:     0.9%  NaCl infusion, , Intravenous, Once, Abner Mitchell MD    diazePAM tablet 10 mg, 10 mg, Oral, On Call Procedure, Abner Mitchell MD, 10 mg at 09/23/22 0823    diphenhydrAMINE capsule 50 mg, 50 mg, Oral, On Call Procedure, Abner Mitchell MD, 50 mg at 09/23/22 0823    sodium chloride 0.9% flush 10 mL, 10 mL, Intravenous, PRN, Abner Mitchell MD     Review of Systems   Complete 12-point review of symptoms negative except for what's mentioned in HPI     Objective:     BP (!) 146/74   Pulse 68   Temp 98.1 °F (36.7 °C) (Oral)   Resp 16   Ht 5' 4.5" (1.638 m)   Wt 63.3 kg (139 lb 8.8 oz)   SpO2 98%   BMI 23.58 kg/m²        Intake/Output Summary (Last 24 hours) at 10/31/2022 0759  Last data filed at 10/30/2022 1700  Gross per 24 hour   Intake 600 ml   Output --   Net 600 ml         Physical Exam  Constitutional:       Appearance: Normal appearance.   HENT:      Head: Normocephalic.      Mouth/Throat:      Mouth: Mucous membranes are moist.   Eyes:      Pupils: Pupils are equal, round, and reactive to light.   Cardiovascular:      Rate and Rhythm: Normal rate and regular rhythm.      Heart sounds: Normal heart sounds.      Comments: Sternal incision  Pulmonary:      Effort: Pulmonary effort is normal.      Breath sounds: Normal breath sounds.   Abdominal:      General: Bowel sounds are normal.      Palpations: Abdomen is soft.   Musculoskeletal:      Cervical back: Neck supple.      Comments: Generalized weakness and muscle atrophy   Skin:     General: Skin is warm and dry.   Neurological:      General: No focal deficit present.      Mental Status: She is alert and oriented to person, place, and time.   Psychiatric:         Mood " and Affect: Mood normal.         Behavior: Behavior normal.         Thought Content: Thought content normal.         Judgment: Judgment normal.       Lines/Drains/Airways       Peripheral Intravenous Line  Duration                  Peripheral IV - Single Lumen 10/20/22 1500 20 G Anterior;Left Forearm 10 days                  Labs  Recent Results (from the past 24 hour(s))   Prealbumin    Collection Time: 10/31/22  5:24 AM   Result Value Ref Range    Prealbumin 17.5 14.0 - 37.0 mg/dL   Comprehensive Metabolic Panel    Collection Time: 10/31/22  5:24 AM   Result Value Ref Range    Sodium Level 145 136 - 145 mmol/L    Potassium Level 4.1 3.5 - 5.1 mmol/L    Chloride 110 (H) 98 - 107 mmol/L    Carbon Dioxide 27 23 - 31 mmol/L    Glucose Level 97 82 - 115 mg/dL    Blood Urea Nitrogen 23.2 (H) 9.8 - 20.1 mg/dL    Creatinine 0.89 0.55 - 1.02 mg/dL    Calcium Level Total 9.3 8.4 - 10.2 mg/dL    Protein Total 6.8 5.8 - 7.6 gm/dL    Albumin Level 2.8 (L) 3.4 - 4.8 gm/dL    Globulin 4.0 (H) 2.4 - 3.5 gm/dL    Albumin/Globulin Ratio 0.7 (L) 1.1 - 2.0 ratio    Bilirubin Total 0.4 <=1.5 mg/dL    Alkaline Phosphatase 63 40 - 150 unit/L    Alanine Aminotransferase 11 0 - 55 unit/L    Aspartate Aminotransferase 19 5 - 34 unit/L    eGFR >60 mls/min/1.73/m2   Magnesium    Collection Time: 10/31/22  5:24 AM   Result Value Ref Range    Magnesium Level 1.80 1.60 - 2.60 mg/dL   Phosphorus    Collection Time: 10/31/22  5:24 AM   Result Value Ref Range    Phosphorus Level 3.3 2.3 - 4.7 mg/dL   CBC with Differential    Collection Time: 10/31/22  5:24 AM   Result Value Ref Range    WBC 6.1 4.5 - 11.5 x10(3)/mcL    RBC 3.64 (L) 4.20 - 5.40 x10(6)/mcL    Hgb 10.8 (L) 12.0 - 16.0 gm/dL    Hct 33.6 (L) 37.0 - 47.0 %    MCV 92.3 80.0 - 94.0 fL    MCH 29.7 27.0 - 31.0 pg    MCHC 32.1 (L) 33.0 - 36.0 mg/dL    RDW 13.3 11.5 - 17.0 %    Platelet 398 130 - 400 x10(3)/mcL    MPV 10.9 (H) 7.4 - 10.4 fL    Neut % 46.4 %    Lymph % 34.9 %    Mono % 9.3 %     Eos % 8.1 %    Basophil % 1.1 %    Lymph # 2.14 0.6 - 4.6 x10(3)/mcL    Neut # 2.9 2.1 - 9.2 x10(3)/mcL    Mono # 0.57 0.1 - 1.3 x10(3)/mcL    Eos # 0.50 0 - 0.9 x10(3)/mcL    Baso # 0.07 0 - 0.2 x10(3)/mcL    IG# 0.01 0 - 0.04 x10(3)/mcL    IG% 0.2 %    NRBC% 0.0 %       Radiology  CXR two view on 10/16/2022 at 9:11 a.m., IMPRESSION: Interval removal of support catheters with evidence of a small left apical pneumothorax. Bilateral pleural effusions. No other focal consolidative changes.   Radiology  Transthoracic echo on 09/23/2022: The ejection fraction was calculated to be 55%.  The left ventricular systolic function was normal.  The left ventricular end diastolic pressure was elevated. The pre-procedure left ventricular end diastolic pressure was 23. The estimated blood loss was none. There was three vessel coronary artery disease. There was no mitral valve regurgitation. There was no aortic valve stenosis.  Diagnostic study  [facility] on 09/23/2022-left main: Highly calcified without obstructive lesions.  Lad: Heavily calcified 70-80% stenosis proximally (long lesion) in 70% distal at the bifurcation with the 2nd diagonal branch.  The 1st diagonal branch shows disease of 90%.  Left circumflex:  Calcified, 99% stenosis proximally.  RCA:  Highly calcified, 99% stenosis of the ostium followed by 90% stenosis proximally.    Assessment/Plan:     82 y.o. WF admitted on 10/25/2022    CAD (multivessel)  - s/p CABG x4 (LIMA to LAD, SVG to Diag 1, SVG to OM, and SVG to RCA), Ligation of left atrial appendage on 10/11/2022  - denies recent chest pain or discomfort  - continue                Coreg 12.5 mg b.i.d.                       Valsartan 100 mg daily                 Lipitor 40 mg daily                 Aspirin 81 mg daily   Carafate 1 g q.i.d.  Norco 5 mg/325 mg q.4 hours p.r.n.   - ECG and Nitro PRN  - not on Plavix  - continue cardiac diet  - to follow-up with cardiology outpatient     Debility   - 2/2 above  - defer  to physiatry for rehab and pain management  - PT/OT/RT/ST following     Carotid artery stenosis   - s/p CEA  - continue                Lipitor 40 mg daily                 Aspirin 81 mg daily                 Eliquis 5 mg b.i.d.  - to follow-up with vascular surgery outpatient     Sick sinus syndrome   - s/p PPM  - continue                Coreg 12.5 mg b.i.d.   - to follow-up with cardiology outpatient     HTN  - BP mod control  - discontinued                HCTZ 25 mg daily on 10/25                Prazosin 1 mg daily on 10/25  - continue                Coreg 12.5 mg b.i.d.                       Valsartan 100 mg daily                 Norvasc 10 mg daily (increased 10/27)                Hydralazine 10 mg every 2 hours as needed for BP > 160/90                Labetalol 10 mg every 2 hours as needed for BP > 160/90     Paroxysmal atrial fibrillation   - rate control   - no evidence of bleeding  - continue                Eliquis 5 mg b.i.d.                Coreg 12.5 mg b.i.d.    - to follow-up with cardiology outpatient     GERD  - Avoid spicy foods, and nothing to eat or drink within x2 hours of bedtime or laying flat (water is ok)   - Avoid NSAIDs (Advil, ibuprofen, naproxen...) and brown-2 inhibitors (Mobic, Celebrex)    - continue                Pepcid 40 mg daily  Colace 100 mg b.i.d.     Anxiety    - stable  - continue  Zoloft 50 mg daily     HLD  - FLP at goal!!  - continue                Lipitor 40 mg daily      Normocytic anemia  - asymptomatic  - H/H stable   - continue                Folic acid 1 mg daily   - no evidence of active bleeds  - will closely monitor and transfuse if needed     MP   - Compliant with CPAP at home     Chronic stage III sacral ulcer  - current   - Wound Care following  - currently with home border dressing  - change acute will nutrition     VTE Prophylaxis:  Eliquis 5 mg b.i.d.  COVID-19 testing:  Negative on 10/25/2002  COVID-19 vaccination status:  Vaccinated (Moderna) 01/20/2021 and  02/17/2021     POA: No  Living will: No  Contacts: Carrillo Elias (daughter) [phone]-lives in [place]                      Kiara Elias (niece) [phone]     CODE STATUS: Full Code  Internal Medicine (attending): Demetri Heredia MD  Physiatry (consulting):  Tonio Herzog MD     OUTPATIENT PROVIDERS  PCP: Tarik Gee MD  Cardiology:  Peterson Sánchez MD  CV surgery:  Earl Thompson MD  Nephrology: Dhaval Ibrahim MD     DISPOSITION: Condition stable.  Sleep hygiene fair, bowel maintenance, and appetite at goal.  Vital signs at goal with no recorded fevers.    H&H stable.  Renal indices improved.  Albumin 2.8-trending up.  Prealbumin 17.5-trending up.  Continue aggressive mobilization as tolerated.  Monitor closely.  Notify of acute changes.  Staffing completed today.    Staffing 10/31/2022: Incisions are healing well.  Stress incontinence.  Continent of bowel.  RT: Overall min to set up.  Limited by activity tolerance.  Appetite is good. PT: Ambulate 750 feet.  Overall set up to CGA.  Should be ready for discharge towards the end of the week. OT: Overall set up to independent with ADLs.  Projected discharge 11/3 home with HH.      TeleMedicine MD Visit (live NP visit)  Verbal consent obtained for live A/V Telehealth visit     Originating Site:          [facility] [address] [place] [address]  Place of service of originating site: [facility] Inpatient Rehab Unit  Distant Site:     [address], [address]  Synchronous Communication Encounter Clock Time: 5 minutes     Toño Mccollum NP conducted independent physical examination and assisted with medical documentation.     Total time spent on this encounter including MD + NP chart review and direct 1-on-1 patient interaction: 44 minutes (including TeleMedicine time)  Over 50% of this time was spent in counseling and coordination  of care

## 2022-10-31 NOTE — PT/OT/SLP RE-EVAL
"Occupational Therapy Inpatient Rehab Re-Evaluation    Name: Jessica Elias  MRN: [id number]    Recommendations:     Discharge Recommendations:  home with home health   Discharge Equipment Recommendations: other (see comments) (Hand held shower)   Barriers to discharge: None    Assessment:  Jessica Elias is a 82 y.o. female admitted with a medical diagnosis of S/P CABG x 4.  She presents with the following impairments/functional limitations:  weakness, impaired endurance, impaired self care skills, impaired functional mobility, gait instability, impaired balance, decreased coordination, decreased safety awareness.    General Precautions: Standard, sternal     Orthopedic Precautions:N/A     Braces: N/A    Rehab Prognosis: Good; patient would benefit from acute skilled OT services to address these deficits and reach maximum level of function.      History:     Past Medical History:   Diagnosis Date    Chronic kidney disease, unspecified     stage 3    Coronary artery disease     History of carotid artery disease     Hyperlipidemia     Hypertension     Sleep apnea        Past Surgical History:   Procedure Laterality Date    APPENDECTOMY N/A     BLADDER SURGERY N/A     CAROTID ENDARTERECTOMY N/A     CATARACT EXTRACTION N/A     CORONARY ARTERY BYPASS GRAFT (CABG) N/A 10/11/2022    Procedure: CORONARY ARTERY BYPASS GRAFT (CABG);  Surgeon: Bety Thompson MD;  Location: [facility] OR;  Service: Cardiothoracic;  Laterality: N/A;    HYSTERECTOMY N/A     INSERTION OF PERMANENT PACEMAKER N/A     LEFT HEART CATHETERIZATION Left 09/23/2022    Procedure: CATHETERIZATION, HEART, LEFT;  Surgeon: Abner Mitchell MD;  Location: [facility] CATH LAB;  Service: Cardiology;  Laterality: Left;  LHC VIA RRA    lump removed from right shoulder         Subjective     Orientation: Oriented x4    Chief Complaint: decub is bleeding after pt took off bandage    Patient/Family Comments/goals: "to go home"    Vitals  Vitals at Rest  BP (!) 146/87     HR " 71   O2 Sat     Pain Pain Rating 1: 0/10  Location 1: coccyx  Pain Addressed 1: Reposition     Vitals With Activity  /76   HR 71   O2 Sat     Pain Pain Rating 1: 0/10  Location 1: coccyx  Pain Addressed 1: Reposition     Respiratory Status: Room air    Patients cultural, spiritual, Yazidi conflicts given the current situation: no       Living Environment   Living Environment  Lives With: alone  Living Arrangements: house  Home Accessibility: wheelchair accessible  Number of Stairs, Main Entrance: one  Stair Railings, Main Entrance: none  Home Layout: Able to live on 1st floor  Transportation Anticipated: car, drives self  Equipment Currently Used at Home: shower chair, grab bar  Shower Setup: Walk-in shower    Prior Level of Function  BADL: Independent    IADL: Independent    Equipment used at home: shower chair, grab bar, walker, rolling.  DME owned (not currently used):    .      Upon discharge, patient will have assistance from family.    Objective:     Patient found up in chair with peripheral IV  upon OT entry to room.    Mobility   Patient completed:  Sit to Stand Transfer with independence with rolling walker  Stand to Sit Transfer with independence with rolling walker  Toilet Transfer Step Transfer technique with modified independence with  rolling walker  Tub Transfer Stand Pivot technique with modified independence with rolling walker    Functional Mobility   In room mobility with RW and standing at sink for grooming MI    ADLs     Current Status   Eating 6   Oral Hygiene 6   Shower, Bathe Self 5   Upper Body Dressing 5   Lower Body Dressing 5   Toileting Hygiene 6   Toilet Transfer 5   Putting On, Taking Off Footwear 6     Limiting Factors for ADLs: motor, sensory, endurance, balance, weakness, and safety awareness    Exams     ROM:          -       WFL    Hand Dominance: Right    Patient left up in chair with call button in reach and chair alarm off.    Education provided: Roles and goals of  OT, ADLs, transfer training, bed mobility, assistive device, sequencing, safety precautions, fall prevention, equipment recommendations, and home safety    Multidisciplinary Problems       Occupational Therapy Goals          Problem: Occupational Therapy    Goal Priority Disciplines Outcome Interventions   Occupational Therapy Goal     OT, PT/OT Ongoing, Progressing    Description: ADLs:  Pt to perform UB dressing with set up MET  Pt to perform LB dressing with set up MET  Pt to perform putting on/off footwear task with set up MET  Pt to perform toileting with independent using sternal precautions MET    Functional Transfers:  Pt to perform toilet transfers with set up. MET  Pt to perform a tub transfer with set up MET  Pt to perform a walk-in shower transfer with set up     IADLs:  Pt to perform simple meal with SBA and RW.    Balance, Strengthening, Endurance, Balance:  Pt to consistently demonstrate adherence to sternal precautions during all ADL's as instructed by OT.  Pt to demonstrate consistent adherence to breathing control and energy conservation techniques as educated by OT.                        Plan     During this hospitalization, patient to be seen 5 x/week to address the identified rehab impairments via self-care/home management, therapeutic activities, therapeutic exercises and progress toward the following goals:    Plan of Care Expires:  11/07/22    Time Tracking     OT Received On: 10/31/22  Time In 0800     Time Out 0930  Total Time 90 min  Therapy Time: OT Individual: 90  Missed Time:    Missed Time Reason:      Billable Minutes: Re-eval 15 and Self Care/Home Management 75    10/31/2022

## 2022-10-31 NOTE — PT/OT/SLP PROGRESS
Pt was not seen for Therapy due to schedule conflict    Pt progress, plan of care and discharge plans were discussed during staffing at 0930

## 2022-10-31 NOTE — PLAN OF CARE
Spoke with pt then called dtr Carrillo ([phone]) then granddtr Kiara ([phone]) to discuss updates from team conference today and discharge planned Thurs, 11/3.      Plans were made for pt to purchase a wedge, which will assist her with independence with bed mobility and OOB transfers.  Pt has agreed to purchase one.    Plans were made for Kiara and her cousin, who has experience as a CNA, to come for training at 1000 on Thurs, 11/3, then take pt home.  Kiara indicated that pt will likely want to sleep in her recliner at home, as she has for the most part since her   (difficult for her to sleep in the same room).  Carrillo mentioned concern about pt being able to use the lever on her recliner and asked if she could possibly use a reacher to assist and to not break sternal precautions.  Will discuss this option with PT/OT.    Also discussed HH care that I will arrange.    Pt. Has a walker with no wheels that Carrillo BELIEVES belonged to pt's .  I will try to order pt her own RW instead of her having to use his old one with no wheels.      Kiara agreed to assist pt with getting out of bed or recliner, and she and her cousin will also make sure she gets up and exercises so she does not lose the progress she has made here.

## 2022-10-31 NOTE — PLAN OF CARE
10/30/22 2000   Skin   Skin WDL ex;characteristics        Incision/Site 10/11/22 0806 Chest   Date First Assessed/Time First Assessed: 10/11/22 0806   Location: Chest   Dressing Appearance Open to air        Incision/Site 10/11/22 0806 Left Leg   Date First Assessed/Time First Assessed: 10/11/22 0806   Side: Left  Location: Leg   Dressing Appearance Open to air        Altered Skin Integrity 10/12/22 1356 Buttocks Purple or maroon localized area of discolored intact skin or non-intact skin or a blood-filled blister.   Date First Assessed/Time First Assessed: 10/12/22 1356   Altered Skin Integrity Present on Admission: yes  Location: Buttocks  Description of Altered Skin Integrity: Purple or maroon localized area of discolored intact skin or non-intact skin or a blo...   Dressing Applied;Foam

## 2022-10-31 NOTE — PROGRESS NOTES
Inpatient Nutrition Assessment    Admit Date: 10/25/2022   Total duration of encounter: 6 days     Nutrition Recommendation/Prescription     - Continue Heart Healthy diet   - Discontinue Manish 2/2 pt not consuming  - Consider adding MVI + 500mg vitamin C + 220 mg zinc sulfate to aid in wound healing.    Communication of Recommendations: reviewed with patient/caregiver    Nutrition Assessment     Malnutrition Assessment/Nutrition-Focused Physical Exam    Malnutrition in the context of acute illness or injury  Degree of Malnutrition: does not meet criteria  Energy Intake: does not meet criteria  Interpretation of Weight Loss: does not meet criteria  Body Fat:well nourished  Area of Body Fat Loss: not applicable  Muscle Mass Loss: well nourished  Area of Muscle Mass Loss: not applicable  Fluid Accumulation: unable to obtain  Edema: not applicable   Reduced  Strength: unable to obtain  A minimum of two characteristics is recommended for diagnosis of either severe or non-severe malnutrition.    Chart Review    Reason Seen: follow-up     Diagnosis:  CAD (multivessel)  Debility   Carotid artery stenosis   Sick sinus syndrome  HTN  Paroxysmal atrial fibrillation   GERD  Anxiety  MP  Chronic Sacral ulcer III  Hypokalemia    Relevant Medical History: multi vessel CAD, paroxysmal atrial fibrillation, carotid artery stenosis, MP, recurrent bradycardia, HLD, and HTN    Nutrition-Related Medications: amlodipine, statin, gabapentin, famotidine, folic acid, sucralfate  Calorie Containing IV Medications: no significant kcals from medications at this time    Nutrition-Related Labs:  10/31: H/H 10.8/33.6L, Glu 97, BUN 23.2H, Creat 0.89, GFR >60, Alb 2.8L, PAB 17.5  10/26: K 2.9, BUN 28, GFR 58    Diet/PN Order: Diet heart healthy  Oral Supplement Order: none  Tube Feeding Order: none  Appetite/Oral Intake: good/50-75% of meals  Factors Affecting Nutritional Intake: none identified  Food/[employer]/Cultural Preferences: none  "reported  Food Allergies: none reported    Skin Integrity: incision  Wound(s): Stage III sacral pressure ulcer    Comments  10/31:  Pt continues with good appetite and po intake, averaging 86% of meals consumed over past 3 days.  Pt denies n/v/d.  Pt does not like Manish - will discontinue. Encourage good po intake of protein, vitamin rich foods for wound healing.  Labs /meds reviewed.  Wt stable.      10/26: Pt is a new rehab admit. Pt reports good appetite; noted  50-75% consumed on 10/25. Per previous RD note above, pt had good oral intake. MD addressing electrolyte abnormalities (hypokalemia). Noted slight wt decrease via EMR x 1 day. Pt reports fluid loss after getting up to 150lbs. Noted stage III sacral ulcer, offered Manish - pt agreed to trial. Complains of burning with Bms, notified nsg.       Previous facility RD note:   10/13/22: Pt with good appetite, tolerating meals.    Anthropometrics    Height: 5' 4.5" (163.8 cm)    Last Weight: 63.3 kg (139 lb 8.8 oz) (10/30/22 0700) Weight Method: Bed Scale  BMI (Calculated): 23.6  BMI Classification: normal (BMI 18.5-24.9)     Ideal Body Weight (IBW), Female: 122.5 lb     % Ideal Body Weight, Female (lb): 111.58 %    Usual Weight Provided By: EMR weight history    Wt Readings from Last 5 Encounters:   10/30/22 63.3 kg (139 lb 8.8 oz)   10/24/22 62.9 kg (138 lb 10.7 oz)   09/23/22 63.1 kg (139 lb 1.8 oz)   03/26/21 64.5 kg (142 lb 3.2 oz)     Weight Change(s) Since Admission: wt increase 1.3 kg since admit  Admit Weight: 62 kg (136 lb 11 oz) (10/25/22 1400)    Estimated Needs    Weight Used For Calorie Calculations: 62 kg (136 lb 11 oz)  Energy Calorie Requirements (kcal): 1550kcals (25kcal/kg)  Energy Need Method: Kcal/kg  Weight Used For Protein Calculations: 62 kg (136 lb 11 oz)  Protein Requirements: 74gm (1.2gm /kg)  Fluid Requirements (mL): 1550mL  Temp: 98.1 °F (36.7 °C)       Enteral Nutrition    Patient not receiving enteral nutrition at this " time.    Parenteral Nutrition    Patient not receiving parenteral nutrition support at this time.    Evaluation of Received Nutrient Intake    Calories: meeting estimated needs  Protein: meeting estimated needs    Patient Education    Not applicable.    Nutrition Diagnosis     PES: Increased nutrient needs related to increased protein needs as evidenced by Stage III pressure ulcer   (Continues)    Interventions/Goals     Intervention(s): modified composition of meals/snacks and collaboration with other providers  Goal: Meet greater than 75% of nutritional needs by follow-up. (goal met)    Monitoring & Evaluation     Dietitian will monitor food and beverage intake and weight change.  Nutrition Risk/Follow-Up: low (follow-up in 5-7 days)   Please consult if re-assessment needed sooner.

## 2022-10-31 NOTE — PLAN OF CARE
Problem: Fall Injury Risk  Goal: Absence of Fall and Fall-Related Injury  Outcome: Ongoing, Progressing  Intervention: Promote Injury-Free Environment  Flowsheets (Taken 10/31/2022 0130)  Safety Promotion/Fall Prevention:   assistive device/personal item within reach   Fall Risk signage in place   lighting adjusted   nonskid shoes/socks when out of bed   side rails raised x 3   instructed to call staff for mobility

## 2022-10-31 NOTE — PLAN OF CARE
Problem: Occupational Therapy  Goal: Occupational Therapy Goal  Description: ADLs:  Pt to perform UB dressing with set up MET  Pt to perform LB dressing with set up MET  Pt to perform putting on/off footwear task with set up MET  Pt to perform toileting with independent using sternal precautions MET    Functional Transfers:  Pt to perform toilet transfers with set up. MET  Pt to perform a tub transfer with set up MET  Pt to perform a walk-in shower transfer with set up     IADLs:  Pt to perform simple meal with SBA and RW.    Balance, Strengthening, Endurance, Balance:  Pt to consistently demonstrate adherence to sternal precautions during all ADL's as instructed by OT.  Pt to demonstrate consistent adherence to breathing control and energy conservation techniques as educated by OT.   Outcome: Ongoing, Progressing

## 2022-10-31 NOTE — PROGRESS NOTES
10/31/22 1105        Altered Skin Integrity 10/12/22 1356 Buttocks Purple or maroon localized area of discolored intact skin or non-intact skin or a blood-filled blister.   Date First Assessed/Time First Assessed: 10/12/22 1356   Altered Skin Integrity Present on Admission: yes  Location: Buttocks  Description of Altered Skin Integrity: Purple or maroon localized area of discolored intact skin or non-intact skin or a blo...   Description of Altered Skin Integrity Purple or maroon localized area of discolored intact skin or non-intact skin or a blood-filled blister.   Appearance Pink   Wound Length (cm) 3 cm   Wound Width (cm) 2 cm   Wound Depth (cm) 0.2 cm   Wound Volume (cm^3) 1.2 cm^3   Wound Surface Area (cm^2) 6 cm^2   Dressing Applied;Hydrocolloid   Dressing Change Due 11/05/22   Safety Management   Safety Promotion/Fall Prevention assistive device/personal item within reach   Patient Rounds bed in low position   Positioning   Body Position 30 degrees

## 2022-10-31 NOTE — PT/OT/SLP PROGRESS
Physical Therapy Inpatient Rehab Treatment    Patient Name:  Jessica Elias   MRN:  [id number]    Recommendations:     Discharge Recommendations:      Discharge Equipment Recommendations:     Barriers to discharge: None    Assessment:     Jessica Elias is a 82 y.o. female admitted with a medical diagnosis of S/P CABG x 4.  She presents with the following impairments/functional limitations:  weakness, impaired endurance, impaired functional mobility, gait instability, pain, edema.    Rehab Diagnosis: s/p CABG x 4; Pacemaker; Sacral Ulcer    Recent Surgery: * No surgery found *      General Precautions: Standard, sternal     Orthopedic Precautions:N/A     Braces: N/A    Rehab Prognosis: Good; patient would benefit from acute skilled PT services to address these deficits and reach maximum level of function.      History:     Past Medical History:   Diagnosis Date    Chronic kidney disease, unspecified     stage 3    Coronary artery disease     History of carotid artery disease     Hyperlipidemia     Hypertension     Sleep apnea        Past Surgical History:   Procedure Laterality Date    APPENDECTOMY N/A     BLADDER SURGERY N/A     CAROTID ENDARTERECTOMY N/A     CATARACT EXTRACTION N/A     CORONARY ARTERY BYPASS GRAFT (CABG) N/A 10/11/2022    Procedure: CORONARY ARTERY BYPASS GRAFT (CABG);  Surgeon: Bety Thompson MD;  Location: [facility] OR;  Service: Cardiothoracic;  Laterality: N/A;    HYSTERECTOMY N/A     INSERTION OF PERMANENT PACEMAKER N/A     LEFT HEART CATHETERIZATION Left 09/23/2022    Procedure: CATHETERIZATION, HEART, LEFT;  Surgeon: Abner Mitchell MD;  Location: [facility] CATH LAB;  Service: Cardiology;  Laterality: Left;  LHC VIA RRA    lump removed from right shoulder         Subjective     Chief Complaint: N/A    Respiratory Status: Room air    Patients cultural, spiritual, Pentecostal conflicts given the current situation: no      Objective:     Communicated with RN prior to session.  Patient found supine  with peripheral IV  upon PT entry to room.    Pt is Oriented x3 and Alert, Cooperative, and Motivated.    Vitals     Functional Mobility:   Toilet t/f: stand step t/f with RW to toilet; overall set up/ clean-up; + void     Current   Status   Discharge   Goal   Functional Area: Care Score:     Roll Left and Right    Independent   Sit to Lying 6 HOB flat Independent   Lying to Sitting on Side of Bed 4 HOB flat; overall CGA to trunk; VC for sequencing Set-up/clean-up   Sit to Stand 5 With RW; overall set up/ clean-up Independent   Chair/Bed-to-Chair Transfer 5 Bed <> w/c using stand step t/f with RW; overall set up/ clean-up Independent   Car Transfer    Independent   Walk 10 Feet 5  Independent   Walk 50 Feet with Two Turns 5  Independent   Walk 150 Feet 5 ~150 ft with RW using step through gait pattern; overall set up/ clean-up Independent   Walk 10 Feet Uneven Surface    Independent   1 Step (Curb) 5 4 in curb with RW x 2 trials; overall set up/clean-up Independent   4 Steps    Independent   12 Steps    Independent   Picking Up Object 5 Reacher and RW; overall set up/clean-up Independent   Wheel 50 Feet with Two Turns    Not applicable   Wheel 150 Feet    Not applicable       Therapeutic Activities and Exercises:  Standing exercises: performed in // bars with BUE support; 2 x 15 on Airexfoam   Step ups   Lateral step ups    Seated TherX: performed in w/c with BUE support; 3 x 15 with 3#; HEP reviewed with Patient   Knee extension   Hip flexion    Ankle pumps    Hip adduction with ball    Hip abduction with Theraband        Activity Tolerance: Excellent    Patient left supine with all lines intact and call button in reach.    Education provided: roles and goals of PT/PTA, transfer training, bed mob, gait training, balance training, safety awareness, body mechanics, assistive device, strengthening exercises, fall prevention, and sternal precautions.    Expected compliance: High compliance    GOALS:   Multidisciplinary  Problems       Physical Therapy Goals          Problem: Physical Therapy    Goal Priority Disciplines Outcome Goal Variances Interventions   Physical Therapy Goal     PT, PT/OT Ongoing, Progressing     Description: Bed Mobility:  Roll left and right with setup/clean-up assist.  Sit to supine transfer with setup/clean-up assist. (MET)  Supine to sit transfer with supervision/touching assist. (MET)  Supine to sit transfer with setup/clean-up assist     Transfers:  Sit to stand transfer with setup/clean-up assist using LRAD. (MET)  Bed to chair transfer with setup/clean-up assist using LRAD. (MET)  Car transfer with setup/clean-up assist using LRAD.   an object from the ground in standing position with setup/clean-up assist using LRAD. (MET)    Mobility:  Ambulate 360 feet with supervision/touching assist using LRAD.  Ambulate 10 feet on uneven surfaces/ramps with setup/clean-up assist using LRAD.  Ascend/descend a 4 inch curb with setup/clean-up assist using LRAD. (MET)  Ascend/descend 12 stairs with supervision/touching assist using bilateral handrails.                        Plan:     During this hospitalization, patient to be seen 5 x/week to address the identified rehab impairments via gait training, therapeutic activities, therapeutic exercises, neuromuscular re-education and progress toward the following goals:    Plan of Care Expires:  11/01/22  PT Next Visit Date: 11/01/22  Plan of Care reviewed with: patient    Additional Information:         Time Tracking:     Therapy Time  PT Received On: 10/31/22  PT Start Time: 1330  PT Stop Time: 1430  PT Total Time (min): 60 min   PT Individual: 60  Missed Time:    Time Missed due to:      Billable Minutes: Therapeutic Activity 15 and Therapeutic Exercise 45    10/31/2022

## 2022-10-31 NOTE — PT/OT/SLP PROGRESS
Physical Therapy Inpatient Rehab Treatment    Patient Name:  Jessica Elias   MRN:  [id number]    Recommendations:     Discharge Recommendations:      Discharge Equipment Recommendations:     Barriers to discharge: None    Assessment:     Jessica Elias is a 82 y.o. female admitted with a medical diagnosis of S/P CABG x 4.  She presents with the following impairments/functional limitations:  weakness, impaired endurance, impaired functional mobility, gait instability, pain, edema.    Rehab Diagnosis: s/p CABG x 4; Pacemaker; Sacral Ulcer    Recent Surgery: * No surgery found *      General Precautions: Standard, sternal     Orthopedic Precautions:N/A     Braces: N/A    Rehab Prognosis: Good; patient would benefit from acute skilled PT services to address these deficits and reach maximum level of function.      History:     Past Medical History:   Diagnosis Date    Chronic kidney disease, unspecified     stage 3    Coronary artery disease     History of carotid artery disease     Hyperlipidemia     Hypertension     Sleep apnea        Past Surgical History:   Procedure Laterality Date    APPENDECTOMY N/A     BLADDER SURGERY N/A     CAROTID ENDARTERECTOMY N/A     CATARACT EXTRACTION N/A     CORONARY ARTERY BYPASS GRAFT (CABG) N/A 10/11/2022    Procedure: CORONARY ARTERY BYPASS GRAFT (CABG);  Surgeon: Bety Thompson MD;  Location: [facility] OR;  Service: Cardiothoracic;  Laterality: N/A;    HYSTERECTOMY N/A     INSERTION OF PERMANENT PACEMAKER N/A     LEFT HEART CATHETERIZATION Left 09/23/2022    Procedure: CATHETERIZATION, HEART, LEFT;  Surgeon: Abner Mitchell MD;  Location: [facility] CATH LAB;  Service: Cardiology;  Laterality: Left;  LHC VIA RRA    lump removed from right shoulder         Subjective     Chief Complaint: N/A    Respiratory Status: Room air    Patients cultural, spiritual, Mormon conflicts given the current situation: no      Objective:     Communicated with RN prior to session.  Patient found supine  with peripheral IV  upon PT entry to room.    Pt is Oriented x3 and Alert, Cooperative, and Motivated.    Vitals     Functional Mobility:   Toilet t/f: stand step t/f with RW to toilet; overall CGA to trunk; + void; Incident of BM Incontinence     Current   Status   Discharge   Goal   Functional Area: Care Score:     Roll Left and Right    Independent   Sit to Lying 6 Performed on Mat Independent   Lying to Sitting on Side of Bed 4 Performed on Mat with 10 in wedge x 4 trials; overall CGA to trunk; PT educated Pt on usage of wedge at home Set-up/clean-up   Sit to Stand 5 With RW; overall set up/clean-up Independent   Chair/Bed-to-Chair Transfer 5 Bed > w/c using stand step t/f with RW; overall set up/clean up Independent   Car Transfer    Independent   Walk 10 Feet 4  Independent   Walk 50 Feet with Two Turns 4  Independent   Walk 150 Feet 4 ~600 ft with RW using step through gait pattern; overall CGA to trunk Independent   Walk 10 Feet Uneven Surface    Independent   1 Step (Curb)    Independent   4 Steps    Independent   12 Steps    Independent   Picking Up Object    Independent   Wheel 50 Feet with Two Turns    Not applicable   Wheel 150 Feet    Not applicable       Therapeutic Activities and Exercises:    Partial curl ups: performed EOM with wedge and 2 pillows; 3 x 15; VC to keep feet planted on ground.    Standing TA activation: performed standing with rolling desk; 3 x 15; Pt stands and places both hands on rolling desk. Pt pushes desk forward and uses TA to pull desk into upright posture.     Activity Tolerance: Excellent    Patient left up in chair with all lines intact, call button in reach, and chair alarm on.    Education provided: roles and goals of PT/PTA, transfer training, bed mob, gait training, balance training, safety awareness, body mechanics, assistive device, strengthening exercises, fall prevention, and sternal precautions.    Expected compliance: High compliance    GOALS:   Multidisciplinary  Problems       Physical Therapy Goals          Problem: Physical Therapy    Goal Priority Disciplines Outcome Goal Variances Interventions   Physical Therapy Goal     PT, PT/OT Ongoing, Progressing     Description: Bed Mobility:  Roll left and right with setup/clean-up assist.  Sit to supine transfer with setup/clean-up assist.  Supine to sit transfer with supervision/touching assist.    Transfers:  Sit to stand transfer with setup/clean-up assist using LRAD.  Bed to chair transfer with setup/clean-up assist using LRAD.  Car transfer with setup/clean-up assist using LRAD.   an object from the ground in standing position with setup/clean-up assist using LRAD.    Mobility:  Ambulate 360 feet with supervision/touching assist using LRAD.  Ambulate 10 feet on uneven surfaces/ramps with setup/clean-up assist using LRAD.  Ascend/descend a 4 inch curb with setup/clean-up assist using LRAD.   Ascend/descend 12 stairs with supervision/touching assist using bilateral handrails.                        Plan:     During this hospitalization, patient to be seen 5 x/week to address the identified rehab impairments via gait training, therapeutic activities, therapeutic exercises, neuromuscular re-education and progress toward the following goals:    Plan of Care Expires:  11/01/22  PT Next Visit Date: 11/01/22  Plan of Care reviewed with: patient    Additional Information:         Time Tracking:     Therapy Time  PT Received On: 10/31/22  PT Start Time: 1100  PT Stop Time: 1200  PT Total Time (min): 60 min   PT Individual: 60  Missed Time:    Time Missed due to:      Billable Minutes: Therapeutic Activity 20 and Therapeutic Exercise 40    10/31/2022

## 2022-10-31 NOTE — PROGRESS NOTES
Dos 10/31/22  I have personally evaluated the patient during therapy today. Have discussed progress and problems with patient. Have reviewed barriers to progress as well as response to therapies with therapists. I have reviewed medical issues and plan of care with IM team. I met with  to review d/c plans and barriers. I have discussed skin integrity and B/B status with nursing. I am in agreement with present IRF plan of care.  I have been involved in  formation of this note with Maricruz Winter.  Ja Herzog MD   Subjective  HPI: 83yo WF with PMH of CAD, PAF, carotid artery stenosis s/p CEA, hypertension, hyperlipidemia, CKD, obstructive sleep apnea, and recurrent bradycardia s/p pacemaker implant (February 2018) presented to [facility] on 10/11/22 for scheduled surgery.  Patient had underwent outpatient coronary angiogram which revealed triple-vessel disease.  She was electively admitted on October 11, 2022 and underwent CABG x 4 (LIMA to LAD, SVG to Diag 1, SVG to OM, and SVG to RCA), Ligation of left atrial appendage per Dr. Bety Thompson. Patient was noted to be in Atrial Fibrillation RVR during her acute stay.  She is now in sinus. She is on Eliquis. Participating with therapy. Functional status includes bed mobility and transfers requiring minimal assistance. Amb w/RW for 130ft. Patient was evaluated, accepted, and admitted to inpatient rehab to improve functional status. Transferred to [facility] on 10/25 without incident.   10/31: Seen with PT, seated on mat and performing sit ups from wedge. Pleasant and willing to do anything asked of her. Tolerating therapy without complaint. VSSAF.     Review of Systems  Depression/Anxiety:      sertraline tablet 50 mg qd  ALPRAZolam tablet 0.25 mg TID PRN Anxiety  Pain: denies   acetaminophen tablet 650 mg q4hr PRN mild pain  HYDROcodone-acetaminophen 5-325 mg 1 tablet q4hr PRN moderate pain   methocarbamoL tablet 500 mg TID PRN muscle spasm/pain  Bowels/Bladder: last BM  10/29, 10/27 x 2, 10/26 x 2 (loose, explosive per patient) Colace DC'd   Appetite: good. Does not typically eat a whole lot    Sleep: good          Physical Exam  General: well-developed, well-nourished, in no acute distress  Respiratory: equal chest rise, no SOB, no audible wheeze  Cardiovascular: regular rate and rhythm, no edema  Gastrointestinal: soft, non-tender, non-distended   Musculoskeletal: full range of motion of all extremities/spine with generalized weakness  Integumentary: no rashes or skin lesions present, midsternal utkymuki-CTJ-l/d/i, Chest tube sutures removed, LLE EVH eyudaosq-WEO-f/d/I, sacral pressure wound-foam dressing  Neurologic: cranial nerves intact, no signs of peripheral neurological deficit, motor/sensory function intact  *MD performed and documented physical examination     Labs:   Latest Reference Range & Units 10/31/22 05:24   WBC 4.5 - 11.5 x10(3)/mcL 6.1   RBC 4.20 - 5.40 x10(6)/mcL 3.64 (L)   Hemoglobin 12.0 - 16.0 gm/dL 10.8 (L)   Hematocrit 37.0 - 47.0 % 33.6 (L)   MCV 80.0 - 94.0 fL 92.3   MCH 27.0 - 31.0 pg 29.7   MCHC 33.0 - 36.0 mg/dL 32.1 (L)   RDW 11.5 - 17.0 % 13.3   Platelets 130 - 400 x10(3)/mcL 398   MPV 7.4 - 10.4 fL 10.9 (H)   Neut % % 46.4   LYMPH % % 34.9   Mono % % 9.3   Eosinophil % % 8.1   Basophil % % 1.1   Immature Granulocytes % 0.2   Neut # 2.1 - 9.2 x10(3)/mcL 2.9   Lymph # 0.6 - 4.6 x10(3)/mcL 2.14   Mono # 0.1 - 1.3 x10(3)/mcL 0.57   Eos # 0 - 0.9 x10(3)/mcL 0.50   Baso # 0 - 0.2 x10(3)/mcL 0.07   Immature Grans (Abs) 0 - 0.04 x10(3)/mcL 0.01   nRBC % 0.0   Sodium 136 - 145 mmol/L 145   Potassium 3.5 - 5.1 mmol/L 4.1   Chloride 98 - 107 mmol/L 110 (H)   CO2 23 - 31 mmol/L 27   BUN 9.8 - 20.1 mg/dL 23.2 (H)   Creatinine 0.55 - 1.02 mg/dL 0.89   eGFR mls/min/1.73/m2 >60   Glucose 82 - 115 mg/dL 97   Calcium 8.4 - 10.2 mg/dL 9.3   Phosphorus 2.3 - 4.7 mg/dL 3.3   Magnesium 1.60 - 2.60 mg/dL 1.80   Alkaline Phosphatase 40 - 150 unit/L 63   PROTEIN TOTAL 5.8 -  7.6 gm/dL 6.8   Albumin 3.4 - 4.8 gm/dL 2.8 (L)   Albumin/Globulin Ratio 1.1 - 2.0 ratio 0.7 (L)   Prealbumin 14.0 - 37.0 mg/dL 17.5   BILIRUBIN TOTAL <=1.5 mg/dL 0.4   AST 5 - 34 unit/L 19   ALT 0 - 55 unit/L 11   Globulin, Total 2.4 - 3.5 gm/dL 4.0 (H)   (L): Data is abnormally low  (H): Data is abnormally high      Assessment/Plan  Hospital   S/P CABG x 4   Arteriosclerosis of coronary artery     Non-Hospital   Nonspecific abnormal results of basal metabolism function study   Hypertension   Hyperlipidemia   Stage 3 chronic kidney disease   Hyperglycemia   Urinary incontinence   Incontinence of feces   Depressive disorder   Generalized osteoarthritis   Presence of cardiac pacemaker   Anxiety   Sleep apnea   History of carotid artery disease       Wounds: midsternal atweljqw-NUK-z/d/i, Chest tube sutures removed, LLE EVH ziydehgs-SXJ-t/d/I, sacral pressure wound-foam dressing  S/p CABG x 4 (LIMA to LAD, SVG to Diag 1, SVG to OM, and SVG to RCA), Ligation of left atrial appendage per Dr. Bety Thompson on 10/11  Precautions: sternal   Bracing/AD: RW  Swallowing:   Function: Tolerating therapy. Continue PT/OT  VTE Prophylaxis:   apixaban tablet 5 mg BID  Code Status: FULL CODE   Discharge:Lives alone in [place] in a single-story home with a threshold to enter the residence. Has a Masters in Guidance/Counseling. No  history. Pt. Is a retired Guidance Counselor.  Pt.is . She was completely independent, living alone. She will have family available to assist her if needed after discharge from rehab. Children:(4/3 living). Date 11/3 Thursday.                  Michelle Winter NP, conducted additional independent physical examination and assisted with medical documentation.

## 2022-11-01 PROCEDURE — 97110 THERAPEUTIC EXERCISES: CPT

## 2022-11-01 PROCEDURE — 97530 THERAPEUTIC ACTIVITIES: CPT

## 2022-11-01 PROCEDURE — 99233 PR SUBSEQUENT HOSPITAL CARE,LEVL III: ICD-10-PCS | Mod: ,,, | Performed by: PHYSICAL MEDICINE & REHABILITATION

## 2022-11-01 PROCEDURE — 99233 SBSQ HOSP IP/OBS HIGH 50: CPT | Mod: ,,, | Performed by: PHYSICAL MEDICINE & REHABILITATION

## 2022-11-01 PROCEDURE — 97164 PT RE-EVAL EST PLAN CARE: CPT

## 2022-11-01 PROCEDURE — 94799 UNLISTED PULMONARY SVC/PX: CPT

## 2022-11-01 PROCEDURE — 11800000 HC REHAB PRIVATE ROOM

## 2022-11-01 PROCEDURE — 97535 SELF CARE MNGMENT TRAINING: CPT

## 2022-11-01 PROCEDURE — 25000003 PHARM REV CODE 250: Performed by: NURSE PRACTITIONER

## 2022-11-01 RX ADMIN — APIXABAN 5 MG: 5 TABLET, FILM COATED ORAL at 08:11

## 2022-11-01 RX ADMIN — APIXABAN 5 MG: 5 TABLET, FILM COATED ORAL at 09:11

## 2022-11-01 RX ADMIN — VALSARTAN 160 MG: 160 TABLET, FILM COATED ORAL at 08:11

## 2022-11-01 RX ADMIN — ASPIRIN 81 MG: 81 TABLET, COATED ORAL at 08:11

## 2022-11-01 RX ADMIN — FAMOTIDINE 20 MG: 20 TABLET ORAL at 08:11

## 2022-11-01 RX ADMIN — SERTRALINE HYDROCHLORIDE 50 MG: 50 TABLET ORAL at 08:11

## 2022-11-01 RX ADMIN — AMLODIPINE BESYLATE 10 MG: 5 TABLET ORAL at 08:11

## 2022-11-01 RX ADMIN — GABAPENTIN 300 MG: 300 CAPSULE ORAL at 09:11

## 2022-11-01 RX ADMIN — SUCRALFATE 1 G: 1 TABLET ORAL at 05:11

## 2022-11-01 RX ADMIN — CARVEDILOL 12.5 MG: 6.25 TABLET, FILM COATED ORAL at 08:11

## 2022-11-01 RX ADMIN — ATORVASTATIN CALCIUM 40 MG: 40 TABLET, FILM COATED ORAL at 08:11

## 2022-11-01 RX ADMIN — CETIRIZINE HYDROCHLORIDE 10 MG: 10 TABLET, FILM COATED ORAL at 09:11

## 2022-11-01 RX ADMIN — FOLIC ACID 1 MG: 1 TABLET ORAL at 08:11

## 2022-11-01 RX ADMIN — CARVEDILOL 12.5 MG: 6.25 TABLET, FILM COATED ORAL at 09:11

## 2022-11-01 RX ADMIN — SUCRALFATE 1 G: 1 TABLET ORAL at 11:11

## 2022-11-01 RX ADMIN — SUCRALFATE 1 G: 1 TABLET ORAL at 09:11

## 2022-11-01 NOTE — PLAN OF CARE
Patients [facility] declined the referral, saying that they have hit their quota of [employer] Patients for now.    Sent referral to [facility] via [facility] and will await response.

## 2022-11-01 NOTE — PROGRESS NOTES
[facility] ([facility])  Rehab Progress Note    Patient Name: Jessica Elias  MRN: [id number]  Age: 82 y.o. Sex: female  : 1940  Hospital Length of Stay: 7 days  Date of Service: 2022   Chief Complaint: CAD s/p CABG x4 (LIMA to LAD, SVG to Diag 1, SVG to OM, and SVG to RCA), Ligation of left atrial appendage on 10/11/2022    Subjective:     Basic Information  Admit Information: 82-year-old white female presented to [facility] on 10/11/2022 for scheduled CABG x4 2/2 multivessel CAD.  PMH significant for multi vessel CAD, paroxysmal atrial fibrillation, carotid artery stenosis, MP, recurrent bradycardia, HLD, and HTN.  Tolerated CABG x4 (LIMA to LAD, SVG to Diag 1, SVG to OM, and SVG to RCA), Ligation of left atrial appendage on 10/11 without perioperative complications.  Diuresed postoperatively. Chest tubes discontinued on 10/15.  Atrial fib RVR reported on 10/15.  Amiodarone drip initiated.  Eliquis 5 mg b.i.d. resumed on 10/16.  Cardiology recommended not continuing amiodarone.  Metoprolol discontinued on 10/19 and Coreg 12.5 mg b.i.d. initiated.  Tolerated transfer to [facility] inpatient rehab unit on 10/25 without incident.  Today's Information: No acute events overnight.  Sitting up in recliner.  Reports good sleep and appetite.  Last BM 10/31.  Vital signs at goal with no recorded fevers.  Need daily weights.  No labs or imaging today.    Review of patient's allergies indicates:   Allergen Reactions    Penicillin     Sulfa (sulfonamide antibiotics)         Current Facility-Administered Medications:     acetaminophen tablet 650 mg, 650 mg, Oral, Q4H PRN, Toño A Veda, FNP    ALPRAZolam tablet 0.25 mg, 0.25 mg, Oral, TID PRN, Toño A Veda, FNP    amLODIPine tablet 10 mg, 10 mg, Oral, Daily, Toño A Veda, FNP, 10 mg at 22 0809    apixaban tablet 5 mg, 5 mg, Oral, BID, Toño A Veda, FNP, 5 mg at 22 0809    aspirin EC tablet 81 mg, 81 mg,  Oral, Daily, Toño JERNIGAN Veda, FNP, 81 mg at 11/01/22 0809    atorvastatin tablet 40 mg, 40 mg, Oral, Daily, Toño A Veda, FNP, 40 mg at 11/01/22 0809    benzonatate capsule 100 mg, 100 mg, Oral, TID PRN, Toño A Veda, FNP    bisacodyL suppository 10 mg, 10 mg, Rectal, Daily PRN, Toño A Veda, FNP    carvediloL tablet 12.5 mg, 12.5 mg, Oral, BID, Toño A Veda, FNP, 12.5 mg at 11/01/22 0809    cetirizine tablet 10 mg, 10 mg, Oral, QHS, Michelle Crumpte, FNP, 10 mg at 10/31/22 2112    famotidine tablet 20 mg, 20 mg, Oral, Daily, Toño A Veda, FNP, 20 mg at 11/01/22 0809    folic acid tablet 1 mg, 1 mg, Oral, Daily, Toño A Veda, FNP, 1 mg at 11/01/22 0809    gabapentin capsule 300 mg, 300 mg, Oral, QHS, Toño A Veda, FNP, 300 mg at 10/31/22 2112    hydrALAZINE injection 10 mg, 10 mg, Intravenous, Q4H PRN, Toño A Veda, FNP    HYDROcodone-acetaminophen 5-325 mg per tablet 1 tablet, 1 tablet, Oral, Q4H PRN, Toño A Veda, FNP    labetalol 20 mg/4 mL (5 mg/mL) IV syring, 10 mg, Intravenous, Q4H PRN, Toño A Veda, FNP    methocarbamoL tablet 500 mg, 500 mg, Oral, TID PRN, Michelle Crumpte, FNP    metoprolol injection 10 mg, 10 mg, Intravenous, Q2H PRN, Tñoo A Veda, FNP    nitroGLYCERIN SL tablet 0.4 mg, 0.4 mg, Sublingual, Q5 Min PRN, Toño A Veda, FNP    ondansetron disintegrating tablet 4 mg, 4 mg, Oral, Q6H PRN, Toño A Veda, FNP    polyethylene glycol packet 17 g, 17 g, Oral, BID PRN, Toño A Veda, FNP    promethazine tablet 25 mg, 25 mg, Oral, Q6H PRN, Toño A Veda, FNP    sertraline tablet 50 mg, 50 mg, Oral, Daily, Toño A Veda, FNP, 50 mg at 11/01/22 0809    sucralfate tablet 1 g, 1 g, Oral, QID (AC & HS), Toño A Veda, FNP, 1 g at 11/01/22 1100    valsartan tablet 160 mg, 160 mg, Oral, Daily, Toño A Veda, FNP, 160 mg at 11/01/22 0809    Facility-Administered Medications Ordered in  "Other Encounters:     0.9%  NaCl infusion, , Intravenous, Once, Abner Mitchell MD    diazePAM tablet 10 mg, 10 mg, Oral, On Call Procedure, Abner Mitchell MD, 10 mg at 09/23/22 0823    diphenhydrAMINE capsule 50 mg, 50 mg, Oral, On Call Procedure, Abner Mitchell MD, 50 mg at 09/23/22 0823    sodium chloride 0.9% flush 10 mL, 10 mL, Intravenous, PRN, Abner Mitchell MD     Review of Systems   Complete 12-point review of symptoms negative except for what's mentioned in HPI     Objective:     BP (!) 98/50   Pulse 69   Temp 97.7 °F (36.5 °C) (Oral)   Resp 20   Ht 5' 4.5" (1.638 m)   Wt 63.3 kg (139 lb 8.8 oz)   SpO2 97%   BMI 23.58 kg/m²        Intake/Output Summary (Last 24 hours) at 11/1/2022 1404  Last data filed at 11/1/2022 0913  Gross per 24 hour   Intake 720 ml   Output --   Net 720 ml         Physical Exam  Constitutional:       Appearance: Normal appearance.   HENT:      Head: Normocephalic.      Mouth/Throat:      Mouth: Mucous membranes are moist.   Eyes:      Pupils: Pupils are equal, round, and reactive to light.   Cardiovascular:      Rate and Rhythm: Normal rate and regular rhythm.      Heart sounds: Normal heart sounds.      Comments: Sternal incision  Pulmonary:      Effort: Pulmonary effort is normal.      Breath sounds: Normal breath sounds.   Abdominal:      General: Bowel sounds are normal.      Palpations: Abdomen is soft.   Musculoskeletal:      Cervical back: Neck supple.      Comments: Generalized weakness and muscle atrophy   Skin:     General: Skin is warm and dry.   Neurological:      General: No focal deficit present.      Mental Status: She is alert and oriented to person, place, and time.   Psychiatric:         Mood and Affect: Mood normal.         Behavior: Behavior normal.         Thought Content: Thought content normal.         Judgment: Judgment normal.   *MD performed and documented physical examination       Lines/Drains/Airways       Peripheral Intravenous Line  " Duration                  Peripheral IV - Single Lumen 10/20/22 1500 20 G Anterior;Left Forearm 11 days                  Labs  No results found for this or any previous visit (from the past 24 hour(s)).      Radiology  CXR two view on 10/16/2022 at 9:11 a.m., IMPRESSION: Interval removal of support catheters with evidence of a small left apical pneumothorax. Bilateral pleural effusions. No other focal consolidative changes.   Radiology  Transthoracic echo on 09/23/2022: The ejection fraction was calculated to be 55%.  The left ventricular systolic function was normal.  The left ventricular end diastolic pressure was elevated. The pre-procedure left ventricular end diastolic pressure was 23. The estimated blood loss was none. There was three vessel coronary artery disease. There was no mitral valve regurgitation. There was no aortic valve stenosis.  Diagnostic study  [facility] on 09/23/2022-left main: Highly calcified without obstructive lesions.  Lad: Heavily calcified 70-80% stenosis proximally (long lesion) in 70% distal at the bifurcation with the 2nd diagonal branch.  The 1st diagonal branch shows disease of 90%.  Left circumflex:  Calcified, 99% stenosis proximally.  RCA:  Highly calcified, 99% stenosis of the ostium followed by 90% stenosis proximally.    Assessment/Plan:     82 y.o. WF admitted on 10/25/2022    CAD (multivessel)  - s/p CABG x4 (LIMA to LAD, SVG to Diag 1, SVG to OM, and SVG to RCA), Ligation of left atrial appendage on 10/11/2022  - denies recent chest pain or discomfort  - continue                Coreg 12.5 mg b.i.d.                       Valsartan 100 mg daily                 Lipitor 40 mg daily                 Aspirin 81 mg daily   Carafate 1 g q.i.d.  Norco 5 mg/325 mg q.4 hours p.r.n.   - ECG and Nitro PRN  - not on Plavix  - continue cardiac diet  - to follow-up with cardiology outpatient     Debility   - 2/2 above  - defer to physiatry for rehab and pain management  - PT/OT/RT/ST following      Carotid artery stenosis   - s/p CEA  - continue                Lipitor 40 mg daily                 Aspirin 81 mg daily                 Eliquis 5 mg b.i.d.  - to follow-up with vascular surgery outpatient     Sick sinus syndrome   - s/p PPM  - continue                Coreg 12.5 mg b.i.d.   - to follow-up with cardiology outpatient     HTN  - BP at goal!!  - discontinued                HCTZ 25 mg daily on 10/25                Prazosin 1 mg daily on 10/25  - continue                Coreg 12.5 mg b.i.d.                       Valsartan 100 mg daily                 Norvasc 10 mg daily (increased 10/27)                Hydralazine 10 mg every 2 hours as needed for BP > 160/90                Labetalol 10 mg every 2 hours as needed for BP > 160/90     Paroxysmal atrial fibrillation   - rate control   - no evidence of bleeding  - continue                Eliquis 5 mg b.i.d.                Coreg 12.5 mg b.i.d.    - to follow-up with cardiology outpatient     GERD  - Avoid spicy foods, and nothing to eat or drink within x2 hours of bedtime or laying flat (water is ok)   - Avoid NSAIDs (Advil, ibuprofen, naproxen...) and bronw-2 inhibitors (Mobic, Celebrex)    - continue                Pepcid 40 mg daily  Colace 100 mg b.i.d.     Anxiety    - stable  - continue  Zoloft 50 mg daily     HLD  - FLP at goal!!  - continue                Lipitor 40 mg daily      Normocytic anemia  - asymptomatic  - H/H stable   - continue                Folic acid 1 mg daily   - no evidence of active bleeds  - will closely monitor and transfuse if needed     MP   - Compliant with CPAP at home     Chronic stage III sacral ulcer  - current   - Wound Care following  - currently with home border dressing  - change acute will nutrition     VTE Prophylaxis:  Eliquis 5 mg b.i.d.  COVID-19 testing:  Negative on 10/25/2002  COVID-19 vaccination status:  Vaccinated (Moderna) 01/20/2021 and 02/17/2021     POA: No  Living will: No  Contacts: Carrillo Elias  (daughter) [phone]-lives in [place]                      Kiara Elias (niece) [phone]     CODE STATUS: Full Code  Internal Medicine (attending): Demetri Heredia MD  Physiatry (consulting):  Tonio Herzog MD     OUTPATIENT PROVIDERS  PCP: Tarik Gee MD  Cardiology:  Peterson Sánchez MD  CV surgery:  Earl Thompson MD  Nephrology: Dhaval Ibrahim MD     DISPOSITION: Condition stable.  Sleep hygiene, bowel maintenance, and appetite at goal.  Vital signs at goal with no recorded fevers.  No labs or imaging today.  Need daily standing weights.  Continue aggressive mobilization as tolerated.  Monitor closely.  Notify of acute changes.    Staffing 10/31/2022: Incisions are healing well.  Stress incontinence.  Continent of bowel.  RT: Overall min to set up.  Limited by activity tolerance.  Appetite is good. PT: Ambulate 750 feet.  Overall set up to CGA.  Should be ready for discharge towards the end of the week. OT: Overall set up to independent with ADLs.  Projected discharge 11/3 home with .      Christian Mccollum NP conducted independent physical examination and assisted with medical documentation.    Total time spent on this encounter including chart review and direct MD + NP 1-on-1 patient interaction: 38 minutes   Over 50% of this time was spent in counseling and coordination of care

## 2022-11-01 NOTE — PROGRESS NOTES
11/01/22 1130   Rec Therapy Time Calculation   Date of Treatment 11/01/22   Rec Start Time 1130   Rec Stop Time 1200   Rec Total Time (min) 30 min   Time   Treatment time 2 units   Precautions   General Precautions fall;sternal   Orthopedic Precautions  N/A   Braces N/A   Pain/Comfort   Pain Rating 1 no pain   OTHER   Rehab identified problem list/impairments weakness;impaired endurance;impaired balance   Values/Beliefs/Spiritual Care   Spiritual, Cultural Beliefs, Caodaism Practices, Values that Affect Care no   Overall Level of Functioning   Activity Tolerance Mod Indep   Dynamic Sitting Balance/Reaching Does not occur   Dynamic Standing Balance/Reaching Standby Assist   Right UE Coodination/Dexterity Independent   Left UE Coordination/Dexterity Independent   Problem Solving/Sequencing Skills Independent   Memory Recall Independent   R/L Neglect/Inattention Does not occur   Attention Span Independent   Recreational Therapy Short Term Goals   Short Term Goal 1 Progression Met   Short Term Goal 2 Progression Met   Recreational Therapy Long Term Goals   Long Term Goal 1 Progression Met   Long Term Goal 2 Progression Progressing   Plan   Patient to be seen Daily   Planned Duration Other (Comment)  (2 days)   Treatments Planned Energy conservation training   Treatment plan/goals estblished with Patient/Caregiver Yes

## 2022-11-01 NOTE — PHYSICIAN QUERY
PT Name: Jessica Elias  MR #: [id number]     DOCUMENTATION CLARIFICATION     CDS/: JAYASHREE Shah, RN, CCDS              Contact information: [email or web address]  This form is a permanent document in the medical record.     Query Date: November 1, 2022    By submitting this query, we are merely seeking further clarification of documentation.  Please utilize your independent clinical judgment when addressing the question(s) below.    The Medical Record contains the following:   Indicators   Supporting Clinical Findings Location in Medical Record    Non-blanchable erythema/redness     X Ulcer/Injury/Skin Breakdown WOCN consulted for Buttocks  Patient states she has had this wound for years. It comes and goes    Buttocks Purple or maroon localized area of discolored intact skin or non-intact skin or a blood-filled blister Wound care PN: SARA Castellano RN 10/12      Nursing flowsheet    Deep Tissue Injury     X Wound care consult  Altered Skin Integrity 10/12/22 1356 Buttocks Purple or maroon localized area of discolored intact skin or non-intact skin or a blood-filled blister  Wound Image       Wound care PN: SARA Castellano RN 10/12   X Acute/Chronic Illness past medical history sick sinus syndrome status post pacemaker placement, hypertension, hyperlipidemia, obstructive sleep apnea, and carotid artery to the status post previous endarterectomy who had recently been experiencing dizziness and palpitation   CC PN: Dr. Garcia 10/13   X Medication/Treatment Educated on pressure relief.   Recommendations made to Nurse for Desitin ointment, cover with 4 x 4, abd pad, secure with medipore tape. Continue with turning every 2 hours, wedge and float heels while in bed. She is on a DONATO mattress   Wound care PN: SARA Castellano RN 10/12    Other       The clinical guidelines noted are only a system guideline. It does not replace the providers clinical judgment.    Per the National Pressure Injury Advisory Panel:   A  pressure injury is localized damage to the skin and underlying soft tissue usually over a bony prominence or related to a medical or other device. The injury can present as intact skin or an open ulcer and may be painful. The injury occurs as a result of intense and/or prolonged pressure or pressure in combination with shear. The tolerance of soft tissue for pressure and shear may also be affected by microclimate, nutrition, perfusion, co-morbidities and condition of the soft tissue.       Stage 1 Pressure Injury:  Intact skin with a localized area of non-blanchable erythema, which may appear differently in darkly pigmented skin. Color changes do not include purple or maroon discoloration; these may indicate deep tissue pressure injury.    Stage 2 Pressure Injury:  Partial-thickness loss of skin with exposed dermis. The wound bed is viable, pink or red, moist, and may also present as an intact or ruptured serum-filled blister.    Stage 3 Pressure Injury:  Full-thickness loss of skin, in which adipose (fat) is visible in the ulcer and granulation tissue and epibole (rolled wound edges) are often present. Slough and/or eschar may be visible. Undermining and tunneling may occur.    Stage 4 Pressure Injury:  Full-thickness skin and tissue loss with exposed or directly palpable fascia, muscle, tendon, ligament, cartilage or bone in the ulcer. Slough and/or eschar may be visible. Epibole (rolled edges), undermining and/or tunneling often occur.    Unstageable Pressure Injury:  Full-thickness skin and tissue loss in which the extent of tissue damage within the ulcer cannot be confirmed because it is obscured by slough or eschar. If slough or eschar is removed, a Stage 3 or Stage 4 pressure injury will be revealed.    Deep Tissue Pressure Injury:  Intact or non-intact skin with localized area of persistent non-blanchable deep red, maroon, purple discoloration or epidermal separation revealing a dark wound bed or blood  filled blister. This injury results from intense and/or prolonged pressure and shear forces at the bone-muscle interface. The wound may evolve rapidly to reveal the actual extent of tissue injury, or may resolve without tissue loss. If necrotic tissue, subcutaneous tissue, granulation tissue, fascia, muscle or other underlying structures are visible, this indicates a full thickness pressure injury (Unstageable, Stage 3 or Stage 4). Do not use DTPI to describe vascular, traumatic, neuropathic, or dermatologic conditions.   Medical Device Related Pressure Injury: This describes an etiology. Medical device related pressure injuries result from the use of devices designed and applied for diagnostic or therapeutic purposes. The resultant pressure injury generally conforms to the pattern or shape of the device. The injury should be staged using the staging system.    Mucosal Membrane Pressure Injury: Mucosal membrane pressure injury is found on mucous membranes with a history of a medical device in use at the location of the injury. Due to the anatomy of the tissue these ulcers cannot be staged.       Provider, please provide the integumentary diagnosis related to the documentation of (buttocks):     [   ] Pressure Injury/Decubitus Ulcer, Stage 1   [   ] Pressure Injury/Decubitus Ulcer, Stage 2   [   ] Pressure Injury/Decubitus Ulcer, Unstageable   [   ] Deep Tissue Pressure Injury   [   ] Non-pressure ulcer, skin breakdown only   [   ] Non-pressure ulcer, other depth (please specify): ___________   [   ] Other Integumentary Diagnosis (please specify):______________   [  ] Clinically Undetermined     Please document in your progress notes daily for the duration of treatment until resolved and include in your discharge summary.    Reference:    LUCIEN Trejo., HALEIGH Bailon., Goldberg, M., LUCIEN Bullard, LUCIEN Elizabeth, & SHIRLEY Gomez (2016). Revised National Pressure Ulcer Advisory Panel Pressure Injury Staging System: Revised  Pressure Injury Staging System. J Wound Ostomy Continence Nurs, 43(6), 585-597. doi:10.1097/won.0820821103853817    Form No.06375

## 2022-11-01 NOTE — PT/OT/SLP PROGRESS
Physical Therapy Inpatient Rehab Treatment    Patient Name:  Jessica Elias   MRN:  [id number]    Recommendations:     Discharge Recommendations:      Discharge Equipment Recommendations:     Barriers to discharge: None    Assessment:     Jessica Elias is a 82 y.o. female admitted with a medical diagnosis of S/P CABG x 4.  She presents with the following impairments/functional limitations:  weakness, impaired endurance, impaired functional mobility, gait instability, pain, edema.    Rehab Diagnosis: s/p CABG x 4; Pacemaker; Sacral Ulcer    Recent Surgery: * No surgery found *      General Precautions: Standard, sternal     Orthopedic Precautions:N/A     Braces: N/A    Rehab Prognosis: Good; patient would benefit from acute skilled PT services to address these deficits and reach maximum level of function.      History:     Past Medical History:   Diagnosis Date    Chronic kidney disease, unspecified     stage 3    Coronary artery disease     History of carotid artery disease     Hyperlipidemia     Hypertension     Sleep apnea        Past Surgical History:   Procedure Laterality Date    APPENDECTOMY N/A     BLADDER SURGERY N/A     CAROTID ENDARTERECTOMY N/A     CATARACT EXTRACTION N/A     CORONARY ARTERY BYPASS GRAFT (CABG) N/A 10/11/2022    Procedure: CORONARY ARTERY BYPASS GRAFT (CABG);  Surgeon: Bety Thompson MD;  Location: [facility] OR;  Service: Cardiothoracic;  Laterality: N/A;    HYSTERECTOMY N/A     INSERTION OF PERMANENT PACEMAKER N/A     LEFT HEART CATHETERIZATION Left 09/23/2022    Procedure: CATHETERIZATION, HEART, LEFT;  Surgeon: Abner Mitchell MD;  Location: [facility] CATH LAB;  Service: Cardiology;  Laterality: Left;  LHC VIA RRA    lump removed from right shoulder         Subjective     Chief Complaint: N/A    Respiratory Status: Room air    Patients cultural, spiritual, Jehovah's witness conflicts given the current situation: no      Objective:     Communicated with RN prior to session.  Patient found supine  with peripheral IV  upon PT entry to room.    Pt is Oriented x3 and Alert, Cooperative, and Motivated.    Vitals     Functional Mobility:   - Toilet t/f: stand step t/f with RW to toilet; overall independent; + void & BM; incidental incontinent of bowel     Current   Status   Discharge   Goal   Functional Area: Care Score:     Roll Left and Right   Independent   Sit to Lying   Independent   Lying to Sitting on Side of Bed   Set-up/clean-up   Sit to Stand 6 With RW Independent   Chair/Bed-to-Chair Transfer 6 Stand step t/f with RW Independent   Car Transfer   Independent   Walk 10 Feet 6  Independent   Walk 50 Feet with Two Turns 6  Independent   Walk 150 Feet 6 ~750 ft with RW using step through gait pattern Independent   Walk 10 Feet Uneven Surface   Independent   1 Step (Curb)   Independent   4 Steps   Independent   12 Steps   Independent   Picking Up Object   Independent   Wheel 50 Feet with Two Turns    Not applicable   Wheel 150 Feet    Not applicable       Therapeutic Activities and Exercises:  Recumbent bike: 10 min 1.4 resistance    Activity Tolerance: Excellent    Patient left  on toilet  with call button in reach.    Education provided: roles and goals of PT/PTA, transfer training, gait training, balance training, safety awareness, body mechanics, assistive device, strengthening exercises, fall prevention, and sternal precautions.    Expected compliance: High compliance    GOALS:   Multidisciplinary Problems       Physical Therapy Goals          Problem: Physical Therapy    Goal Priority Disciplines Outcome Goal Variances Interventions   Physical Therapy Goal     PT, PT/OT Ongoing, Progressing     Description: Bed Mobility:  Roll left and right with setup/clean-up assist. (MET)  Sit to supine transfer with setup/clean-up assist. (MET)  Supine to sit transfer with supervision/touching assist. (MET)  Supine to sit transfer with setup/clean-up assist (MET)    Transfers:  Sit to stand transfer with  setup/clean-up assist using LRAD. (MET)  Bed to chair transfer with setup/clean-up assist using LRAD. (MET)  Car transfer with setup/clean-up assist using LRAD. (MET)   an object from the ground in standing position with setup/clean-up assist using LRAD. (MET)    Mobility:  Ambulate 360 feet with supervision/touching assist using LRAD. (MET)  Ambulate 10 feet on uneven surfaces/ramps with setup/clean-up assist using LRAD. (MET)  Ascend/descend a 4 inch curb with setup/clean-up assist using LRAD. (MET)  Ascend/descend 12 stairs with supervision/touching assist using bilateral handrails. (MET)  Ascend/descend 12 stairs independently using bilateral handrails (MET)   Ambulate 1000 feet independently using RW                         Plan:     During this hospitalization, patient to be seen 5 x/week to address the identified rehab impairments via gait training, therapeutic activities, therapeutic exercises, neuromuscular re-education and progress toward the following goals:    Plan of Care Expires:  11/07/22  PT Next Visit Date: 11/07/22  Plan of Care reviewed with: patient    Additional Information:         Time Tracking:     Therapy Time  PT Received On: 11/01/22  PT Start Time: 1430  PT Stop Time: 1500  PT Total Time (min): 30 min   PT Individual: 30  Missed Time:    Time Missed due to:      Billable Minutes: Therapeutic Activity 30    11/01/2022

## 2022-11-01 NOTE — PT/OT/SLP EVAL
Physical Therapy Rehab Re-Evaluation    Patient Name:  Jessica Elias   MRN:  [id number]    Recommendations:     Discharge Recommendations:      Discharge Equipment Recommendations:     Barriers to discharge: None    Assessment:     Jessica Elias is a 82 y.o. female admitted with a medical diagnosis of S/P CABG x 4.  She presents with the following impairments/functional limitations:  weakness, impaired endurance, impaired functional mobility, gait instability, pain, edema. Pt stated that her and her son will shop on Friday for an electric recliner. Pt also stated that her son will arrive Thursday night and stay with Pt until Sunday to provide assistance when needed. Pt ordered bed wedge and it is set to arrive on Thursday.    Rehab Diagnosis: s/p CABG x 4; Pacemaker; Sacral Ulcer    Recent Surgery: * No surgery found *      General Precautions: Standard, sternal     Orthopedic Precautions: N/A     Braces: N/A    Rehab Prognosis: Good; patient would benefit from acute skilled PT services to address these deficits and reach maximum level of function.      History:     Past Medical History:   Diagnosis Date    Chronic kidney disease, unspecified     stage 3    Coronary artery disease     History of carotid artery disease     Hyperlipidemia     Hypertension     Sleep apnea        Past Surgical History:   Procedure Laterality Date    APPENDECTOMY N/A     BLADDER SURGERY N/A     CAROTID ENDARTERECTOMY N/A     CATARACT EXTRACTION N/A     CORONARY ARTERY BYPASS GRAFT (CABG) N/A 10/11/2022    Procedure: CORONARY ARTERY BYPASS GRAFT (CABG);  Surgeon: Bety Thompson MD;  Location: [facility] OR;  Service: Cardiothoracic;  Laterality: N/A;    HYSTERECTOMY N/A     INSERTION OF PERMANENT PACEMAKER N/A     LEFT HEART CATHETERIZATION Left 09/23/2022    Procedure: CATHETERIZATION, HEART, LEFT;  Surgeon: Abner Mitchell MD;  Location: [facility] CATH LAB;  Service: Cardiology;  Laterality: Left;  LHC VIA RRA    lump removed from right  shoulder         Subjective     Chief Complaint: N/A  Patient/Family Comments/goals: N/A    Patients cultural, spiritual, Judaism conflicts given the current situation: no       Living Environment  Lives With: alone  Living Arrangements: house  Home Accessibility: wheelchair accessible  Number of Stairs, Main Entrance: none  Stair Railings, Main Entrance: none  Home Layout: Able to live on 1st floor  Transportation Anticipated: car, drives self  Equipment Currently Used at Home: shower chair, grab bar, walker, rolling    Prior Level of Function  Ambulation Skills: independent  Stairs: independent (Pt stated that she avoids stairs in the community.)  Transfer Skills: independent  ADL Skills: independent  Work/Leisure Activity: independent  Cognitive Communication: independent    Equipment used at home: shower chair, grab bar.  DME owned (not currently used): none.      Upon discharge, patient will have assistance from family.    Objective:     Communicated with RN prior to session.  Patient found supine with peripheral IV  upon PT entry to room.    Vitals   Vitals at Rest  BP (!) 160/70     HR 77   O2 Sat     Pain Pain Rating 1: 0/10     Vitals With Activity  BP     HR     O2 Sat     Pain Pain Rating 1: 0/10     Respiratory Status: Room air    Exams    Tests and Measures:      PROM  AROM    Right  LE    WNL    Left    LE    WNL       Lower Extremity Strength:    Right LE  Left LE    Knee extension: 4+ Knee extension: 4+   Knee flexion: 4+ Knee flexion: 4+   Hip flexion: 4+ Hip flexion: 4+   Hip extension:  Not Tested Hip extension: Not Tested   Hip abduction: 5 Hip abduction: 5   Hip adduction: 5 Hip adduction: 5   Ankle dorsiflexion: 5 Ankle dorsiflexion: 5   Ankle plantarflexion: 5 Ankle plantarflexion: 5         Functional Mobility  Toilet t/f: stand step t/f with RW to toilet; overall independent; x 2 trials; + void; Pt was able to perform doffing pants and briefs, however required Min A to nahed new briefs and  pants.    GGs   Admit Current   Status  Goal   Functional Area: Care Score: Care Score:  Care Score:   Roll Left and Right 4 6 HOB flat Independent   Sit to Lying 4 6 HOB flat Independent   Lying to Sitting on Side of Bed 3 6 HOB flat; performed from 10 inch wedge; Pt will utilize a 10 in wedge when d/c home Set-up/clean-up   Sit to Stand 4 6 With RW Independent   Chair/Bed-to-Chair Transfer 4 6 Bed <> w/c using stand step t/f with RW Independent   Car Transfer 4 6 With RW using stand step t/f Independent   Walk 10 Feet 4 6  Independent   Walk 50 Feet with Two Turns 4 6  Independent   Walk 150 Feet 4 6 ~350 ft with RW using step through gait pattern Independent   Walk 10 Feet Uneven Surface 4 6 10 ft on ramp with RW using step to gait pattern Independent   1 Step (Curb) 4 6  Independent   4 Steps 4 6  Independent   12 Steps 88 6 12 steps using B rails; Pt able to maintain sternal precautions Independent   Picking Up Object 4 6 Reacher and RW Independent   Wheel 50 Feet with Two Turns 9 9  Not applicable   Wheel 150 Feet 9 9  Not applicable     Therapeutic Activities and Exercises:  Seated TherX: seated in w/c with BUE support; 3 x 15 BLE 3#   Knee extension     30 min CoTreatment with Rec Therapy via washer toss with emphasis on standing tolerance, static and dynamic standing balance, and overall effort.     Activity Tolerance: Excellent    Patient left sitting edge of bed with all lines intact, call button in reach, and CNA & RN present.    Education Provided: roles and goals of PT/PTA, transfer training, bed mob, gait training, stair training, balance training, safety awareness, body mechanics, assistive device, strengthening exercises, fall prevention, and sternal precautions.    Expected compliance: High compliance    GOALS:   Multidisciplinary Problems       Physical Therapy Goals          Problem: Physical Therapy    Goal Priority Disciplines Outcome Goal Variances Interventions   Physical Therapy Goal     PT,  PT/OT Ongoing, Progressing     Description: Bed Mobility:  Roll left and right with setup/clean-up assist. (MET)  Sit to supine transfer with setup/clean-up assist. (MET)  Supine to sit transfer with supervision/touching assist. (MET)  Supine to sit transfer with setup/clean-up assist (MET)    Transfers:  Sit to stand transfer with setup/clean-up assist using LRAD. (MET)  Bed to chair transfer with setup/clean-up assist using LRAD. (MET)  Car transfer with setup/clean-up assist using LRAD. (MET)   an object from the ground in standing position with setup/clean-up assist using LRAD. (MET)    Mobility:  Ambulate 360 feet with supervision/touching assist using LRAD. (MET)  Ambulate 10 feet on uneven surfaces/ramps with setup/clean-up assist using LRAD. (MET)  Ascend/descend a 4 inch curb with setup/clean-up assist using LRAD. (MET)  Ascend/descend 12 stairs with supervision/touching assist using bilateral handrails. (MET)  Ascend/descend 12 stairs independently using bilateral handrails (MET)   Ambulate 1000 feet independently using RW                         Plan:     During this hospitalization, patient to be seen 5 x/week to address the identified rehab impairments via gait training, therapeutic activities, therapeutic exercises, neuromuscular re-education and progress toward the following goals:    Plan of Care Expires:  11/07/22  PT Next Visit Date: 11/07/22  Plan of Care reviewed with: patient      Additional Infomation:           Time Tracking:     Therapy Time   PT Received On: 11/01/22  PT Start Time: 1030  PT Stop Time: 1200  PT Total Time (min): 90 min  PT Individual: 90  Missed Time:    Time Missed due to:      Billable Minutes: Re-eval 20 and Therapeutic Activity 70    11/01/2022

## 2022-11-01 NOTE — PLAN OF CARE
Problem: Skin Injury Risk Increased  Goal: Skin Health and Integrity  Outcome: Ongoing, Progressing  Intervention: Optimize Skin Protection  Flowsheets (Taken 10/31/2022 2007)  Pressure Reduction Techniques:   rest period provided between sit times   pressure points protected   weight shift assistance provided  Pressure Reduction Devices:   heel offloading device utilized   positioning supports utilized  Head of Bed (HOB) Positioning: HOB at 30-45 degrees     Problem: Fall Injury Risk  Goal: Absence of Fall and Fall-Related Injury  Outcome: Ongoing, Progressing  Intervention: Promote Injury-Free Environment  Flowsheets (Taken 10/31/2022 2007)  Safety Promotion/Fall Prevention:   assistive device/personal item within reach   gait belt with ambulation   medications reviewed   side rails raised x 3

## 2022-11-01 NOTE — PROGRESS NOTES
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  Patient had underwent outpatient coronary angiogram which revealed triple-vessel disease.  She was electively admitted on October 11, 2022 and underwent CABG x 4 (LIMA to LAD, SVG to Diag 1, SVG to OM, and SVG to RCA), Ligation of left atrial appendage per Dr. Bety Thompson. Patient was noted to be in Atrial Fibrillation RVR during her acute stay.  She is now in sinus. She is on Eliquis. Participating with therapy. Functional status includes bed mobility and transfers requiring minimal assistance. Amb w/RW for 130ft. Patient was evaluated, accepted, and admitted to inpatient rehab to improve functional status. Transferred to [facility] on 10/25 without incident.   11/1: Seen with OT, seated in WC, discussing RW modifications to carry adaptive equipment allowing patient to be more independent. Admits to some trouble sleeping 2/2 position changes for Sacral sore, therefore she is a bit tired today. Tolerating therapy. VSSAF.      Review of Systems  Depression/Anxiety:      sertraline tablet 50 mg qd  ALPRAZolam tablet 0.25 mg TID PRN Anxiety  Pain: denies   acetaminophen tablet 650 mg q4hr PRN mild pain  HYDROcodone-acetaminophen 5-325 mg 1 tablet q4hr PRN moderate pain   methocarbamoL tablet 500 mg TID PRN muscle spasm/pain  Bowels/Bladder: last BM 10/31   Appetite: good. Does not typically eat a whole lot    Sleep: good          Physical Exam  General: well-developed,  well-nourished, in no acute distress  Respiratory: equal chest rise, no SOB, no audible wheeze  Cardiovascular: regular rate and rhythm, no edema  Gastrointestinal: soft, non-tender, non-distended   Musculoskeletal: full range of motion of all extremities/spine with generalized weakness  Integumentary: no rashes or skin lesions present, midsternal [id number]/d/i, Chest tube sutures removed, LLE EVH [id number]/d/I, sacral pressure wound-foam dressing  Neurologic: cranial nerves intact, no signs of peripheral neurological deficit, motor/sensory function intact  *MD performed and documented physical examination       Assessment/Plan  Hospital   S/P CABG x 4   Arteriosclerosis of coronary artery     Non-Hospital   Nonspecific abnormal results of basal metabolism function study   Hypertension   Hyperlipidemia   Stage 3 chronic kidney disease   Hyperglycemia   Urinary incontinence   Incontinence of feces   Depressive disorder   Generalized osteoarthritis   Presence of cardiac pacemaker   Anxiety   Sleep apnea   History of carotid artery disease       Wounds: midsternal [id number]-u/d/i, Chest tube sutures removed, LLE EVH afwmmazy-ETR-p/d/I, sacral pressure wound-foam dressing  S/p CABG x 4 (LIMA to LAD, SVG to Diag 1, SVG to OM, and SVG to RCA), Ligation of left atrial appendage per Dr. Bety Thompson on 10/11  Precautions: sternal   Bracing/AD: RW  Swallowing:   Function: Tolerating therapy. Continue PT/OT  VTE Prophylaxis:   apixaban tablet 5 mg BID  Code Status: FULL CODE   Discharge:Lives alone in [place] in a single-story home with a threshold to enter the residence. Has a Masters in Guidance/Counseling. No  history. Pt. Is a retired Guidance Counselor.  Pt.is . She was completely independent, living alone. She will have family available to assist her if needed after discharge from rehab. Children:(4/3 living). Date 11/3 Thursday.                  Michelle Winter NP, conducted additional  independent physical examination and assisted with medical documentation.

## 2022-11-01 NOTE — PT/OT/SLP PROGRESS
"Occupational Therapy Inpatient Rehab Treatment    Name: Jessica Elias  MRN: [id number]    Assessment:  Jessica Elias is a 82 y.o. female admitted with a medical diagnosis of S/P CABG x 4.  She presents with the following impairments/functional limitations:  weakness, impaired endurance, impaired sensation, impaired self care skills, impaired functional mobility, impaired balance, decreased upper extremity function, decreased safety awareness.    General Precautions: Standard, sternal     Orthopedic Precautions:N/A     Braces: N/A    Rehab Prognosis: Good; patient would benefit from acute skilled OT services to address these deficits and reach maximum level of function.      History:     Past Medical History:   Diagnosis Date    Chronic kidney disease, unspecified     stage 3    Coronary artery disease     History of carotid artery disease     Hyperlipidemia     Hypertension     Sleep apnea        Past Surgical History:   Procedure Laterality Date    APPENDECTOMY N/A     BLADDER SURGERY N/A     CAROTID ENDARTERECTOMY N/A     CATARACT EXTRACTION N/A     CORONARY ARTERY BYPASS GRAFT (CABG) N/A 10/11/2022    Procedure: CORONARY ARTERY BYPASS GRAFT (CABG);  Surgeon: Bety Thompson MD;  Location: [facility];  Service: Cardiothoracic;  Laterality: N/A;    HYSTERECTOMY N/A     INSERTION OF PERMANENT PACEMAKER N/A     LEFT HEART CATHETERIZATION Left 09/23/2022    Procedure: CATHETERIZATION, HEART, LEFT;  Surgeon: Abner Mitchell MD;  Location: [facility] CATH LAB;  Service: Cardiology;  Laterality: Left;  LHC VIA RRA    lump removed from right shoulder         Subjective     Orientation: Oriented x4    Chief Complaint: no new complaints    Patient/Family Comments/goals: "to go home"    Vitals   Vitals at Rest  /67   HR 82   O2 Sat    Pain      Vitals With Activity  /66   HR 81   O2 Sat    Pain      Respiratory Status: Room air    Patients cultural, spiritual, Jewish conflicts given the current situation: no   "     Objective:     Patient found right sidelying with peripheral IV  upon OT entry to room.    Mobility   Patient completed:  Supine to Sit with supervision  Sit to Stand Transfer with independence with rolling walker  Stand to Sit Transfer with independence with no assistive device    Functional Mobility  FM room 410<>OT gym independently with RW.    Limiting Factors for ADLs: motor, endurance, balance, and safety awareness     IADLs: Educated on walker safety within home (kitchen, bathroom) and made suggestions on cabinet/refrigerator item arrangement. Introduced walker accessories (walker tray, walker bag). Pt was given resources on where to purchase items.    Therapeutic Exercise  Performed UB strengthening with UBE in standing for 5mins forward and 5 mins backward without a rest break.    Patient left right sidelying with call button in reach.     Education provided: Roles and goals of OT, ADLs, transfer training, bed mobility, assistive device, sequencing, safety precautions, fall prevention, equipment recommendations, and home safety    Multidisciplinary Problems       Occupational Therapy Goals          Problem: Occupational Therapy    Goal Priority Disciplines Outcome Interventions   Occupational Therapy Goal     OT, PT/OT Ongoing, Progressing    Description: ADLs:  Pt to perform UB dressing with set up MET  Pt to perform LB dressing with set up MET  Pt to perform putting on/off footwear task with set up MET  Pt to perform toileting with independent using sternal precautions MET    Functional Transfers:  Pt to perform toilet transfers with set up. MET  Pt to perform a tub transfer with set up MET  Pt to perform a walk-in shower transfer with set up     IADLs:  Pt to perform simple meal with SBA and RW.    Balance, Strengthening, Endurance, Balance:  Pt to consistently demonstrate adherence to sternal precautions during all ADL's as instructed by OT.  Pt to demonstrate consistent adherence to breathing  control and energy conservation techniques as educated by OT.                        Time Tracking     OT Received On: 11/01/22  Time In 0800     Time Out 0900  Total Time 60 min  Therapy Time: OT Individual: 60  Missed Time:    Missed Time Reason:      Billable Minutes: Self Care/Home Management 30, Therapeutic Activity 15, and Therapeutic Exercise 15    11/01/2022

## 2022-11-01 NOTE — PLAN OF CARE
[facility] responded that they can provide the RW and will have it delivered to pt's room by 1030 Thurs as requested.

## 2022-11-01 NOTE — PT/OT/SLP RE-EVAL
Recreational Therapy Re-Evaluation      Date of Treatment: 11/01/22  Start Time: 1130  Stop Time: 1200  Total Time: 30 min  Missed Time:    Assessment      Jessica Elias is a 82 y.o. female admitted with a medical diagnosis of S/P CABG x 4.  She presents with the following impairments/functional limitations:  weakness, impaired endurance, impaired balance .    Rehab Diagnosis:     Recent Surgery: * No surgery found *      General Precautions: Standard, fall, sternal     Orthopedic Precautions:N/A     Braces: N/A    Rehab Prognosis: Good; patient would benefit from acute skilled Recreational Therapy services to address these deficits and reach maximum level of function.      Impairments: Endurance deficits and Strength deficits  Rehab Potential: Good  Treatment Recommendations: Continue with current plan of care   Treatment Diagnosis: CABG x4, 3rd spacing, volume overload, a-fib, CKD, SSS, pacemaker, HTN, MP, CEA  Orientation: Oriented x4  Affect/Behavior: Appropriate and Cooperative  Safety/Judgement: intact   Basic Command Following: intact  Spiritual Cultural: no        History     Past Medical History:   Diagnosis Date    Chronic kidney disease, unspecified     stage 3    Coronary artery disease     History of carotid artery disease     Hyperlipidemia     Hypertension     Sleep apnea        Past Surgical History:   Procedure Laterality Date    APPENDECTOMY N/A     BLADDER SURGERY N/A     CAROTID ENDARTERECTOMY N/A     CATARACT EXTRACTION N/A     CORONARY ARTERY BYPASS GRAFT (CABG) N/A 10/11/2022    Procedure: CORONARY ARTERY BYPASS GRAFT (CABG);  Surgeon: Bety Thompson MD;  Location: [facility] OR;  Service: Cardiothoracic;  Laterality: N/A;    HYSTERECTOMY N/A     INSERTION OF PERMANENT PACEMAKER N/A     LEFT HEART CATHETERIZATION Left 09/23/2022    Procedure: CATHETERIZATION, HEART, LEFT;  Surgeon: Abner Mitchell MD;  Location: [facility] CATH LAB;  Service: Cardiology;  Laterality: Left;  [facility] VIA [facility]    lum  "removed from right shoulder         Home Environment     Admit Date: 10/25/22  Living Situation  Lives With: alone  Lives in: house  Patients Responsibilities: , Financial management, Health and wellness, Laundry, Leisure/play/hobbies, Meal preparation, Retired, Shopping, Social participation  Number of Children: 3  Occupation:Retired: Guidance Counselor    Instrumental Activities of Daily Living     Previous Hand Dominance: Right Current Hand Dominance: Right     Other iADL Information:        Cognitive Skills Building         Cognitive Observation Activity Assist Position Equipment Response            Comment:      Dynamic Activities      Activity Assist Position Equipment Response   Activity 1 Washer toss independence and modified independence Standing Rolling walker and Metal washers good   Comment: Sit to stand was supervision/setup. Dynamic standing/balance/reaching was setup/I. UE coordination was I as were problem solving skills. She was playfully competitive with staff       Fine Motor Activities      Activity Assist Position Equipment Response           Comment:        Goals     Patient Goals  Patient Goal 1: "To be self sufficient"    Short Term Goals    Goal  Goal Status   Will increase activity tolerance to supervision Met   Will improve dynamic standing balance/reaching to setup Met                 Long Term Goals    Goal Goal Status   Will increase standing tolerance to 5,10 minutes Met   Will improve dynamic standing balance/reaching shila I Progressing                     Plan       Patient to be seen: Daily  Duration: Other (Comment) (2 days)  Treatments planned: Energy conservation training  Treatment plan/goals established with Patient/Caregiver: Yes     "

## 2022-11-01 NOTE — PHYSICIAN QUERY
PT Name: Jessica Elias  MR #: [id number]     DOCUMENTATION CLARIFICATION     CDS/: JAYASHREE Shah, RN, CCDS              Contact information: [email or web address]  This form is a permanent document in the medical record.    Query Date: November 1, 2022    By submitting this query, we are merely seeking further clarification of documentation.  Please utilize your independent clinical judgment when addressing the question(s) below.    The Medical Record contains the following:   Indicator Supporting Clinical Findings Location in Medical Record    Kidney (Renal) Insufficiency      Kidney (Renal) Failure/Injury      Nephrotoxic Agents     X BUN/Creatinine           GFR Cr: 0.79, 0.91, 1.40 Lab: 10/11, 10/16, 10/20    Urine: Casts         Eosinophils      Dehydration      Nausea/Vomiting      Dialysis/CRRT     X Treatment check UA, FENA, o/w continue supportive care for now and follow closely    has been doing well postoperatively but yesterday urine output was low and started on IV fluids   Nephro consult: Dr. Smalls 10/12      Nephro PN: Dr. Ibrahim 10/21   X Other Oliguria - prerenal azotemia due to perioperative 3rd spacing, cardiorenal +/- ATN    h/o CKD 3 (Dr Ibrahim, baseline creatinine 1.1-1.3) Nephro consult: Dr. Smalls 10/12       [facility] approved diagnostic criteria for acute kidney injury is based on KDIGO criteria:    An increase in serum creatinine > 0.3mg/dl within 48 hours  OR  Increase in serum creatinine to > 1.5x baseline, which is known or presumed to have occurred within the prior 7 days  OR  Urine volume <0.5 ml/kg/hr for 6 hours       The clinical guidelines noted above are only a system guideline. It does not replace the providers clinical judgment.     Provider, please specify the diagnosis or diagnoses associated with above clinical findings.     [    ] Unspecified Acute Kidney Failure/Injury     [    ] Other Acute Kidney Failure/Injury (please specify): ____________     [    ]  Acute Renal Insufficiency  - Consider if SCr rise is transient and normalizes quickly with no efforts at real resuscitation of vital signs and perfusion   [   X ] Other (please specify): ___________please defer to specialist who diagnosed it. ____________________   [  ] Clinically Undetermined     Please document in your progress notes daily for the duration of treatment until resolved and include in your discharge summary.    References:   KDIGO Clinical Practice Guideline for Acute Kidney Injury. (2012, March). Retrieved October 21, 2020, from https://kdigo.org/wp-content/uploads/2016/10/PROWB-5079-PUP-Guideline-English.pdf    KUMAR Villa MD, THA Aguilar MD, & KACY Mosquera MD. (1960). Renal medullary necrosis [Abstract]. The American Journal of Medicine, 29(1), 132-156. Doi:https://www.sciencedirect.com/science/article/abs/pii/2370623736928679    KACY Meyer MD, & LUCIEN Mcclain MD, MS. (2020, June 18). Definition and staging of chronic kidney disease in adults ([id number] THA Kumar MD, ScD & [id number] HALEIGH Schaffer MD, MSc, Eds.). Retrieved October 21, 2020, from https://www.[employer]/contents/definition-and-staging-of-chronic-kidney-disease-in-adults?search=ckd%20staging&source=search_result&selectedTitle=1~150&usage_type=default&display_rank=1     NANCY Casey MD, FACP. (2015, Mare 15). Acute kidney injury revisited. Retrieved October 21, 2020, from https://acphospitalist.org/archives/2015/06/coding-acute-kidney-injury.htm    HANS Lopez MD. (2019, July). Renal Cortical Necrosis. Retrieved October 21, 2020, from https://www.[employer].us/professional/genitourinary-disorders/renovascular-disorders/renal-cortical-necrosis    Form No. [id number]

## 2022-11-02 PROCEDURE — 11800000 HC REHAB PRIVATE ROOM

## 2022-11-02 PROCEDURE — 97530 THERAPEUTIC ACTIVITIES: CPT

## 2022-11-02 PROCEDURE — 97535 SELF CARE MNGMENT TRAINING: CPT

## 2022-11-02 PROCEDURE — 25000003 PHARM REV CODE 250: Performed by: NURSE PRACTITIONER

## 2022-11-02 PROCEDURE — 99233 SBSQ HOSP IP/OBS HIGH 50: CPT | Mod: ,,, | Performed by: PHYSICAL MEDICINE & REHABILITATION

## 2022-11-02 PROCEDURE — 94799 UNLISTED PULMONARY SVC/PX: CPT

## 2022-11-02 PROCEDURE — 97110 THERAPEUTIC EXERCISES: CPT

## 2022-11-02 PROCEDURE — 99233 PR SUBSEQUENT HOSPITAL CARE,LEVL III: ICD-10-PCS | Mod: ,,, | Performed by: PHYSICAL MEDICINE & REHABILITATION

## 2022-11-02 RX ADMIN — ATORVASTATIN CALCIUM 40 MG: 40 TABLET, FILM COATED ORAL at 07:11

## 2022-11-02 RX ADMIN — SERTRALINE HYDROCHLORIDE 50 MG: 50 TABLET ORAL at 07:11

## 2022-11-02 RX ADMIN — APIXABAN 5 MG: 5 TABLET, FILM COATED ORAL at 07:11

## 2022-11-02 RX ADMIN — SUCRALFATE 1 G: 1 TABLET ORAL at 08:11

## 2022-11-02 RX ADMIN — APIXABAN 5 MG: 5 TABLET, FILM COATED ORAL at 08:11

## 2022-11-02 RX ADMIN — FOLIC ACID 1 MG: 1 TABLET ORAL at 07:11

## 2022-11-02 RX ADMIN — CARVEDILOL 12.5 MG: 6.25 TABLET, FILM COATED ORAL at 07:11

## 2022-11-02 RX ADMIN — GABAPENTIN 300 MG: 300 CAPSULE ORAL at 08:11

## 2022-11-02 RX ADMIN — CARVEDILOL 12.5 MG: 6.25 TABLET, FILM COATED ORAL at 08:11

## 2022-11-02 RX ADMIN — CETIRIZINE HYDROCHLORIDE 10 MG: 10 TABLET, FILM COATED ORAL at 08:11

## 2022-11-02 RX ADMIN — SUCRALFATE 1 G: 1 TABLET ORAL at 05:11

## 2022-11-02 RX ADMIN — VALSARTAN 160 MG: 160 TABLET, FILM COATED ORAL at 07:11

## 2022-11-02 RX ADMIN — SUCRALFATE 1 G: 1 TABLET ORAL at 11:11

## 2022-11-02 RX ADMIN — FAMOTIDINE 20 MG: 20 TABLET ORAL at 07:11

## 2022-11-02 RX ADMIN — ASPIRIN 81 MG: 81 TABLET, COATED ORAL at 07:11

## 2022-11-02 RX ADMIN — AMLODIPINE BESYLATE 10 MG: 5 TABLET ORAL at 07:11

## 2022-11-02 NOTE — PHYSICIAN QUERY
PT Name: Jessica Elias  MR #: [id number]     DOCUMENTATION CLARIFICATION     CDS/: JAYASHREE Shah, RN, CCDS               Contact information: [email or web address]  This form is a permanent document in the medical record.     Query Date: November 2, 2022    By submitting this query, we are merely seeking further clarification of documentation.  Please utilize your independent clinical judgment when addressing the question(s) below.    The Medical Record contains the following:   Indicators   Supporting Clinical Findings Location in Medical Record    Non-blanchable erythema/redness     X Ulcer/Injury/Skin Breakdown WOCN consulted for Buttocks  Patient states she has had this wound for years. It comes and goes     Buttocks Purple or maroon localized area of discolored intact skin or non-intact skin or a blood-filled blister Wound care PN: SARA Castellano RN 10/12   X   Nursing flowsheet    Deep Tissue Injury     X Wound care consult Altered Skin Integrity 10/12/22 1356 Buttocks Purple or maroon localized area of discolored intact skin or non-intact skin or a blood-filled blister  Wound Image      Wound care PN: SARA Castellano RN 10/12X   X Acute/Chronic Illness past medical history sick sinus syndrome status post pacemaker placement, hypertension, hyperlipidemia, obstructive sleep apnea, and carotid artery to the status post previous endarterectomy who had recently been experiencing dizziness and palpitation   CC PN: Dr. Garcia 10/13   X Medication/Treatment Educated on pressure relief.   Recommendations made to Nurse for Desitin ointment, cover with 4 x 4, abd pad, secure with medipore tape. Continue with turning every 2 hours, wedge and float heels while in bed. She is on a DONATO mattress   Wound care PN: SARA Castellano RN 10/12    Other       The clinical guidelines noted are only a system guideline. It does not replace the providers clinical judgment.    Per the National Pressure Injury Advisory Panel:   A  pressure injury is localized damage to the skin and underlying soft tissue usually over a bony prominence or related to a medical or other device. The injury can present as intact skin or an open ulcer and may be painful. The injury occurs as a result of intense and/or prolonged pressure or pressure in combination with shear. The tolerance of soft tissue for pressure and shear may also be affected by microclimate, nutrition, perfusion, co-morbidities and condition of the soft tissue.       Stage 1 Pressure Injury:  Intact skin with a localized area of non-blanchable erythema, which may appear differently in darkly pigmented skin. Color changes do not include purple or maroon discoloration; these may indicate deep tissue pressure injury.    Stage 2 Pressure Injury:  Partial-thickness loss of skin with exposed dermis. The wound bed is viable, pink or red, moist, and may also present as an intact or ruptured serum-filled blister.    Stage 3 Pressure Injury:  Full-thickness loss of skin, in which adipose (fat) is visible in the ulcer and granulation tissue and epibole (rolled wound edges) are often present. Slough and/or eschar may be visible. Undermining and tunneling may occur.    Stage 4 Pressure Injury:  Full-thickness skin and tissue loss with exposed or directly palpable fascia, muscle, tendon, ligament, cartilage or bone in the ulcer. Slough and/or eschar may be visible. Epibole (rolled edges), undermining and/or tunneling often occur.    Unstageable Pressure Injury:  Full-thickness skin and tissue loss in which the extent of tissue damage within the ulcer cannot be confirmed because it is obscured by slough or eschar. If slough or eschar is removed, a Stage 3 or Stage 4 pressure injury will be revealed.    Deep Tissue Pressure Injury:  Intact or non-intact skin with localized area of persistent non-blanchable deep red, maroon, purple discoloration or epidermal separation revealing a dark wound bed or blood  filled blister. This injury results from intense and/or prolonged pressure and shear forces at the bone-muscle interface. The wound may evolve rapidly to reveal the actual extent of tissue injury, or may resolve without tissue loss. If necrotic tissue, subcutaneous tissue, granulation tissue, fascia, muscle or other underlying structures are visible, this indicates a full thickness pressure injury (Unstageable, Stage 3 or Stage 4). Do not use DTPI to describe vascular, traumatic, neuropathic, or dermatologic conditions.   Medical Device Related Pressure Injury: This describes an etiology. Medical device related pressure injuries result from the use of devices designed and applied for diagnostic or therapeutic purposes. The resultant pressure injury generally conforms to the pattern or shape of the device. The injury should be staged using the staging system.    Mucosal Membrane Pressure Injury: Mucosal membrane pressure injury is found on mucous membranes with a history of a medical device in use at the location of the injury. Due to the anatomy of the tissue these ulcers cannot be staged.       Provider, please provide the integumentary diagnosis related to the documentation of (buttocks): PLEASE SELECT ANSWER PRIOR TO SIGNING.      [   ] Pressure Injury/Decubitus Ulcer, Stage 1   [   ] Pressure Injury/Decubitus Ulcer, Stage 2   [   ] Pressure Injury/Decubitus Ulcer, Unstageable   [   ] Deep Tissue Pressure Injury   [   ] Non-pressure ulcer, skin breakdown only   [   ] Non-pressure ulcer, other depth (please specify): ___________   [   ] Other Integumentary Diagnosis (please specify):______________   [ X ] Clinically Undetermined       Please document in your progress notes daily for the duration of treatment until resolved and include in your discharge summary.    Reference:    LUCIEN Trejo., HALEIGH Bailon., Goldberg, M., TWIN Bullard., LUCIEN Elizabeth, & JAROD Gomez. (2016). Revised National Pressure Ulcer Advisory  Panel Pressure Injury Staging System: Revised Pressure Injury Staging System. J Wound Ostomy Continence Nurs, 43(6), 585-597. doi:10.1097/won.8765184202487748    Form No.[id number]

## 2022-11-02 NOTE — PT/OT/SLP PROGRESS
Physical Therapy Inpatient Rehab Treatment    Patient Name:  Jessica Elias   MRN:  [id number]    Recommendations:     Discharge Recommendations:      Discharge Equipment Recommendations:     Barriers to discharge: None    Assessment:     Jessica Elias is a 82 y.o. female admitted with a medical diagnosis of S/P CABG x 4.  She presents with the following impairments/functional limitations:  weakness, impaired endurance, impaired functional mobility, gait instability, pain, edema.    Rehab Diagnosis: s/p CABG x 4; Pacemaker; Sacral Ulcer    Recent Surgery: * No surgery found *      General Precautions: Standard, sternal     Orthopedic Precautions:N/A     Braces: N/A    Rehab Prognosis: Good; patient would benefit from acute skilled PT services to address these deficits and reach maximum level of function.      History:     Past Medical History:   Diagnosis Date    Chronic kidney disease, unspecified     stage 3    Coronary artery disease     History of carotid artery disease     Hyperlipidemia     Hypertension     Sleep apnea        Past Surgical History:   Procedure Laterality Date    APPENDECTOMY N/A     BLADDER SURGERY N/A     CAROTID ENDARTERECTOMY N/A     CATARACT EXTRACTION N/A     CORONARY ARTERY BYPASS GRAFT (CABG) N/A 10/11/2022    Procedure: CORONARY ARTERY BYPASS GRAFT (CABG);  Surgeon: Bety Thompson MD;  Location: [facility] OR;  Service: Cardiothoracic;  Laterality: N/A;    HYSTERECTOMY N/A     INSERTION OF PERMANENT PACEMAKER N/A     LEFT HEART CATHETERIZATION Left 09/23/2022    Procedure: CATHETERIZATION, HEART, LEFT;  Surgeon: Abner Mitchell MD;  Location: [facility] CATH LAB;  Service: Cardiology;  Laterality: Left;  L[facility] VIA RRA    lump removed from right shoulder         Subjective     Chief Complaint: N/A    Respiratory Status: Room air    Patients cultural, spiritual, Caodaism conflicts given the current situation: no      Objective:     Communicated with RN prior to session.  Patient found left  sidelying with peripheral IV  upon PT entry to room.    Pt is Oriented x3 and Alert, Cooperative, and Motivated.    Vitals      Functional Mobility:   Timed Up and Go: 15 seconds with RW     Current   Status   Discharge   Goal   Functional Area: Care Score:     Roll Left and Right    Independent   Sit to Lying    Independent   Lying to Sitting on Side of Bed 6 HOB elevated to mimic wedge that Pt will have at home Set-up/clean-up   Sit to Stand 6 With RW Independent   Chair/Bed-to-Chair Transfer 6 Stand step t/f with RW Independent   Car Transfer    Independent   Walk 10 Feet 6  Independent   Walk 50 Feet with Two Turns 6  Independent   Walk 150 Feet 6 ~150 ft with RW using step through gait pattern Independent   Walk 10 Feet Uneven Surface    Independent   1 Step (Curb)    Independent   4 Steps    Independent   12 Steps    Independent   Picking Up Object    Independent   Wheel 50 Feet with Two Turns    Not applicable   Wheel 150 Feet    Not applicable       Therapeutic Activities and Exercises:  Balance Training in // bars: performed x 3 trials with and without BUE support; EO/EC 3 X 30 seconds; BLE on Theradisc   SLS    Balance Training in // bars: performed x 4 trials; BLE on Aeromat with BUE support   Braiding    Tandem Walks   Tandem stance with no UE support: EO/EC    Standing exercise in // bars: performed with BUE support; 3 x 15 BLE   Mini squats   Hip Abduction   Heel raises   Hip flexion    30 min CoTreatment with Rec Therapy via [employer] Ball with emphasis on standing tolerance, static and dynamic standing balance, and overall endurance.    Activity Tolerance: Excellent    Patient left  on toilet  with call button in reach.    Education provided: roles and goals of PT/PTA, transfer training, gait training, balance training, safety awareness, body mechanics, assistive device, strengthening exercises, and sternal precautions.    Expected compliance: High compliance    GOALS:   Multidisciplinary Problems        Physical Therapy Goals          Problem: Physical Therapy    Goal Priority Disciplines Outcome Goal Variances Interventions   Physical Therapy Goal     PT, PT/OT Ongoing, Progressing     Description: Bed Mobility:  Roll left and right with setup/clean-up assist. (MET)  Sit to supine transfer with setup/clean-up assist. (MET)  Supine to sit transfer with supervision/touching assist. (MET)  Supine to sit transfer with setup/clean-up assist (MET)    Transfers:  Sit to stand transfer with setup/clean-up assist using LRAD. (MET)  Bed to chair transfer with setup/clean-up assist using LRAD. (MET)  Car transfer with setup/clean-up assist using LRAD. (MET)   an object from the ground in standing position with setup/clean-up assist using LRAD. (MET)    Mobility:  Ambulate 360 feet with supervision/touching assist using LRAD. (MET)  Ambulate 10 feet on uneven surfaces/ramps with setup/clean-up assist using LRAD. (MET)  Ascend/descend a 4 inch curb with setup/clean-up assist using LRAD. (MET)  Ascend/descend 12 stairs with supervision/touching assist using bilateral handrails. (MET)  Ascend/descend 12 stairs independently using bilateral handrails (MET)   Ambulate 1000 feet independently using RW                         Plan:     During this hospitalization, patient to be seen 5 x/week to address the identified rehab impairments via gait training, therapeutic activities, therapeutic exercises, neuromuscular re-education and progress toward the following goals:    Plan of Care Expires:  11/07/22  PT Next Visit Date: 11/07/22  Plan of Care reviewed with: patient    Additional Information:         Time Tracking:     Therapy Time  PT Received On: 11/02/22  PT Start Time: 1030  PT Stop Time: 1200  PT Total Time (min): 90 min   PT Individual: 90  Missed Time:    Time Missed due to:      Billable Minutes: Therapeutic Activity 45 and Therapeutic Exercise 45    11/02/2022

## 2022-11-02 NOTE — PROGRESS NOTES
11/02/22 1130   Rec Therapy Time Calculation   Date of Treatment 11/02/22   Rec Start Time 1130   Rec Stop Time 1200   Rec Total Time (min) 30 min   Time   Treatment time 2 units   Precautions   General Precautions fall;sternal   Orthopedic Precautions  N/A   Braces N/A   Pain/Comfort   Pain Rating 1 no pain   OTHER   Rehab identified problem list/impairments weakness;impaired endurance   Values/Beliefs/Spiritual Care   Spiritual, Cultural Beliefs, Amish Practices, Values that Affect Care no   Overall Level of Functioning   Activity Tolerance Mod Indep   Dynamic Sitting Balance/Reaching Does not occur   Dynamic Standing Balance/Reaching Independent   Right UE Coodination/Dexterity Independent   Left UE Coordination/Dexterity Independent   Problem Solving/Sequencing Skills Independent   Memory Recall Independent   R/L Neglect/Inattention Does not occur   Attention Span Independent   Recreational Therapy Short Term Goals   Short Term Goal 1 Progression Met   Short Term Goal 2 Progression Met   Recreational Therapy Long Term Goals   Long Term Goal 1 Progression Met   Long Term Goal 2 Progression Met   Plan   Patient to be seen Daily   Planned Duration Other (Comment)  (1 day)   Treatments Planned Energy conservation training   Treatment plan/goals estblished with Patient/Caregiver Yes

## 2022-11-02 NOTE — PROGRESS NOTES
Dos 11/2/22  Patient seen and evaluated in room today.  Therapy and progress discussed with patient  Reviewed present barriers to progress  Reviewed chart  Discussed with Dr Heredia's [facility] medicine team, nursing staff as well as my NP, Maricruz Winter  Agree with present POC   Subjective  HPI: 81yo WF with PMH of CAD, PAF, carotid artery stenosis s/p CEA, hypertension, hyperlipidemia, CKD, obstructive sleep apnea, and recurrent bradycardia s/p pacemaker implant (February 2018) presented to [facility] on 10/11/22 for scheduled surgery.  Patient had underwent outpatient coronary angiogram which revealed triple-vessel disease.  She was electively admitted on October 11, 2022 and underwent CABG x 4 (LIMA to LAD, SVG to Diag 1, SVG to OM, and SVG to RCA), Ligation of left atrial appendage per Dr. Bety Thompson. Patient was noted to be in Atrial Fibrillation RVR during her acute stay.  She is now in sinus. She is on Eliquis. Participating with therapy. Functional status includes bed mobility and transfers requiring minimal assistance. Amb w/RW for 130ft. Patient was evaluated, accepted, and admitted to inpatient rehab to improve functional status. Transferred to [facility] on 10/25 without incident.   11/2: Seen with PT, side stepping in parallel bars. Noted LOB when closing eyes and standing within parallel bars but not holding on.Progressing with therapy without complaint. VSSAF.     Review of Systems  Depression/Anxiety:      sertraline tablet 50 mg qd  ALPRAZolam tablet 0.25 mg TID PRN Anxiety  Pain: denies   acetaminophen tablet 650 mg q4hr PRN mild pain  HYDROcodone-acetaminophen 5-325 mg 1 tablet q4hr PRN moderate pain   methocarbamoL tablet 500 mg TID PRN muscle spasm/pain  Bowels/Bladder: last BM 11/2   Appetite: good. Does not typically eat a whole lot    Sleep: good          Physical Exam  General: well-developed, well-nourished, in no acute distress  Respiratory: equal chest rise, no SOB, no audible wheeze  Cardiovascular:  regular rate and rhythm, no edema  Gastrointestinal: soft, non-tender, non-distended   Musculoskeletal: full range of motion of all extremities/spine with generalized weakness  Integumentary: no rashes or skin lesions present, midsternal dxybbflf-FJR-c/d/i, Chest tube sutures removed, LLE EVH qonkwaoz-ZWB-c/d/I, sacral pressure wound-foam dressing  Neurologic: cranial nerves intact, no signs of peripheral neurological deficit, motor/sensory function intact  *MD performed and documented physical examination       Assessment/Plan  Hospital   S/P CABG x 4   Arteriosclerosis of coronary artery     Non-Hospital   Nonspecific abnormal results of basal metabolism function study   Hypertension   Hyperlipidemia   Stage 3 chronic kidney disease   Hyperglycemia   Urinary incontinence   Incontinence of feces   Depressive disorder   Generalized osteoarthritis   Presence of cardiac pacemaker   Anxiety   Sleep apnea   History of carotid artery disease       Wounds: midsternal bbsiekgz-UKU-j/d/i, Chest tube sutures removed, LLE EVH ypqbaeit-IDB-z/d/I, sacral pressure wound-foam dressing  S/p CABG x 4 (LIMA to LAD, SVG to Diag 1, SVG to OM, and SVG to RCA), Ligation of left atrial appendage per Dr. Bety Thompson on 10/11  Precautions: sternal   Bracing/AD: RW  Swallowing:   Function: Tolerating therapy. Continue PT/OT  VTE Prophylaxis:   apixaban tablet 5 mg BID  Code Status: FULL CODE   Discharge:Lives alone in [place] in a single-story home with a threshold to enter the residence. Has a Masters in Guidance/Counseling. No  history. Pt. Is a retired Guidance Counselor.  Pt.is . She was completely independent, living alone. She will have family available to assist her if needed after discharge from rehab. Children:(4/3 living). Date 11/3 Thursday.                  Michelle Winter NP, conducted additional independent physical examination and assisted with medical documentation.

## 2022-11-02 NOTE — PT/OT/SLP PROGRESS
"Occupational Therapy Inpatient Rehab Treatment    Name: Jessica Elias  MRN: [id number]    Assessment:  Jessica Elias is a 82 y.o. female admitted with a medical diagnosis of S/P CABG x 4.  She presents with the following impairments/functional limitations:  weakness, impaired endurance, impaired self care skills, impaired functional mobility, decreased upper extremity function .    General Precautions: Standard, fall, sternal     Orthopedic Precautions:N/A     Braces: N/A    Rehab Prognosis: Good; patient would benefit from acute skilled OT services to address these deficits and reach maximum level of function.      History:     Past Medical History:   Diagnosis Date    Chronic kidney disease, unspecified     stage 3    Coronary artery disease     History of carotid artery disease     Hyperlipidemia     Hypertension     Sleep apnea        Past Surgical History:   Procedure Laterality Date    APPENDECTOMY N/A     BLADDER SURGERY N/A     CAROTID ENDARTERECTOMY N/A     CATARACT EXTRACTION N/A     CORONARY ARTERY BYPASS GRAFT (CABG) N/A 10/11/2022    Procedure: CORONARY ARTERY BYPASS GRAFT (CABG);  Surgeon: Bety Thompson MD;  Location: [facility];  Service: Cardiothoracic;  Laterality: N/A;    HYSTERECTOMY N/A     INSERTION OF PERMANENT PACEMAKER N/A     LEFT HEART CATHETERIZATION Left 09/23/2022    Procedure: CATHETERIZATION, HEART, LEFT;  Surgeon: Abner Mitchell MD;  Location: [facility] CATH LAB;  Service: Cardiology;  Laterality: Left;  LHC VIA RRA    lump removed from right shoulder         Subjective     Orientation: Oriented x4    Chief Complaint: no new complaints    Patient/Family Comments/goals: "to go home tomorrow"    Respiratory Status: Room air    Patients cultural, spiritual, Gnosticism conflicts given the current situation: no       Objective:     Patient found right sidelying with peripheral IV  upon OT entry to room.    Mobility   Patient completed:  Supine to Sit with independence  Sit to Supine " with independence  Sit to Stand Transfer with independence with rolling walker  Stand to Sit Transfer with independence with rolling walker  Toilet Transfer Stand Pivot technique with independence with  rolling walker  Tub Transfer Stand Pivot technique with independence with rolling walker  Shower Transfer Stand Pivot technique with independence with rolling walker    Functional Mobility  In room and hallway mobility with RW independently.    ADLs   Current Status   Eating 6   Oral Hygiene 6   Shower, Bathe Self 6   Upper Body Dressing 6   Lower Body Dressing 6   Toileting Hygiene 6   Toilet Transfer 6   Putting On, Taking Off Footwear 6     Limiting Factors for ADLs: endurance     IADLs: Pt participated in cooking activity of making burger sliders in OT kitchen.  Pt able to manage walker properly along with walker accessories within kitchen using good safety precautions. Tolerated standing at sink for 5-8mins while washing dishes after activity.    Patient left right sidelying with all lines intact and call button in reach.     Education provided: Roles and goals of OT, ADLs, transfer training, bed mobility, body mechanics, assistive device, sequencing, safety precautions, fall prevention, equipment recommendations, and home safety    Multidisciplinary Problems       Occupational Therapy Goals          Problem: Occupational Therapy    Goal Priority Disciplines Outcome Interventions   Occupational Therapy Goal     OT, PT/OT Ongoing, Progressing    Description: ADLs:  Pt to perform UB dressing with set up MET  Pt to perform LB dressing with set up MET  Pt to perform putting on/off footwear task with set up MET  Pt to perform toileting with independent using sternal precautions MET    Functional Transfers:  Pt to perform toilet transfers with set up. MET  Pt to perform a tub transfer with set up MET  Pt to perform a walk-in shower transfer with set up  MET    IADLs:  Pt to perform simple meal with SBA and RW.  MET    Balance, Strengthening, Endurance, Balance:  Pt to consistently demonstrate adherence to sternal precautions during all ADL's as instructed by OT. MET  Pt to demonstrate consistent adherence to breathing control and energy conservation techniques as educated by OT. MET                       Time Tracking     OT Received On: 11/02/22  Time In 0800     Time Out 0930  Total Time 90 min  Therapy Time: OT Individual: 90  Missed Time:    Missed Time Reason:      Billable Minutes: Self Care/Home Management 75 and Therapeutic Activity 15    11/02/2022

## 2022-11-02 NOTE — PT/OT/SLP PROGRESS
Physical Therapy Inpatient Rehab Treatment    Patient Name:  Jessica Elias   MRN:  [id number]    Recommendations:     Discharge Recommendations:      Discharge Equipment Recommendations:     Barriers to discharge: None    Assessment:     Jessica Elias is a 82 y.o. female admitted with a medical diagnosis of S/P CABG x 4.  She presents with the following impairments/functional limitations:  weakness, impaired endurance, impaired functional mobility, gait instability, pain, edema.    Rehab Diagnosis: s/p CABG x 4; Pacemaker; Sacral Ulcer    Recent Surgery: * No surgery found *      General Precautions: Standard, sternal     Orthopedic Precautions:N/A     Braces: N/A    Rehab Prognosis: Good; patient would benefit from acute skilled PT services to address these deficits and reach maximum level of function.      History:     Past Medical History:   Diagnosis Date    Chronic kidney disease, unspecified     stage 3    Coronary artery disease     History of carotid artery disease     Hyperlipidemia     Hypertension     Sleep apnea        Past Surgical History:   Procedure Laterality Date    APPENDECTOMY N/A     BLADDER SURGERY N/A     CAROTID ENDARTERECTOMY N/A     CATARACT EXTRACTION N/A     CORONARY ARTERY BYPASS GRAFT (CABG) N/A 10/11/2022    Procedure: CORONARY ARTERY BYPASS GRAFT (CABG);  Surgeon: Bety Thompson MD;  Location: [facility] OR;  Service: Cardiothoracic;  Laterality: N/A;    HYSTERECTOMY N/A     INSERTION OF PERMANENT PACEMAKER N/A     LEFT HEART CATHETERIZATION Left 09/23/2022    Procedure: CATHETERIZATION, HEART, LEFT;  Surgeon: Abner Mitchell MD;  Location: [facility] CATH LAB;  Service: Cardiology;  Laterality: Left;  LHC VIA RRA    lump removed from right shoulder         Subjective     Chief Complaint: N/A    Respiratory Status: Room air    Patients cultural, spiritual, Mandaen conflicts given the current situation: no      Objective:     Communicated with RN prior to session.  Patient found supine  with peripheral IV  upon PT entry to room.    Pt is Oriented x3 and Alert, Cooperative, and Motivated.    Vitals     Functional Mobility:   Smith Score: 50 improved from 32 on initial smith balance score     Current   Status   Discharge   Goal   Functional Area: Care Score:     Roll Left and Right    Independent   Sit to Lying 6 HOB elevated to simulate wedge that will be used at home for d/c Independent   Lying to Sitting on Side of Bed 6 HOB elevated to simulated wedge that will be used at home for d/c Set-up/clean-up   Sit to Stand 6 With RW Independent   Chair/Bed-to-Chair Transfer 6 Stand step t/f with RW Independent   Car Transfer    Independent   Walk 10 Feet 6  Independent   Walk 50 Feet with Two Turns 6  Independent   Walk 150 Feet 6 ~150 ft with RW using step through gait pattern Independent   Walk 10 Feet Uneven Surface    Independent   1 Step (Curb)    Independent   4 Steps    Independent   12 Steps    Independent   Picking Up Object    Independent   Wheel 50 Feet with Two Turns    Not applicable   Wheel 150 Feet    Not applicable       Therapeutic Activities and Exercises:  Seated TherX: seated in w/c with BUE support; 3 x 15 4# BLE   Knee extension   Hip flexion     Activity Tolerance: Excellent    Patient left supine with all lines intact and call button in reach.    Education provided: roles and goals of PT/PTA, transfer training, gait training, balance training, safety awareness, body mechanics, assistive device, strengthening exercises, fall prevention, and sternal precautions.    Expected compliance: High compliance    GOALS:   Multidisciplinary Problems       Physical Therapy Goals          Problem: Physical Therapy    Goal Priority Disciplines Outcome Goal Variances Interventions   Physical Therapy Goal     PT, PT/OT Ongoing, Progressing     Description: Bed Mobility:  Roll left and right with setup/clean-up assist. (MET)  Sit to supine transfer with setup/clean-up assist. (MET)  Supine to sit  transfer with supervision/touching assist. (MET)  Supine to sit transfer with setup/clean-up assist (MET)    Transfers:  Sit to stand transfer with setup/clean-up assist using LRAD. (MET)  Bed to chair transfer with setup/clean-up assist using LRAD. (MET)  Car transfer with setup/clean-up assist using LRAD. (MET)   an object from the ground in standing position with setup/clean-up assist using LRAD. (MET)    Mobility:  Ambulate 360 feet with supervision/touching assist using LRAD. (MET)  Ambulate 10 feet on uneven surfaces/ramps with setup/clean-up assist using LRAD. (MET)  Ascend/descend a 4 inch curb with setup/clean-up assist using LRAD. (MET)  Ascend/descend 12 stairs with supervision/touching assist using bilateral handrails. (MET)  Ascend/descend 12 stairs independently using bilateral handrails (MET)   Ambulate 1000 feet independently using RW                         Plan:     During this hospitalization, patient to be seen 5 x/week to address the identified rehab impairments via gait training, therapeutic activities, therapeutic exercises, neuromuscular re-education and progress toward the following goals:    Plan of Care Expires:  11/07/22  PT Next Visit Date: 11/07/22  Plan of Care reviewed with: patient    Additional Information:         Time Tracking:     Therapy Time  PT Received On: 11/02/22  PT Start Time: 1400  PT Stop Time: 1430  PT Total Time (min): 30 min   PT Individual: 30  Missed Time:    Time Missed due to:      Billable Minutes: Therapeutic Activity 30    11/02/2022

## 2022-11-02 NOTE — PT/OT/SLP DISCHARGE
Recreational Therapy Discharge      Date of Treatment: 11/02/22  Start Time: 1130  Stop Time: 1200  Total Time: 30 min  Missed Time:     Assessment      Jessica Elias is a 82 y.o. female admitted with a medical diagnosis of S/P CABG x 4.  She presents with the following impairments/functional limitations:  weakness, impaired endurance .    Rehab Diagnosis:     Recent Surgery: * No surgery found *      General Precautions: Standard, fall, sternal     Orthopedic Precautions:N/A     Braces: N/A    Rehab Prognosis: Good; patient would benefit from acute skilled Recreational Therapy services to address these deficits and reach maximum level of function.      Impairments: Endurance deficits and Strength deficits  Rehab Potential: Good  Treatment Recommendations: Complete discharge plan  Treatment Diagnosis: CABG x4, 3rd spacing, volume overload, a-fib, CKD, SSS, pacemaker, HTN, MP, CEA  Orientation: Oriented x4  Affect/Behavior: Appropriate and Cooperative  Safety/Judgement: intact   Basic Command Following: intact  Spiritual Cultural: no        History     Past Medical History:   Diagnosis Date    Chronic kidney disease, unspecified     stage 3    Coronary artery disease     History of carotid artery disease     Hyperlipidemia     Hypertension     Sleep apnea        Past Surgical History:   Procedure Laterality Date    APPENDECTOMY N/A     BLADDER SURGERY N/A     CAROTID ENDARTERECTOMY N/A     CATARACT EXTRACTION N/A     CORONARY ARTERY BYPASS GRAFT (CABG) N/A 10/11/2022    Procedure: CORONARY ARTERY BYPASS GRAFT (CABG);  Surgeon: Bety Thompson MD;  Location: [facility] OR;  Service: Cardiothoracic;  Laterality: N/A;    HYSTERECTOMY N/A     INSERTION OF PERMANENT PACEMAKER N/A     LEFT HEART CATHETERIZATION Left 09/23/2022    Procedure: CATHETERIZATION, HEART, LEFT;  Surgeon: Abner Mitchell MD;  Location: [facility] CATH LAB;  Service: Cardiology;  Laterality: Left;  LHC VIA RRA    lump removed from right shoulder    "      Home Environment     Admit Date: 10/25/22  Living Situation  Lives With: alone  Lives in: house  Patients Responsibilities: , Financial management, Health and wellness, Laundry, Leisure/play/hobbies, Meal preparation, Retired, Shopping, Social participation  Number of Children: 3  Occupation:Retired: Guidance Counselor    Instrumental Activities of Daily Living     Previous Hand Dominance: Right Current Hand Dominance: Right     Other iADL Information:        Cognitive Skills Building         Cognitive Observation Activity Assist Position Equipment Response            Comment:      Dynamic Activities      Activity Assist Position Equipment Response   Activity 1 Bocce ball independence and modified independence Standing Rolling walker and Bocce balls good   Comment: Sit to stand was setup/I as was dynamic standing balance/reaching. Standing tolerance was 5 minutes at a time. UE coordination and sequencing skills were I. Playfully competitive with staff       Fine Motor Activities      Activity Assist Position Equipment Response           Comment:        Goals     Patient Goals  Patient Goal 1: "To be self sufficient"    Short Term Goals    Goal  Goal Status   Will increase activity tolerance to supervision Met   Will improve dynamic standing balance/reaching to setup Met                 Long Term Goals    Goal Goal Status   Will increase standing tolerance to 5,10 minutes Met   Will improve dynamic standing balance/reaching shila I Met                     Plan       Patient to be seen: Daily  Duration: Other (Comment) (1 day)  Treatments planned: Energy conservation training  Treatment plan/goals established with Patient/Caregiver: Yes     "

## 2022-11-02 NOTE — PLAN OF CARE
PHQ-2 completed.  Score=0 (negative).    Discussed with pt her planned discharge tomorrow.    She will have her son staying with her through Sunday.  She plans to look for a new chair (possibly electric/lift) this weekend.  She ordered a wedge to use in her bed.  Discussed my ordering of RW and delivery planned for tomorrow.  Confirmed pharmacy in system for tomorrow.  Pt indicated that her PCP, Tarik Gee, only prescribes her Zoloft and that her cardiologist orders all of her other meds, so she does not want an appt with Tarik Gee.  Relayed to unit secretary.

## 2022-11-02 NOTE — PHYSICIAN QUERY
PT Name: Jessica Elias  MR #: [id number]     DOCUMENTATION CLARIFICATION     CDS/: JAYASHREE Shah, RN, CCDS               Contact information: [email or web address]  This form is a permanent document in the medical record.    Query Date: November 2, 2022    By submitting this query, we are merely seeking further clarification of documentation.  Please utilize your independent clinical judgment when addressing the question(s) below.    The Medical Record contains the following:   Indicator Supporting Clinical Findings Location in Medical Record    Kidney (Renal) Insufficiency      Kidney (Renal) Failure/Injury      Nephrotoxic Agents     X BUN/Creatinine           GFR Cr: 0.79, 0.91, 1.40 Lab: 10/11, 10/16, 10/20    Urine: Casts         Eosinophils      Dehydration      Nausea/Vomiting      Dialysis/CRRT     X Treatment check UA, FENA, o/w continue supportive care for now and follow closely     has been doing well postoperatively but yesterday urine output was low and started on IV fluids   Nephro consult: Dr. Smalls 10/12        Nephro PN: Dr. Ibrahim 10/21   X Other Oliguria - prerenal azotemia due to perioperative 3rd spacing, cardiorenal +/- ATN     h/o CKD 3 (Dr Ibrahim, baseline creatinine 1.1-1.3) Nephro consult: Dr. Smalls 10/12       [facility] approved diagnostic criteria for acute kidney injury is based on KDIGO criteria:    An increase in serum creatinine > 0.3mg/dl within 48 hours  OR  Increase in serum creatinine to > 1.5x baseline, which is known or presumed to have occurred within the prior 7 days  OR  Urine volume <0.5 ml/kg/hr for 6 hours       The clinical guidelines noted above are only a system guideline. It does not replace the providers clinical judgment.     Provider, please specify the diagnosis or diagnoses associated with above clinical findings.     [    ] Unspecified Acute Kidney Failure/Injury     [    ] Other Acute Kidney Failure/Injury  (please specify): ____________     [    ] Acute Renal Insufficiency  - Consider if SCr rise is transient and normalizes quickly with no efforts at real resuscitation of vital signs and perfusion   [    ] Other (please specify): _______________________________   [  ] Clinically Undetermined     Please document in your progress notes daily for the duration of treatment until resolved and include in your discharge summary.    References:   [employer] Clinical Practice Guideline for Acute Kidney Injury. (2012, March). Retrieved October 21, 2020, from https://kdigo.org/wp-content/uploads/2016/10/BAJJY-2732-ARU-Guideline-English.pdf    KUMAR Villa MD, THA Aguilar MD, & KACY Mosquera MD. (1960). Renal medullary necrosis [Abstract]. The American Journal of Medicine, 29(1), 132-156. Doi:https://www.sciencedirect.com/science/article/abs/pii/3268743160222821    KACY Meyer MD, & LUCIEN Mcclain MD, MS. (2020, June 18). Definition and staging of chronic kidney disease in adults ([id number] THA Kumar MD, ScD & [id number] HALEIGH Schaffer MD, MSc, Eds.). Retrieved October 21, 2020, from https://www.[employer].Rel[employer]Rides/contents/definition-and-staging-of-chronic-kidney-disease-in-adults?search=ckd%20staging&source=search_result&selectedTitle=1~150&usage_type=default&display_rank=1     NANCY Casey MD, FACP. (2015, Mare 15). Acute kidney injury revisited. Retrieved October 21, 2020, from https://acphospitalist.org/archives/2015/06/coding-acute-kidney-injury.htm    HANS Lopez MD. (2019, July). Renal Cortical Necrosis. Retrieved October 21, 2020, from https://www.FRUCT/professional/genitourinary-disorders/renovascular-disorders/renal-cortical-necrosis    Form No. 36775

## 2022-11-02 NOTE — PROGRESS NOTES
[facility] ([facility])  Rehab Progress Note    Patient Name: Jessica Elias  MRN: [id number]  Age: 82 y.o. Sex: female  : 1940  Hospital Length of Stay: 8 days  Date of Service: 2022   Chief Complaint: CAD s/p CABG x4 (LIMA to LAD, SVG to Diag 1, SVG to OM, and SVG to RCA), Ligation of left atrial appendage on 10/11/2022    Subjective:     Basic Information  Admit Information: 82-year-old white female presented to [facility] on 10/11/2022 for scheduled CABG x4 2/2 multivessel CAD.  PMH significant for multi vessel CAD, paroxysmal atrial fibrillation, carotid artery stenosis, MP, recurrent bradycardia, HLD, and HTN.  Tolerated CABG x4 (LIMA to LAD, SVG to Diag 1, SVG to OM, and SVG to RCA), Ligation of left atrial appendage on 10/11 without perioperative complications.  Diuresed postoperatively. Chest tubes discontinued on 10/15.  Atrial fib RVR reported on 10/15.  Amiodarone drip initiated.  Eliquis 5 mg b.i.d. resumed on 10/16.  Cardiology recommended not continuing amiodarone.  Metoprolol discontinued on 10/19 and Coreg 12.5 mg b.i.d. initiated.  Tolerated transfer to [facility] inpatient rehab unit on 10/25 without incident.  Today's Information: No acute events overnight.  Sitting up in bed.  Reports good sleep and appetite.  Last BM 10/31.  Vital signs at goal with no recorded fevers.  No labs or imaging today.  Patient reports she is currently at her dry weight.     Review of patient's allergies indicates:   Allergen Reactions    Penicillin     Sulfa (sulfonamide antibiotics)         Current Facility-Administered Medications:     acetaminophen tablet 650 mg, 650 mg, Oral, Q4H PRN, Toño A Veda, FNP    ALPRAZolam tablet 0.25 mg, 0.25 mg, Oral, TID PRN, Toño A Veda, FNP    amLODIPine tablet 10 mg, 10 mg, Oral, Daily, Toño A Veda, FNP, 10 mg at 22 0809    apixaban tablet 5 mg, 5 mg, Oral, BID, Toño A Veda, FNP, 5 mg at 22 8469     aspirin EC tablet 81 mg, 81 mg, Oral, Daily, Toño JERNIGAN Veda, FNP, 81 mg at 11/01/22 0809    atorvastatin tablet 40 mg, 40 mg, Oral, Daily, Toño A Veda, FNP, 40 mg at 11/01/22 0809    benzonatate capsule 100 mg, 100 mg, Oral, TID PRN, Toño Lundehart, FNP    bisacodyL suppository 10 mg, 10 mg, Rectal, Daily PRN, Toño Lundehart, FNP    carvediloL tablet 12.5 mg, 12.5 mg, Oral, BID, Toño A Veda, FNP, 12.5 mg at 11/01/22 2115    cetirizine tablet 10 mg, 10 mg, Oral, QHS, Michelle Crumpte, FNP, 10 mg at 11/01/22 2115    famotidine tablet 20 mg, 20 mg, Oral, Daily, Toño A Veda, FNP, 20 mg at 11/01/22 0809    folic acid tablet 1 mg, 1 mg, Oral, Daily, Toño A Veda, FNP, 1 mg at 11/01/22 0809    gabapentin capsule 300 mg, 300 mg, Oral, QHS, Toño A Veda, FNP, 300 mg at 11/01/22 2115    hydrALAZINE injection 10 mg, 10 mg, Intravenous, Q4H PRN, Toño A Veda, FNP    HYDROcodone-acetaminophen 5-325 mg per tablet 1 tablet, 1 tablet, Oral, Q4H PRN, Toño Lundehart, FNP    labetalol 20 mg/4 mL (5 mg/mL) IV syring, 10 mg, Intravenous, Q4H PRN, Toño A Veda, FNP    methocarbamoL tablet 500 mg, 500 mg, Oral, TID PRN, Michelle Crumpte, FNP    metoprolol injection 10 mg, 10 mg, Intravenous, Q2H PRN, Toño A Veda, FNP    nitroGLYCERIN SL tablet 0.4 mg, 0.4 mg, Sublingual, Q5 Min PRN, Toño Lundehart, FNP    ondansetron disintegrating tablet 4 mg, 4 mg, Oral, Q6H PRN, Toño A Veda, FNP    polyethylene glycol packet 17 g, 17 g, Oral, BID PRN, Toño JERNIGAN Veda, FNP    promethazine tablet 25 mg, 25 mg, Oral, Q6H PRN, Toño Kendallart, FNP    sertraline tablet 50 mg, 50 mg, Oral, Daily, Toño Kendallart, FNP, 50 mg at 11/01/22 0809    sucralfate tablet 1 g, 1 g, Oral, QID (AC & HS), Toño Kendallart, FNP, 1 g at 11/01/22 2115    valsartan tablet 160 mg, 160 mg, Oral, Daily, Toño JERNIGAN Veda, FNP, 160 mg at 11/01/22  "0809    Facility-Administered Medications Ordered in Other Encounters:     0.9%  NaCl infusion, , Intravenous, Once, Abner Mitchell MD    diazePAM tablet 10 mg, 10 mg, Oral, On Call Procedure, Abner Mitchell MD, 10 mg at 09/23/22 0823    diphenhydrAMINE capsule 50 mg, 50 mg, Oral, On Call Procedure, Abner Mitchell MD, 50 mg at 09/23/22 0823    sodium chloride 0.9% flush 10 mL, 10 mL, Intravenous, PRN, Abner Mitchell MD     Review of Systems   Complete 12-point review of symptoms negative except for what's mentioned in HPI     Objective:     BP (!) 161/84   Pulse 69   Temp 97.7 °F (36.5 °C) (Oral)   Resp 20   Ht 5' 4.5" (1.638 m)   Wt 63.3 kg (139 lb 8.8 oz)   SpO2 97%   BMI 23.58 kg/m²        Intake/Output Summary (Last 24 hours) at 11/2/2022 0443  Last data filed at 11/1/2022 1739  Gross per 24 hour   Intake 720 ml   Output --   Net 720 ml       Physical Exam  Constitutional:       Appearance: Normal appearance.   HENT:      Head: Normocephalic.      Mouth/Throat:      Mouth: Mucous membranes are moist.   Eyes:      Pupils: Pupils are equal, round, and reactive to light.   Cardiovascular:      Rate and Rhythm: Normal rate and regular rhythm.      Heart sounds: Normal heart sounds.      Comments: Sternal incision  Pulmonary:      Effort: Pulmonary effort is normal.      Breath sounds: Normal breath sounds.   Abdominal:      General: Bowel sounds are normal.      Palpations: Abdomen is soft.   Musculoskeletal:      Cervical back: Neck supple.      Comments: Generalized weakness and muscle atrophy   Skin:     General: Skin is warm and dry.   Neurological:      General: No focal deficit present.      Mental Status: She is alert and oriented to person, place, and time.   Psychiatric:         Mood and Affect: Mood normal.         Behavior: Behavior normal.         Thought Content: Thought content normal.         Judgment: Judgment normal.   *MD performed and documented physical examination   "     Lines/Drains/Airways       Peripheral Intravenous Line  Duration                  Peripheral IV - Single Lumen 10/20/22 1500 20 G Anterior;Left Forearm 12 days                  Labs  No results found for this or any previous visit (from the past 24 hour(s)).      Radiology  CXR two view on 10/16/2022 at 9:11 a.m., IMPRESSION: Interval removal of support catheters with evidence of a small left apical pneumothorax. Bilateral pleural effusions. No other focal consolidative changes.   Radiology  Transthoracic echo on 09/23/2022: The ejection fraction was calculated to be 55%.  The left ventricular systolic function was normal.  The left ventricular end diastolic pressure was elevated. The pre-procedure left ventricular end diastolic pressure was 23. The estimated blood loss was none. There was three vessel coronary artery disease. There was no mitral valve regurgitation. There was no aortic valve stenosis.  Diagnostic study  [facility] on 09/23/2022-left main: Highly calcified without obstructive lesions.  Lad: Heavily calcified 70-80% stenosis proximally (long lesion) in 70% distal at the bifurcation with the 2nd diagonal branch.  The 1st diagonal branch shows disease of 90%.  Left circumflex:  Calcified, 99% stenosis proximally.  RCA:  Highly calcified, 99% stenosis of the ostium followed by 90% stenosis proximally.    Assessment/Plan:     82 y.o. WF admitted on 10/25/2022    CAD (multivessel)  - s/p CABG x4 (LIMA to LAD, SVG to Diag 1, SVG to OM, and SVG to RCA), Ligation of left atrial appendage on 10/11/2022  - denies recent chest pain or discomfort  - continue                Coreg 12.5 mg b.i.d.                       Valsartan 100 mg daily                 Lipitor 40 mg daily                 Aspirin 81 mg daily   Carafate 1 g q.i.d.  Norco 5 mg/325 mg q.4 hours p.r.n.   - ECG and Nitro PRN  - not on Plavix  - continue cardiac diet  - to follow-up with cardiology outpatient     Debility   - 2/2 above  - defer to  physiatry for rehab and pain management  - PT/OT/RT/ST following     Carotid artery stenosis   - s/p CEA  - continue                Lipitor 40 mg daily                 Aspirin 81 mg daily                 Eliquis 5 mg b.i.d.  - to follow-up with vascular surgery outpatient     Sick sinus syndrome   - s/p PPM  - continue                Coreg 12.5 mg b.i.d.   - to follow-up with cardiology outpatient     HTN  - BP at goal!!  - discontinued                HCTZ 25 mg daily on 10/25                Prazosin 1 mg daily on 10/25  - continue                Coreg 12.5 mg b.i.d.                       Valsartan 100 mg daily                 Norvasc 10 mg daily (increased 10/27)                Hydralazine 10 mg every 2 hours as needed for BP > 160/90                Labetalol 10 mg every 2 hours as needed for BP > 160/90     Paroxysmal atrial fibrillation   - rate control   - no evidence of bleeding  - continue                Eliquis 5 mg b.i.d.                Coreg 12.5 mg b.i.d.    - to follow-up with cardiology outpatient     GERD  - Avoid spicy foods, and nothing to eat or drink within x2 hours of bedtime or laying flat (water is ok)   - Avoid NSAIDs (Advil, ibuprofen, naproxen...) and brown-2 inhibitors (Mobic, Celebrex)    - continue                Pepcid 40 mg daily  Colace 100 mg b.i.d.     Anxiety    - stable  - continue  Zoloft 50 mg daily     HLD  - FLP at goal!!  - continue                Lipitor 40 mg daily      Normocytic anemia  - asymptomatic  - H/H stable   - continue                Folic acid 1 mg daily   - no evidence of active bleeds  - will closely monitor and transfuse if needed     MP   - Compliant with CPAP at home     Chronic stage III sacral ulcer  - current   - Wound Care following  - currently with home border dressing  - change acute will nutrition     VTE Prophylaxis:  Eliquis 5 mg b.i.d.  COVID-19 testing:  Negative on 10/25/2002  COVID-19 vaccination status:  Vaccinated (Moderna) 01/20/2021 and  02/17/2021     POA: No  Living will: No  Contacts: Carrillo Elias (daughter) [phone]-lives in [place]                      Kiara Elias (niece) [phone]     CODE STATUS: Full Code  Internal Medicine (attending): Demetri Heredia MD  Physiatry (consulting):  Tonio Herzog MD     OUTPATIENT PROVIDERS  PCP: Tarik Gee MD  Cardiology:  Peterson Sánchez MD  CV surgery:  Earl Thompson MD  Nephrology: Dhaval Ibrahim MD     DISPOSITION: Condition stable.  Sleep hygiene, bowel maintenance, and appetite at goal.  Vital signs at goal with no recorded fevers.  No labs or imaging today.  Need daily standing weights.  Currently at her dry weight.  Continue aggressive mobilization as tolerated.  Monitor closely.  Notify of acute changes.    Staffing 10/31/2022: Incisions are healing well.  Stress incontinence.  Continent of bowel.  RT: Overall min to set up.  Limited by activity tolerance.  Appetite is good. PT: Ambulate 750 feet.  Overall set up to CGA.  Should be ready for discharge towards the end of the week. OT: Overall set up to independent with ADLs.  Projected discharge 11/3 home with .      Christian Mccollum NP conducted independent physical examination and assisted with medical documentation.    Total time spent on this encounter including chart review and direct MD + NP 1-on-1 patient interaction: 38 minutes   Over 50% of this time was spent in counseling and coordination of care

## 2022-11-02 NOTE — PLAN OF CARE
Problem: Skin Injury Risk Increased  Goal: Skin Health and Integrity  Outcome: Ongoing, Progressing  Intervention: Optimize Skin Protection  Flowsheets (Taken 11/1/2022 2042)  Pressure Reduction Techniques:   positioned off wounds   pressure points protected   frequent weight shift encouraged  Head of Bed (HOB) Positioning: HOB at 30-45 degrees     Problem: Fall Injury Risk  Goal: Absence of Fall and Fall-Related Injury  Outcome: Ongoing, Progressing  Intervention: Promote Injury-Free Environment  Flowsheets (Taken 11/1/2022 2042)  Safety Promotion/Fall Prevention:   assistive device/personal item within reach   gait belt with ambulation   medications reviewed   nonskid shoes/socks when out of bed   side rails raised x 3      Additional Complaints

## 2022-11-02 NOTE — PLAN OF CARE
Problem: Occupational Therapy  Goal: Occupational Therapy Goal  Description: ADLs:  Pt to perform UB dressing with set up MET  Pt to perform LB dressing with set up MET  Pt to perform putting on/off footwear task with set up MET  Pt to perform toileting with independent using sternal precautions MET    Functional Transfers:  Pt to perform toilet transfers with set up. MET  Pt to perform a tub transfer with set up MET  Pt to perform a walk-in shower transfer with set up  MET    IADLs:  Pt to perform simple meal with SBA and RW. MET    Balance, Strengthening, Endurance, Balance:  Pt to consistently demonstrate adherence to sternal precautions during all ADL's as instructed by OT. MET  Pt to demonstrate consistent adherence to breathing control and energy conservation techniques as educated by OT. MET  Outcome: Ongoing, Progressing

## 2022-11-03 VITALS
TEMPERATURE: 98 F | BODY MASS INDEX: 23.21 KG/M2 | HEART RATE: 72 BPM | DIASTOLIC BLOOD PRESSURE: 75 MMHG | OXYGEN SATURATION: 96 % | HEIGHT: 65 IN | SYSTOLIC BLOOD PRESSURE: 160 MMHG | RESPIRATION RATE: 20 BRPM | WEIGHT: 139.31 LBS

## 2022-11-03 PROCEDURE — 94799 UNLISTED PULMONARY SVC/PX: CPT

## 2022-11-03 PROCEDURE — 97535 SELF CARE MNGMENT TRAINING: CPT

## 2022-11-03 PROCEDURE — 25000003 PHARM REV CODE 250: Performed by: NURSE PRACTITIONER

## 2022-11-03 RX ORDER — SERTRALINE HYDROCHLORIDE 50 MG/1
50 TABLET, FILM COATED ORAL DAILY
Qty: 90 TABLET | Refills: 0 | Status: SHIPPED | OUTPATIENT
Start: 2022-11-03 | End: 2023-01-27 | Stop reason: SDUPTHER

## 2022-11-03 RX ORDER — APIXABAN 5 MG/1
5 TABLET, FILM COATED ORAL 2 TIMES DAILY
Qty: 180 TABLET | Refills: 0 | Status: SHIPPED | OUTPATIENT
Start: 2022-11-03 | End: 2023-01-30 | Stop reason: HOSPADM

## 2022-11-03 RX ORDER — GABAPENTIN 300 MG/1
300 CAPSULE ORAL NIGHTLY
Qty: 14 CAPSULE | Refills: 0 | Status: SHIPPED | OUTPATIENT
Start: 2022-11-03 | End: 2023-01-27

## 2022-11-03 RX ORDER — ASPIRIN 81 MG/1
81 TABLET ORAL DAILY
Qty: 90 TABLET | Refills: 0 | Status: SHIPPED | OUTPATIENT
Start: 2022-11-03 | End: 2023-01-30 | Stop reason: HOSPADM

## 2022-11-03 RX ORDER — AMLODIPINE BESYLATE 10 MG/1
10 TABLET ORAL DAILY
Qty: 30 TABLET | Refills: 0 | Status: SHIPPED | OUTPATIENT
Start: 2022-11-03 | End: 2023-01-27

## 2022-11-03 RX ORDER — FOLIC ACID 1 MG/1
1 TABLET ORAL DAILY
Qty: 30 TABLET | Refills: 0 | Status: SHIPPED | OUTPATIENT
Start: 2022-11-03 | End: 2024-04-25

## 2022-11-03 RX ORDER — ROSUVASTATIN CALCIUM 20 MG/1
20 TABLET, COATED ORAL NIGHTLY
Qty: 90 TABLET | Refills: 0 | Status: SHIPPED | OUTPATIENT
Start: 2022-11-03 | End: 2024-04-25

## 2022-11-03 RX ORDER — HYDROCODONE BITARTRATE AND ACETAMINOPHEN 5; 325 MG/1; MG/1
1 TABLET ORAL EVERY 6 HOURS PRN
Qty: 12 TABLET | Refills: 0 | Status: SHIPPED | OUTPATIENT
Start: 2022-11-03 | End: 2022-11-06

## 2022-11-03 RX ORDER — VALSARTAN 160 MG/1
160 TABLET ORAL DAILY
Qty: 90 TABLET | Refills: 0 | Status: SHIPPED | OUTPATIENT
Start: 2022-11-03 | End: 2023-08-25

## 2022-11-03 RX ORDER — FAMOTIDINE 20 MG/1
20 TABLET, FILM COATED ORAL DAILY
Qty: 30 TABLET | Refills: 0 | Status: SHIPPED | OUTPATIENT
Start: 2022-11-03 | End: 2023-01-27

## 2022-11-03 RX ORDER — CARVEDILOL 12.5 MG/1
12.5 TABLET ORAL 2 TIMES DAILY
Qty: 180 TABLET | Refills: 0 | Status: SHIPPED | OUTPATIENT
Start: 2022-11-03 | End: 2023-02-01

## 2022-11-03 RX ADMIN — FOLIC ACID 1 MG: 1 TABLET ORAL at 09:11

## 2022-11-03 RX ADMIN — VALSARTAN 160 MG: 160 TABLET, FILM COATED ORAL at 09:11

## 2022-11-03 RX ADMIN — APIXABAN 5 MG: 5 TABLET, FILM COATED ORAL at 09:11

## 2022-11-03 RX ADMIN — AMLODIPINE BESYLATE 10 MG: 5 TABLET ORAL at 09:11

## 2022-11-03 RX ADMIN — ATORVASTATIN CALCIUM 40 MG: 40 TABLET, FILM COATED ORAL at 09:11

## 2022-11-03 RX ADMIN — CARVEDILOL 12.5 MG: 6.25 TABLET, FILM COATED ORAL at 09:11

## 2022-11-03 RX ADMIN — FAMOTIDINE 20 MG: 20 TABLET ORAL at 09:11

## 2022-11-03 RX ADMIN — ASPIRIN 81 MG: 81 TABLET, COATED ORAL at 09:11

## 2022-11-03 RX ADMIN — SERTRALINE HYDROCHLORIDE 50 MG: 50 TABLET ORAL at 09:11

## 2022-11-03 NOTE — PT/OT/SLP DISCHARGE
Physical Therapy Discharge Summary    Name: Jessica Elias  MRN: [id number]   Principal Problem: S/P CABG x 4       Assessment:     Patient has met initial goals. Patient has shown great improvement in overall endurance and strength. Patients' granddaughter and great grandson were in attendance for family training. All questions and concerns answered. Patient was able to recall sternal precautions.     Objective:     GOALS:   Multidisciplinary Problems       Physical Therapy Goals          Problem: Physical Therapy    Goal Priority Disciplines Outcome Goal Variances Interventions   Physical Therapy Goal     PT, PT/OT Ongoing, Progressing     Description: Bed Mobility:  Roll left and right with setup/clean-up assist. (MET)  Sit to supine transfer with setup/clean-up assist. (MET)  Supine to sit transfer with supervision/touching assist. (MET)  Supine to sit transfer with setup/clean-up assist (MET)    Transfers:  Sit to stand transfer with setup/clean-up assist using LRAD. (MET)  Bed to chair transfer with setup/clean-up assist using LRAD. (MET)  Car transfer with setup/clean-up assist using LRAD. (MET)   an object from the ground in standing position with setup/clean-up assist using LRAD. (MET)    Mobility:  Ambulate 360 feet with supervision/touching assist using LRAD. (MET)  Ambulate 10 feet on uneven surfaces/ramps with setup/clean-up assist using LRAD. (MET)  Ascend/descend a 4 inch curb with setup/clean-up assist using LRAD. (MET)  Ascend/descend 12 stairs with supervision/touching assist using bilateral handrails. (MET)  Ascend/descend 12 stairs independently using bilateral handrails (MET)   Ambulate 1000 feet independently using RW (NOT MET)                         Care Scores:   Admission Assessment Current   Status  Discharge   Goal   Functional Area: Care Score:  Care Score:    Roll Left and Right 4 6 Independent   Sit to Lying 4 6 Independent   Lying to Sitting on Side of Bed 3 6 Set-up/clean-up    Sit to Stand 4 6 Independent   Chair/Bed-to-Chair Transfer 4 6 Independent   Car Transfer 4 6 Independent   Walk 10 Feet 4 6 Independent   Walk 50 Feet with Two Turns 4 6 Independent   Walk 150 Feet 4 6 Independent   Walk 10 Feet Uneven Surface 4 6 Independent   1 Step (Curb) 4 6 Independent   4 Steps 4 6 Independent   12 Steps 88 6 Independent   Picking Up Object 4 6 Independent   Wheel 50 Feet with Two Turns 9 9 Not applicable   Wheel 150 Feet 9 9 Not applicable       Reasons for Discontinuation of Therapy Services  Satisfactory goal achievement.      Plan:     Patient Discharged to: Home with Home Health Service.    11/3/2022

## 2022-11-03 NOTE — PT/OT/SLP PROGRESS
"Occupational Therapy Inpatient Rehab Treatment    Name: Jessica Elias  MRN: [id number]    Assessment:  Jessica Elias is a 82 y.o. female admitted with a medical diagnosis of S/P CABG x 4.  She presents with the following impairments/functional limitations:  weakness, impaired endurance, impaired self care skills, impaired functional mobility, decreased upper extremity function .    General Precautions: Standard, fall, sternal     Orthopedic Precautions:N/A     Braces: N/A    Rehab Prognosis: Good; patient would benefit from acute skilled OT services to address these deficits and reach maximum level of function.      History:     Past Medical History:   Diagnosis Date    Chronic kidney disease, unspecified     stage 3    Coronary artery disease     History of carotid artery disease     Hyperlipidemia     Hypertension     Sleep apnea        Past Surgical History:   Procedure Laterality Date    APPENDECTOMY N/A     BLADDER SURGERY N/A     CAROTID ENDARTERECTOMY N/A     CATARACT EXTRACTION N/A     CORONARY ARTERY BYPASS GRAFT (CABG) N/A 10/11/2022    Procedure: CORONARY ARTERY BYPASS GRAFT (CABG);  Surgeon: Bety Thompson MD;  Location: [facility];  Service: Cardiothoracic;  Laterality: N/A;    HYSTERECTOMY N/A     INSERTION OF PERMANENT PACEMAKER N/A     LEFT HEART CATHETERIZATION Left 09/23/2022    Procedure: CATHETERIZATION, HEART, LEFT;  Surgeon: Abner Mitchell MD;  Location: [facility] CATH LAB;  Service: Cardiology;  Laterality: Left;  LHC VIA RRA    lump removed from right shoulder         Subjective     Orientation: Oriented x4    Chief Complaint: no new complaints    Patient/Family Comments/goals: "to go home today"    Respiratory Status: Room air    Patients cultural, spiritual, [employer]alism conflicts given the current situation: no       Objective:     Patient found supine with peripheral IV  upon OT entry to room.    Mobility   Patient completed:  Supine to Sit with independence  Sit to Stand Transfer with " independence with rolling walker  Stand to Sit Transfer with independence with rolling walker  Shower Transfer Step Transfer technique with independence with rolling walker    Functional Mobility  FM room 410 to shower stall in hallway ~230' with RW independently    Additional Treatments: Grand daughter present for family training.  Reviewed sternal precautions and use of get belt then pt performed shower transfer to simulate home set up.  Pt had no issues with transfer.  Answered questions from both pt and family member.    Patient left sitting edge of bed with call button in reach.     Education provided: Roles and goals of OT, ADLs, transfer training, bed mobility, body mechanics, sequencing, safety precautions, fall prevention, post-op precautions, equipment recommendations, and home safety    Multidisciplinary Problems       Occupational Therapy Goals          Problem: Occupational Therapy    Goal Priority Disciplines Outcome Interventions   Occupational Therapy Goal     OT, PT/OT Ongoing, Progressing    Description: ADLs:  Pt to perform UB dressing with set up MET  Pt to perform LB dressing with set up MET  Pt to perform putting on/off footwear task with set up MET  Pt to perform toileting with independent using sternal precautions MET    Functional Transfers:  Pt to perform toilet transfers with set up. MET  Pt to perform a tub transfer with set up MET  Pt to perform a walk-in shower transfer with set up  MET    IADLs:  Pt to perform simple meal with SBA and RW. MET    Balance, Strengthening, Endurance, Balance:  Pt to consistently demonstrate adherence to sternal precautions during all ADL's as instructed by OT. MET  Pt to demonstrate consistent adherence to breathing control and energy conservation techniques as educated by OT. MET                       Time Tracking     OT Received On: 11/03/22  Time In 1000     Time Out 1030  Total Time 30 min  Therapy Time: OT Individual: 30  Missed Time:    Missed Time  Reason:      Billable Minutes: Self Care/Home Management 30    11/03/2022

## 2022-11-03 NOTE — DISCHARGE SUMMARY
[facility] ([facility])  Rehab Discharge Summary    Patient Name: Jessica Elias  MRN: [id number]  Age: 82 y.o. Sex: female  : 1940  Hospital Length of Stay: 9 days   Date of Service: 11/3/2022      Discharge Information   Date of Admission: 10/25/2022  Date of Discharge: 11/3/2022  Admit Diagnosis:  CAD         Debility          Carotid artery stenosis         Sick sinus syndrome          HTN         Paroxysmal atrial fibrillation         GERD         Anxiety         HLD          Normocytic anemia         MP         Chronic stage III sacral ulcer  Discharge Diagnosis: CAD (stable)           Debility (improved)           Carotid artery stenosis (stable)           Sick sinus syndrome (stable)           HTN (stable)           Paroxysmal atrial fibrillation (stable)           GERD (stable)           Anxiety (stable)           HLD  (stable)           Normocytic anemia (stable)           MP (stable)           Chronic stage III sacral ulcer (stable)  COVID-19 testing:  Negative on 10/25/2002  COVID-19 vaccination status:  Vaccinated (Moderna) 2021 and 2021     Internal Medicine (attending): Demetri Heredia MD  Physiatry (consulting):  Tonio Herzog MD     OUTPATIENT PROVIDERS  PCP: Tarik Gee MD  Cardiology:  Peterson Sánchez MD  CV surgery:  Earl Thompson MD  Nephrology: Dhaval Ibrahim MD    Hospital Course   82-year-old white female presented to [facility] on 10/11/2022 for scheduled CABG x4 2/2 multivessel CAD.  PMH significant for multi vessel CAD, paroxysmal atrial fibrillation, carotid artery stenosis, MP, recurrent bradycardia, HLD, and HTN.  Tolerated CABG x4 (LIMA to LAD, SVG to Diag 1, SVG to OM, and SVG to RCA), Ligation of left atrial appendage on 10/11 without perioperative complications.  Diuresed postoperatively. Chest tubes discontinued on 10/15.  Atrial fib RVR reported on 10/15.  Amiodarone drip initiated.  Eliquis 5 mg b.i.d. resumed on 10/16.  Cardiology  "recommended not continuing amiodarone.  Metoprolol discontinued on 10/19 and Coreg 12.5 mg b.i.d. initiated.  Tolerated transfer to [facility] inpatient rehab unit on 10/25 without incident.  During inpatient rehab course BP low on admission.  Normal saline 500 mL bolus x1 given.  Prazosin 1 mg and HCTZ discontinued.  Norvasc decreased to 5 mg daily.  Norvasc increased to 10 mg daily on 10/27.  Bilateral lower extremity swelling resolved.  Continue to participate in progress in therapy.  RT reports overall setup.  PT reports ambulating greater than 700 ft overall setup to contact guard assist.  OT reports overall independent with ADLs.  Vital signs overall at goal with no recorded fevers.  Lab work remained stable.  Patient reported she is at her dry weight of 139 lb.  Med reconciliation completed.  Discharge orders initiated.  Stable for transfer home with home health.  To follow-up with scheduled appointments documented below.    Chief Complaint: CAD s/p CABG x4 (LIMA to LAD, SVG to Diag 1, SVG to OM, and SVG to RCA), Ligation of left atrial appendage on 10/11/2022    BP (!) 160/75   Pulse 72   Temp 98.2 °F (36.8 °C) (Oral)   Resp 20   Ht 5' 4.5" (1.638 m)   Wt 63.2 kg (139 lb 5.3 oz)   SpO2 96%   BMI 23.55 kg/m²      Physical Exam  Constitutional:       Appearance: Normal appearance.   HENT:      Head: Normocephalic.      Mouth/Throat:      Mouth: Mucous membranes are moist.   Eyes:      Pupils: Pupils are equal, round, and reactive to light.   Cardiovascular:      Rate and Rhythm: Normal rate and regular rhythm.      Heart sounds: Normal heart sounds.      Comments: Sternal incision  Pulmonary:      Effort: Pulmonary effort is normal.      Breath sounds: Normal breath sounds.   Abdominal:      General: Bowel sounds are normal.      Palpations: Abdomen is soft.   Musculoskeletal:      Cervical back: Neck supple.      Comments: Generalized weakness and muscle atrophy   Skin:     General: Skin is warm and dry. "   Neurological:      General: No focal deficit present.      Mental Status: She is alert and oriented to person, place, and time.   Psychiatric:         Mood and Affect: Mood normal.         Behavior: Behavior normal.         Thought Content: Thought content normal.         Judgment: Judgment normal.   *MD performed and documented physical examination        Labs  Admission on 10/25/2022   Component Date Value Ref Range Status    POCT Glucose 10/25/2022 120 (H)  70 - 110 mg/dL Final    POCT Glucose 10/25/2022 131 (H)  70 - 110 mg/dL Final    Sodium Level 10/26/2022 142  136 - 145 mmol/L Final    Potassium Level 10/26/2022 2.9 (L)  3.5 - 5.1 mmol/L Final    Chloride 10/26/2022 104  98 - 107 mmol/L Final    Carbon Dioxide 10/26/2022 31  23 - 31 mmol/L Final    Glucose Level 10/26/2022 115  82 - 115 mg/dL Final    Blood Urea Nitrogen 10/26/2022 28.1 (H)  9.8 - 20.1 mg/dL Final    Creatinine 10/26/2022 0.99  0.55 - 1.02 mg/dL Final    Calcium Level Total 10/26/2022 9.3  8.4 - 10.2 mg/dL Final    Protein Total 10/26/2022 6.5  5.8 - 7.6 gm/dL Final    Albumin Level 10/26/2022 2.5 (L)  3.4 - 4.8 gm/dL Final    Globulin 10/26/2022 4.0 (H)  2.4 - 3.5 gm/dL Final    Albumin/Globulin Ratio 10/26/2022 0.6 (L)  1.1 - 2.0 ratio Final    Bilirubin Total 10/26/2022 0.5  <=1.5 mg/dL Final    Alkaline Phosphatase 10/26/2022 52  40 - 150 unit/L Final    Alanine Aminotransferase 10/26/2022 8  0 - 55 unit/L Final    Aspartate Aminotransferase 10/26/2022 17  5 - 34 unit/L Final    eGFR 10/26/2022 58  mls/min/1.73/m2 Final    Magnesium Level 10/26/2022 1.70  1.60 - 2.60 mg/dL Final    Phosphorus Level 10/26/2022 3.6  2.3 - 4.7 mg/dL Final    Prealbumin 10/26/2022 12.0 (L)  14.0 - 37.0 mg/dL Final    Ferritin Level 10/26/2022 900.44 (H)  4.63 - 204.00 ng/mL Final    Iron Binding Capacity Unsaturated 10/26/2022 109  70 - 310 ug/dL Final    Iron Level 10/26/2022 30 (L)  50 - 170 ug/dL Final    Transferrin 10/26/2022 127  mg/dL Final    Iron  Binding Capacity Total 10/26/2022 139 (L)  250 - 450 ug/dL Final    Iron Saturation 10/26/2022 22  20 - 50 % Final    WBC 10/26/2022 7.9  4.5 - 11.5 x10(3)/mcL Final    RBC 10/26/2022 3.51 (L)  4.20 - 5.40 x10(6)/mcL Final    Hgb 10/26/2022 10.5 (L)  12.0 - 16.0 gm/dL Final    Hct 10/26/2022 32.3 (L)  37.0 - 47.0 % Final    MCV 10/26/2022 92.0  80.0 - 94.0 fL Final    MCH 10/26/2022 29.9  27.0 - 31.0 pg Final    MCHC 10/26/2022 32.5 (L)  33.0 - 36.0 mg/dL Final    RDW 10/26/2022 13.2  11.5 - 17.0 % Final    Platelet 10/26/2022 422 (H)  130 - 400 x10(3)/mcL Final    MPV 10/26/2022 10.8 (H)  7.4 - 10.4 fL Final    Neut % 10/26/2022 62.4  % Final    Lymph % 10/26/2022 23.5  % Final    Mono % 10/26/2022 8.4  % Final    Eos % 10/26/2022 4.8  % Final    Basophil % 10/26/2022 0.6  % Final    Lymph # 10/26/2022 1.85  0.6 - 4.6 x10(3)/mcL Final    Neut # 10/26/2022 4.9  2.1 - 9.2 x10(3)/mcL Final    Mono # 10/26/2022 0.66  0.1 - 1.3 x10(3)/mcL Final    Eos # 10/26/2022 0.38  0 - 0.9 x10(3)/mcL Final    Baso # 10/26/2022 0.05  0 - 0.2 x10(3)/mcL Final    IG# 10/26/2022 0.02  0 - 0.04 x10(3)/mcL Final    IG% 10/26/2022 0.3  % Final    NRBC% 10/26/2022 0.0  % Final    POCT Glucose 10/26/2022 111 (H)  70 - 110 mg/dL Final    Sodium Level 10/27/2022 145  136 - 145 mmol/L Final    Potassium Level 10/27/2022 4.8  3.5 - 5.1 mmol/L Final    Chloride 10/27/2022 108 (H)  98 - 107 mmol/L Final    Carbon Dioxide 10/27/2022 28  23 - 31 mmol/L Final    Glucose Level 10/27/2022 107  82 - 115 mg/dL Final    Blood Urea Nitrogen 10/27/2022 25.6 (H)  9.8 - 20.1 mg/dL Final    Creatinine 10/27/2022 1.01  0.55 - 1.02 mg/dL Final    BUN/Creatinine Ratio 10/27/2022 25   Final    Calcium Level Total 10/27/2022 9.5  8.4 - 10.2 mg/dL Final    Anion Gap 10/27/2022 9.0  mEq/L Final    eGFR 10/27/2022 56  mls/min/1.73/m2 Final    Prealbumin 10/31/2022 17.5  14.0 - 37.0 mg/dL Final    Sodium Level 10/31/2022 145  136 - 145 mmol/L Final    Potassium Level  10/31/2022 4.1  3.5 - 5.1 mmol/L Final    Chloride 10/31/2022 110 (H)  98 - 107 mmol/L Final    Carbon Dioxide 10/31/2022 27  23 - 31 mmol/L Final    Glucose Level 10/31/2022 97  82 - 115 mg/dL Final    Blood Urea Nitrogen 10/31/2022 23.2 (H)  9.8 - 20.1 mg/dL Final    Creatinine 10/31/2022 0.89  0.55 - 1.02 mg/dL Final    Calcium Level Total 10/31/2022 9.3  8.4 - 10.2 mg/dL Final    Protein Total 10/31/2022 6.8  5.8 - 7.6 gm/dL Final    Albumin Level 10/31/2022 2.8 (L)  3.4 - 4.8 gm/dL Final    Globulin 10/31/2022 4.0 (H)  2.4 - 3.5 gm/dL Final    Albumin/Globulin Ratio 10/31/2022 0.7 (L)  1.1 - 2.0 ratio Final    Bilirubin Total 10/31/2022 0.4  <=1.5 mg/dL Final    Alkaline Phosphatase 10/31/2022 63  40 - 150 unit/L Final    Alanine Aminotransferase 10/31/2022 11  0 - 55 unit/L Final    Aspartate Aminotransferase 10/31/2022 19  5 - 34 unit/L Final    eGFR 10/31/2022 >60  mls/min/1.73/m2 Final    Magnesium Level 10/31/2022 1.80  1.60 - 2.60 mg/dL Final    Phosphorus Level 10/31/2022 3.3  2.3 - 4.7 mg/dL Final    WBC 10/31/2022 6.1  4.5 - 11.5 x10(3)/mcL Final    RBC 10/31/2022 3.64 (L)  4.20 - 5.40 x10(6)/mcL Final    Hgb 10/31/2022 10.8 (L)  12.0 - 16.0 gm/dL Final    Hct 10/31/2022 33.6 (L)  37.0 - 47.0 % Final    MCV 10/31/2022 92.3  80.0 - 94.0 fL Final    MCH 10/31/2022 29.7  27.0 - 31.0 pg Final    MCHC 10/31/2022 32.1 (L)  33.0 - 36.0 mg/dL Final    RDW 10/31/2022 13.3  11.5 - 17.0 % Final    Platelet 10/31/2022 398  130 - 400 x10(3)/mcL Final    MPV 10/31/2022 10.9 (H)  7.4 - 10.4 fL Final    Neut % 10/31/2022 46.4  % Final    Lymph % 10/31/2022 34.9  % Final    Mono % 10/31/2022 9.3  % Final    Eos % 10/31/2022 8.1  % Final    Basophil % 10/31/2022 1.1  % Final    Lymph # 10/31/2022 2.14  0.6 - 4.6 x10(3)/mcL Final    Neut # 10/31/2022 2.9  2.1 - 9.2 x10(3)/mcL Final    Mono # 10/31/2022 0.57  0.1 - 1.3 x10(3)/mcL Final    Eos # 10/31/2022 0.50  0 - 0.9 x10(3)/mcL Final    Baso # 10/31/2022 0.07  0 - 0.2  x10(3)/mcL Final    IG# 10/31/2022 0.01  0 - 0.04 x10(3)/mcL Final    IG% 10/31/2022 0.2  % Final    NRBC% 10/31/2022 0.0  % Final     Radiology  CXR two view on 10/16/2022 at 9:11 a.m., IMPRESSION: Interval removal of support catheters with evidence of a small left apical pneumothorax. Bilateral pleural effusions. No other focal consolidative changes.   Radiology  Transthoracic echo on 09/23/2022: The ejection fraction was calculated to be 55%.  The left ventricular systolic function was normal.  The left ventricular end diastolic pressure was elevated. The pre-procedure left ventricular end diastolic pressure was 23. The estimated blood loss was none. There was three vessel coronary artery disease. There was no mitral valve regurgitation. There was no aortic valve stenosis.  Diagnostic study  [facility] on 09/23/2022-left main: Highly calcified without obstructive lesions.  Lad: Heavily calcified 70-80% stenosis proximally (long lesion) in 70% distal at the bifurcation with the 2nd diagonal branch.  The 1st diagonal branch shows disease of 90%.  Left circumflex:  Calcified, 99% stenosis proximally.  RCA:  Highly calcified, 99% stenosis of the ostium followed by 90% stenosis proximally.     Discharge Summary Plan   Discharge Status: Improved    Location: Discharge home with     Medications: See discharge medicine reconciliation    Activity: as tolerated    Diet: Cardiac    Instructions:  Take all medications as prescribed.      Attend appointments as scheduled.      Return to ED if symptoms worsen, or if t > 100.4.    Education: CABG. AFIB. HTN    Follow-up:   Tarik Gee MD within 5-7 days  Peterson Sánchez MD on 11/18/2022 at 3:20 p.m.  Bety Thompson MD on 11/16/2022 at 8:40 a.m.  Dhaval Ibrahim MD on 05/08/2023 at 9:00 a.m.  Jhonny Dos Santos MD on 06/14/2023 at 8:50 a.m.    Discussed plan of care, and patient communicated understanding. Agreed to comply with recommendations.    Christian Mccollum NP conducted independent examination  and assisted with medical documentation.     Discharge Time: 48 minutes

## 2022-11-03 NOTE — PLAN OF CARE
Problem: Rehabilitation (IRF) Plan of Care  Goal: Plan of Care Review  Outcome: Met  Goal: Patient-Specific Goal (Individualized)  Outcome: Met  Goal: Absence of New-Onset Illness or Injury  Outcome: Met  Goal: Optimal Comfort and Wellbeing  Outcome: Met  Goal: Readiness for Transition of Care  Outcome: Met     Problem: Impaired Wound Healing  Goal: Optimal Wound Healing  Outcome: Met     Problem: Skin Injury Risk Increased  Goal: Skin Health and Integrity  Outcome: Met     Problem: Fall Injury Risk  Goal: Absence of Fall and Fall-Related Injury  Outcome: Met     Problem: Infection  Goal: Absence of Infection Signs and Symptoms  Outcome: Met

## 2022-11-03 NOTE — DISCHARGE INSTRUCTIONS
Went over discharge instructions, medications, follow-up visits, and when to seek medical care. Removed IV.

## 2022-11-03 NOTE — PLAN OF CARE
Discharging today following training as planned.    Alerted [facility] (providing HH PT/OT/RN services) and Aj (delivering RW to room).  [facility].

## 2022-11-03 NOTE — PLAN OF CARE
Problem: Fall Injury Risk  Goal: Absence of Fall and Fall-Related Injury  Outcome: Ongoing, Progressing  Intervention: Promote Injury-Free Environment  Flowsheets (Taken 11/3/2022 0110)  Safety Promotion/Fall Prevention:   assistive device/personal item within reach   Fall Risk signage in place   lighting adjusted   nonskid shoes/socks when out of bed   side rails raised x 3   instructed to call staff for mobility

## 2022-11-03 NOTE — PT/OT/SLP DISCHARGE
Occupational Therapy Discharge Summary    Jessica Elias  MRN: [id number]   Principal Problem: S/P CABG x 4      Please refer to prior OT note dated 11/2/2022 for functional status.    Assessment:      Patient has met all goals and is not appropriate for therapy.    Objective:     GOALS:   Multidisciplinary Problems       Occupational Therapy Goals          Problem: Occupational Therapy    Goal Priority Disciplines Outcome Interventions   Occupational Therapy Goal     OT, PT/OT Ongoing, Progressing    Description: ADLs:  Pt to perform UB dressing with set up MET  Pt to perform LB dressing with set up MET  Pt to perform putting on/off footwear task with set up MET  Pt to perform toileting with independent using sternal precautions MET    Functional Transfers:  Pt to perform toilet transfers with set up. MET  Pt to perform a tub transfer with set up MET  Pt to perform a walk-in shower transfer with set up  MET    IADLs:  Pt to perform simple meal with SBA and RW. MET    Balance, Strengthening, Endurance, Balance:  Pt to consistently demonstrate adherence to sternal precautions during all ADL's as instructed by OT. MET  Pt to demonstrate consistent adherence to breathing control and energy conservation techniques as educated by OT. MET                       Care Scores:   Admission Assessment Current Status Discharge  Goal   Functional Area: Care Score:  Care Score:    Eating 6 6     Oral Hygiene 5 6 Independent   Toileting Hygiene 4 6 Independent   Shower/Bathe Self 4 6 Independent   Upper Body Dressing 3 6 Independent   Lower Body Dressing 4 6 Independent   Putting On/Taking Off Footwear 4 6 Independent   Toilet Transfer 4 6 Independent     Reasons for Discontinuation of Therapy Services   Satisfactory goal achievement.      Plan:     Patient Discharged to: Home no OT services needed      11/3/2022

## 2022-11-03 NOTE — PT/OT/SLP PROGRESS
Physical Therapy Inpatient Rehab Treatment    Patient Name:  Jessica Elias   MRN:  [id number]    Recommendations:     Discharge Recommendations:      Discharge Equipment Recommendations:     Barriers to discharge: None    Assessment:     Jessica Elias is a 82 y.o. female admitted with a medical diagnosis of S/P CABG x 4.  She presents with the following impairments/functional limitations:  weakness, impaired endurance, impaired functional mobility, gait instability, pain, edema.    Rehab Diagnosis: s/p CABG x 4; Pacemaker; Sacral Ulcer    Recent Surgery: * No surgery found *      General Precautions: Standard, sternal     Orthopedic Precautions:N/A     Braces: N/A    Rehab Prognosis: Good; patient would benefit from acute skilled PT services to address these deficits and reach maximum level of function.      History:     Past Medical History:   Diagnosis Date    Chronic kidney disease, unspecified     stage 3    Coronary artery disease     History of carotid artery disease     Hyperlipidemia     Hypertension     Sleep apnea        Past Surgical History:   Procedure Laterality Date    APPENDECTOMY N/A     BLADDER SURGERY N/A     CAROTID ENDARTERECTOMY N/A     CATARACT EXTRACTION N/A     CORONARY ARTERY BYPASS GRAFT (CABG) N/A 10/11/2022    Procedure: CORONARY ARTERY BYPASS GRAFT (CABG);  Surgeon: Bety Thompson MD;  Location: [facility] OR;  Service: Cardiothoracic;  Laterality: N/A;    HYSTERECTOMY N/A     INSERTION OF PERMANENT PACEMAKER N/A     LEFT HEART CATHETERIZATION Left 09/23/2022    Procedure: CATHETERIZATION, HEART, LEFT;  Surgeon: Abner Mitchell MD;  Location: [facility] CATH LAB;  Service: Cardiology;  Laterality: Left;  LHC VIA RRA    lump removed from right shoulder         Subjective     Chief Complaint: N/A    Respiratory Status: Room air    Patients cultural, spiritual, [employer] conflicts given the current situation: no      Objective:     Communicated with RN prior to session.  Patient found HOB  elevated with peripheral IV  upon PT entry to room.    Pt is Oriented x3 and Alert, Cooperative, and Motivated.    Vitals     Functional Mobility:        Current   Status   Discharge   Goal   Functional Area: Care Score:     Roll Left and Right    Independent   Sit to Lying    Independent   Lying to Sitting on Side of Bed    Set-up/clean-up   Sit to Stand 6 With RW Independent   Chair/Bed-to-Chair Transfer    Independent   Car Transfer    Independent   Walk 10 Feet    Independent   Walk 50 Feet with Two Turns    Independent   Walk 150 Feet    Independent   Walk 10 Feet Uneven Surface    Independent   1 Step (Curb)    Independent   4 Steps    Independent   12 Steps    Independent   Picking Up Object    Independent   Wheel 50 Feet with Two Turns    Not applicable   Wheel 150 Feet    Not applicable       Therapeutic Activities and Exercises:  Family Training: Patient's Granddaughter and great grandson were present. PT educated family on sternal precautions, safety precautions, and the patients' status. All questions and concerns answered.     Activity Tolerance: Excellent    Patient left HOB elevated with all lines intact and call button in reach.    Education provided: roles and goals of PT/PTA, safety awareness, body mechanics, assistive device, and fall prevention    Expected compliance: High compliance    GOALS:   Multidisciplinary Problems       Physical Therapy Goals          Problem: Physical Therapy    Goal Priority Disciplines Outcome Goal Variances Interventions   Physical Therapy Goal     PT, PT/OT Ongoing, Progressing     Description: Bed Mobility:  Roll left and right with setup/clean-up assist. (MET)  Sit to supine transfer with setup/clean-up assist. (MET)  Supine to sit transfer with supervision/touching assist. (MET)  Supine to sit transfer with setup/clean-up assist (MET)    Transfers:  Sit to stand transfer with setup/clean-up assist using LRAD. (MET)  Bed to chair transfer with setup/clean-up assist  using LRAD. (MET)  Car transfer with setup/clean-up assist using LRAD. (MET)   an object from the ground in standing position with setup/clean-up assist using LRAD. (MET)    Mobility:  Ambulate 360 feet with supervision/touching assist using LRAD. (MET)  Ambulate 10 feet on uneven surfaces/ramps with setup/clean-up assist using LRAD. (MET)  Ascend/descend a 4 inch curb with setup/clean-up assist using LRAD. (MET)  Ascend/descend 12 stairs with supervision/touching assist using bilateral handrails. (MET)  Ascend/descend 12 stairs independently using bilateral handrails (MET)   Ambulate 1000 feet independently using RW                         Plan:     During this hospitalization, patient to be seen 5 x/week to address the identified rehab impairments via gait training, therapeutic activities, therapeutic exercises, neuromuscular re-education and progress toward the following goals:    Plan of Care Expires:  22  PT Next Visit Date: 22  Plan of Care reviewed with: patient    Additional Information:         Time Tracking:     Therapy Time  PT Received On: 22  PT Start Time: 1030  PT Stop Time: 1042  PT Total Time (min): 12 min   PT Individual: 12  Missed Time:    Time Missed due to:      Billable Minutes: Self-Care/ Home Management Trainin    2022

## 2022-11-08 LAB
FIO2: 100
FIO2: 100
FIO2: 55
GLUCOSE SERPL-MCNC: 118 MG/DL (ref 70–110)
GLUCOSE SERPL-MCNC: 157 MG/DL (ref 70–110)
GLUCOSE SERPL-MCNC: 172 MG/DL (ref 70–110)
GLUCOSE SERPL-MCNC: 212 MG/DL (ref 70–110)
HCO3 UR-SCNC: 25.1 MMOL/L (ref 24–28)
HCO3 UR-SCNC: 25.2 MMOL/L (ref 24–28)
HCO3 UR-SCNC: 27.1 MMOL/L (ref 24–28)
HCO3 UR-SCNC: 29.1 MMOL/L (ref 24–28)
HCT VFR BLD CALC: 19 %PCV (ref 36–54)
HCT VFR BLD CALC: 26 %PCV (ref 36–54)
HCT VFR BLD CALC: 28 %PCV (ref 36–54)
HCT VFR BLD CALC: 28 %PCV (ref 36–54)
HGB BLD-MCNC: 10 G/DL
HGB BLD-MCNC: 10 G/DL
HGB BLD-MCNC: 7 G/DL
HGB BLD-MCNC: 9 G/DL
PCO2 BLDA: 35.9 MMHG (ref 35–45)
PCO2 BLDA: 41.7 MMHG (ref 35–45)
PCO2 BLDA: 44.7 MMHG (ref 35–45)
PCO2 BLDA: 46.4 MMHG (ref 35–45)
PH SMN: 7.36 [PH] (ref 7.35–7.45)
PH SMN: 7.37 [PH] (ref 7.35–7.45)
PH SMN: 7.39 [PH] (ref 7.35–7.45)
PH SMN: 7.52 [PH] (ref 7.35–7.45)
PO2 BLDA: 302 MMHG (ref 80–100)
PO2 BLDA: 42 MMHG (ref 40–60)
PO2 BLDA: 421 MMHG (ref 80–100)
PO2 BLDA: 47 MMHG (ref 40–60)
POC BE: 0 MMOL/L
POC BE: 0 MMOL/L
POC BE: 2 MMOL/L
POC BE: 6 MMOL/L
POC IONIZED CALCIUM: 0.97 MMOL/L (ref 1.06–1.42)
POC IONIZED CALCIUM: 1.05 MMOL/L (ref 1.06–1.42)
POC IONIZED CALCIUM: 1.24 MMOL/L (ref 1.06–1.42)
POC IONIZED CALCIUM: 1.42 MMOL/L (ref 1.06–1.42)
POC PCO2 TEMP: 30.9 MMHG
POC PCO2 TEMP: 41.8 MMHG
POC PCO2 TEMP: 42.8 MMHG
POC PH TEMP: 7.37
POC PH TEMP: 7.41
POC PH TEMP: 7.57
POC PO2 TEMP: 286 MMHG
POC PO2 TEMP: 35 MMHG
POC PO2 TEMP: 44 MMHG
POC SATURATED O2: 100 % (ref 95–100)
POC SATURATED O2: 100 % (ref 95–100)
POC SATURATED O2: 76 % (ref 95–100)
POC SATURATED O2: 80 % (ref 95–100)
POC TCO2: 26 MMOL/L (ref 23–27)
POC TCO2: 26 MMOL/L (ref 24–29)
POC TCO2: 28 MMOL/L (ref 24–29)
POC TCO2: 30 MMOL/L (ref 23–27)
POC TEMPERATURE: ABNORMAL
POTASSIUM BLD-SCNC: 2.7 MMOL/L (ref 3.5–5.1)
POTASSIUM BLD-SCNC: 3.1 MMOL/L (ref 3.5–5.1)
POTASSIUM BLD-SCNC: 4 MMOL/L (ref 3.5–5.1)
POTASSIUM BLD-SCNC: 4.7 MMOL/L (ref 3.5–5.1)
SAMPLE: ABNORMAL
SODIUM BLD-SCNC: 145 MMOL/L (ref 136–145)
SODIUM BLD-SCNC: 146 MMOL/L (ref 136–145)
SODIUM BLD-SCNC: 146 MMOL/L (ref 136–145)
SODIUM BLD-SCNC: 147 MMOL/L (ref 136–145)

## 2022-11-16 ENCOUNTER — OFFICE VISIT (OUTPATIENT)
Dept: CARDIAC SURGERY | Facility: CLINIC | Age: 82
End: 2022-11-16
Payer: MEDICARE

## 2022-11-16 VITALS
DIASTOLIC BLOOD PRESSURE: 67 MMHG | SYSTOLIC BLOOD PRESSURE: 118 MMHG | HEART RATE: 90 BPM | BODY MASS INDEX: 22.36 KG/M2 | HEIGHT: 64 IN | WEIGHT: 131 LBS

## 2022-11-16 DIAGNOSIS — I25.10 CORONARY ARTERY DISEASE, UNSPECIFIED VESSEL OR LESION TYPE, UNSPECIFIED WHETHER ANGINA PRESENT, UNSPECIFIED WHETHER NATIVE OR TRANSPLANTED HEART: Primary | ICD-10-CM

## 2022-11-16 PROCEDURE — 1160F PR REVIEW ALL MEDS BY PRESCRIBER/CLIN PHARMACIST DOCUMENTED: ICD-10-PCS | Mod: CPTII,,, | Performed by: THORACIC SURGERY (CARDIOTHORACIC VASCULAR SURGERY)

## 2022-11-16 PROCEDURE — 1159F PR MEDICATION LIST DOCUMENTED IN MEDICAL RECORD: ICD-10-PCS | Mod: CPTII,,, | Performed by: THORACIC SURGERY (CARDIOTHORACIC VASCULAR SURGERY)

## 2022-11-16 PROCEDURE — 3074F PR MOST RECENT SYSTOLIC BLOOD PRESSURE < 130 MM HG: ICD-10-PCS | Mod: CPTII,,, | Performed by: THORACIC SURGERY (CARDIOTHORACIC VASCULAR SURGERY)

## 2022-11-16 PROCEDURE — 3288F FALL RISK ASSESSMENT DOCD: CPT | Mod: CPTII,,, | Performed by: THORACIC SURGERY (CARDIOTHORACIC VASCULAR SURGERY)

## 2022-11-16 PROCEDURE — 1159F MED LIST DOCD IN RCRD: CPT | Mod: CPTII,,, | Performed by: THORACIC SURGERY (CARDIOTHORACIC VASCULAR SURGERY)

## 2022-11-16 PROCEDURE — 3074F SYST BP LT 130 MM HG: CPT | Mod: CPTII,,, | Performed by: THORACIC SURGERY (CARDIOTHORACIC VASCULAR SURGERY)

## 2022-11-16 PROCEDURE — 3078F DIAST BP <80 MM HG: CPT | Mod: CPTII,,, | Performed by: THORACIC SURGERY (CARDIOTHORACIC VASCULAR SURGERY)

## 2022-11-16 PROCEDURE — 99024 POSTOP FOLLOW-UP VISIT: CPT | Mod: ,,, | Performed by: THORACIC SURGERY (CARDIOTHORACIC VASCULAR SURGERY)

## 2022-11-16 PROCEDURE — 3288F PR FALLS RISK ASSESSMENT DOCUMENTED: ICD-10-PCS | Mod: CPTII,,, | Performed by: THORACIC SURGERY (CARDIOTHORACIC VASCULAR SURGERY)

## 2022-11-16 PROCEDURE — 3078F PR MOST RECENT DIASTOLIC BLOOD PRESSURE < 80 MM HG: ICD-10-PCS | Mod: CPTII,,, | Performed by: THORACIC SURGERY (CARDIOTHORACIC VASCULAR SURGERY)

## 2022-11-16 PROCEDURE — 1160F RVW MEDS BY RX/DR IN RCRD: CPT | Mod: CPTII,,, | Performed by: THORACIC SURGERY (CARDIOTHORACIC VASCULAR SURGERY)

## 2022-11-16 PROCEDURE — 1101F PR PT FALLS ASSESS DOC 0-1 FALLS W/OUT INJ PAST YR: ICD-10-PCS | Mod: CPTII,,, | Performed by: THORACIC SURGERY (CARDIOTHORACIC VASCULAR SURGERY)

## 2022-11-16 PROCEDURE — 1101F PT FALLS ASSESS-DOCD LE1/YR: CPT | Mod: CPTII,,, | Performed by: THORACIC SURGERY (CARDIOTHORACIC VASCULAR SURGERY)

## 2022-11-16 PROCEDURE — 99024 PR POST-OP FOLLOW-UP VISIT: ICD-10-PCS | Mod: ,,, | Performed by: THORACIC SURGERY (CARDIOTHORACIC VASCULAR SURGERY)

## 2022-11-16 NOTE — PROGRESS NOTES
"Jessica Elias is a 82 y.o. female patient.   No diagnosis found.  Past Medical History:   Diagnosis Date    Chronic kidney disease, unspecified     stage 3    Coronary artery disease     History of carotid artery disease     Hyperlipidemia     Hypertension     Sleep apnea      Past Surgical History Pertinent Negatives:   Procedure Date Noted    ADENOIDECTOMY 2022    BRAIN SURGERY 2022    BREAST SURGERY 2022    CARDIAC VALVE REPLACEMENT 2022     SECTION 2022    CHOLECYSTECTOMY 2022    COLON SURGERY 2022    COSMETIC SURGERY 2022    FRACTURE SURGERY 2022    HERNIA REPAIR 2022    JOINT REPLACEMENT 2022    KIDNEY TRANSPLANT 2022    LIVER TRANSPLANT 2022    PROSTATE SURGERY 2022    SMALL INTESTINE SURGERY 2022    SPINE SURGERY 2022    TONSILLECTOMY 2022    TUBAL LIGATION 2022    VASECTOMY 2022     Scheduled Meds:  Continuous Infusions:  PRN Meds:    Review of patient's allergies indicates:   Allergen Reactions    Penicillin     Sulfa (sulfonamide antibiotics)      There are no hospital problems to display for this patient.    Blood pressure 118/67, pulse 90, height 5' 4" (1.626 m), weight 59.4 kg (131 lb).    Subjective:  The patient is doing very well status post coronary artery bypass grafting.  She has no complaints.      Objective:  Her wounds have healed well.  Her sternum is stable.      Assessment & Plan:  Overall doing very well status post coronary artery bypass grafting.  RTC p.r.nBalta Thompson MD  2022      "

## 2022-11-17 ENCOUNTER — DOCUMENT SCAN (OUTPATIENT)
Dept: HOME HEALTH SERVICES | Facility: HOSPITAL | Age: 82
End: 2022-11-17
Payer: MEDICARE

## 2023-01-27 PROBLEM — E21.3 HYPERPARATHYROIDISM: Status: ACTIVE | Noted: 2023-01-27

## 2023-01-27 PROBLEM — I48.11 LONGSTANDING PERSISTENT ATRIAL FIBRILLATION: Status: ACTIVE | Noted: 2023-01-27

## 2023-01-27 PROBLEM — E11.9 TYPE 2 DIABETES MELLITUS WITHOUT COMPLICATION, WITHOUT LONG-TERM CURRENT USE OF INSULIN: Status: ACTIVE | Noted: 2023-01-27

## 2023-01-30 ENCOUNTER — HOSPITAL ENCOUNTER (OUTPATIENT)
Dept: CARDIOLOGY | Facility: HOSPITAL | Age: 83
Discharge: HOME | End: 2023-01-30
Attending: INTERNAL MEDICINE
Payer: MEDICARE

## 2023-01-30 ENCOUNTER — ANESTHESIA EVENT (OUTPATIENT)
Dept: CARDIOLOGY | Facility: HOSPITAL | Age: 83
End: 2023-01-30
Payer: MEDICARE

## 2023-01-30 ENCOUNTER — ANESTHESIA (OUTPATIENT)
Dept: CARDIOLOGY | Facility: HOSPITAL | Age: 83
End: 2023-01-30
Payer: MEDICARE

## 2023-01-30 VITALS
OXYGEN SATURATION: 100 % | DIASTOLIC BLOOD PRESSURE: 61 MMHG | TEMPERATURE: 97 F | HEIGHT: 65 IN | BODY MASS INDEX: 21.31 KG/M2 | SYSTOLIC BLOOD PRESSURE: 125 MMHG | HEART RATE: 60 BPM | RESPIRATION RATE: 18 BRPM | WEIGHT: 127.88 LBS

## 2023-01-30 VITALS
HEART RATE: 60 BPM | DIASTOLIC BLOOD PRESSURE: 94 MMHG | SYSTOLIC BLOOD PRESSURE: 193 MMHG | RESPIRATION RATE: 10 BRPM | OXYGEN SATURATION: 100 %

## 2023-01-30 DIAGNOSIS — I48.0 PAROXYSMAL ATRIAL FIBRILLATION: ICD-10-CM

## 2023-01-30 DIAGNOSIS — I48.0 PAF (PAROXYSMAL ATRIAL FIBRILLATION): ICD-10-CM

## 2023-01-30 DIAGNOSIS — I48.0 PAROXYSMAL ATRIAL FIBRILLATION: Primary | ICD-10-CM

## 2023-01-30 DIAGNOSIS — Z01.818 PREOP TESTING: ICD-10-CM

## 2023-01-30 LAB — BSA FOR ECHO PROCEDURE: 1.63 M2

## 2023-01-30 PROCEDURE — 37000008 HC ANESTHESIA 1ST 15 MINUTES

## 2023-01-30 PROCEDURE — 25000003 PHARM REV CODE 250: Performed by: NURSE ANESTHETIST, CERTIFIED REGISTERED

## 2023-01-30 PROCEDURE — 93005 ELECTROCARDIOGRAM TRACING: CPT

## 2023-01-30 PROCEDURE — 93010 EKG 12-LEAD: ICD-10-PCS | Mod: ,,, | Performed by: INTERNAL MEDICINE

## 2023-01-30 PROCEDURE — 63600175 PHARM REV CODE 636 W HCPCS: Performed by: INTERNAL MEDICINE

## 2023-01-30 PROCEDURE — 93010 ELECTROCARDIOGRAM REPORT: CPT | Mod: ,,, | Performed by: INTERNAL MEDICINE

## 2023-01-30 PROCEDURE — 93325 DOPPLER ECHO COLOR FLOW MAPG: CPT

## 2023-01-30 PROCEDURE — 63600175 PHARM REV CODE 636 W HCPCS: Performed by: NURSE ANESTHETIST, CERTIFIED REGISTERED

## 2023-01-30 RX ORDER — ACETAMINOPHEN 500 MG
5000 TABLET ORAL DAILY
COMMUNITY

## 2023-01-30 RX ORDER — HYDRALAZINE HYDROCHLORIDE 20 MG/ML
10 INJECTION INTRAMUSCULAR; INTRAVENOUS ONCE
Status: COMPLETED | OUTPATIENT
Start: 2023-01-30 | End: 2023-01-30

## 2023-01-30 RX ORDER — CARVEDILOL 6.25 MG/1
6.25 TABLET ORAL NIGHTLY
COMMUNITY

## 2023-01-30 RX ORDER — ASPIRIN 81 MG/1
325 TABLET ORAL DAILY
Qty: 360 TABLET | Refills: 3
Start: 2023-01-30 | End: 2024-02-27

## 2023-01-30 RX ORDER — PROPOFOL 10 MG/ML
VIAL (ML) INTRAVENOUS
Status: DISCONTINUED | OUTPATIENT
Start: 2023-01-30 | End: 2023-01-30

## 2023-01-30 RX ORDER — LANOLIN ALCOHOL/MO/W.PET/CERES
100 CREAM (GRAM) TOPICAL DAILY
COMMUNITY

## 2023-01-30 RX ORDER — LIDOCAINE HYDROCHLORIDE 20 MG/ML
INJECTION INTRAVENOUS
Status: DISCONTINUED | OUTPATIENT
Start: 2023-01-30 | End: 2023-01-30

## 2023-01-30 RX ORDER — FAMOTIDINE 20 MG/1
20 TABLET, FILM COATED ORAL DAILY
COMMUNITY
End: 2024-02-27

## 2023-01-30 RX ADMIN — LIDOCAINE HYDROCHLORIDE 80 MG: 20 INJECTION, SOLUTION INTRAVENOUS at 08:01

## 2023-01-30 RX ADMIN — SODIUM CHLORIDE, SODIUM GLUCONATE, SODIUM ACETATE, POTASSIUM CHLORIDE AND MAGNESIUM CHLORIDE: 526; 502; 368; 37; 30 INJECTION, SOLUTION INTRAVENOUS at 08:01

## 2023-01-30 RX ADMIN — HYDRALAZINE HYDROCHLORIDE 10 MG: 20 INJECTION INTRAMUSCULAR; INTRAVENOUS at 10:01

## 2023-01-30 RX ADMIN — PROPOFOL 50 MG: 10 INJECTION, EMULSION INTRAVENOUS at 08:01

## 2023-01-30 NOTE — DISCHARGE SUMMARY
[facility] - Cardiology Diagnostics  Discharge Note  Short Stay    Transesophageal echo (RYLEE)      OUTCOME: Patient tolerated treatment/procedure well without complication and is now ready for discharge.    DISPOSITION: Home or Self Care    FINAL DIAGNOSIS:  Longstanding persistent atrial fibrillation    FOLLOWUP: In clinic    DISCHARGE INSTRUCTIONS:  No discharge procedures on file.     TIME SPENT ON DISCHARGE: 15 minutes

## 2023-01-30 NOTE — TRANSFER OF CARE
"Anesthesia Transfer of Care Note    Patient: Jessica Elias    Procedure(s) Performed: * No procedures listed *    Patient location: PACU    Anesthesia Type: general    Post pain: adequate analgesia    Post assessment: no apparent anesthetic complications    Post vital signs: stable    Level of consciousness: awake    Nausea/Vomiting: no nausea/vomiting    Complications: none    Transfer of care protocol was followed      Last vitals:   Visit Vitals  BP (!) 185/91   Pulse 87   Temp 36 °C (96.8 °F)   Resp 10   Ht 5' 5" (1.651 m)   Wt 58 kg (127 lb 13.9 oz)   SpO2 99%   Breastfeeding No   BMI 21.28 kg/m²     "

## 2023-01-30 NOTE — ANESTHESIA PREPROCEDURE EVALUATION
01/30/2023  Jessica Elias is a 82 y.o., female.    Other Medical History   Coronary artery disease Hypertension   Hyperlipidemia Sleep apnea   History of carotid artery disease Chronic kidney disease, unspecified   Atrial fibrillation      Surgical History  CAROTID ENDARTERECTOMY INSERTION OF PERMANENT PACEMAKER   CATARACT EXTRACTION HYSTERECTOMY   APPENDECTOMY BLADDER SURGERY   LEFT HEART CATHETERIZATION lump removed from right shoulder   CORONARY ARTERY BYPASS GRAFT (CABG) EYE SURGERY   CORONARY ARTERY BYPASS GRAFT      LVEF 55%  cabg with SHAVON ligation, christiana now for evaluation of previous ligation  Doing well since cabg    Pre-op Assessment    I have reviewed the Patient Summary Reports.     I have reviewed the Nursing Notes. I have reviewed the NPO Status.   I have reviewed the Medications.     Review of Systems  Anesthesia Hx:  No problems with previous Anesthesia    Social:  Non-Smoker    Cardiovascular:   Pacemaker (pacer) Hypertension Denies MI. CAD   CABG/stent   Denies Angina.  Denies CHF. hyperlipidemia EF 55%   s/p CABG 10/ 2022   Pulmonary:   Denies COPD.  Denies Asthma. Sleep Apnea    Renal/:   Chronic Renal Disease, CKD    Hepatic/GI:   GERD, well controlled Denies Liver Disease. Denies Hepatitis.    Musculoskeletal:   Arthritis     Neurological:   Denies CVA. Denies Seizures.    Endocrine:   Diabetes: A1C 5.7 for an average glucose of 117. Denies Hypothyroidism. Denies Hyperthyroidism. K+ 3.7 Denies Obesity / BMI > 30  Psych:   Psychiatric History depression          Physical Exam  General: Well nourished, Cooperative, Alert and Oriented    Airway:  Mallampati: I   Mouth Opening: Normal  TM Distance: Normal  Tongue: Normal  Neck ROM: Normal ROM    Dental:  Intact        Anesthesia Plan  Type of Anesthesia, risks & benefits discussed:    Anesthesia Type: Gen Natural Airway  Intra-op  Monitoring Plan: Standard ASA Monitors  Induction:  IV  Informed Consent: Informed consent signed with the Patient and all parties understand the risks and agree with anesthesia plan.  All questions answered.   ASA Score: 4  Day of Surgery Review of History & Physical: H&P Update referred to the surgeon/provider.    Ready For Surgery From Anesthesia Perspective.     .

## 2023-01-31 NOTE — ANESTHESIA POSTPROCEDURE EVALUATION
Anesthesia Post Evaluation    Patient: Jessica Elias    Procedure(s) Performed: * No procedures listed *    Final Anesthesia Type: general      Patient location during evaluation: PACU  Patient participation: Yes- Able to Participate  Level of consciousness: awake and alert  Post-procedure vital signs: reviewed and stable  Pain management: adequate  Airway patency: patent    PONV status at discharge: No PONV  Anesthetic complications: no      Respiratory status: unassisted  Hydration status: euvolemic  Follow-up not needed.          Vitals Value Taken Time   /61 01/30/23 1046   Temp nl 01/30/23 2122   Pulse 73 01/30/23 1035   Resp 11 01/30/23 1034   SpO2 100 % 01/30/23 1034   Vitals shown include unvalidated device data.      No case tracking events are documented in the log.      Pain/Tyson Score: Tyson Score: 10 (1/30/2023 10:50 AM)

## 2023-04-28 PROCEDURE — 83735 ASSAY OF MAGNESIUM: CPT | Performed by: FAMILY MEDICINE

## 2023-04-28 PROCEDURE — 87088 URINE BACTERIA CULTURE: CPT | Performed by: FAMILY MEDICINE

## 2023-04-28 PROCEDURE — 82570 ASSAY OF URINE CREATININE: CPT | Performed by: FAMILY MEDICINE

## 2023-04-28 PROCEDURE — 83970 ASSAY OF PARATHORMONE: CPT | Performed by: FAMILY MEDICINE

## 2023-04-28 PROCEDURE — 87186 SC STD MICRODIL/AGAR DIL: CPT | Performed by: FAMILY MEDICINE

## 2023-04-28 PROCEDURE — 84100 ASSAY OF PHOSPHORUS: CPT | Performed by: FAMILY MEDICINE

## 2023-08-31 PROCEDURE — 83970 ASSAY OF PARATHORMONE: CPT | Performed by: FAMILY MEDICINE

## 2023-08-31 PROCEDURE — 82570 ASSAY OF URINE CREATININE: CPT | Performed by: FAMILY MEDICINE

## 2023-08-31 PROCEDURE — 83735 ASSAY OF MAGNESIUM: CPT | Performed by: FAMILY MEDICINE

## 2023-08-31 PROCEDURE — 87088 URINE BACTERIA CULTURE: CPT | Performed by: FAMILY MEDICINE

## 2023-08-31 PROCEDURE — 87186 SC STD MICRODIL/AGAR DIL: CPT | Performed by: FAMILY MEDICINE

## 2023-08-31 PROCEDURE — 84100 ASSAY OF PHOSPHORUS: CPT | Performed by: FAMILY MEDICINE

## 2024-04-25 PROCEDURE — 83735 ASSAY OF MAGNESIUM: CPT | Performed by: FAMILY MEDICINE

## 2024-04-25 PROCEDURE — 87086 URINE CULTURE/COLONY COUNT: CPT | Performed by: FAMILY MEDICINE

## 2024-04-25 PROCEDURE — 84100 ASSAY OF PHOSPHORUS: CPT | Performed by: FAMILY MEDICINE

## 2024-04-25 PROCEDURE — 83970 ASSAY OF PARATHORMONE: CPT | Performed by: FAMILY MEDICINE

## 2024-04-25 PROCEDURE — 82570 ASSAY OF URINE CREATININE: CPT | Performed by: FAMILY MEDICINE

## 2024-06-20 PROCEDURE — 87086 URINE CULTURE/COLONY COUNT: CPT | Performed by: FAMILY MEDICINE

## 2024-06-20 PROCEDURE — 87077 CULTURE AEROBIC IDENTIFY: CPT | Performed by: FAMILY MEDICINE

## 2024-08-05 PROBLEM — N18.32 CHRONIC KIDNEY DISEASE, STAGE 3B: Status: ACTIVE | Noted: 2022-10-25

## 2024-08-05 PROCEDURE — 87086 URINE CULTURE/COLONY COUNT: CPT | Performed by: FAMILY MEDICINE

## 2024-08-05 PROCEDURE — 87077 CULTURE AEROBIC IDENTIFY: CPT | Performed by: FAMILY MEDICINE

## 2024-09-04 PROBLEM — E78.00 PURE HYPERCHOLESTEROLEMIA: Status: ACTIVE | Noted: 2022-10-25

## 2024-09-04 PROBLEM — I73.9 PERIPHERAL VASCULAR DISEASE, UNSPECIFIED: Status: ACTIVE | Noted: 2024-09-04

## 2024-09-05 PROBLEM — I48.0 PAROXYSMAL ATRIAL FIBRILLATION: Status: ACTIVE | Noted: 2023-01-27

## 2025-04-29 PROCEDURE — 83735 ASSAY OF MAGNESIUM: CPT | Performed by: FAMILY MEDICINE

## 2025-04-29 PROCEDURE — 84100 ASSAY OF PHOSPHORUS: CPT | Performed by: FAMILY MEDICINE

## 2025-04-29 PROCEDURE — 83970 ASSAY OF PARATHORMONE: CPT | Performed by: FAMILY MEDICINE

## 2025-04-29 PROCEDURE — 84156 ASSAY OF PROTEIN URINE: CPT | Performed by: FAMILY MEDICINE

## 2025-06-17 PROCEDURE — 87186 SC STD MICRODIL/AGAR DIL: CPT | Performed by: FAMILY MEDICINE

## 2025-08-28 PROCEDURE — 83970 ASSAY OF PARATHORMONE: CPT | Performed by: FAMILY MEDICINE

## 2025-09-03 PROBLEM — E78.2 MIXED HYPERLIPIDEMIA: Status: ACTIVE | Noted: 2022-10-25

## (undated) DEVICE — BANDAGE ACE NON LATEX 3IN

## (undated) DEVICE — MARKER ANASTOMARK COR FLX

## (undated) DEVICE — SENSOR LOW LEVEL OXYGEN

## (undated) DEVICE — SUT 2 30IN SILK BLK BRAIDE

## (undated) DEVICE — SET ANGIO ACIST CVI ANGIOTOUCH

## (undated) DEVICE — CATH THORACIC 28FR ST

## (undated) DEVICE — SUT ETHBND XTRA 1 OS-8 30IN

## (undated) DEVICE — CATH THOR STND RGHT ANG 28F

## (undated) DEVICE — BLANKET HYPER ADULT 24X60IN

## (undated) DEVICE — SOL NORMAL USPCA 0.9%

## (undated) DEVICE — INSERT INTRACK CLAMP ULT 66MM

## (undated) DEVICE — BAND TR COMP DEVICE REG 24CM

## (undated) DEVICE — HEMOSTAT SURGICEL FIBRLR 2X4IN

## (undated) DEVICE — DRESSING TELFA + RECT 6X10IN

## (undated) DEVICE — SUT VICRYL 1 OB 36 CTX

## (undated) DEVICE — CARTRIDGE HEPARIN DOSE

## (undated) DEVICE — BAG MEDI-PLAST DECANTER C-FLOW

## (undated) DEVICE — TRAY CATH FOL SIL TEMP 10 16FR

## (undated) DEVICE — GLOVE PROTEXIS LTX MICRO  7.5

## (undated) DEVICE — SUT PROLENE 7-0 BV-1 30 BLU

## (undated) DEVICE — DRAIN CHEST WATER SEAL

## (undated) DEVICE — SEE MEDLINE ITEM 159606

## (undated) DEVICE — PAD DEFIB RADIOLUCENT ADULT

## (undated) DEVICE — CATH ALL PUR URTHL 20FR

## (undated) DEVICE — STOPCOCK 4-WAY

## (undated) DEVICE — KIT CATHGARD DBL LUMN 9FRX11.5

## (undated) DEVICE — KIT SURGICAL TURNOVER

## (undated) DEVICE — CANNULA MC2 OVAL NVENT 32/40FR

## (undated) DEVICE — KIT C.A.T.S. FAST START 4/CASE

## (undated) DEVICE — SUT MONOCRYL PLUS UD 3-0 27

## (undated) DEVICE — DRESSING TELFA + BARR 4X6IN

## (undated) DEVICE — SOL PLASMALYTE PH 7.4 1000ML

## (undated) DEVICE — BANDAGE ADHESIVE FABRIC 2X4

## (undated) DEVICE — SOL ELECTROLYTE PH 7.4 500ML

## (undated) DEVICE — SOL LAC RINGERS 1000ML INJ

## (undated) DEVICE — WATER STRL IRR USP UROMTC 1000

## (undated) DEVICE — GLOVE PROTEXIS NEOPRN SZ8

## (undated) DEVICE — NDL ASPIRATOR AIR 16G

## (undated) DEVICE — SOL NACL IRR 3000ML

## (undated) DEVICE — APPLICATOR ENDOSCP 32CM5MM2TIP

## (undated) DEVICE — BLADE SCALP OPHTL BEVEL STR

## (undated) DEVICE — SOL .9NACL PF 100 ML

## (undated) DEVICE — HOLDER STRIP-T SELF ADH 2X10IN

## (undated) DEVICE — CATH IMPULSE MP 5FR 145CM

## (undated) DEVICE — Device

## (undated) DEVICE — BANDAGE ACE DOUBLE STER 6IN

## (undated) DEVICE — GLOVE PROTEXIS HYDROGEL SZ6.5

## (undated) DEVICE — CONNECTOR Y STRL 3/8X3/8X3/8IN

## (undated) DEVICE — GLOVE COTTON KNITTED CUFF

## (undated) DEVICE — PUNCH AORTIC ROT TIP 4.0MM

## (undated) DEVICE — KIT GLIDESHEATH SLEND 6FR 10CM

## (undated) DEVICE — APPLICATOR SURG 2 SPRY 1 PLUNG

## (undated) DEVICE — SUT PROLENE 7-0 BV175-6

## (undated) DEVICE — CANNULA ADULT NASAL 7FT

## (undated) DEVICE — PAD DEFIB CADENCE ADULT R2

## (undated) DEVICE — DRAPE SLUSH WARMER WITH DISC

## (undated) DEVICE — APPLIER CLIP LIAGCLIP 9.375IN

## (undated) DEVICE — GUIDEWIRE INQWIRE .021IN 180CM

## (undated) DEVICE — GLOVE PROTEXIS BLUE LATEX 7

## (undated) DEVICE — SYS VIRTUOSAPH PLUS EVM

## (undated) DEVICE — CARTRIDGE HEPARIN 2 CHNNL ACT

## (undated) DEVICE — CATH FL 3.5 5FR

## (undated) DEVICE — CARTRIDGE SILV 4CHAN 2.0-3.5MG

## (undated) DEVICE — CANNULA NON-VENT 24FR 14.5IN

## (undated) DEVICE — APPLIER LIGACLIP SM 9.38IN